# Patient Record
Sex: FEMALE | Race: WHITE | NOT HISPANIC OR LATINO | Employment: STUDENT | URBAN - METROPOLITAN AREA
[De-identification: names, ages, dates, MRNs, and addresses within clinical notes are randomized per-mention and may not be internally consistent; named-entity substitution may affect disease eponyms.]

---
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
## 2018-01-31 ENCOUNTER — HOSPITAL ENCOUNTER (EMERGENCY)
Facility: MEDICAL CENTER | Age: 18
End: 2018-01-31
Attending: EMERGENCY MEDICINE
Payer: MEDICAID

## 2018-01-31 VITALS
SYSTOLIC BLOOD PRESSURE: 113 MMHG | RESPIRATION RATE: 18 BRPM | TEMPERATURE: 98.2 F | HEART RATE: 102 BPM | OXYGEN SATURATION: 100 % | BODY MASS INDEX: 26.33 KG/M2 | WEIGHT: 148.59 LBS | HEIGHT: 63 IN | DIASTOLIC BLOOD PRESSURE: 67 MMHG

## 2018-01-31 DIAGNOSIS — J02.9 PHARYNGITIS, UNSPECIFIED ETIOLOGY: ICD-10-CM

## 2018-01-31 DIAGNOSIS — J04.0 LARYNGITIS: ICD-10-CM

## 2018-01-31 LAB
S PYO AG THROAT QL: NORMAL
SIGNIFICANT IND 70042: NORMAL
SITE SITE: NORMAL
SOURCE SOURCE: NORMAL

## 2018-01-31 PROCEDURE — 87880 STREP A ASSAY W/OPTIC: CPT

## 2018-01-31 PROCEDURE — 87081 CULTURE SCREEN ONLY: CPT

## 2018-01-31 PROCEDURE — 99283 EMERGENCY DEPT VISIT LOW MDM: CPT

## 2018-01-31 RX ORDER — AZITHROMYCIN 250 MG/1
TABLET, FILM COATED ORAL
Qty: 6 TAB | Refills: 0 | Status: SHIPPED | OUTPATIENT
Start: 2018-01-31 | End: 2019-11-11

## 2018-01-31 ASSESSMENT — PAIN SCALES - GENERAL: PAINLEVEL_OUTOF10: 5

## 2018-01-31 NOTE — ED NOTES
Released with all follow-up and Rx, advised OTC measures of Tylenol or Motrin for pain and to take antibiotic until all gone. Encouraged to see PCP is not better.

## 2018-01-31 NOTE — DISCHARGE INSTRUCTIONS
Laryngitis  Laryngitis is inflammation of your vocal cords. This causes hoarseness, coughing, loss of voice, sore throat, or a dry throat. Your vocal cords are two bands of muscles that are found in your throat. When you speak, these cords come together and vibrate. These vibrations come out through your mouth as sound. When your vocal cords are inflamed, your voice sounds different.  Laryngitis can be temporary (acute) or long-term (chronic). Most cases of acute laryngitis improve with time. Chronic laryngitis is laryngitis that lasts for more than three weeks.  CAUSES  Acute laryngitis may be caused by:  · A viral infection.  · Lots of talking, yelling, or singing. This is also called vocal strain.  · Bacterial infections.  Chronic laryngitis may be caused by:  · Vocal strain.  · Injury to your vocal cords.  · Acid reflux (gastroesophageal reflux disease or GERD).  · Allergies.  · Sinus infection.  · Smoking.  · Alcohol abuse.  · Breathing in chemicals or dust.  · Growths on the vocal cords.  RISK FACTORS  Risk factors for laryngitis include:  · Smoking.  · Alcohol abuse.  · Having allergies.  SIGNS AND SYMPTOMS  Symptoms of laryngitis may include:  · Low, hoarse voice.  · Loss of voice.  · Dry cough.  · Sore throat.  · Stuffy nose.  DIAGNOSIS  Laryngitis may be diagnosed by:  · Physical exam.  · Throat culture.  · Blood test.  · Laryngoscopy. This procedure allows your health care provider to look at your vocal cords with a mirror or viewing tube.  TREATMENT  Treatment for laryngitis depends on what is causing it. Usually, treatment involves resting your voice and using medicines to soothe your throat. However, if your laryngitis is caused by a bacterial infection, you may need to take antibiotic medicine. If your laryngitis is caused by a growth, you may need to have a procedure to remove it.  HOME CARE INSTRUCTIONS  · Drink enough fluid to keep your urine clear or pale yellow.  · Breathe in moist air. Use a  humidifier if you live in a dry climate.  · Take medicines only as directed by your health care provider.  · If you were prescribed an antibiotic medicine, finish it all even if you start to feel better.  · Do not smoke cigarettes or electronic cigarettes. If you need help quitting, ask your health care provider.  · Talk as little as possible. Also avoid whispering, which can cause vocal strain.  · Write instead of talking. Do this until your voice is back to normal.  SEEK MEDICAL CARE IF:  · You have a fever.  · You have increasing pain.  · You have difficulty swallowing.  SEEK IMMEDIATE MEDICAL CARE IF:  · You cough up blood.  · You have trouble breathing.     This information is not intended to replace advice given to you by your health care provider. Make sure you discuss any questions you have with your health care provider.     Document Released: 12/18/2006 Document Revised: 01/08/2016 Document Reviewed: 06/02/2015  [employer] Interactive Patient Education ©2016 [employer]  Salt Water Gargle  This solution will help make your mouth and throat feel better.  HOME CARE INSTRUCTIONS   · Mix 1 teaspoon of salt in 8 ounces of warm water.  · Gargle with this solution as much or often as you need or as directed. Swish and gargle gently if you have any sores or wounds in your mouth.  · Do not swallow this mixture.     This information is not intended to replace advice given to you by your health care provider. Make sure you discuss any questions you have with your health care provider.     Document Released: 09/21/2005 Document Revised: 03/11/2013 Document Reviewed: 02/12/2010  [employer] Patient Education ©2016 [employer]  Pharyngitis  Pharyngitis is redness, pain, and swelling (inflammation) of your pharynx.   CAUSES   Pharyngitis is usually caused by infection. Most of the time, these infections are from viruses (viral) and are part of a cold. However, sometimes pharyngitis is caused by bacteria  (bacterial). Pharyngitis can also be caused by allergies. Viral pharyngitis may be spread from person to person by coughing, sneezing, and personal items or utensils (cups, forks, spoons, toothbrushes). Bacterial pharyngitis may be spread from person to person by more intimate contact, such as kissing.   SIGNS AND SYMPTOMS   Symptoms of pharyngitis include:    · Sore throat.    · Tiredness (fatigue).    · Low-grade fever.    · Headache.  · Joint pain and muscle aches.  · Skin rashes.  · Swollen lymph nodes.  · Plaque-like film on throat or tonsils (often seen with bacterial pharyngitis).  DIAGNOSIS   Your health care provider will ask you questions about your illness and your symptoms. Your medical history, along with a physical exam, is often all that is needed to diagnose pharyngitis. Sometimes, a rapid strep test is done. Other lab tests may also be done, depending on the suspected cause.   TREATMENT   Viral pharyngitis will usually get better in 3-4 days without the use of medicine. Bacterial pharyngitis is treated with medicines that kill germs (antibiotics).   HOME CARE INSTRUCTIONS   · Drink enough water and fluids to keep your urine clear or pale yellow.    · Only take over-the-counter or prescription medicines as directed by your health care provider:    ¨ If you are prescribed antibiotics, make sure you finish them even if you start to feel better.    ¨ Do not take aspirin.    · Get lots of rest.    · Gargle with 8 oz of salt water (½ tsp of salt per 1 qt of water) as often as every 1-2 hours to soothe your throat.    · Throat lozenges (if you are not at risk for choking) or sprays may be used to soothe your throat.  SEEK MEDICAL CARE IF:   · You have large, tender lumps in your neck.  · You have a rash.  · You cough up green, yellow-brown, or bloody spit.  SEEK IMMEDIATE MEDICAL CARE IF:   · Your neck becomes stiff.  · You drool or are unable to swallow liquids.  · You vomit or are unable to keep  medicines or liquids down.  · You have severe pain that does not go away with the use of recommended medicines.  · You have trouble breathing (not caused by a stuffy nose).  MAKE SURE YOU:   · Understand these instructions.  · Will watch your condition.  · Will get help right away if you are not doing well or get worse.     This information is not intended to replace advice given to you by your health care provider. Make sure you discuss any questions you have with your health care provider.     Document Released: 12/18/2006 Document Revised: 10/08/2014 Document Reviewed: 08/25/2014  [employer] Interactive Patient Education ©2016 [employer]

## 2018-01-31 NOTE — ED PROVIDER NOTES
"ED Provider Note    CHIEF COMPLAINT  Chief Complaint   Patient presents with   • Sore Throat       HPI  Annika Means is a 17 y.o. female who presents with a 1 day history of progressive sore throat.  Patient presents complaining of a hoarse voice along with throat pain.  Patient does relate a past history of pharyngitis.  The patient denies: Fever, chills, nasal congestion, cough, sputum, difficulty swallowing, difficulty breathing, rashes.  No other acute symptomatology or complaints.    REVIEW OF SYSTEMS  See HPI for further details.  She denies a history of: Hypertension, diabetes, cardiopulmonary disorders, gastrointestinal disorders.    PAST MEDICAL HISTORY  None    FAMILY HISTORY  No family history on file.    SOCIAL HISTORY  Nonsmoker; positive alcohol use;    SURGICAL HISTORY  No past surgical history on file.    CURRENT MEDICATIONS  None    ALLERGIES  Allergies   Allergen Reactions   • Pcn [Penicillins] Hives     Rxn - as a child       PHYSICAL EXAM  VITAL SIGNS: /67   Pulse (!) 102   Temp 36.8 °C (98.2 °F)   Resp 18   Ht 1.6 m (5' 3\")   Wt 67.4 kg (148 lb 9.4 oz)   LMP 01/17/2018   SpO2 100%   BMI 26.32 kg/m²    Constitutional: 17-year-old female, Well developed, Well nourished, No acute distress, reading a book, sitting comfortably, Non-toxic appearance has a hoarse voice but not a hot potato type voice.   HENT: Normocephalic, Atraumatic, Nares:Clear, Oropharynx: moist, well hydrated, posterior pharynx: Mildly erythematous  Eyes: PERRL, EOMI, Conjunctiva normal, No discharge.   Neck: Normal range of motion, No tenderness, Supple, No stridor.   Lymphatic: No lymphadenopathy noted.   Cardiovascular: Regular rate and rhythm without mumurs, gallups, rubs   Thorax & Lungs: Normal Equal breath sounds, No respiratory distress, No wheezing, no stridor, no rales. No chest tenderness.   Abdomen: Soft, nontender, nondistended, no organomegaly, positive bowel sounds normal in quality. No " guarding or rebound.  Skin: Good skin turgor, pink, warm, dry. No rashes, petechiae, purpura. Normal capillary refill.   Extremities: Intact distal pulses, No edema, No tenderness, No cyanosis,  Vascular: Pulses are 2+, symmetric in the upper and lower extremities.  Neurologic: Alert & oriented x 3,  No gross focal deficits noted.   Psychiatric: Affect normal, Judgment normal, Mood normal.     COURSE & MEDICAL DECISION MAKING  Pertinent Labs & Imaging studies reviewed. (See chart for details)  Laboratory studies: Rapid strep is negative;    Discussion: At this time, the patient presents with findings consistent with laryngitis.  She did not have findings to suggest retropharyngeal abscess.  She is nontoxic appearing.  Her voice is hoarse as opposed to a hot potato type voice.  At this time, the patient is stable for discharge.  I see no indication for further laboratory studies or any emergent imaging studies.  I discussed the findings and treatment plan with the patient.  She indicates she is comfortable with this explanation and disposition.    FINAL IMPRESSION  1. Pharyngitis, unspecified etiology    2. Laryngitis        PLAN  1.  Appropriate discharge instructions given  2.  Azithromycin, Z-Elpidio  3.  Follow-up with primary care  4.  Recheck if any change or worsening symptoms, as discussed      Electronically signed by: Guy G Gansert, 1/31/2018 3:23 PM

## 2018-02-02 LAB
S PYO SPEC QL CULT: NORMAL
SIGNIFICANT IND 70042: NORMAL
SITE SITE: NORMAL
SOURCE SOURCE: NORMAL

## 2019-11-11 ENCOUNTER — HOSPITAL ENCOUNTER (EMERGENCY)
Facility: MEDICAL CENTER | Age: 19
End: 2019-11-11
Attending: EMERGENCY MEDICINE
Payer: MEDICAID

## 2019-11-11 ENCOUNTER — APPOINTMENT (OUTPATIENT)
Dept: RADIOLOGY | Facility: MEDICAL CENTER | Age: 19
End: 2019-11-11
Attending: EMERGENCY MEDICINE
Payer: MEDICAID

## 2019-11-11 VITALS
HEART RATE: 100 BPM | SYSTOLIC BLOOD PRESSURE: 134 MMHG | RESPIRATION RATE: 18 BRPM | WEIGHT: 142.86 LBS | TEMPERATURE: 97.8 F | OXYGEN SATURATION: 95 % | HEIGHT: 63 IN | DIASTOLIC BLOOD PRESSURE: 76 MMHG | BODY MASS INDEX: 25.31 KG/M2

## 2019-11-11 DIAGNOSIS — S90.111A CONTUSION OF RIGHT GREAT TOE WITHOUT DAMAGE TO NAIL, INITIAL ENCOUNTER: ICD-10-CM

## 2019-11-11 PROCEDURE — 99284 EMERGENCY DEPT VISIT MOD MDM: CPT

## 2019-11-11 PROCEDURE — 73660 X-RAY EXAM OF TOE(S): CPT | Mod: RT

## 2019-11-11 RX ORDER — NAPROXEN SODIUM 220 MG
440 TABLET ORAL 2 TIMES DAILY PRN
COMMUNITY

## 2019-11-11 NOTE — ED TRIAGE NOTES
"Pt ambulates with limp to triage  Chief Complaint   Patient presents with   • Toe Injury     \"dropped a car door on it 5 days ago\"   restoring a car and the door fell onto R big toe  Pt A & 0 x 4, speech clear, ambulates well  Pt asked to wait in lobby, pt updated on triage process and pt asked to inform RN of any changes.   "

## 2019-11-11 NOTE — ED NOTES
Pt given written and oral dc instructions. Pt verbalized understanding of all instructions given. All questions answered. VSS. Pt given fu instructions and educated on s/s of when to return to the ER. Pt dc'd in stable condition.

## 2019-11-11 NOTE — ED PROVIDER NOTES
"ED Provider Note    CHIEF COMPLAINT  Chief Complaint   Patient presents with   • Toe Injury     \"dropped a car door on it 5 days ago\"       HPI  Annika Means is a 18 y.o. female who presents to the emergency department with right great toe pain.  She dropped a car door on her great toe 5 days ago and continues with pain and swelling to she is here.  She is the MCP joint she is small little abscess follow-up her dose here is 1 she of her +1 think this is a few days see how she is doing okay she should come see any evidence of flexion to the synovitis might be concerned would be a little abscess abscess because she is it is a little abscess antibiotics might you can see her she    REVIEW OF SYSTEMS  Positive for toe pain  PAST MEDICAL HISTORY   Denies    SOCIAL HISTORY  Social History     Tobacco Use   • Smoking status: Light Tobacco Smoker     Packs/day: 0.00     Types: Cigarettes   • Smokeless tobacco: Never Used   Substance and Sexual Activity   • Alcohol use: Yes   • Drug use: No   • Sexual activity: Not on file       SURGICAL HISTORY  patient denies any surgical history    CURRENT MEDICATIONS  Reviewed.      ALLERGIES  Allergies   Allergen Reactions   • Pcn [Penicillins] Hives     Rxn - as a child       PHYSICAL EXAM  VITAL SIGNS: /83   Pulse (!) 102   Temp 36.2 °C (97.2 °F) (Temporal)   Resp 20   Ht 1.6 m (5' 3\")   Wt 64.8 kg (142 lb 13.7 oz)   LMP 10/16/2019   BMI 25.31 kg/m²   Constitutional:  Alert in no apparent distress.  HENT: Normocephalic, Bilateral external ears normal. Nose normal.   Eyes: Pupils are equal and reactive. Conjunctiva normal, non-icteric.   Thorax & Lungs: Easy unlabored respirations  Abdomen:  No gross signs of peritonitis, no pain with movement   Skin: Visualized skin is  Dry, No erythema, No rash.   Extremities: Swelling of her great toe  Neurologic: Alert, Grossly non-focal.   Psychiatric: Affect and Mood normal      COURSE & MEDICAL DECISION MAKING  Nursing " notes and vital signs were reviewed. (See chart for details)    The patient presents to the Emergency Department with toe pain after dropping a car door and the differential is fracture versus contusion    DX-TOE(S) 2+ RIGHT   Final Result      No acute fracture is identified.            We discussed julian taping, symptomatic relief with NSAIDs and elevation.  Pain lasts greater than 1 week she will follow-up with orthopedics      FINAL IMPRESSION  1.  Acute toe pain          Electronically signed by: Stefanie Ibrahim, 11/11/2019 9:55 AM

## 2019-11-11 NOTE — ED NOTES
Pt c/o increasing swelling and pain in Right Great Toe, able to move toe w/ effort.  Pt provided w/ warm blanket, aware of POC including pending Radiology.  Whiteboard updated.

## 2020-08-23 ENCOUNTER — APPOINTMENT (OUTPATIENT)
Dept: RADIOLOGY | Facility: MEDICAL CENTER | Age: 20
DRG: 004 | End: 2020-08-23
Attending: EMERGENCY MEDICINE
Payer: MEDICAID

## 2020-08-23 ENCOUNTER — HOSPITAL ENCOUNTER (INPATIENT)
Facility: MEDICAL CENTER | Age: 20
LOS: 35 days | DRG: 004 | End: 2020-09-28
Attending: EMERGENCY MEDICINE | Admitting: INTERNAL MEDICINE
Payer: MEDICAID

## 2020-08-23 DIAGNOSIS — G93.1 ANOXIC ENCEPHALOPATHY (HCC): ICD-10-CM

## 2020-08-23 DIAGNOSIS — J96.01 ACUTE RESPIRATORY FAILURE WITH HYPOXIA (HCC): ICD-10-CM

## 2020-08-23 DIAGNOSIS — R56.9 SEIZURES (HCC): ICD-10-CM

## 2020-08-23 LAB
ACTION RANGE TRIGGERED IACRT: YES
AMMONIA PLAS-SCNC: 47 UMOL/L (ref 11–45)
AMPHET UR QL SCN: POSITIVE
APAP SERPL-MCNC: <5 UG/ML (ref 10–30)
APPEARANCE UR: CLEAR
APTT PPP: 26.9 SEC (ref 24.7–36)
BACTERIA #/AREA URNS HPF: ABNORMAL /HPF
BARBITURATES UR QL SCN: NEGATIVE
BASE EXCESS BLDA CALC-SCNC: -18 MMOL/L (ref -4–3)
BENZODIAZ UR QL SCN: NEGATIVE
BILIRUB UR QL STRIP.AUTO: NEGATIVE
BODY TEMPERATURE: ABNORMAL DEGREES
BZE UR QL SCN: NEGATIVE
CANNABINOIDS UR QL SCN: NEGATIVE
CO2 BLDA-SCNC: 13 MMOL/L (ref 20–33)
COLOR UR: YELLOW
COVID ORDER STATUS COVID19: NORMAL
EKG IMPRESSION: NORMAL
EPI CELLS #/AREA URNS HPF: NEGATIVE /HPF
ETHANOL BLD-MCNC: 160.8 MG/DL (ref 0–10.1)
GLUCOSE BLD-MCNC: 154 MG/DL (ref 65–99)
GLUCOSE UR STRIP.AUTO-MCNC: NEGATIVE MG/DL
HCG SERPL QL: NEGATIVE
HCO3 BLDA-SCNC: 12 MMOL/L (ref 17–25)
HOROWITZ INDEX BLDA+IHG-RTO: 250 MM[HG]
HYALINE CASTS #/AREA URNS LPF: ABNORMAL /LPF
INR PPP: 0.87 (ref 0.87–1.13)
INST. QUALIFIED PATIENT IIQPT: YES
KETONES UR STRIP.AUTO-MCNC: NEGATIVE MG/DL
LACTATE BLD-SCNC: 7.6 MMOL/L (ref 0.5–2)
LEUKOCYTE ESTERASE UR QL STRIP.AUTO: NEGATIVE
LIPASE SERPL-CCNC: 44 U/L (ref 11–82)
METHADONE UR QL SCN: NEGATIVE
MICRO URNS: ABNORMAL
MORPHOLOGY BLD-IMP: NORMAL
NITRITE UR QL STRIP.AUTO: POSITIVE
O2/TOTAL GAS SETTING VFR VENT: 50 %
OPIATES UR QL SCN: NEGATIVE
OXYCODONE UR QL SCN: NEGATIVE
PCO2 BLDA: 41.5 MMHG (ref 26–37)
PCO2 TEMP ADJ BLDA: 39.2 MMHG (ref 26–37)
PCP UR QL SCN: NEGATIVE
PH BLDA: 7.07 [PH] (ref 7.4–7.5)
PH TEMP ADJ BLDA: 7.09 [PH] (ref 7.4–7.5)
PH UR STRIP.AUTO: 5 [PH] (ref 5–8)
PO2 BLDA: 125 MMHG (ref 64–87)
PO2 TEMP ADJ BLDA: 117 MMHG (ref 64–87)
PROPOXYPH UR QL SCN: NEGATIVE
PROT UR QL STRIP: NEGATIVE MG/DL
PROTHROMBIN TIME: 12.1 SEC (ref 12–14.6)
RBC # URNS HPF: ABNORMAL /HPF
RBC UR QL AUTO: NEGATIVE
SALICYLATES SERPL-MCNC: <1 MG/DL (ref 15–25)
SAO2 % BLDA: 97 % (ref 93–99)
SP GR UR STRIP.AUTO: 1.01
SPECIMEN DRAWN FROM PATIENT: ABNORMAL
TRIGL SERPL-MCNC: 312 MG/DL (ref 0–149)
UROBILINOGEN UR STRIP.AUTO-MCNC: 0.2 MG/DL
WBC #/AREA URNS HPF: ABNORMAL /HPF

## 2020-08-23 PROCEDURE — 70450 CT HEAD/BRAIN W/O DYE: CPT

## 2020-08-23 PROCEDURE — 700101 HCHG RX REV CODE 250: Performed by: EMERGENCY MEDICINE

## 2020-08-23 PROCEDURE — 92950 HEART/LUNG RESUSCITATION CPR: CPT

## 2020-08-23 PROCEDURE — C9803 HOPD COVID-19 SPEC COLLECT: HCPCS | Performed by: EMERGENCY MEDICINE

## 2020-08-23 PROCEDURE — 700111 HCHG RX REV CODE 636 W/ 250 OVERRIDE (IP): Performed by: EMERGENCY MEDICINE

## 2020-08-23 PROCEDURE — 96368 THER/DIAG CONCURRENT INF: CPT

## 2020-08-23 PROCEDURE — 0BH18EZ INSERTION OF ENDOTRACHEAL AIRWAY INTO TRACHEA, VIA NATURAL OR ARTIFICIAL OPENING ENDOSCOPIC: ICD-10-PCS | Performed by: EMERGENCY MEDICINE

## 2020-08-23 PROCEDURE — 82803 BLOOD GASES ANY COMBINATION: CPT

## 2020-08-23 PROCEDURE — 85027 COMPLETE CBC AUTOMATED: CPT

## 2020-08-23 PROCEDURE — U0003 INFECTIOUS AGENT DETECTION BY NUCLEIC ACID (DNA OR RNA); SEVERE ACUTE RESPIRATORY SYNDROME CORONAVIRUS 2 (SARS-COV-2) (CORONAVIRUS DISEASE [COVID-19]), AMPLIFIED PROBE TECHNIQUE, MAKING USE OF HIGH THROUGHPUT TECHNOLOGIES AS DESCRIBED BY CMS-2020-01-R: HCPCS

## 2020-08-23 PROCEDURE — 71045 X-RAY EXAM CHEST 1 VIEW: CPT

## 2020-08-23 PROCEDURE — 85730 THROMBOPLASTIN TIME PARTIAL: CPT

## 2020-08-23 PROCEDURE — 83605 ASSAY OF LACTIC ACID: CPT

## 2020-08-23 PROCEDURE — 85610 PROTHROMBIN TIME: CPT

## 2020-08-23 PROCEDURE — 96365 THER/PROPH/DIAG IV INF INIT: CPT

## 2020-08-23 PROCEDURE — 82962 GLUCOSE BLOOD TEST: CPT

## 2020-08-23 PROCEDURE — 36415 COLL VENOUS BLD VENIPUNCTURE: CPT

## 2020-08-23 PROCEDURE — 99291 CRITICAL CARE FIRST HOUR: CPT

## 2020-08-23 PROCEDURE — 80053 COMPREHEN METABOLIC PANEL: CPT

## 2020-08-23 PROCEDURE — 83690 ASSAY OF LIPASE: CPT

## 2020-08-23 PROCEDURE — 5A12012 PERFORMANCE OF CARDIAC OUTPUT, SINGLE, MANUAL: ICD-10-PCS | Performed by: EMERGENCY MEDICINE

## 2020-08-23 PROCEDURE — 5A1955Z RESPIRATORY VENTILATION, GREATER THAN 96 CONSECUTIVE HOURS: ICD-10-PCS | Performed by: EMERGENCY MEDICINE

## 2020-08-23 PROCEDURE — 31500 INSERT EMERGENCY AIRWAY: CPT

## 2020-08-23 PROCEDURE — 81001 URINALYSIS AUTO W/SCOPE: CPT

## 2020-08-23 PROCEDURE — 93005 ELECTROCARDIOGRAM TRACING: CPT

## 2020-08-23 PROCEDURE — 87040 BLOOD CULTURE FOR BACTERIA: CPT

## 2020-08-23 PROCEDURE — 84478 ASSAY OF TRIGLYCERIDES: CPT

## 2020-08-23 PROCEDURE — 96366 THER/PROPH/DIAG IV INF ADDON: CPT

## 2020-08-23 PROCEDURE — 80307 DRUG TEST PRSMV CHEM ANLYZR: CPT

## 2020-08-23 PROCEDURE — 82140 ASSAY OF AMMONIA: CPT

## 2020-08-23 PROCEDURE — 36600 WITHDRAWAL OF ARTERIAL BLOOD: CPT

## 2020-08-23 PROCEDURE — 94002 VENT MGMT INPAT INIT DAY: CPT

## 2020-08-23 PROCEDURE — 96375 TX/PRO/DX INJ NEW DRUG ADDON: CPT

## 2020-08-23 PROCEDURE — 84703 CHORIONIC GONADOTROPIN ASSAY: CPT

## 2020-08-23 PROCEDURE — 85007 BL SMEAR W/DIFF WBC COUNT: CPT

## 2020-08-23 RX ORDER — EPINEPHRINE IN SOD CHLOR,ISO 1 MG/10 ML
SYRINGE (ML) INTRAVENOUS
Status: COMPLETED | OUTPATIENT
Start: 2020-08-23 | End: 2020-08-23

## 2020-08-23 RX ADMIN — NOREPINEPHRINE BITARTRATE 10 MCG/MIN: 1 INJECTION INTRAVENOUS at 22:37

## 2020-08-23 RX ADMIN — PROPOFOL 15 MCG/KG/MIN: 10 INJECTION, EMULSION INTRAVENOUS at 23:37

## 2020-08-23 RX ADMIN — PROPOFOL 5 MCG/KG/MIN: 10 INJECTION, EMULSION INTRAVENOUS at 22:16

## 2020-08-23 RX ADMIN — EPINEPHRINE 1 MG: 0.1 INJECTION, SOLUTION ENDOTRACHEAL; INTRACARDIAC; INTRAVENOUS at 21:59

## 2020-08-23 RX ADMIN — PROPOFOL 15 MCG/KG/MIN: 10 INJECTION, EMULSION INTRAVENOUS at 22:45

## 2020-08-23 RX ADMIN — FENTANYL CITRATE 100 MCG: 50 INJECTION INTRAMUSCULAR; INTRAVENOUS at 23:15

## 2020-08-23 RX ADMIN — PROPOFOL 15 MCG/KG/MIN: 10 INJECTION, EMULSION INTRAVENOUS at 22:21

## 2020-08-23 RX ADMIN — PROPOFOL 17 MCG/KG/MIN: 10 INJECTION, EMULSION INTRAVENOUS at 23:17

## 2020-08-23 RX ADMIN — PROPOFOL 20 MCG/KG/MIN: 10 INJECTION, EMULSION INTRAVENOUS at 22:30

## 2020-08-23 RX ADMIN — PROPOFOL 25 MCG/KG/MIN: 10 INJECTION, EMULSION INTRAVENOUS at 23:51

## 2020-08-24 ENCOUNTER — APPOINTMENT (OUTPATIENT)
Dept: CARDIOLOGY | Facility: MEDICAL CENTER | Age: 20
DRG: 004 | End: 2020-08-24
Attending: INTERNAL MEDICINE
Payer: MEDICAID

## 2020-08-24 ENCOUNTER — APPOINTMENT (OUTPATIENT)
Dept: RADIOLOGY | Facility: MEDICAL CENTER | Age: 20
DRG: 004 | End: 2020-08-24
Attending: INTERNAL MEDICINE
Payer: MEDICAID

## 2020-08-24 PROBLEM — E87.20 METABOLIC ACIDOSIS: Status: ACTIVE | Noted: 2020-08-24

## 2020-08-24 PROBLEM — G93.40 ENCEPHALOPATHY ACUTE: Status: ACTIVE | Noted: 2020-08-24

## 2020-08-24 PROBLEM — I46.9 PEA (PULSELESS ELECTRICAL ACTIVITY) (HCC): Status: ACTIVE | Noted: 2020-08-24

## 2020-08-24 PROBLEM — J96.01 ACUTE RESPIRATORY FAILURE WITH HYPOXIA (HCC): Status: ACTIVE | Noted: 2020-08-24

## 2020-08-24 PROBLEM — R74.01 ELEVATED TRANSAMINASE LEVEL: Status: ACTIVE | Noted: 2020-08-24

## 2020-08-24 PROBLEM — T50.901A DRUG OVERDOSE: Status: ACTIVE | Noted: 2020-08-24

## 2020-08-24 PROBLEM — E16.2 HYPOGLYCEMIA: Status: ACTIVE | Noted: 2020-08-24

## 2020-08-24 PROBLEM — G93.6 CEREBRAL EDEMA (HCC): Status: ACTIVE | Noted: 2020-08-24

## 2020-08-24 PROBLEM — I46.9 CARDIAC ARREST (HCC): Status: ACTIVE | Noted: 2020-08-24

## 2020-08-24 LAB
ACTION RANGE TRIGGERED IACRT: NO
ACTION RANGE TRIGGERED IACRT: YES
ALBUMIN SERPL BCP-MCNC: 3.6 G/DL (ref 3.2–4.9)
ALBUMIN SERPL BCP-MCNC: 4.1 G/DL (ref 3.2–4.9)
ALBUMIN/GLOB SERPL: 1.7 G/DL
ALBUMIN/GLOB SERPL: 1.8 G/DL
ALP SERPL-CCNC: 109 U/L (ref 30–99)
ALP SERPL-CCNC: 81 U/L (ref 30–99)
ALT SERPL-CCNC: 124 U/L (ref 2–50)
ALT SERPL-CCNC: 152 U/L (ref 2–50)
ANION GAP SERPL CALC-SCNC: 12 MMOL/L (ref 7–16)
ANION GAP SERPL CALC-SCNC: 13 MMOL/L (ref 7–16)
ANION GAP SERPL CALC-SCNC: 14 MMOL/L (ref 7–16)
ANION GAP SERPL CALC-SCNC: 18 MMOL/L (ref 7–16)
ANION GAP SERPL CALC-SCNC: 25 MMOL/L (ref 7–16)
ANISOCYTOSIS BLD QL SMEAR: ABNORMAL
APTT PPP: 25.7 SEC (ref 24.7–36)
APTT PPP: 28.2 SEC (ref 24.7–36)
APTT PPP: 30.5 SEC (ref 24.7–36)
APTT PPP: 48.2 SEC (ref 24.7–36)
AST SERPL-CCNC: 188 U/L (ref 12–45)
AST SERPL-CCNC: 265 U/L (ref 12–45)
BASE EXCESS BLDA CALC-SCNC: -12 MMOL/L (ref -4–3)
BASE EXCESS BLDA CALC-SCNC: -9 MMOL/L (ref -4–3)
BASOPHILS # BLD AUTO: 0 % (ref 0–1.8)
BILIRUB SERPL-MCNC: 0.3 MG/DL (ref 0.1–1.5)
BILIRUB SERPL-MCNC: 0.3 MG/DL (ref 0.1–1.5)
BODY TEMPERATURE: ABNORMAL DEGREES
BODY TEMPERATURE: ABNORMAL DEGREES
BUN SERPL-MCNC: 10 MG/DL (ref 8–22)
BUN SERPL-MCNC: 13 MG/DL (ref 8–22)
BUN SERPL-MCNC: 15 MG/DL (ref 8–22)
BUN SERPL-MCNC: 17 MG/DL (ref 8–22)
BUN SERPL-MCNC: 19 MG/DL (ref 8–22)
CALCIUM SERPL-MCNC: 7.1 MG/DL (ref 8.5–10.5)
CALCIUM SERPL-MCNC: 7.3 MG/DL (ref 8.5–10.5)
CALCIUM SERPL-MCNC: 7.4 MG/DL (ref 8.5–10.5)
CALCIUM SERPL-MCNC: 7.9 MG/DL (ref 8.5–10.5)
CALCIUM SERPL-MCNC: 8.4 MG/DL (ref 8.5–10.5)
CHLORIDE SERPL-SCNC: 100 MMOL/L (ref 96–112)
CHLORIDE SERPL-SCNC: 103 MMOL/L (ref 96–112)
CHLORIDE SERPL-SCNC: 104 MMOL/L (ref 96–112)
CHLORIDE SERPL-SCNC: 105 MMOL/L (ref 96–112)
CHLORIDE SERPL-SCNC: 105 MMOL/L (ref 96–112)
CO2 BLDA-SCNC: 14 MMOL/L (ref 20–33)
CO2 BLDA-SCNC: 17 MMOL/L (ref 20–33)
CO2 SERPL-SCNC: 12 MMOL/L (ref 20–33)
CO2 SERPL-SCNC: 17 MMOL/L (ref 20–33)
CO2 SERPL-SCNC: 17 MMOL/L (ref 20–33)
CO2 SERPL-SCNC: 18 MMOL/L (ref 20–33)
CO2 SERPL-SCNC: 19 MMOL/L (ref 20–33)
CORTIS SERPL-MCNC: 13.6 UG/DL (ref 0–23)
CREAT SERPL-MCNC: 0.47 MG/DL (ref 0.5–1.4)
CREAT SERPL-MCNC: 0.49 MG/DL (ref 0.5–1.4)
CREAT SERPL-MCNC: 0.52 MG/DL (ref 0.5–1.4)
CREAT SERPL-MCNC: 0.73 MG/DL (ref 0.5–1.4)
CREAT SERPL-MCNC: 0.99 MG/DL (ref 0.5–1.4)
DACRYOCYTES BLD QL SMEAR: NORMAL
EOSINOPHIL # BLD AUTO: 0.18 K/UL (ref 0–0.51)
EOSINOPHIL NFR BLD: 1.6 % (ref 0–6.9)
ERYTHROCYTE [DISTWIDTH] IN BLOOD BY AUTOMATED COUNT: 42.6 FL (ref 35.9–50)
ERYTHROCYTE [DISTWIDTH] IN BLOOD BY AUTOMATED COUNT: 44.2 FL (ref 35.9–50)
ERYTHROCYTE [DISTWIDTH] IN BLOOD BY AUTOMATED COUNT: 48.9 FL (ref 35.9–50)
GLOBULIN SER CALC-MCNC: 2 G/DL (ref 1.9–3.5)
GLOBULIN SER CALC-MCNC: 2.4 G/DL (ref 1.9–3.5)
GLUCOSE BLD-MCNC: 100 MG/DL (ref 65–99)
GLUCOSE BLD-MCNC: 112 MG/DL (ref 65–99)
GLUCOSE BLD-MCNC: 119 MG/DL (ref 65–99)
GLUCOSE BLD-MCNC: 135 MG/DL (ref 65–99)
GLUCOSE BLD-MCNC: 138 MG/DL (ref 65–99)
GLUCOSE BLD-MCNC: 157 MG/DL (ref 65–99)
GLUCOSE BLD-MCNC: 29 MG/DL (ref 65–99)
GLUCOSE BLD-MCNC: 55 MG/DL (ref 65–99)
GLUCOSE BLD-MCNC: 59 MG/DL (ref 65–99)
GLUCOSE BLD-MCNC: 67 MG/DL (ref 65–99)
GLUCOSE BLD-MCNC: 67 MG/DL (ref 65–99)
GLUCOSE BLD-MCNC: 69 MG/DL (ref 65–99)
GLUCOSE BLD-MCNC: 70 MG/DL (ref 65–99)
GLUCOSE BLD-MCNC: 71 MG/DL (ref 65–99)
GLUCOSE BLD-MCNC: 76 MG/DL (ref 65–99)
GLUCOSE BLD-MCNC: 78 MG/DL (ref 65–99)
GLUCOSE BLD-MCNC: 86 MG/DL (ref 65–99)
GLUCOSE BLD-MCNC: 92 MG/DL (ref 65–99)
GLUCOSE BLD-MCNC: 92 MG/DL (ref 65–99)
GLUCOSE SERPL-MCNC: 127 MG/DL (ref 65–99)
GLUCOSE SERPL-MCNC: 138 MG/DL (ref 65–99)
GLUCOSE SERPL-MCNC: 141 MG/DL (ref 65–99)
GLUCOSE SERPL-MCNC: 82 MG/DL (ref 65–99)
GLUCOSE SERPL-MCNC: 86 MG/DL (ref 65–99)
HCO3 BLDA-SCNC: 13.1 MMOL/L (ref 17–25)
HCO3 BLDA-SCNC: 16 MMOL/L (ref 17–25)
HCT VFR BLD AUTO: 38 % (ref 37–47)
HCT VFR BLD AUTO: 43 % (ref 37–47)
HCT VFR BLD AUTO: 49 % (ref 37–47)
HGB BLD-MCNC: 13.1 G/DL (ref 12–16)
HGB BLD-MCNC: 14.4 G/DL (ref 12–16)
HGB BLD-MCNC: 15.3 G/DL (ref 12–16)
HOROWITZ INDEX BLDA+IHG-RTO: 195 MM[HG]
HOROWITZ INDEX BLDA+IHG-RTO: 276 MM[HG]
INR PPP: 0.92 (ref 0.87–1.13)
INR PPP: 1.03 (ref 0.87–1.13)
INR PPP: 1.03 (ref 0.87–1.13)
INR PPP: 1.09 (ref 0.87–1.13)
INST. QUALIFIED PATIENT IIQPT: YES
INST. QUALIFIED PATIENT IIQPT: YES
LACTATE BLD-SCNC: 1.4 MMOL/L (ref 0.5–2)
LACTATE BLD-SCNC: 1.8 MMOL/L (ref 0.5–2)
LACTATE BLD-SCNC: 2.1 MMOL/L (ref 0.5–2)
LACTATE BLD-SCNC: 3.1 MMOL/L (ref 0.5–2)
LV EJECT FRACT  99904: 60
LV EJECT FRACT MOD 2C 99903: 57.16
LV EJECT FRACT MOD 4C 99902: 55.97
LV EJECT FRACT MOD BP 99901: 58.68
LYMPHOCYTES # BLD AUTO: 5.79 K/UL (ref 1–4.8)
LYMPHOCYTES NFR BLD: 50.8 % (ref 22–41)
MACROCYTES BLD QL SMEAR: ABNORMAL
MAGNESIUM SERPL-MCNC: 2.1 MG/DL (ref 1.5–2.5)
MAGNESIUM SERPL-MCNC: 2.3 MG/DL (ref 1.5–2.5)
MAGNESIUM SERPL-MCNC: 2.9 MG/DL (ref 1.5–2.5)
MAGNESIUM SERPL-MCNC: >4.8 MG/DL (ref 1.5–2.5)
MANUAL DIFF BLD: NORMAL
MCH RBC QN AUTO: 31.6 PG (ref 27–33)
MCH RBC QN AUTO: 31.6 PG (ref 27–33)
MCH RBC QN AUTO: 31.9 PG (ref 27–33)
MCHC RBC AUTO-ENTMCNC: 31.2 G/DL (ref 33.6–35)
MCHC RBC AUTO-ENTMCNC: 33.5 G/DL (ref 33.6–35)
MCHC RBC AUTO-ENTMCNC: 34.5 G/DL (ref 33.6–35)
MCV RBC AUTO: 102.3 FL (ref 81.4–97.8)
MCV RBC AUTO: 91.6 FL (ref 81.4–97.8)
MCV RBC AUTO: 94.3 FL (ref 81.4–97.8)
MONOCYTES # BLD AUTO: 0.63 K/UL (ref 0–0.85)
MONOCYTES NFR BLD AUTO: 5.6 % (ref 0–13.4)
MYELOCYTES NFR BLD MANUAL: 1.6 %
NEUTROPHILS # BLD AUTO: 4.61 K/UL (ref 2–7.15)
NEUTROPHILS NFR BLD: 40.5 % (ref 44–72)
NRBC # BLD AUTO: 0.05 K/UL
NRBC BLD-RTO: 0.4 /100 WBC
O2/TOTAL GAS SETTING VFR VENT: 40 %
O2/TOTAL GAS SETTING VFR VENT: 50 %
PCO2 BLDA: 20.2 MMHG (ref 26–37)
PCO2 BLDA: 44.5 MMHG (ref 26–37)
PCO2 TEMP ADJ BLDA: 19.1 MMHG (ref 26–37)
PCO2 TEMP ADJ BLDA: 41.2 MMHG (ref 26–37)
PH BLDA: 7.16 [PH] (ref 7.4–7.5)
PH BLDA: 7.42 [PH] (ref 7.4–7.5)
PH TEMP ADJ BLDA: 7.19 [PH] (ref 7.4–7.5)
PH TEMP ADJ BLDA: 7.44 [PH] (ref 7.4–7.5)
PHOSPHATE SERPL-MCNC: 1.6 MG/DL (ref 2.5–4.5)
PLATELET # BLD AUTO: 193 K/UL (ref 164–446)
PLATELET # BLD AUTO: 256 K/UL (ref 164–446)
PLATELET # BLD AUTO: 334 K/UL (ref 164–446)
PLATELET BLD QL SMEAR: NORMAL
PMV BLD AUTO: 9.2 FL (ref 9–12.9)
PMV BLD AUTO: 9.5 FL (ref 9–12.9)
PMV BLD AUTO: 9.7 FL (ref 9–12.9)
PO2 BLDA: 138 MMHG (ref 64–87)
PO2 BLDA: 78 MMHG (ref 64–87)
PO2 TEMP ADJ BLDA: 131 MMHG (ref 64–87)
PO2 TEMP ADJ BLDA: 69 MMHG (ref 64–87)
POIKILOCYTOSIS BLD QL SMEAR: NORMAL
POTASSIUM SERPL-SCNC: 3.3 MMOL/L (ref 3.6–5.5)
POTASSIUM SERPL-SCNC: 3.4 MMOL/L (ref 3.6–5.5)
POTASSIUM SERPL-SCNC: 3.7 MMOL/L (ref 3.6–5.5)
POTASSIUM SERPL-SCNC: 4.3 MMOL/L (ref 3.6–5.5)
POTASSIUM SERPL-SCNC: 4.4 MMOL/L (ref 3.6–5.5)
POTASSIUM SERPL-SCNC: ABNORMAL MMOL/L (ref 3.6–5.5)
PROT SERPL-MCNC: 5.6 G/DL (ref 6–8.2)
PROT SERPL-MCNC: 6.5 G/DL (ref 6–8.2)
PROTHROMBIN TIME: 12.6 SEC (ref 12–14.6)
PROTHROMBIN TIME: 13.8 SEC (ref 12–14.6)
PROTHROMBIN TIME: 13.9 SEC (ref 12–14.6)
PROTHROMBIN TIME: 14.4 SEC (ref 12–14.6)
RBC # BLD AUTO: 4.15 M/UL (ref 4.2–5.4)
RBC # BLD AUTO: 4.56 M/UL (ref 4.2–5.4)
RBC # BLD AUTO: 4.79 M/UL (ref 4.2–5.4)
RBC BLD AUTO: PRESENT
SAO2 % BLDA: 91 % (ref 93–99)
SAO2 % BLDA: 99 % (ref 93–99)
SARS-COV-2 RNA RESP QL NAA+PROBE: NOTDETECTED
SODIUM SERPL-SCNC: 132 MMOL/L (ref 135–145)
SODIUM SERPL-SCNC: 134 MMOL/L (ref 135–145)
SODIUM SERPL-SCNC: 136 MMOL/L (ref 135–145)
SODIUM SERPL-SCNC: 140 MMOL/L (ref 135–145)
SODIUM SERPL-SCNC: 140 MMOL/L (ref 135–145)
SPECIMEN DRAWN FROM PATIENT: ABNORMAL
SPECIMEN DRAWN FROM PATIENT: ABNORMAL
SPECIMEN SOURCE: NORMAL
TARGETS BLD QL SMEAR: NORMAL
TROPONIN T SERPL-MCNC: 12 NG/L (ref 6–19)
TROPONIN T SERPL-MCNC: 22 NG/L (ref 6–19)
TROPONIN T SERPL-MCNC: 34 NG/L (ref 6–19)
TROPONIN T SERPL-MCNC: 37 NG/L (ref 6–19)
TSH SERPL DL<=0.005 MIU/L-ACNC: 0.79 UIU/ML (ref 0.38–5.33)
WBC # BLD AUTO: 10.7 K/UL (ref 4.8–10.8)
WBC # BLD AUTO: 11.4 K/UL (ref 4.8–10.8)
WBC # BLD AUTO: 8.5 K/UL (ref 4.8–10.8)

## 2020-08-24 PROCEDURE — 700101 HCHG RX REV CODE 250: Performed by: INTERNAL MEDICINE

## 2020-08-24 PROCEDURE — 36620 INSERTION CATHETER ARTERY: CPT | Performed by: INTERNAL MEDICINE

## 2020-08-24 PROCEDURE — 99292 CRITICAL CARE ADDL 30 MIN: CPT | Mod: 25 | Performed by: INTERNAL MEDICINE

## 2020-08-24 PROCEDURE — 93306 TTE W/DOPPLER COMPLETE: CPT | Mod: 26 | Performed by: INTERNAL MEDICINE

## 2020-08-24 PROCEDURE — 700102 HCHG RX REV CODE 250 W/ 637 OVERRIDE(OP): Performed by: INTERNAL MEDICINE

## 2020-08-24 PROCEDURE — 82533 TOTAL CORTISOL: CPT

## 2020-08-24 PROCEDURE — 84484 ASSAY OF TROPONIN QUANT: CPT | Mod: 91

## 2020-08-24 PROCEDURE — 99223 1ST HOSP IP/OBS HIGH 75: CPT | Performed by: PSYCHIATRY & NEUROLOGY

## 2020-08-24 PROCEDURE — 83605 ASSAY OF LACTIC ACID: CPT | Mod: 91

## 2020-08-24 PROCEDURE — 93306 TTE W/DOPPLER COMPLETE: CPT

## 2020-08-24 PROCEDURE — A9270 NON-COVERED ITEM OR SERVICE: HCPCS | Performed by: INTERNAL MEDICINE

## 2020-08-24 PROCEDURE — 700111 HCHG RX REV CODE 636 W/ 250 OVERRIDE (IP): Performed by: EMERGENCY MEDICINE

## 2020-08-24 PROCEDURE — 95822 EEG COMA OR SLEEP ONLY: CPT | Performed by: PSYCHIATRY & NEUROLOGY

## 2020-08-24 PROCEDURE — 99291 CRITICAL CARE FIRST HOUR: CPT | Mod: 25 | Performed by: INTERNAL MEDICINE

## 2020-08-24 PROCEDURE — 700111 HCHG RX REV CODE 636 W/ 250 OVERRIDE (IP): Performed by: INTERNAL MEDICINE

## 2020-08-24 PROCEDURE — 700105 HCHG RX REV CODE 258: Performed by: INTERNAL MEDICINE

## 2020-08-24 PROCEDURE — 05H633Z INSERTION OF INFUSION DEVICE INTO LEFT SUBCLAVIAN VEIN, PERCUTANEOUS APPROACH: ICD-10-PCS | Performed by: INTERNAL MEDICINE

## 2020-08-24 PROCEDURE — 94770 HCHG CO2 EXPIRED GAS DETERMINATION: CPT

## 2020-08-24 PROCEDURE — B547ZZA ULTRASONOGRAPHY OF LEFT SUBCLAVIAN VEIN, GUIDANCE: ICD-10-PCS | Performed by: INTERNAL MEDICINE

## 2020-08-24 PROCEDURE — 37799 UNLISTED PX VASCULAR SURGERY: CPT

## 2020-08-24 PROCEDURE — 84460 ALANINE AMINO (ALT) (SGPT): CPT

## 2020-08-24 PROCEDURE — 36556 INSERT NON-TUNNEL CV CATH: CPT | Mod: LT | Performed by: INTERNAL MEDICINE

## 2020-08-24 PROCEDURE — 80048 BASIC METABOLIC PNL TOTAL CA: CPT | Mod: 91

## 2020-08-24 PROCEDURE — 80053 COMPREHEN METABOLIC PANEL: CPT

## 2020-08-24 PROCEDURE — 82962 GLUCOSE BLOOD TEST: CPT | Mod: 91

## 2020-08-24 PROCEDURE — 84100 ASSAY OF PHOSPHORUS: CPT

## 2020-08-24 PROCEDURE — 82803 BLOOD GASES ANY COMBINATION: CPT

## 2020-08-24 PROCEDURE — 85730 THROMBOPLASTIN TIME PARTIAL: CPT | Mod: 91

## 2020-08-24 PROCEDURE — 84443 ASSAY THYROID STIM HORMONE: CPT

## 2020-08-24 PROCEDURE — 84450 TRANSFERASE (AST) (SGOT): CPT

## 2020-08-24 PROCEDURE — 71045 X-RAY EXAM CHEST 1 VIEW: CPT

## 2020-08-24 PROCEDURE — 700105 HCHG RX REV CODE 258: Performed by: EMERGENCY MEDICINE

## 2020-08-24 PROCEDURE — 95822 EEG COMA OR SLEEP ONLY: CPT | Mod: 26 | Performed by: PSYCHIATRY & NEUROLOGY

## 2020-08-24 PROCEDURE — 4A10X4Z MONITORING OF CENTRAL NERVOUS ELECTRICAL ACTIVITY, EXTERNAL APPROACH: ICD-10-PCS | Performed by: PSYCHIATRY & NEUROLOGY

## 2020-08-24 PROCEDURE — 84132 ASSAY OF SERUM POTASSIUM: CPT

## 2020-08-24 PROCEDURE — 96366 THER/PROPH/DIAG IV INF ADDON: CPT

## 2020-08-24 PROCEDURE — 85027 COMPLETE CBC AUTOMATED: CPT

## 2020-08-24 PROCEDURE — 36600 WITHDRAWAL OF ARTERIAL BLOOD: CPT

## 2020-08-24 PROCEDURE — 94003 VENT MGMT INPAT SUBQ DAY: CPT

## 2020-08-24 PROCEDURE — 85610 PROTHROMBIN TIME: CPT | Mod: 91

## 2020-08-24 PROCEDURE — 770022 HCHG ROOM/CARE - ICU (200)

## 2020-08-24 PROCEDURE — 83735 ASSAY OF MAGNESIUM: CPT | Mod: 91

## 2020-08-24 PROCEDURE — 03HY32Z INSERTION OF MONITORING DEVICE INTO UPPER ARTERY, PERCUTANEOUS APPROACH: ICD-10-PCS | Performed by: INTERNAL MEDICINE

## 2020-08-24 RX ORDER — FAMOTIDINE 20 MG/1
20 TABLET, FILM COATED ORAL EVERY 12 HOURS
Status: DISCONTINUED | OUTPATIENT
Start: 2020-08-24 | End: 2020-09-26

## 2020-08-24 RX ORDER — LORAZEPAM 2 MG/ML
4 INJECTION INTRAMUSCULAR ONCE
Status: DISCONTINUED | OUTPATIENT
Start: 2020-08-24 | End: 2020-08-24

## 2020-08-24 RX ORDER — LABETALOL HYDROCHLORIDE 5 MG/ML
10 INJECTION, SOLUTION INTRAVENOUS EVERY 4 HOURS PRN
Status: DISCONTINUED | OUTPATIENT
Start: 2020-08-24 | End: 2020-08-28

## 2020-08-24 RX ORDER — DEXTROSE MONOHYDRATE 50 MG/ML
INJECTION, SOLUTION INTRAVENOUS CONTINUOUS
Status: DISCONTINUED | OUTPATIENT
Start: 2020-08-24 | End: 2020-08-24

## 2020-08-24 RX ORDER — SODIUM CHLORIDE, SODIUM LACTATE, POTASSIUM CHLORIDE, CALCIUM CHLORIDE 600; 310; 30; 20 MG/100ML; MG/100ML; MG/100ML; MG/100ML
2000 INJECTION, SOLUTION INTRAVENOUS ONCE
Status: COMPLETED | OUTPATIENT
Start: 2020-08-24 | End: 2020-08-24

## 2020-08-24 RX ORDER — ACETAMINOPHEN 325 MG/1
650 TABLET ORAL EVERY 6 HOURS PRN
Status: DISCONTINUED | OUTPATIENT
Start: 2020-08-24 | End: 2020-08-28

## 2020-08-24 RX ORDER — BISACODYL 10 MG
10 SUPPOSITORY, RECTAL RECTAL
Status: DISCONTINUED | OUTPATIENT
Start: 2020-08-24 | End: 2020-09-23

## 2020-08-24 RX ORDER — POTASSIUM CHLORIDE 14.9 MG/ML
20 INJECTION INTRAVENOUS ONCE
Status: COMPLETED | OUTPATIENT
Start: 2020-08-24 | End: 2020-08-24

## 2020-08-24 RX ORDER — AMOXICILLIN 250 MG
2 CAPSULE ORAL 2 TIMES DAILY
Status: DISCONTINUED | OUTPATIENT
Start: 2020-08-24 | End: 2020-09-26

## 2020-08-24 RX ORDER — LORAZEPAM 2 MG/ML
4 INJECTION INTRAMUSCULAR ONCE
Status: COMPLETED | OUTPATIENT
Start: 2020-08-24 | End: 2020-08-24

## 2020-08-24 RX ORDER — NOREPINEPHRINE BITARTRATE 0.03 MG/ML
0-30 INJECTION, SOLUTION INTRAVENOUS CONTINUOUS
Status: DISCONTINUED | OUTPATIENT
Start: 2020-08-24 | End: 2020-08-24

## 2020-08-24 RX ORDER — SODIUM CHLORIDE 9 MG/ML
INJECTION, SOLUTION INTRAVENOUS CONTINUOUS
Status: DISCONTINUED | OUTPATIENT
Start: 2020-08-24 | End: 2020-08-24

## 2020-08-24 RX ORDER — DEXTROSE MONOHYDRATE 25 G/50ML
25 INJECTION, SOLUTION INTRAVENOUS ONCE
Status: COMPLETED | OUTPATIENT
Start: 2020-08-24 | End: 2020-08-24

## 2020-08-24 RX ORDER — DEXTROSE MONOHYDRATE 50 MG/ML
INJECTION, SOLUTION INTRAVENOUS
Status: ACTIVE
Start: 2020-08-24 | End: 2020-08-24

## 2020-08-24 RX ORDER — LORAZEPAM 2 MG/ML
INJECTION INTRAMUSCULAR
Status: ACTIVE
Start: 2020-08-24 | End: 2020-08-24

## 2020-08-24 RX ORDER — SODIUM CHLORIDE 9 MG/ML
INJECTION, SOLUTION INTRAVENOUS
Status: ACTIVE
Start: 2020-08-24 | End: 2020-08-24

## 2020-08-24 RX ORDER — MAGNESIUM SULFATE HEPTAHYDRATE 40 MG/ML
.5-2 INJECTION, SOLUTION INTRAVENOUS CONTINUOUS
Status: DISCONTINUED | OUTPATIENT
Start: 2020-08-24 | End: 2020-08-25

## 2020-08-24 RX ORDER — VECURONIUM BROMIDE 1 MG/ML
0.1 INJECTION, POWDER, LYOPHILIZED, FOR SOLUTION INTRAVENOUS ONCE
Status: DISCONTINUED | OUTPATIENT
Start: 2020-08-24 | End: 2020-08-25

## 2020-08-24 RX ORDER — SODIUM CHLORIDE, SODIUM LACTATE, POTASSIUM CHLORIDE, CALCIUM CHLORIDE 600; 310; 30; 20 MG/100ML; MG/100ML; MG/100ML; MG/100ML
1000 INJECTION, SOLUTION INTRAVENOUS ONCE
Status: DISCONTINUED | OUTPATIENT
Start: 2020-08-24 | End: 2020-08-24

## 2020-08-24 RX ORDER — ACETAMINOPHEN 650 MG/1
650 SUPPOSITORY RECTAL EVERY 6 HOURS PRN
Status: DISCONTINUED | OUTPATIENT
Start: 2020-08-24 | End: 2020-08-28

## 2020-08-24 RX ORDER — MEPERIDINE HYDROCHLORIDE 50 MG/ML
25 INJECTION INTRAMUSCULAR; INTRAVENOUS; SUBCUTANEOUS
Status: DISCONTINUED | OUTPATIENT
Start: 2020-08-24 | End: 2020-08-25

## 2020-08-24 RX ORDER — 0.9 % SODIUM CHLORIDE 0.9 %
10 INTRAVENOUS SOLUTION INTRAVENOUS
Status: DISCONTINUED | OUTPATIENT
Start: 2020-08-24 | End: 2020-08-25

## 2020-08-24 RX ORDER — HYDRALAZINE HYDROCHLORIDE 20 MG/ML
10-20 INJECTION INTRAMUSCULAR; INTRAVENOUS EVERY 6 HOURS PRN
Status: DISCONTINUED | OUTPATIENT
Start: 2020-08-24 | End: 2020-09-08

## 2020-08-24 RX ORDER — NOREPINEPHRINE BITARTRATE 0.03 MG/ML
0-30 INJECTION, SOLUTION INTRAVENOUS CONTINUOUS
Status: DISCONTINUED | OUTPATIENT
Start: 2020-08-24 | End: 2020-08-25

## 2020-08-24 RX ORDER — POLYETHYLENE GLYCOL 3350 17 G/17G
1 POWDER, FOR SOLUTION ORAL
Status: DISCONTINUED | OUTPATIENT
Start: 2020-08-24 | End: 2020-09-23

## 2020-08-24 RX ORDER — DEXTROSE MONOHYDRATE 100 MG/ML
INJECTION, SOLUTION INTRAVENOUS CONTINUOUS
Status: DISCONTINUED | OUTPATIENT
Start: 2020-08-24 | End: 2020-08-24

## 2020-08-24 RX ORDER — IPRATROPIUM BROMIDE AND ALBUTEROL SULFATE 2.5; .5 MG/3ML; MG/3ML
3 SOLUTION RESPIRATORY (INHALATION)
Status: DISCONTINUED | OUTPATIENT
Start: 2020-08-24 | End: 2020-09-23

## 2020-08-24 RX ORDER — DEXTROSE MONOHYDRATE 25 G/50ML
25-50 INJECTION, SOLUTION INTRAVENOUS PRN
Status: DISCONTINUED | OUTPATIENT
Start: 2020-08-24 | End: 2020-08-25

## 2020-08-24 RX ORDER — 0.9 % SODIUM CHLORIDE 0.9 %
20 INTRAVENOUS SOLUTION INTRAVENOUS ONCE
Status: DISCONTINUED | OUTPATIENT
Start: 2020-08-24 | End: 2020-08-24

## 2020-08-24 RX ORDER — MIDAZOLAM HYDROCHLORIDE 1 MG/ML
2 INJECTION INTRAMUSCULAR; INTRAVENOUS ONCE
Status: DISCONTINUED | OUTPATIENT
Start: 2020-08-24 | End: 2020-08-25

## 2020-08-24 RX ADMIN — DEXTROSE MONOHYDRATE 25 ML: 25 INJECTION, SOLUTION INTRAVENOUS at 13:28

## 2020-08-24 RX ADMIN — FAMOTIDINE 20 MG: 10 INJECTION INTRAVENOUS at 06:14

## 2020-08-24 RX ADMIN — POTASSIUM CHLORIDE 20 MEQ: 14.9 INJECTION, SOLUTION INTRAVENOUS at 08:06

## 2020-08-24 RX ADMIN — MINERAL OIL, PETROLATUM 1 APPLICATION: 425; 573 OINTMENT OPHTHALMIC at 14:43

## 2020-08-24 RX ADMIN — PROPOFOL 50 MCG/KG/MIN: 10 INJECTION, EMULSION INTRAVENOUS at 01:43

## 2020-08-24 RX ADMIN — ACETAMINOPHEN 650 MG: 650 SUPPOSITORY RECTAL at 12:47

## 2020-08-24 RX ADMIN — DEXTROSE MONOHYDRATE 50 ML: 25 INJECTION, SOLUTION INTRAVENOUS at 19:42

## 2020-08-24 RX ADMIN — SODIUM CHLORIDE, POTASSIUM CHLORIDE, SODIUM LACTATE AND CALCIUM CHLORIDE 2000 ML: 600; 310; 30; 20 INJECTION, SOLUTION INTRAVENOUS at 02:31

## 2020-08-24 RX ADMIN — SODIUM CHLORIDE: 9 INJECTION, SOLUTION INTRAVENOUS at 01:45

## 2020-08-24 RX ADMIN — POTASSIUM CHLORIDE 20 MEQ: 14.9 INJECTION, SOLUTION INTRAVENOUS at 20:41

## 2020-08-24 RX ADMIN — PROPOFOL 50 MCG/KG/MIN: 10 INJECTION, EMULSION INTRAVENOUS at 03:41

## 2020-08-24 RX ADMIN — MAGNESIUM SULFATE IN WATER 0.5 G/HR: 40 INJECTION, SOLUTION INTRAVENOUS at 01:45

## 2020-08-24 RX ADMIN — PROPOFOL 70 MCG/KG/MIN: 10 INJECTION, EMULSION INTRAVENOUS at 20:46

## 2020-08-24 RX ADMIN — DEXTROSE MONOHYDRATE 50 ML: 50 INJECTION, SOLUTION INTRAVENOUS at 04:43

## 2020-08-24 RX ADMIN — Medication 50 MCG/HR: at 03:34

## 2020-08-24 RX ADMIN — LORAZEPAM 4 MG: 2 INJECTION INTRAMUSCULAR; INTRAVENOUS at 01:55

## 2020-08-24 RX ADMIN — MEPERIDINE HYDROCHLORIDE 25 MG: 50 INJECTION INTRAMUSCULAR; INTRAVENOUS; SUBCUTANEOUS at 04:33

## 2020-08-24 RX ADMIN — METRONIDAZOLE 500 MG: 500 INJECTION, SOLUTION INTRAVENOUS at 16:49

## 2020-08-24 RX ADMIN — DEXTROSE MONOHYDRATE 25 ML: 25 INJECTION, SOLUTION INTRAVENOUS at 09:22

## 2020-08-24 RX ADMIN — PROPOFOL 70 MCG/KG/MIN: 10 INJECTION, EMULSION INTRAVENOUS at 17:54

## 2020-08-24 RX ADMIN — CEFTRIAXONE SODIUM 2 G: 2 INJECTION, POWDER, FOR SOLUTION INTRAMUSCULAR; INTRAVENOUS at 01:27

## 2020-08-24 RX ADMIN — DEXTROSE MONOHYDRATE 50 ML: 25 INJECTION, SOLUTION INTRAVENOUS at 04:10

## 2020-08-24 RX ADMIN — MINERAL OIL, PETROLATUM 1 APPLICATION: 425; 573 OINTMENT OPHTHALMIC at 06:14

## 2020-08-24 RX ADMIN — ENOXAPARIN SODIUM 40 MG: 40 INJECTION SUBCUTANEOUS at 12:47

## 2020-08-24 RX ADMIN — PROPOFOL 80 MCG/KG/MIN: 10 INJECTION, EMULSION INTRAVENOUS at 06:54

## 2020-08-24 RX ADMIN — PROPOFOL 50 MCG/KG/MIN: 10 INJECTION, EMULSION INTRAVENOUS at 11:18

## 2020-08-24 RX ADMIN — LABETALOL HYDROCHLORIDE 10 MG: 5 INJECTION INTRAVENOUS at 05:05

## 2020-08-24 RX ADMIN — MEPERIDINE HYDROCHLORIDE 25 MG: 50 INJECTION INTRAMUSCULAR; INTRAVENOUS; SUBCUTANEOUS at 03:36

## 2020-08-24 RX ADMIN — Medication 200 MCG/HR: at 12:46

## 2020-08-24 RX ADMIN — MINERAL OIL, PETROLATUM 1 APPLICATION: 425; 573 OINTMENT OPHTHALMIC at 22:00

## 2020-08-24 RX ADMIN — POTASSIUM CHLORIDE 20 MEQ: 14.9 INJECTION, SOLUTION INTRAVENOUS at 13:30

## 2020-08-24 RX ADMIN — FAMOTIDINE 20 MG: 10 INJECTION INTRAVENOUS at 18:31

## 2020-08-24 RX ADMIN — PROPOFOL 70 MCG/KG/MIN: 10 INJECTION, EMULSION INTRAVENOUS at 14:15

## 2020-08-24 RX ADMIN — SODIUM PHOSPHATE, MONOBASIC, MONOHYDRATE 15 MMOL: 276; 142 INJECTION, SOLUTION INTRAVENOUS at 06:14

## 2020-08-24 RX ADMIN — DEXTROSE MONOHYDRATE: 100 INJECTION, SOLUTION INTRAVENOUS at 13:29

## 2020-08-24 RX ADMIN — DEXTROSE MONOHYDRATE: 70 INJECTION, SOLUTION INTRAVENOUS at 18:00

## 2020-08-24 RX ADMIN — METRONIDAZOLE 500 MG: 500 INJECTION, SOLUTION INTRAVENOUS at 22:00

## 2020-08-24 ASSESSMENT — FIBROSIS 4 INDEX: FIB4 SCORE: 1.35

## 2020-08-24 NOTE — PROGRESS NOTES
Updated Dr. Murdock on Blood glucose of 67.  Orders received for D5 gtt.  Also updated on increase in shivering and hypertension.  Orders received for PRN medications for hypertension.  Physician in aware that the arterial line and BP cuff have a 40-50 point difference.

## 2020-08-24 NOTE — ED NOTES
Pt back from CT scan and in room. Pt transported on gurney with this RN bedside and on cardiac monitor. Respiratory bedside.

## 2020-08-24 NOTE — PROGRESS NOTES
Pt arrived at 0040 on cardiac monitor with RNx2 and RT.     2 RN skin assessment complete, skin intact.     Arctic Sun pads placed for hypothermia protocol at 0145. Dr. Murdock at bedside to place central line and arterial line.

## 2020-08-24 NOTE — CONSULTS
Critical Care Consultation    Date of consult: 8/24/2020    Referring Physician  Brandy Lazo M.D.    Reason for Consultation  Cardiac arrest and drug overdose    History of Presenting Illness  19 y.o. female who presented 8/23/2020 with drug overdose with methamphetamines, oxycodone and found by boyfriend.  Cardiac arrest enroute to hospital and received narcan, epinephrine and cpr and ROSC achieved.  Difficult airway at scene and LMA placed. ET tube placed in ER.  She was vomiting during airway attempt.  On propofol due to vent asynchrony.  Propofol turned off for neuro assessment and possible hypothermic protocol.  Family no longer at hospital and unable to reach per phone number given for mother.      Code Status  Full code    Review of Systems  Review of Systems   Unable to perform ROS: Intubated       Past Medical History   has no past medical history on file.    Surgical History   has no past surgical history on file.    Family History  family history is not on file.    Social History       Medications  Home Medications    **Home medications have not yet been reviewed for this encounter**       Current Facility-Administered Medications   Medication Dose Route Frequency Provider Last Rate Last Dose   • PROPOFOL 10 MG/ML IV EMUL            • norepinephrine (Levophed) infusion 8 mg/250 mL (premix)  0-30 mcg/min Intravenous Continuous Brandy Lazo M.D. 18.8 mL/hr at 08/23/20 2237 10 mcg/min at 08/23/20 2237   • propofol (DIPRIVAN) injection  0-80 mcg/kg/min Intravenous Continuous Brandy Lazo M.D. 10.8 mL/hr at 08/23/20 2351 25 mcg/kg/min at 08/23/20 2351   • cefTRIAXone (ROCEPHIN) 2 g in  mL IVPB  2 g Intravenous Once Brandy Lazo M.D.         No current outpatient medications on file.       Allergies  Not on File    Vital Signs last 24 hours  Pulse:  [116-150] 124  Resp:  [12-20] 20  BP: ()/(35-90) 119/74  SpO2:  [85 %-99 %] 93 %    Physical Exam  Physical Exam  Vitals signs and  nursing note reviewed.   Constitutional:       General: She is not in acute distress.     Appearance: She is ill-appearing. She is not toxic-appearing or diaphoretic.   HENT:      Head: Normocephalic and atraumatic.      Right Ear: External ear normal.      Left Ear: External ear normal.      Nose: No congestion or rhinorrhea.      Mouth/Throat:      Mouth: Mucous membranes are dry.      Pharynx: No oropharyngeal exudate or posterior oropharyngeal erythema.   Eyes:      General: No scleral icterus.     Conjunctiva/sclera: Conjunctivae normal.      Pupils: Pupils are equal, round, and reactive to light.   Neck:      Musculoskeletal: Neck supple. No neck rigidity or muscular tenderness.   Cardiovascular:      Rate and Rhythm: Regular rhythm. Tachycardia present.      Pulses: Normal pulses.      Heart sounds: Normal heart sounds. No murmur.   Pulmonary:      Effort: Respiratory distress present.      Breath sounds: No wheezing.   Abdominal:      General: There is no distension.      Palpations: There is no mass.      Tenderness: There is no abdominal tenderness. There is no guarding.   Musculoskeletal:         General: No swelling or tenderness.      Right lower leg: No edema.      Left lower leg: No edema.   Lymphadenopathy:      Cervical: No cervical adenopathy.   Skin:     Coloration: Skin is not jaundiced or pale.      Findings: No bruising, erythema, lesion or rash.   Neurological:      Mental Status: She is alert.      Cranial Nerves: Cranial nerve deficit present.      Sensory: No sensory deficit.      Motor: No weakness.      Coordination: Coordination normal.      Deep Tendon Reflexes: Reflexes normal.         Fluids    Intake/Output Summary (Last 24 hours) at 8/24/2020 0012  Last data filed at 8/23/2020 2208  Gross per 24 hour   Intake --   Output 250 ml   Net -250 ml       Laboratory  Recent Results (from the past 48 hour(s))   ACCU-CHEK GLUCOSE    Collection Time: 08/23/20 10:00 PM   Result Value Ref Range     Glucose - Accu-Ck 154 (H) 65 - 99 mg/dL   EKG (NOW)    Collection Time: 20 10:00 PM   Result Value Ref Range    Report       [facility] Emergency Dept.    Test Date:  2020  Pt Name:    LEE TWO                   Department: ER  MRN:        [id number]                      Room:        15  Gender:                                  Technician: [id number]  :                                     Requested By:ER TRIAGE PROTOCOL  Order #:    [id number]                    Reading MD:    Measurements  Intervals                                Axis  Rate:       119                          P:          75  WV:         156                          QRS:        97  QRSD:       86                           T:          -23  QT:         292  QTc:        411    Interpretive Statements  SINUS TACHYCARDIA  RIGHT AXIS DEVIATION  NONSPECIFIC T ABNORMALITIES, INFERIOR LEADS  No previous ECG available for comparison     LACTIC ACID    Collection Time: 20 10:05 PM   Result Value Ref Range    Lactic Acid 7.6 (HH) 0.5 - 2.0 mmol/L   CBC WITH DIFFERENTIAL    Collection Time: 20 10:05 PM   Result Value Ref Range    WBC 11.4 (H) 4.8 - 10.8 K/uL    RBC 4.79 4.20 - 5.40 M/uL    Hemoglobin 15.3 12.0 - 16.0 g/dL    Hematocrit 49.0 (H) 37.0 - 47.0 %    .3 (H) 81.4 - 97.8 fL    MCH 31.9 27.0 - 33.0 pg    MCHC 31.2 (L) 33.6 - 35.0 g/dL    RDW 48.9 35.9 - 50.0 fL    Platelet Count 334 164 - 446 K/uL    MPV 9.7 9.0 - 12.9 fL    Neutrophils-Polys 40.50 (L) 44.00 - 72.00 %    Lymphocytes 50.80 (H) 22.00 - 41.00 %    Monocytes 5.60 0.00 - 13.40 %    Eosinophils 1.60 0.00 - 6.90 %    Basophils 0.00 0.00 - 1.80 %    Nucleated RBC 0.40 /100 WBC    Neutrophils (Absolute) 4.61 2.00 - 7.15 K/uL    Lymphs (Absolute) 5.79 (H) 1.00 - 4.80 K/uL    Monos (Absolute) 0.63 0.00 - 0.85 K/uL    Eos (Absolute) 0.18 0.00 - 0.51 K/uL    NRBC (Absolute) 0.05 K/uL    Anisocytosis 1+     Macrocytosis 1+    HCG QUAL SERUM     Collection Time: 08/23/20 10:05 PM   Result Value Ref Range    Beta-Hcg Qualitative Serum Negative Negative   PROTHROMBIN TIME (INR)    Collection Time: 08/23/20 10:05 PM   Result Value Ref Range    PT 12.1 12.0 - 14.6 sec    INR 0.87 0.87 - 1.13   APTT    Collection Time: 08/23/20 10:05 PM   Result Value Ref Range    APTT 26.9 24.7 - 36.0 sec   ACETAMINOPHEN    Collection Time: 08/23/20 10:05 PM   Result Value Ref Range    Acetaminophen -Tylenol <5 (L) 10 - 30 ug/mL   Salicylate    Collection Time: 08/23/20 10:05 PM   Result Value Ref Range    Salicylates, Quant. <1 (L) 15 - 25 mg/dL   DIFFERENTIAL MANUAL    Collection Time: 08/23/20 10:05 PM   Result Value Ref Range    Myelocytes 1.60 %    Manual Diff Status PERFORMED    PERIPHERAL SMEAR REVIEW    Collection Time: 08/23/20 10:05 PM   Result Value Ref Range    Peripheral Smear Review see below    PLATELET ESTIMATE    Collection Time: 08/23/20 10:05 PM   Result Value Ref Range    Plt Estimation Normal    MORPHOLOGY    Collection Time: 08/23/20 10:05 PM   Result Value Ref Range    RBC Morphology Present     Poikilocytosis 1+     Target Cells 1+     Tear Drop Cells 1+    COVID/SARS CoV-2 PCR    Collection Time: 08/23/20 10:10 PM    Specimen: Nasopharyngeal; Respirate   Result Value Ref Range    COVID Order Status Received    SARS-CoV-2, PCR (In-House)    Collection Time: 08/23/20 10:10 PM   Result Value Ref Range    SARS-CoV-2 Source NP Swab    AMMONIA    Collection Time: 08/23/20 10:17 PM   Result Value Ref Range    Ammonia 47 (H) 11 - 45 umol/L   URINALYSIS (UA)    Collection Time: 08/23/20 10:17 PM    Specimen: Blood   Result Value Ref Range    Color Yellow     Character Clear     Specific Gravity 1.009 <1.035    Ph 5.0 5.0 - 8.0    Glucose Negative Negative mg/dL    Ketones Negative Negative mg/dL    Protein Negative Negative mg/dL    Bilirubin Negative Negative    Urobilinogen, Urine 0.2 Negative    Nitrite Positive (A) Negative    Leukocyte Esterase Negative  Negative    Occult Blood Negative Negative    Micro Urine Req Microscopic    URINE DRUG SCREEN    Collection Time: 08/23/20 10:17 PM   Result Value Ref Range    Amphetamines Urine Positive (A) Negative    Barbiturates Negative Negative    Benzodiazepines Negative Negative    Cocaine Metabolite Negative Negative    Methadone Negative Negative    Opiates Negative Negative    Oxycodone Negative Negative    Phencyclidine -Pcp Negative Negative    Propoxyphene Negative Negative    Cannabinoid Metab Negative Negative   URINE MICROSCOPIC (W/UA)    Collection Time: 08/23/20 10:17 PM   Result Value Ref Range    WBC 0-2 /hpf    RBC 0-2 /hpf    Bacteria Moderate (A) None /hpf    Epithelial Cells Negative /hpf    Hyaline Cast 0-2 /lpf   ISTAT ARTERIAL BLOOD GAS    Collection Time: 08/23/20 10:21 PM   Result Value Ref Range    Ph 7.071 (LL) 7.400 - 7.500    Pco2 41.5 (H) 26.0 - 37.0 mmHg    Po2 125 (H) 64 - 87 mmHg    Tco2 13 (L) 20 - 33 mmol/L    S02 97 93 - 99 %    Hco3 12.0 (L) 17.0 - 25.0 mmol/L    BE -18 (L) -4 - 3 mmol/L    Body Temp 35.7 C degrees    O2 Therapy 50 %    iPF Ratio 250     Ph Temp Barb 7.087 (LL) 7.400 - 7.500    Pco2 Temp Co 39.2 (H) 26.0 - 37.0 mmHg    Po2 Temp Cor 117 (H) 64 - 87 mmHg    Specimen Arterial     Action Range Triggered YES     Inst. Qualified Patient YES    Comp Metabolic Panel    Collection Time: 08/23/20 11:11 PM   Result Value Ref Range    Sodium 140 135 - 145 mmol/L    Potassium - 3.6 - 5.5 mmol/L    Chloride 103 96 - 112 mmol/L    Co2 12 (L) 20 - 33 mmol/L    Anion Gap 25.0 (H) 7.0 - 16.0    Glucose 127 (H) 65 - 99 mg/dL    Bun 17 8 - 22 mg/dL    Creatinine 0.99 0.50 - 1.40 mg/dL    Calcium 8.4 (L) 8.5 - 10.5 mg/dL    Alkaline Phosphatase 109 (H) 30 - 99 U/L    Total Bilirubin 0.3 0.1 - 1.5 mg/dL    Albumin 4.1 3.2 - 4.9 g/dL    Total Protein 6.5 6.0 - 8.2 g/dL    Globulin 2.4 1.9 - 3.5 g/dL    A-G Ratio 1.7 g/dL   LIPASE    Collection Time: 08/23/20 11:11 PM   Result Value Ref Range     Lipase 44 11 - 82 U/L   DIAGNOSTIC ALCOHOL    Collection Time: 08/23/20 11:11 PM   Result Value Ref Range    Diagnostic Alcohol 160.8 (H) 0.0 - 10.1 mg/dL   Triglyceride    Collection Time: 08/23/20 11:11 PM   Result Value Ref Range    Triglycerides 312 (H) 0 - 149 mg/dL   ESTIMATED GFR    Collection Time: 08/23/20 11:11 PM   Result Value Ref Range    GFR If African American >60 >60 mL/min/1.73 m 2    GFR If Non African American >60 >60 mL/min/1.73 m 2       Imaging  CT-HEAD W/O   Final Result         1.  Possible subtle sulcal effacement and slight loss of differentiation of gray-white matter, consider component of cerebral edema. Recommend radiographic follow-up   2.  Bilateral sphenoid and maxillary sinus air-fluid levels      DX-CHEST-PORTABLE (1 VIEW)   Final Result         1.  No acute cardiopulmonary disease.      DX-CHEST-PORTABLE (1 VIEW)    (Results Pending)       Assessment/Plan  Drug overdose  Assessment & Plan  Methamphetamines, oxycodone and etoh per boyfriend    Metabolic acidosis  Assessment & Plan  Secondary to cardiac arrest  Aggressive IVF and pressors    Acute respiratory failure with hypoxia (HCC)  Assessment & Plan  Secondary to drug overdose  Ventilator  Rt protocol  Propofol and fentanyl drips      Cardiac arrest (HCC)  Assessment & Plan  [place], enroute to hospital    PEA (Pulseless electrical activity) (HCC)  Assessment & Plan  Enroute to hospital  Hypoxia and aspiration contributory  If unable to arouse off sedation then hypothermic protocol  Ct with mild edema    Cerebral edema (HCC)  Assessment & Plan  Mild per radiologist on ct  Repeat mri 72 hours      Discussed patient condition and risk of morbidity and/or mortality with RN, RT, Pharmacy, Code status disscussed and Charge nurse / hot rounds.      The patient remains critically ill.  On propofol drip and mechanical ventilator.  Levophed for map > 65 and sbp > 90.  Critical care time = 120 minutes in directly providing and coordinating  critical care and extensive data review.  No time overlap and excludes procedures.

## 2020-08-24 NOTE — PROGRESS NOTES
Brief Neurology Note    Discussed w Dr. Murdock.     19F overdose on alcohol, meth, and oxy in w hypoxia and difficulty in intubation by [facility] in the field given vomiting.    Cardiac arrest in field requiring CPR & epinephrine w ROSC.     Witnessed in [facility] to have rhythmic movements of mouth during weaning of sedation     Prelim rec:  - continue / resume propofol  - EEG and full neurology consult in the AM  <B R>INA. MD Siobhan  Neurology

## 2020-08-24 NOTE — DISCHARGE PLANNING
Medical Social Work    MSW escorted pt's dad, Angel Luis ([phone]) to bedside.  Pt's father was updated by bedside RN.  Emotional support provided to pt's family.  Per pt's father pt's mother went to figure out pt's dog; unsure when she will return.

## 2020-08-24 NOTE — ASSESSMENT & PLAN NOTE
"MRI on 9/2 and 9/15 consistent with severe anoxic brain injury  EEG with frequent, blunted generalized sharps with frontal maximum and triphasic morphology with frequent runs of frontal intermittent rhythmic delta activity (FIRDA)  Continue AEDs    Exam consistent with vegetative state. Poor prognosis for return to independent function. Discuss further with neurology and family    More seizures, back on propofol. Suspect extremely poor brain function will persist. Meet with family Thursday after assessment again off propofol.    More seizures on Versed and propofol. We increased propofol dose from 30 to 60. Neurology considering pentobarb coma. Given duration of coma (30 days), poor and worsening MRI, refractory seizures despite 6 anti-seizure drugs, in my opinion, this patient's prognosis for return to functional independence and return to communication is poor. We will discuss with family tomorrow with neurology.    In addition to fixed dose propofol I added Versed at request of neurology as pt still having seizures per EEG. The refractory seizures reflect a \"bad\" irritable brain. Sadly, given the duration of illness and the progressive clinical decline and MRI deterioration the prognosis is grim for meaningful lneurology recovery    No change- still having some seizures per EEG/ neurology  Family meeting with pt's father today to provide medical update and discuss goals of care  "

## 2020-08-24 NOTE — PROCEDURES
Procedures  Procedure: left subclavian central line insertion, us guided    : Dr Murdock    Reason: acute respiratory failure, shock and cardiac arrest, on vasopressors    The patient's left shoulder region was prepped and draped in sterile fashion using chlorhexidine scrub. Anesthesia was achieved with 1% lidocaine. The left subclavian vein was accessed under ultrasound guidance using a finder needle Venous blood was withdrawn and the needle was withdrawn after a guidewire was advanced through the needle catheter. A small incision was made with a blade scalpel and the needle was exchanged for a dilator over the guidewire until appropriate dilation was obtained. The dilator was removed and a central venous triple-lumen catheter was advanced over the guidewire and secured into place with 2 sutures at 15 cm. At time of procedure completion, all ports aspirated and flushed properly. Post-procedure x-ray adequate placement, no pneumothorax.  Sterile procedure done throughout entire procedure by all participating.    Complications: none    Blood loss: < 3 cc

## 2020-08-24 NOTE — ASSESSMENT & PLAN NOTE
BA 0.16 on presentation  Positive urine drug screen for amphetamines on presentation  Boyfriend reported methamphetamine, oxycodone and alcohol use

## 2020-08-24 NOTE — ASSESSMENT & PLAN NOTE
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  Status post therapeutic hypothermia and rewarmed

## 2020-08-24 NOTE — RESPIRATORY CARE
Ventilator Daily Summary    Vent Day #2    Ventilator settings changed this shift:RR decreased from 30 to 28    APV 28/400/+8 40%    Weaning trials: No    Respiratory Procedures: None    Plan: Continue current ventilator settings and wean mechanical ventilation as tolerated per physician orders.

## 2020-08-24 NOTE — DISCHARGE PLANNING
"Medical Social Work    Pt's father arrived and was escorted to Family Support Room.  Pt's father is also aware that pt's mother, Juliette is on her way.  MSW updated ERP.  Pt's father was advised that only he and pt's mother would be allowed at bedside at this time due to visitor restrictions as pt's \"friend\" arrived with him.    "

## 2020-08-24 NOTE — PROCEDURES
ROUTINE ELECTROENCEPHALOGRAM REPORT      Referring provider: Dr. Mccann.     DOS: 8/24/2020 (total recording of 33 minutes)    INDICATION:  Annika He 19 y.o. female presenting with seizure.    CURRENT ANTIEPILEPTIC REGIMEN: Propofol, prior to the study.  Sedated with fentanyl.    TECHNIQUE: 30 channel routine electroencephalogram (EEG) was performed in accordance with the international 10-20 system. The study was reviewed in bipolar and referential montages. The recording examined a sedated patient.    DESCRIPTION OF THE RECORD:  There is waxing and waning of the cerebral electrical activity, likely secondary to the use of a sedative.  Episodes of mild eye twitching were captured, and were not epileptic in nature.  No seizures captured.      ACTIVATION PROCEDURES:   Not performed.    ICTAL AND/OR INTERICTAL FINDINGS:   No focal or generalized epileptiform activity noted. No regional slowing was seen during this routine study.  No seizures were reported or recorded during the study.     EKG: sampling of the EKG recording demonstrated sinus rhythm.       INTERPRETATION:  This is an normal routine EEG recording in a sedated patient.  Episodes of mild eye twitching were captured during the study, and were not epileptic in nature.  No seizures captured during the study.  Clinical correlation is recommended.    Note: A normal EEG does not rule out epilepsy.  If the clinical suspicion remains high for seizures, a prolonged recording to capture clinical or subclinical events may be helpful.        Garland Beatty MD   Epilepsy and Neurodiagnostics.   Clinical  of Neurology [employer].   Diplomate in Neurology, Epilepsy, and Electrodiagnostic Medicine.   Office: [phone]  Fax: [phone]

## 2020-08-24 NOTE — ED TRIAGE NOTES
Chief Complaint   Patient presents with   • Drug Overdose     unknown amount of type of drug ingestion per EMS, pt found down, aspiration, CPR in progress upon EMS arrival.     Pt BIB EMS. EMS found pt unresponsive w/ HR 60 w/ pulses present. Pt family report drug ingestion. Pt aspirated prior to EMS arrival. EMS applied Kike airway. Pt lost pulses while EMS was wheeling pt into ED on gurney. EMS began CPR which was continue by ED staff upon pt arrival to [facility]. EMS administered 4mg narcan PIV PTA and administered 1mg epi x 1 dose PTA to [facility] 15. Pt has 18G PIV R AC in place PTA.     /68   Pulse 100   Temp (!) 34.8 °C (94.6 °F) (Bladder)   Resp (!) 30   Wt 71.2 kg (156 lb 15.5 oz)   SpO2 96%

## 2020-08-24 NOTE — CODE DOCUMENTATION
ETT 7.5, 23 at the teeth placed by Dr. Lazo, Bilateral breathe sounds present, positive color change. Xray called for bedside CXR.

## 2020-08-24 NOTE — CARE PLAN
Problem: Respiratory:  Goal: Respiratory status will improve  Outcome: PROGRESSING SLOWER THAN EXPECTED  Intervention: Assess and monitor pulmonary status  Note: Ventilator in place.      Problem: Communication  Goal: The ability to communicate needs accurately and effectively will improve  Outcome: PROGRESSING SLOWER THAN EXPECTED  Intervention: [place] patient and significant other/support system to call light to alert staff of needs  Note: Family updated on pt condition.

## 2020-08-24 NOTE — PROCEDURES
Procedures  Arterial line, right radial artery us guided  Reason: cardiac arrest, acute respiratory failure with hypoxia, shock on vasopressors    Procedure:  Sterile procedure.  Draped appropriately.  Florin test normal.  US guided, right radial artery.  Needle inserted with flash obtained.  Guidewire inserted through needle, catheter progressed over wire, wire removed, catheter sutured in place with two sutures, chlorhexidine patch and bandage applied.  Connected appropriately.  No complications. Less than 3 cc blood loss.

## 2020-08-24 NOTE — CONSULTS
"Neurology Initial Consult H&P  Neurohospitalist Service, [facility]    Referring Physician: Boogie Augustine Jr., D.O.    Chief Complaint   Patient presents with   • Drug Overdose     unknown amount or type of drug ingestion per EMS, pt found down, aspiration, CPR in progress upon EMS arrival.       HPI: Annika He is a 19 y.o. woman presenting for whom neurology has been consulted for abnormal movements and prognostication.  Patient currently intubated and sedated with propofol, thus unable to obtain subjective history, with no family at bedside to provide corollary.  Bedside RN noted patient is being chilled (therapeutic hypothermia) with plans to begin rewarming at 3a 8/25/20. As documented by Dr. Brock Murdock 8/24/20, \"presented 8/23/2020 with drug overdose with methamphetamines, oxycodone and found by boyfriend.  Cardiac arrest enroute to hospital and received narcan, epinephrine and cpr and ROSC achieved.  Difficult airway at scene and LMA placed. ET tube placed in ER.  She was vomiting during airway attempt.  On propofol due to vent asynchrony.  Propofol turned off for neuro assessment and possible hypothermic protocol.  Family no longer at hospital and unable to reach per phone number given for mother.\"    Review of systems: In addition to what is detailed in the HPI above, all other systems reviewed and are negative.    Past Medical History:   Unable to obtain given intubation and sedation    FHx:  Unable to obtain given intubation and sedation    SHx:  Polysubstance abuse    Allergies:  Not on File    Medications:    Current Facility-Administered Medications:   •  Cold NS infusion 720 mL, 10 mL/kg, Intravenous, Once PRN, Brock Murdock M.D., Stopped at 08/24/20 0656  •  NS infusion, , Intravenous, Continuous, Brock Murdock M.D., Last Rate: 75 mL/hr at 08/24/20 0445  •  magnesium sulfate 20 g/500mL infusion, 0.5-2 g/hr, Intravenous, Continuous, Brock Murdock M.D., Last " Rate: 13 mL/hr at 08/24/20 0145, 0.5 g/hr at 08/24/20 0145  •  meperidine (DEMEROL) injection 25 mg, 25 mg, Intravenous, Q HOUR PRN, Brock Murdock M.D., 25 mg at 08/24/20 0433  •  fentaNYL (SUBLIMAZE) injection 100 mcg, 100 mcg, Intravenous, Q HOUR PRN, Brock Murdock M.D.  •  fentanyl 50 mcg/mL infusion,  mcg/hr, Intravenous, Continuous, Brock Murdock M.D., Last Rate: 4 mL/hr at 08/24/20 0857, 200 mcg/hr at 08/24/20 0857  •  midazolam (VERSED) 2 MG/2ML injection 2 mg, 2 mg, Intravenous, Once **FOLLOWED BY** midazolam (VERSED) premix 125 mg/125 mL NS, 0-10 mg/hr, Intravenous, Continuous, Brock Murdock M.D., Stopped at 08/24/20 0100  •  vecuronium (NORCURON) injection 7.2 mg, 0.1 mg/kg, Intravenous, Once **FOLLOWED BY** vecuronium (NORCURON) 60 mg in D5W 500 mL Infusion, 1-2 mcg/kg/min, Intravenous, Continuous, Brock Murdock M.D.  •  insulin regular (HumuLIN R,NovoLIN R) injection, 0-14 Units, Intravenous, Once PRN **AND** insulin regular human (HUMULIN/NOVOLIN R) 62.5 Units in  mL infusion per protocol, 0-29 Units/hr, Intravenous, PRN **AND** POC Blood Glucose, , , PRN **AND** dextrose 50% (D50W) injection 25-50 mL, 25-50 mL, Intravenous, PRN, Brock Murdock M.D., 50 mL at 08/24/20 0410  •  artificial tears (EYE LUBRICANT) ophth ointment 1 Application, 1 Application, Both Eyes, Q8HRS, Brock Murdock M.D., 1 Application at 08/24/20 0614  •  norepinephrine (Levophed) infusion 8 mg/250 mL (premix), 0-30 mcg/min, Intravenous, Continuous, Brock Murdock M.D., Stopped at 08/24/20 0100  •  K+ Scale: Goal of 4.5, 1 Each, Intravenous, Q6HRS, Brock Murdock M.D., 1 Each at 08/24/20 0600  •  sodium phosphate 15 mmol in D5W 250 mL ivpb, 15 mmol, Intravenous, Once PRN, Last Rate: 62.5 mL/hr at 08/24/20 0614, 15 mmol at 08/24/20 0614 **OR** [COMPLETED] sodium phosphate 30 mmol in D5W 500 mL ivpb, 30 mmol, Intravenous, Once PRN, Brock Murdock M.D.  •  Respiratory Therapy Consult, , Nebulization, Continuous RT,  Brock Murdock M.D.  •  ipratropium-albuterol (DUONEB) nebulizer solution, 3 mL, Nebulization, Q2HRS PRN (RT), Brock Murdock M.D.  •  famotidine (PEPCID) tablet 20 mg, 20 mg, Enteral Tube, Q12HRS **OR** famotidine (PEPCID) injection 20 mg, 20 mg, Intravenous, Q12HRS, Brock Murdock M.D., 20 mg at 08/24/20 0614  •  senna-docusate (PERICOLACE or SENOKOT S) 8.6-50 MG per tablet 2 Tab, 2 Tab, Enteral Tube, BID, Stopped at 08/24/20 0600 **AND** polyethylene glycol/lytes (MIRALAX) PACKET 1 Packet, 1 Packet, Enteral Tube, QDAY PRN **AND** magnesium hydroxide (MILK OF MAGNESIA) suspension 30 mL, 30 mL, Enteral Tube, QDAY PRN **AND** bisacodyl (DULCOLAX) suppository 10 mg, 10 mg, Rectal, QDAY PRN, Brock Murdock M.D.  •  MD Alert...ICU Electrolyte Replacement per Pharmacy, , Other, PHARMACY TO DOSE, Brock Murdock M.D.  •  lidocaine (XYLOCAINE) 1 % injection 1-2 mL, 1-2 mL, Tracheal Tube, Q30 MIN PRN, Brcok Murdock M.D.  •  LORAZEPAM 2 MG/ML INJ SOLN, , , ,   •  propofol (DIPRIVAN) injection, 50-80 mcg/kg/min, Intravenous, Continuous, Last Rate: 34.6 mL/hr at 08/24/20 0857, 80 mcg/kg/min at 08/24/20 0857 **AND** [START ON 8/26/2020] Triglycerides Starting now and then Every 3 Days, , , Every 3 Days (0300), Brock Murdock M.D.  •  SODIUM CHLORIDE 0.9 % IV SOLN, , , ,   •  DEXTROSE 5 % IV SOLN, , , ,   •  D5W infusion, , Intravenous, Continuous, Brock Murdock M.D., Last Rate: 75 mL/hr at 08/24/20 0910  •  labetalol (NORMODYNE/TRANDATE) injection 10 mg, 10 mg, Intravenous, Q4HRS PRN, Brock Murdock M.D., 10 mg at 08/24/20 0505  •  hydrALAZINE (APRESOLINE) injection 10-20 mg, 10-20 mg, Intravenous, Q6HRS PRN, Brock Murdock M.D.  •  potassium chloride in water (KCL) ivpb **Administer in ICU only** 20 mEq, 20 mEq, Intravenous, Once, Brock Murdock M.D., Last Rate: 50 mL/hr at 08/24/20 0806, 20 mEq at 08/24/20 0806  •  PROPOFOL 10 MG/ML IV EMUL, , , ,     Physical Examination:     Vitals:    08/24/20 0600 08/24/20 0632 08/24/20  0700 08/24/20 0800   BP: 102/68  (!) 96/65 (!) 93/63   Pulse: 95 90 86 78   Resp: (!) 28 (!) 28 (!) 28 (!) 28   Temp: 36.8 °C (98.2 °F)  35.9 °C (96.6 °F) (!) 35.1 °C (95.2 °F)   TempSrc: Bladder  Bladder Bladder   SpO2: 100% 100%  100%   Weight:           General: Patient is intubated and sedated, actively being chilled  Eyes: examination of optic disks not indicated at this time given acuity of consult  CV: RRR    NEUROLOGICAL EXAM:     Mental status: does not open eyes to noxious stimulus, does not follow commands  Speech and language: intubated  Cranial nerve exam: Pupils are equal, round and reactive to light bilaterally. Visual fields: does not blink to threat bilaterally. Gaze in neutral position. Corneal reflexes absent bilaterally with noted stimulus induced myokymia of eyelids.  Extraocular muscles are weakly intact to oculocephalic maneuver ie VOR intact. Face is symmetric. Unable to assess sensation in the face due to mental status. Cough and gag reflexes are absent.  Motor exam: Does not participate in formal strength testing. Tone is flaccid throughout. Stimulus induced myokymia of eyelids  Sensory exam: no response to noxious stimuli x4 extremities  Deep tendon reflexes:  1+ throughout. Toes mute bilaterally  Coordination: not participatory due to mental status  Gait: deferred due to ICU status      Objective Data:    Labs:  Lab Results   Component Value Date/Time    PROTHROMBTM 13.8 08/24/2020 06:14 AM    INR 1.03 08/24/2020 06:14 AM      Lab Results   Component Value Date/Time    WBC 10.7 08/24/2020 02:50 AM    RBC 4.56 08/24/2020 02:50 AM    HEMOGLOBIN 14.4 08/24/2020 02:50 AM    HEMATOCRIT 43.0 08/24/2020 02:50 AM    MCV 94.3 08/24/2020 02:50 AM    MCH 31.6 08/24/2020 02:50 AM    MCHC 33.5 (L) 08/24/2020 02:50 AM    MPV 9.5 08/24/2020 02:50 AM    NEUTSPOLYS 40.50 (L) 08/23/2020 10:05 PM    LYMPHOCYTES 50.80 (H) 08/23/2020 10:05 PM    MONOCYTES 5.60 08/23/2020 10:05 PM    EOSINOPHILS 1.60  08/23/2020 10:05 PM    BASOPHILS 0.00 08/23/2020 10:05 PM    ANISOCYTOSIS 1+ 08/23/2020 10:05 PM      Lab Results   Component Value Date/Time    SODIUM 136 08/24/2020 06:14 AM    POTASSIUM 3.7 08/24/2020 06:14 AM    CHLORIDE 105 08/24/2020 06:14 AM    CO2 17 (L) 08/24/2020 06:14 AM    GLUCOSE 141 (H) 08/24/2020 06:14 AM    BUN 15 08/24/2020 06:14 AM    CREATININE 0.47 (L) 08/24/2020 06:14 AM      Lab Results   Component Value Date/Time    TRIGLYCERIDE 312 (H) 08/23/2020 11:11 PM       Lab Results   Component Value Date/Time    ALKPHOSPHAT 81 08/24/2020 02:50 AM    ASTSGOT 188 (H) 08/24/2020 02:50 AM    ALTSGPT 124 (H) 08/24/2020 02:50 AM    TBILIRUBIN 0.3 08/24/2020 02:50 AM        Imaging/Testing:    I interpreted and/or reviewed the patient's neuroimaging    DX-CHEST-PORTABLE (1 VIEW)   Final Result         1.  Patchy bilateral pulmonary infiltrates, somewhat increased since prior.      CT-HEAD W/O   Final Result         1.  Possible subtle sulcal effacement and slight loss of differentiation of gray-white matter, consider component of cerebral edema. Recommend radiographic follow-up   2.  Bilateral sphenoid and maxillary sinus air-fluid levels      DX-CHEST-PORTABLE (1 VIEW)   Final Result         1.  No acute cardiopulmonary disease.      EC-ECHOCARDIOGRAM COMPLETE W/O CONT    (Results Pending)       Assessment and Plan:    Annika He is a 19 y.o. woman presenting for whom neurology has been consulted for abnormal movements in the setting of drug overdose and PEA arrest.  CT head is concerning for anoxic injury given sulcal effacement and loss of grey white differentiation.  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  Prognosis is guarded, pending rewarming s/p therapeutic hypothermia.    Plan:    - begin rewarming at 3AM 8/25/20  - EEG 8/24/20 with sedation held  - neuron specific enolase to be drawn Thursday  - MRI brain to be performed Friday to identify evidence of  cortical ribboning and bilateral T2 hypertensities of subcortical structures  - GOC, wean sedation and rewarm per ICU protocol  - no AED indicated at this juncture    The evaluation of the patient, and recommended management, was discussed with Dr. Augustine.     Ty Mccann MD  Medical Director, [facility], [facility]  Neurohospitalist, & Vascular Neurology, [facility]  Clinical  of Neurology, [employer]

## 2020-08-24 NOTE — ASSESSMENT & PLAN NOTE
Intubated 8/24  Trach 9/9  On T-piece 6 hours/day yesterday     Now on continuous T-piece, watching minute ventilation  ABG OK  Needs intermittent suctioning

## 2020-08-24 NOTE — PROGRESS NOTES
"Critical Care Progress Note    Date of admission  8/23/2020    Chief Complaint  19 y.o. female admitted 8/23/2020 with drug overdose, cardiac arrest    Hospital Course    \"19 y.o. female who presented 8/23/2020 with drug overdose with methamphetamines, oxycodone and found by boyfriend.  Cardiac arrest enroute to hospital and received narcan, epinephrine and cpr and ROSC achieved.  Difficult airway at scene and LMA placed. ET tube placed in ER.  She was vomiting during airway attempt.  On propofol due to vent asynchrony.  Propofol turned off for neuro assessment and possible hypothermic protocol.  Family no longer at hospital and unable to reach per phone number given for mother.\"      Interval Problem Update  Reviewed last 24 hour events:   - Admitted this AM, on TTM protocol   - Neuro: GCS 7T, on sedation. EEG pending   - HR: 90s-120s   - SBP: 100s-140s   - GI: NPO   - UOP: 815 mL since admission   - Pearce: yes   - Tm: 37.1   - Lines: SC CVC, Art   - PPx: GI pepcid, DVT lovenox   - ABG: compensated metabolic acidosis   - VD#2   - CXR (personally reviewed and compared to prior): patchy B/L infiltrates    Review of Systems  Review of Systems   Unable to perform ROS: Critical illness        Vital Signs for last 24 hours   Temp:  [34.7 °C (94.5 °F)-37 °C (98.6 °F)] 36.8 °C (98.2 °F)  Pulse:  [] 90  Resp:  [12-34] 28  BP: ()/() 102/68  SpO2:  [85 %-100 %] 100 %    Hemodynamic parameters for last 24 hours  CVP:  [12 MM HG-16 MM HG] 16 MM HG    Respiratory Information for the last 24 hours  Vent Mode: APVCMV  Rate (breaths/min): 28  Vt Target (mL): 400  PEEP/CPAP: 8  MAP: 15  Control VTE (exp VT): 408    Physical Exam   Physical Exam  Vitals signs and nursing note reviewed.   Constitutional:       General: She is in acute distress.      Appearance: She is toxic-appearing.   HENT:      Head: Normocephalic and atraumatic.      Right Ear: External ear normal.      Left Ear: External ear normal.      Nose: " No congestion or rhinorrhea.      Mouth/Throat:      Mouth: Mucous membranes are dry.      Pharynx: No oropharyngeal exudate or posterior oropharyngeal erythema.   Eyes:      General: No scleral icterus.     Conjunctiva/sclera: Conjunctivae normal.      Pupils: Pupils are equal, round, and reactive to light.      Comments: Slightly disconjugate gaze    Neck:      Musculoskeletal: Neck supple. No neck rigidity or muscular tenderness.   Cardiovascular:      Rate and Rhythm: Normal rate and regular rhythm.      Pulses: Normal pulses.      Heart sounds: Normal heart sounds. No murmur.   Pulmonary:      Effort: Respiratory distress present.      Breath sounds: No wheezing.   Chest:       Abdominal:      General: Abdomen is flat. There is no distension.      Palpations: There is no mass.      Tenderness: There is no abdominal tenderness.   Musculoskeletal:         General: No swelling or tenderness.      Right lower leg: No edema.      Left lower leg: No edema.   Skin:     General: Skin is warm and dry.      Capillary Refill: Capillary refill takes less than 2 seconds.      Findings: No erythema or rash.   Neurological:      Cranial Nerves: Cranial nerve deficit present.      Comments: Slightly disconjugate gaze, PERRL at 3 mm bilaterally,   + Corneal, -cough, -gag (on sedation at time of exam)  Does not withdraw from painful stimuli  Slight rhythmic movement of upper eyelids to noxious stimuli   Psychiatric:      Comments: Unable to assess         Medications  Current Facility-Administered Medications   Medication Dose Route Frequency Provider Last Rate Last Dose   • Cold NS infusion 720 mL  10 mL/kg Intravenous Once PRN Brock Murdock M.D.   Stopped at 08/24/20 0656   • NS infusion   Intravenous Continuous Brock Murdock M.D. 75 mL/hr at 08/24/20 0445     • magnesium sulfate 20 g/500mL infusion  0.5-2 g/hr Intravenous Continuous Brock Murdock M.D. 13 mL/hr at 08/24/20 0145 0.5 g/hr at 08/24/20 0145   • meperidine  (DEMEROL) injection 25 mg  25 mg Intravenous Q HOUR PRN Brock Murdock M.D.   25 mg at 08/24/20 0433   • fentaNYL (SUBLIMAZE) injection 100 mcg  100 mcg Intravenous Q HOUR PRN Brock Murdock M.D.       • fentanyl 50 mcg/mL infusion   mcg/hr Intravenous Continuous Brock Murdock M.D. 4 mL/hr at 08/24/20 0518 200 mcg/hr at 08/24/20 0518   • midazolam (VERSED) 2 MG/2ML injection 2 mg  2 mg Intravenous Once Brock Murdock M.D.        Followed by   • midazolam (VERSED) premix 125 mg/125 mL NS  0-10 mg/hr Intravenous Continuous Brock Murdock M.D.   Stopped at 08/24/20 0100   • vecuronium (NORCURON) injection 7.2 mg  0.1 mg/kg Intravenous Once Brock Murdock M.D.        Followed by   • vecuronium (NORCURON) 60 mg in D5W 500 mL Infusion  1-2 mcg/kg/min Intravenous Continuous Brock Murdock M.D.       • insulin regular (HumuLIN R,NovoLIN R) injection  0-14 Units Intravenous Once PRN Brock Murdock M.D.        And   • insulin regular human (HUMULIN/NOVOLIN R) 62.5 Units in  mL infusion per protocol  0-29 Units/hr Intravenous PRN Brock Murdock M.D.        And   • dextrose 50% (D50W) injection 25-50 mL  25-50 mL Intravenous PRN Brock Murdock M.D.   50 mL at 08/24/20 0410   • artificial tears (EYE LUBRICANT) ophth ointment 1 Application  1 Application Both Eyes Q8HRS Brock Murdock M.D.   1 Application at 08/24/20 0614   • norepinephrine (Levophed) infusion 8 mg/250 mL (premix)  0-30 mcg/min Intravenous Continuous Brock Murdock M.D.   Stopped at 08/24/20 0100   • K+ Scale: Goal of 4.5  1 Each Intravenous Q6HRS Brock Murdock M.D.   1 Each at 08/24/20 0600   • sodium phosphate 15 mmol in D5W 250 mL ivpb  15 mmol Intravenous Once PRN Brock Murdock M.D. 62.5 mL/hr at 08/24/20 0614 15 mmol at 08/24/20 0614   • Respiratory Therapy Consult   Nebulization Continuous RT Brock Murdock M.D.       • ipratropium-albuterol (DUONEB) nebulizer solution  3 mL Nebulization Q2HRS PRN (RT) Brock Murdock M.D.       • famotidine  (PEPCID) tablet 20 mg  20 mg Enteral Tube Q12HRS Brock Murdock M.D.        Or   • famotidine (PEPCID) injection 20 mg  20 mg Intravenous Q12HRS Brock Murdock M.D.   20 mg at 08/24/20 0614   • senna-docusate (PERICOLACE or SENOKOT S) 8.6-50 MG per tablet 2 Tab  2 Tab Enteral Tube BID Brock Murdock M.D.   Stopped at 08/24/20 0600    And   • polyethylene glycol/lytes (MIRALAX) PACKET 1 Packet  1 Packet Enteral Tube QDAY PRN Brock Murdock M.D.        And   • magnesium hydroxide (MILK OF MAGNESIA) suspension 30 mL  30 mL Enteral Tube QDAY PRN Brock Murdock M.D.        And   • bisacodyl (DULCOLAX) suppository 10 mg  10 mg Rectal QDAY PRN Brock Murdock M.D.       • MD Alert...ICU Electrolyte Replacement per Pharmacy   Other PHARMACY TO DOSE Brock Murdock M.D.       • lidocaine (XYLOCAINE) 1 % injection 1-2 mL  1-2 mL Tracheal Tube Q30 MIN PRN Brock Murdock M.D.       • LORAZEPAM 2 MG/ML INJ SOLN            • propofol (DIPRIVAN) injection  50-80 mcg/kg/min Intravenous Continuous Brock Murdock M.D. 34.6 mL/hr at 08/24/20 0654 80 mcg/kg/min at 08/24/20 0654   • SODIUM CHLORIDE 0.9 % IV SOLN            • DEXTROSE 5 % IV SOLN            • D5W infusion   Intravenous Continuous Brock Murdock M.D. 50 mL/hr at 08/24/20 0443 50 mL at 08/24/20 0443   • labetalol (NORMODYNE/TRANDATE) injection 10 mg  10 mg Intravenous Q4HRS PRN Brock Murdock M.D.   10 mg at 08/24/20 0505   • hydrALAZINE (APRESOLINE) injection 10-20 mg  10-20 mg Intravenous Q6HRS PRN Brock uMrdock M.D.       • potassium chloride in water (KCL) ivpb **Administer in ICU only** 20 mEq  20 mEq Intravenous Once Brock Murdock M.D.       • PROPOFOL 10 MG/ML IV EMUL                Fluids    Intake/Output Summary (Last 24 hours) at 8/24/2020 0702  Last data filed at 8/24/2020 0600  Gross per 24 hour   Intake 2241.67 ml   Output 865 ml   Net 1376.67 ml       Laboratory  Recent Labs     08/23/20  2221 08/24/20  0000 08/24/20  0301   ISTATAPH 7.071* 7.164* 7.420      ISTATAPCO2 41.5* 44.5* 20.2*   ISTATAPO2 125* 78 138*   ISTATATCO2 13* 17* 14*   DHNMDBM6EOT 97 91* 99   ISTATARTHCO3 12.0* 16.0* 13.1*   ISTATARTBE -18* -12* -9*   ISTATTEMP 35.7 C 35.2 C 35.8 C   ISTATFIO2 50 40 50   ISTATSPEC Arterial Arterial Arterial   ISTATAPHTC 7.087* 7.188* 7.438   EYIBQLCI8RB 117* 69 131*         Recent Labs     08/23/20 2311 08/24/20  0021 08/24/20  0250 08/24/20  0614   SODIUM 140  --  140 136   POTASSIUM - 4.4 4.3 3.7   CHLORIDE 103  --  105 105   CO2 12*  --  17* 17*   BUN 17  --  19 15   CREATININE 0.99  --  0.73 0.47*   MAGNESIUM  --   --  2.1 2.3   PHOSPHORUS  --   --  1.6*  --    CALCIUM 8.4*  --  7.9* 7.4*     Recent Labs     08/23/20 2311 08/24/20 0021 08/24/20  0250 08/24/20  0614   ALTSGPT  --  152* 124*  --    ASTSGOT  --  265* 188*  --    ALKPHOSPHAT 109*  --  81  --    TBILIRUBIN 0.3  --  0.3  --    LIPASE 44  --   --   --    GLUCOSE 127*  --  86 141*     Recent Labs     08/23/20 2205 08/23/20 2311 08/24/20  0021 08/24/20  0250   WBC 11.4*  --   --  10.7   NEUTSPOLYS 40.50*  --   --   --    LYMPHOCYTES 50.80*  --   --   --    MONOCYTES 5.60  --   --   --    EOSINOPHILS 1.60  --   --   --    BASOPHILS 0.00  --   --   --    ASTSGOT  --   --  265* 188*   ALTSGPT  --   --  152* 124*   ALKPHOSPHAT  --  109*  --  81   TBILIRUBIN  --  0.3  --  0.3     Recent Labs     08/23/20 2205 08/24/20  0250 08/24/20  0614   RBC 4.79 4.56  --    HEMOGLOBIN 15.3 14.4  --    HEMATOCRIT 49.0* 43.0  --    PLATELETCT 334 256  --    PROTHROMBTM 12.1 12.6 13.8   APTT 26.9 25.7 28.2   INR 0.87 0.92 1.03       Imaging  X-Ray:  I have personally reviewed the images and compared with prior images.  EKG:  I have personally reviewed the images and compared with prior images.  CT:    Reviewed  Echo:   Reviewed    Assessment/Plan  Drug overdose- (present on admission)  Assessment & Plan  Methamphetamines, oxycodone and EtOH per boyfriend  Counseling when able    Metabolic acidosis- (present on  admission)  Assessment & Plan  Secondary to cardiac arrest  Aggressive IVF and pressors  Monitor    Acute respiratory failure with hypoxia (HCC)- (present on admission)  Assessment & Plan  Secondary to drug overdose  RT/O2 protocols  Daily and PRN ABGs  Titration of ventilator therapy based on ABGs and patient's status  Sedation as tolerated/indicated  Daily CXR  HOB >30 degrees and peridex for VAP prevention  Pepcid for GI prophylaxis  SAT/SBT when able (ABCDEF Bundle)  Early mobility  Currently to remain heavily sedated for targeted temperature management protocol, propofol transiently held for EEG    Cardiac arrest (HCC)- (present on admission)  Assessment & Plan  Pulseless electrical activity in ambulance while en route to hospital    AHA guidelines for comatose patients following out-of-hospital cardiac arrest:  Induce hypothermia for unconscious adult patients with return of spontaneous circulation (ROSC) after out-of-hospital cardiac arrest when the initial rhythm was ventricular fibrillation (VF) or pulseless ventricular tachycardia (pVT) (class I, level of evidence: B-R)    Similar therapy may be beneficial for patients with non-VF/non-pVT (nonshockable) arrest out-of-hospital or with in-hospital arrest (class I, level of evidence: C-EO)  Rogelio et al. [id number]    Post cardiac arrest care   -Ventilation: Goal PCO2 40-45, PaO2 ~100, low tidal volume ventilation   -Hemodynamic: Goal systolic blood pressure >90 mmHg, goal MAP >65 mmHg; fluid boluses and pressors (epinephrine, norepinephrine) as guided by bedside ultrasound and hemodynamics   -Cardiac: Treat ACS if indicated, treat arrhythmia as required, Echo pending   -Neurologic: Serial neurologic exams, CT head: Possible subtle sulcal effacement and slight loss of differentiation of gray-white matter, consider component of cerebral edema. Recommend radiographic follow-up, EEG pending, targeted temperature management, sedation is required to control  shivering or ventilator dyssynchrony   -Metabolic: Serial lactate, goal blood glucose 120-180, maintain euvolemia, monitor urine output, maintain potassium greater than 3.5, maintain magnesium greater than 2  Neuro prognostication in 48-72 hours  Cardiology consult if indicated  Cardiac ischemia evaluation when indicated    PEA (Pulseless electrical activity) (HCC)- (present on admission)  Assessment & Plan  Enroute to hospital  Hypoxia and aspiration contributory  Tele monitoring   On TTM protocol  CT Head with mild edema    Hypoglycemia- (present on admission)  Assessment & Plan  ?2/2 ischemic hepatitis  Continue dextrose and serial accuchecks    Encephalopathy acute- (present on admission)  Assessment & Plan  Given history of cardiac arrest and mild edema on CT this is concerning for cerebral ischemia  Ongoing targeted temperature management protocol  Will need to readdress mental status when completed  EEG pending to rule out NCSE    Elevated transaminase level- (present on admission)  Assessment & Plan  Likely due to ischemic hepatitis  Monitor synthetic functions including INR and glucose  Avoid hepatotoxins  Currently infusing D5W to compensate for hypoglycemia    Cerebral edema (HCC)- (present on admission)  Assessment & Plan  Mild/possible per radiologist on CT  Repeat MRI in 72 hours       VTE:  Lovenox  Ulcer: H2 Antagonist  Lines: Central Line  Ongoing indication addressed, Arterial Line  Ongoing indication addressed and Pearce Catheter  Ongoing indication addressed    I have performed a physical exam and reviewed and updated ROS and Plan today (8/24/2020). In review of yesterday's note (8/23/2020), there are no changes except as documented above.     Discussed patient condition and risk of morbidity and/or mortality with Family, RN, RT, Pharmacy, Dietary, , Code status disscussed, Charge nurse / hot rounds and neurology     The patient remains critically ill.  Critical care time = 79 minutes  in directly providing and coordinating critical care and extensive data review.  No time overlap and excludes procedures.

## 2020-08-24 NOTE — ASSESSMENT & PLAN NOTE
Mild/possible per radiologist on CT  MRI on 8/27/2020 without edema or severe changes  Repeat MRI 9/2 - radiographic evidence of anoxic brain injury more apparent then prior MRI

## 2020-08-24 NOTE — ED PROVIDER NOTES
ED Provider Note    CHIEF COMPLAINT  AMS, ingestion, arrest    HPI  Phil Vargas is a 28ish adult who presents to the emergency department after possible ingestion and cardiac arrest.  According to EMS on scene the boyfriend was quite hysterical stating she took something not quite sure maybe alcohol may be opioids but she was unresponsive and had been vomiting.  They attempted to give airway she had an emesis episode and ended up putting in a LMA and unfortunately in route she became bradycardic and then had a cardiac arrest she received 4 mg of Narcan without improvement of her symptoms as well as 1 round of epinephrine.    History is otherwise limited secondary to poor historian and the patient is in cardiac arrest    REVIEW OF SYSTEMS  Unable to perform  All other review of systems are negative    PAST MEDICAL HISTORY       SOCIAL HISTORY  Social History     Tobacco Use   • Smoking status: Not on file   Substance and Sexual Activity   • Alcohol use: Not on file   • Drug use: Not on file   • Sexual activity: Not on file       SURGICAL HISTORY  patient denies any surgical history    CURRENT MEDICATIONS  Home Medications    **Home medications have not yet been reviewed for this encounter**         ALLERGIES  Not on File    PHYSICAL EXAM  VITAL SIGNS: Wt 72 kg (158 lb 11.7 oz)  CPR in progress, end tital 77 for EMS   Pulse ox interpretation: low 80's with LMA  Constitutional: unconscious CPR in progress  HENT: Normocephalic atraumatic, supraglottic airway in place  Eyes:  No stridor no ant neck swelling  Cardiovascular: pulses felt with CPR  Thorax & Lungs: coarse diminished breath sounds b/l   Abdomen: distended abdomen   Skin: mottled, purplish extremities .   Extremities/MSK:no edema or large deformity noted  Neurologic: unconscious       DIFFERENTIAL DIAGNOSIS AND WORK UP PLAN    Patient was 36-year-old female who presented in cardiac arrest after possible ingestion unknown if this was purposeful.  CPR was in  progress we gave her another round of epi here in the emergency department and on pulse check we had obtained return of spontaneous circulation.  Her supraglottic airway was replaced with a 7.5 ET tube there was evidence of aspiration with food products in her airway.    She did have a gag reflex during intubation and is doing some agonal respirations but otherwise no other purposeful movement noted    We will evaluate for intracranial injury severe acidosis severe electrolyte abnormality possible ingestions she be placed on some propofol as nursing staff told me she is starting to move her head a little bit more and I do not want her to dislodge the ET tube.    I spoke with Dr. Yan with the ICU service as I was discussing another patient with him and let him know when she rolled through the door he will come to evaluate her once her work-up has been completed    DIAGNOSTIC STUDIES / PROCEDURES    EKG  Results for orders placed or performed during the hospital encounter of 20   EKG (NOW)   Result Value Ref Range    Report       [facility]    Test Date:  2020  Pt Name:    LEE TWO                   Department: ER  MRN:        [id number]                      Room:       [facility]  Gender:                                  Technician: [id number]  :                                     Requested By:ER TRIAGE PROTOCOL  Order #:    [id number]                    Reading MD:    Measurements  Intervals                                Axis  Rate:       119                          P:          75  KS:         156                          QRS:        97  QRSD:       86                           T:          -23  QT:         292  QTc:        411    Interpretive Statements  SINUS TACHYCARDIA  RIGHT AXIS DEVIATION  NONSPECIFIC T ABNORMALITIES, INFERIOR LEADS  No previous ECG available for comparison         LABS  Pertinent Lab Findings  CBC with white blood count of 11 she is a neutrophil  predominance lactate is elevated 7.6 ammonia is mildly elevated urinalysis consistent with infection negative salicylate Tylenol urine drug screen positive for amphetamines and alcohol is elevated CMP consistent with anion gap acidosis without evidence of RUSSELL  Blood cultures were sent the patient is not pregnant troponin was mildly elevated  COVID negative      RADIOLOGY  CT-HEAD W/O   Final Result         1.  Possible subtle sulcal effacement and slight loss of differentiation of gray-white matter, consider component of cerebral edema. Recommend radiographic follow-up   2.  Bilateral sphenoid and maxillary sinus air-fluid levels      DX-CHEST-PORTABLE (1 VIEW)   Final Result         1.  No acute cardiopulmonary disease.        The radiologist's interpretation of all radiological studies have been reviewed by me.    Intubation Procedure    Indication: Respiratory failure and airway compromise    Consent: Unable to be obtained due to the emergent nature of this procedure.    Medications Used: None    Procedure: The patient was placed in the appropriate position.  Cricoid pressure was not required.  Intubation was performed by direct laryngoscopy using a laryngoscope and a 7.5 cuffed endotracheal tube.  The cuff was then inflated and the tube was secured appropriately at a distance of 23 cm to the dental ridge.  Initial confirmation of placement included bilateral breath sounds, an end tidal CO2 detector, absence of sounds over the stomach, tube fogging and adequate chest rise.  A chest x-ray to verify correct placement of the tube showed appropriate tube position.    The patient tolerated the procedure well - Evidence of aspirated food in the airway and ett after placement     Complications: None        COURSE & MEDICAL DECISION MAKING  Pertinent Labs & Imaging studies reviewed. (See chart for details)    11:10 PM  Reassessed patient at the bedside, she is moving a little bit more on the vent and kind of  overbreathing which is a good sign, she will be given 100 mcg of fentanyl for apparent discomfort she is tachycardic and diaphoretic which is in line with possible amphetamine use    11:19 PM  Spoke karlos family and the boyfriend was with her he states that she did take an Gina 30 as well as alcohol and possibly amphetamines.  He states he was giving her mouth-to-mouth at home and she was vomiting so unknown how long she was down with hypoxia concerning signs of her CT scan showing global swelling and edema which is concerning in the setting of hypoxia.      12:18 AM  Spoke karlos Murdock with the PMA service and he will hospitalize the patient.  He agrees with me that edema on the brain is a porcine at this time but she is showing a little bit more life she is biting on the tube and a little bit more agitated potentially she may have a positive outcome.  She will be admitted possible hypothermia in the ICU overnight    CRITICAL CARE  The very real possibilty of a deterioration of this patient's condition required the highest level of my preparedness for sudden, emergent intervention.  I provided critical care services, which included medication orders, frequent reevaluations of the patient's condition and response to treatment, ordering and reviewing test results, and discussing the case with various consultants.  The critical care time associated with the care of the patient was 45 minutes. Review chart for interventions. This time is exclusive of any other billable procedures.     I did place patient on a legal hold for ingestion leading to hypoxia, and in cardiac arrest.  I am not sure if this was intentional or not but did have deleterious effects.    I verified that the patient was wearing a mask and I was wearing appropriate PPE every time I entered the room. The patient's mask was on the patient at all times during my encounter except for a brief view of the oropharynx.    DISPOSITION:  Patient will be evaluated  for hospitalization by Dr Murdock in critical condition.        FINAL IMPRESSION  1. Ingestion  2. Cardiac arrest  3. Lactic acidosis  4. Global cerebral edema  5. Amphetamine use  6. Alcohol use  7. Aspiration    Critical care time 45 min         Electronically signed by: Brandy Lazo M.D., 8/23/2020 10:09 PM    This dictation has been created using voice recognition software and/or scribes. The accuracy of the dictation is limited by the abilities of the software and the expertise of the scribes. I expect there may be some errors of grammar and possibly content. I made every attempt to manually correct the errors within my dictation. However, errors related to voice recognition software and/or scribes may still exist and should be interpreted within the appropriate context.

## 2020-08-24 NOTE — ED NOTES
Blood cultures x 2 drawn according to hospital policy and drawn prior to administering antibiotics.

## 2020-08-24 NOTE — ED NOTES
Pt transported by Elif KING w/ respiratory, Pt transported on ventilator and cardiac monitor, pt transported on gurney, pt left ED GSC 3 w/ no sedation medication.

## 2020-08-24 NOTE — PROGRESS NOTES
Group Topic: BH Coping Skills Education    Date: 4/30/2020  Start Time:  1:00 PM  End Time:  2:00 PM  Facilitators: Guido Mars LPC    Focus: Cognitive distortions/Challenging negative thoughts   Number in attendance: 4    This visit was performed via live interactive two-way video.    During their visit, the patient was informed that their consent to treat includes permission to submit claims to their insurance on their behalf for the services received.  Clinician Location: Home  Patient Location: Home    A presentation was given on some of the cognitive distortions that are prevalent in daily life. Pt's engaged in self-reflection to determine which one they struggle with most and how to challenge them.    Guido Mars LPC        Attendance: Present  Patient Response: Appropriate feedback, Attentive, Good eye contact, Interactive and Interested in topic    Pt was attentive during the presentation and stated that he was working on challenging the negative thought that his divorce made him a failure.   Monitor Summary    SR ST  .14/.08/.36     12 hour chart check

## 2020-08-24 NOTE — DISCHARGE PLANNING
Medical Social Work    MSW escorted pt's mom, Juliette to the Family Room where she was updated by ERP.  ERP also spoke with pt's father earlier.  MSW will escort pt's parents to bedside as soon as nursing staff is ready.

## 2020-08-24 NOTE — DISCHARGE PLANNING
Medical Social Work    Referral: Acute Medical Patient    Intervention: MSW responded to [id number].  Pt is a 19 year old female brought in by MAREK from [facility] at [address], [place].  Pt is Annika Means (: 2000).  MSW attempted to contact pt's mother, Juliette listed as emergency contact; however, number was not in service.  MSW received a transferred call and spoke with pt's mother, Juliette ([phone]) via phone.  Pt's mom was briefly updated on pt's status.  Pt's mom is driving here from [facility].  MSW updated [facility] Lobby that pt's mom is on her way.  The Lobby will contact this worker when pt's mom arrives.      Plan: SW will follow.

## 2020-08-25 ENCOUNTER — APPOINTMENT (OUTPATIENT)
Dept: RADIOLOGY | Facility: MEDICAL CENTER | Age: 20
DRG: 004 | End: 2020-08-25
Attending: INTERNAL MEDICINE
Payer: MEDICAID

## 2020-08-25 LAB
ACTION RANGE TRIGGERED IACRT: NO
ALBUMIN SERPL BCP-MCNC: 2.5 G/DL (ref 3.2–4.9)
ALBUMIN/GLOB SERPL: 1.3 G/DL
ALP SERPL-CCNC: 65 U/L (ref 30–99)
ALT SERPL-CCNC: 63 U/L (ref 2–50)
ANION GAP SERPL CALC-SCNC: 10 MMOL/L (ref 7–16)
ANION GAP SERPL CALC-SCNC: 12 MMOL/L (ref 7–16)
ANION GAP SERPL CALC-SCNC: 12 MMOL/L (ref 7–16)
ANION GAP SERPL CALC-SCNC: 9 MMOL/L (ref 7–16)
APTT PPP: 41.6 SEC (ref 24.7–36)
APTT PPP: 45 SEC (ref 24.7–36)
APTT PPP: 46.5 SEC (ref 24.7–36)
APTT PPP: 47.2 SEC (ref 24.7–36)
AST SERPL-CCNC: 56 U/L (ref 12–45)
BASE EXCESS BLDA CALC-SCNC: -7 MMOL/L (ref -4–3)
BASOPHILS # BLD AUTO: 0.2 % (ref 0–1.8)
BASOPHILS # BLD: 0.02 K/UL (ref 0–0.12)
BILIRUB SERPL-MCNC: 0.7 MG/DL (ref 0.1–1.5)
BODY TEMPERATURE: ABNORMAL DEGREES
BUN SERPL-MCNC: 6 MG/DL (ref 8–22)
BUN SERPL-MCNC: 7 MG/DL (ref 8–22)
BUN SERPL-MCNC: 8 MG/DL (ref 8–22)
BUN SERPL-MCNC: 8 MG/DL (ref 8–22)
CALCIUM SERPL-MCNC: 7 MG/DL (ref 8.5–10.5)
CALCIUM SERPL-MCNC: 7.5 MG/DL (ref 8.5–10.5)
CHLORIDE SERPL-SCNC: 105 MMOL/L (ref 96–112)
CHLORIDE SERPL-SCNC: 106 MMOL/L (ref 96–112)
CHLORIDE SERPL-SCNC: 106 MMOL/L (ref 96–112)
CHLORIDE SERPL-SCNC: 107 MMOL/L (ref 96–112)
CO2 BLDA-SCNC: 15 MMOL/L (ref 20–33)
CO2 SERPL-SCNC: 16 MMOL/L (ref 20–33)
CO2 SERPL-SCNC: 18 MMOL/L (ref 20–33)
CREAT SERPL-MCNC: 0.42 MG/DL (ref 0.5–1.4)
CREAT SERPL-MCNC: 0.49 MG/DL (ref 0.5–1.4)
CREAT SERPL-MCNC: 0.52 MG/DL (ref 0.5–1.4)
CREAT SERPL-MCNC: 0.64 MG/DL (ref 0.5–1.4)
EOSINOPHIL # BLD AUTO: 0.13 K/UL (ref 0–0.51)
EOSINOPHIL NFR BLD: 1.4 % (ref 0–6.9)
ERYTHROCYTE [DISTWIDTH] IN BLOOD BY AUTOMATED COUNT: 42.7 FL (ref 35.9–50)
GLOBULIN SER CALC-MCNC: 2 G/DL (ref 1.9–3.5)
GLUCOSE BLD-MCNC: 109 MG/DL (ref 65–99)
GLUCOSE BLD-MCNC: 112 MG/DL (ref 65–99)
GLUCOSE BLD-MCNC: 124 MG/DL (ref 65–99)
GLUCOSE BLD-MCNC: 125 MG/DL (ref 65–99)
GLUCOSE BLD-MCNC: 131 MG/DL (ref 65–99)
GLUCOSE BLD-MCNC: 131 MG/DL (ref 65–99)
GLUCOSE BLD-MCNC: 132 MG/DL (ref 65–99)
GLUCOSE BLD-MCNC: 136 MG/DL (ref 65–99)
GLUCOSE BLD-MCNC: 137 MG/DL (ref 65–99)
GLUCOSE BLD-MCNC: 139 MG/DL (ref 65–99)
GLUCOSE BLD-MCNC: 140 MG/DL (ref 65–99)
GLUCOSE BLD-MCNC: 145 MG/DL (ref 65–99)
GLUCOSE BLD-MCNC: 147 MG/DL (ref 65–99)
GLUCOSE BLD-MCNC: 149 MG/DL (ref 65–99)
GLUCOSE BLD-MCNC: 152 MG/DL (ref 65–99)
GLUCOSE BLD-MCNC: 158 MG/DL (ref 65–99)
GLUCOSE BLD-MCNC: 71 MG/DL (ref 65–99)
GLUCOSE BLD-MCNC: 90 MG/DL (ref 65–99)
GLUCOSE BLD-MCNC: 95 MG/DL (ref 65–99)
GLUCOSE BLD-MCNC: 99 MG/DL (ref 65–99)
GLUCOSE SERPL-MCNC: 113 MG/DL (ref 65–99)
GLUCOSE SERPL-MCNC: 121 MG/DL (ref 65–99)
GLUCOSE SERPL-MCNC: 139 MG/DL (ref 65–99)
GLUCOSE SERPL-MCNC: 149 MG/DL (ref 65–99)
HCO3 BLDA-SCNC: 14.6 MMOL/L (ref 17–25)
HCT VFR BLD AUTO: 35.2 % (ref 37–47)
HGB BLD-MCNC: 12.4 G/DL (ref 12–16)
HOROWITZ INDEX BLDA+IHG-RTO: 377 MM[HG]
IMM GRANULOCYTES # BLD AUTO: 0.02 K/UL (ref 0–0.11)
IMM GRANULOCYTES NFR BLD AUTO: 0.2 % (ref 0–0.9)
INR PPP: 1.05 (ref 0.87–1.13)
INR PPP: 1.06 (ref 0.87–1.13)
INR PPP: 1.1 (ref 0.87–1.13)
INR PPP: 1.17 (ref 0.87–1.13)
INST. QUALIFIED PATIENT IIQPT: YES
LYMPHOCYTES # BLD AUTO: 1.44 K/UL (ref 1–4.8)
LYMPHOCYTES NFR BLD: 15 % (ref 22–41)
MAGNESIUM SERPL-MCNC: 2.3 MG/DL (ref 1.5–2.5)
MAGNESIUM SERPL-MCNC: 2.3 MG/DL (ref 1.5–2.5)
MAGNESIUM SERPL-MCNC: 3.3 MG/DL (ref 1.5–2.5)
MAGNESIUM SERPL-MCNC: 4.4 MG/DL (ref 1.5–2.5)
MCH RBC QN AUTO: 31.6 PG (ref 27–33)
MCHC RBC AUTO-ENTMCNC: 35.2 G/DL (ref 33.6–35)
MCV RBC AUTO: 89.6 FL (ref 81.4–97.8)
MONOCYTES # BLD AUTO: 0.44 K/UL (ref 0–0.85)
MONOCYTES NFR BLD AUTO: 4.6 % (ref 0–13.4)
NEUTROPHILS # BLD AUTO: 7.57 K/UL (ref 2–7.15)
NEUTROPHILS NFR BLD: 78.6 % (ref 44–72)
NRBC # BLD AUTO: 0 K/UL
NRBC BLD-RTO: 0 /100 WBC
O2/TOTAL GAS SETTING VFR VENT: 30 %
PCO2 BLDA: 21 MMHG (ref 26–37)
PCO2 TEMP ADJ BLDA: 20 MMHG (ref 26–37)
PH BLDA: 7.45 [PH] (ref 7.4–7.5)
PH TEMP ADJ BLDA: 7.47 [PH] (ref 7.4–7.5)
PHOSPHATE SERPL-MCNC: 2 MG/DL (ref 2.5–4.5)
PLATELET # BLD AUTO: 188 K/UL (ref 164–446)
PMV BLD AUTO: 9.7 FL (ref 9–12.9)
PO2 BLDA: 113 MMHG (ref 64–87)
PO2 TEMP ADJ BLDA: 107 MMHG (ref 64–87)
POTASSIUM SERPL-SCNC: 3.5 MMOL/L (ref 3.6–5.5)
POTASSIUM SERPL-SCNC: 3.6 MMOL/L (ref 3.6–5.5)
POTASSIUM SERPL-SCNC: 4 MMOL/L (ref 3.6–5.5)
POTASSIUM SERPL-SCNC: 4.5 MMOL/L (ref 3.6–5.5)
PROT SERPL-MCNC: 4.5 G/DL (ref 6–8.2)
PROTHROMBIN TIME: 14 SEC (ref 12–14.6)
PROTHROMBIN TIME: 14.1 SEC (ref 12–14.6)
PROTHROMBIN TIME: 14.6 SEC (ref 12–14.6)
PROTHROMBIN TIME: 15.2 SEC (ref 12–14.6)
RBC # BLD AUTO: 3.93 M/UL (ref 4.2–5.4)
SAO2 % BLDA: 99 % (ref 93–99)
SODIUM SERPL-SCNC: 132 MMOL/L (ref 135–145)
SODIUM SERPL-SCNC: 133 MMOL/L (ref 135–145)
SODIUM SERPL-SCNC: 133 MMOL/L (ref 135–145)
SODIUM SERPL-SCNC: 135 MMOL/L (ref 135–145)
SPECIMEN DRAWN FROM PATIENT: ABNORMAL
WBC # BLD AUTO: 9.6 K/UL (ref 4.8–10.8)

## 2020-08-25 PROCEDURE — 99233 SBSQ HOSP IP/OBS HIGH 50: CPT | Performed by: PSYCHIATRY & NEUROLOGY

## 2020-08-25 PROCEDURE — 700102 HCHG RX REV CODE 250 W/ 637 OVERRIDE(OP): Performed by: INTERNAL MEDICINE

## 2020-08-25 PROCEDURE — 700105 HCHG RX REV CODE 258: Performed by: INTERNAL MEDICINE

## 2020-08-25 PROCEDURE — 94003 VENT MGMT INPAT SUBQ DAY: CPT

## 2020-08-25 PROCEDURE — 71045 X-RAY EXAM CHEST 1 VIEW: CPT

## 2020-08-25 PROCEDURE — 82803 BLOOD GASES ANY COMBINATION: CPT

## 2020-08-25 PROCEDURE — 700111 HCHG RX REV CODE 636 W/ 250 OVERRIDE (IP): Performed by: INTERNAL MEDICINE

## 2020-08-25 PROCEDURE — 85025 COMPLETE CBC W/AUTO DIFF WBC: CPT

## 2020-08-25 PROCEDURE — 99292 CRITICAL CARE ADDL 30 MIN: CPT | Performed by: INTERNAL MEDICINE

## 2020-08-25 PROCEDURE — 80053 COMPREHEN METABOLIC PANEL: CPT

## 2020-08-25 PROCEDURE — 700105 HCHG RX REV CODE 258

## 2020-08-25 PROCEDURE — 82962 GLUCOSE BLOOD TEST: CPT | Mod: 91

## 2020-08-25 PROCEDURE — 700101 HCHG RX REV CODE 250: Performed by: INTERNAL MEDICINE

## 2020-08-25 PROCEDURE — 37799 UNLISTED PX VASCULAR SURGERY: CPT

## 2020-08-25 PROCEDURE — 85730 THROMBOPLASTIN TIME PARTIAL: CPT | Mod: 91

## 2020-08-25 PROCEDURE — 85610 PROTHROMBIN TIME: CPT

## 2020-08-25 PROCEDURE — 84100 ASSAY OF PHOSPHORUS: CPT

## 2020-08-25 PROCEDURE — 99291 CRITICAL CARE FIRST HOUR: CPT | Performed by: INTERNAL MEDICINE

## 2020-08-25 PROCEDURE — A9270 NON-COVERED ITEM OR SERVICE: HCPCS | Performed by: INTERNAL MEDICINE

## 2020-08-25 PROCEDURE — 770022 HCHG ROOM/CARE - ICU (200)

## 2020-08-25 PROCEDURE — 83735 ASSAY OF MAGNESIUM: CPT | Mod: 91

## 2020-08-25 PROCEDURE — 94770 HCHG CO2 EXPIRED GAS DETERMINATION: CPT

## 2020-08-25 PROCEDURE — 80048 BASIC METABOLIC PNL TOTAL CA: CPT | Mod: 91

## 2020-08-25 RX ORDER — SODIUM CHLORIDE, SODIUM LACTATE, POTASSIUM CHLORIDE, CALCIUM CHLORIDE 600; 310; 30; 20 MG/100ML; MG/100ML; MG/100ML; MG/100ML
INJECTION, SOLUTION INTRAVENOUS
Status: COMPLETED
Start: 2020-08-25 | End: 2020-08-25

## 2020-08-25 RX ORDER — SODIUM CHLORIDE 9 MG/ML
INJECTION, SOLUTION INTRAVENOUS
Status: COMPLETED
Start: 2020-08-25 | End: 2020-08-25

## 2020-08-25 RX ORDER — SODIUM CHLORIDE 9 MG/ML
INJECTION, SOLUTION INTRAVENOUS
Status: ACTIVE
Start: 2020-08-25 | End: 2020-08-26

## 2020-08-25 RX ORDER — SODIUM CHLORIDE, SODIUM LACTATE, POTASSIUM CHLORIDE, AND CALCIUM CHLORIDE .6; .31; .03; .02 G/100ML; G/100ML; G/100ML; G/100ML
500 INJECTION, SOLUTION INTRAVENOUS ONCE
Status: COMPLETED | OUTPATIENT
Start: 2020-08-25 | End: 2020-08-25

## 2020-08-25 RX ORDER — SODIUM CHLORIDE 9 MG/ML
1000 INJECTION, SOLUTION INTRAVENOUS ONCE
Status: COMPLETED | OUTPATIENT
Start: 2020-08-25 | End: 2020-08-25

## 2020-08-25 RX ADMIN — METRONIDAZOLE 500 MG: 500 INJECTION, SOLUTION INTRAVENOUS at 21:09

## 2020-08-25 RX ADMIN — METRONIDAZOLE 500 MG: 500 INJECTION, SOLUTION INTRAVENOUS at 05:09

## 2020-08-25 RX ADMIN — ENOXAPARIN SODIUM 40 MG: 40 INJECTION SUBCUTANEOUS at 05:08

## 2020-08-25 RX ADMIN — DEXTROSE MONOHYDRATE: 70 INJECTION, SOLUTION INTRAVENOUS at 11:50

## 2020-08-25 RX ADMIN — PROPOFOL 50 MCG/KG/MIN: 10 INJECTION, EMULSION INTRAVENOUS at 20:41

## 2020-08-25 RX ADMIN — PROPOFOL 70 MCG/KG/MIN: 10 INJECTION, EMULSION INTRAVENOUS at 00:25

## 2020-08-25 RX ADMIN — FAMOTIDINE 20 MG: 20 TABLET, FILM COATED ORAL at 18:10

## 2020-08-25 RX ADMIN — CEFTRIAXONE SODIUM 1 G: 1 INJECTION, POWDER, FOR SOLUTION INTRAMUSCULAR; INTRAVENOUS at 06:10

## 2020-08-25 RX ADMIN — SODIUM CHLORIDE, SODIUM LACTATE, POTASSIUM CHLORIDE, AND CALCIUM CHLORIDE 500 ML: .6; .31; .03; .02 INJECTION, SOLUTION INTRAVENOUS at 11:08

## 2020-08-25 RX ADMIN — SODIUM CHLORIDE 1000 ML: 9 INJECTION, SOLUTION INTRAVENOUS at 06:11

## 2020-08-25 RX ADMIN — PROPOFOL 30 MCG/KG/MIN: 10 INJECTION, EMULSION INTRAVENOUS at 04:57

## 2020-08-25 RX ADMIN — Medication 200 MCG/HR: at 01:01

## 2020-08-25 RX ADMIN — FAMOTIDINE 20 MG: 10 INJECTION INTRAVENOUS at 05:09

## 2020-08-25 RX ADMIN — PROPOFOL 20 MCG/KG/MIN: 10 INJECTION, EMULSION INTRAVENOUS at 14:38

## 2020-08-25 RX ADMIN — METRONIDAZOLE 500 MG: 500 INJECTION, SOLUTION INTRAVENOUS at 14:38

## 2020-08-25 RX ADMIN — SODIUM CHLORIDE, POTASSIUM CHLORIDE, SODIUM LACTATE AND CALCIUM CHLORIDE 500 ML: 600; 310; 30; 20 INJECTION, SOLUTION INTRAVENOUS at 11:08

## 2020-08-25 RX ADMIN — DOCUSATE SODIUM 50 MG AND SENNOSIDES 8.6 MG 2 TABLET: 8.6; 5 TABLET, FILM COATED ORAL at 18:10

## 2020-08-25 RX ADMIN — Medication 200 MCG/HR: at 10:04

## 2020-08-25 RX ADMIN — POTASSIUM PHOSPHATE, MONOBASIC AND POTASSIUM PHOSPHATE, DIBASIC 30 MMOL: 224; 236 INJECTION, SOLUTION, CONCENTRATE INTRAVENOUS at 11:51

## 2020-08-25 ASSESSMENT — FIBROSIS 4 INDEX
FIB4 SCORE: 1.84
FIB4 SCORE: 0.79

## 2020-08-25 NOTE — PROGRESS NOTES
SBP 90's, MAPs low 50's, CVP 7-9, UOP zero for last hour. Dr. Tomlin updated. Orders to administer 1 L NS bolus, if no improvement start norepinephrine infusion. Order entered into Epic.

## 2020-08-25 NOTE — PROGRESS NOTES
BS 55. D50 infusing at 20 mL/hr. Dr. Gonda updated. Orders to increase D50 infusion to 30 mL/hr and administer 1 amp D50.

## 2020-08-25 NOTE — DIETARY
"Nutrition Support Assessment   Day 1 of admit.  Annika He is a 19 y.o. female with admitting DX of Cardiac arrest and drug overdose.     19 y.o. female who presented 2020 with drug overdose with methamphetamines, oxycodone and found by boyfriend.  Cardiac arrest enroute to hospital and received narcan, epinephrine and cpr and ROSC achieved.  Difficult airway at scene and LMA placed. ET tube placed in ER.  She was vomiting during airway attempt.  On propofol due to vent asynchrony.  Propofol turned off for neuro assessment and possible hypothermic protocol. Pt was cooled then rewarmed to reach 36.5C on .     Current problem list:  1. Acute respiratory failure with hypoxia   2. Metabolic acidosis  3. Cerebral edema  4. Acute Encephalopathy  5. Hypoglycemia      Assessment:  Estimated Nutritional Needs: based on:   Height: 160 cm (5' 3\")  Weight: 76.7 kg (169 lb 1.5 oz)  Weight to use in calculation: 71.2 kg (157 lb)  Body mass index is 29.95 kg/m²., BMI classification: Overweight. Pt appears to be retaining fluid. Per I/O's, pt is positive 4.3 L since admit.    Calculation/Equation: MSJ x 1.0-1.1: 1447 - 1592 kcal/ day   PSU using VE=10.4, Tmax over past 24 hrs = 36.7: 1627 kcal/day    Total Calories / day: 1450 - 1700  (Calories/k - 24 )   Total Grams Protein / day: 85 - 107 (Grams Protein/k.2 - 1.5)       Evaluation:   1. Intubated , unable to eat.   2. Vent day # 3  3.  Cortrak placed and verified (pylorus or first duodenal segment)  4. Labs: Lg=005, K=3.5, Glucose=139, Glucose-Accu-Cq=844,Phos=2.0 and Mg=3.3  5. Medications: rocephin, lovenox, pepcid, electrolyte replacement therapy, flagyl, potassium phosphate, and pericolace.   6. Fluids: propofol @ 9.2 mL/hr= 243 kcal   7. Edema: BUE/BLE generalized edema.  8. Last BM: PTA   9. Based on estimation of nutritional needs, will recommend Impact Peptide 1.5 to meet increased protein/calories requirements for this ventilated pt. " Formula has lower volume, which is appropriate as pt is  positive for fluid accumulation per I/O's + generalized BUE/BLE edema.      Malnutrition Risk: At risk due to generalized edema in BUE/BLE noted. No other criteria noted at this time.      Recommendations/Plan:  1. Start Impact Peptide 1.5 @ 25 ml/hr and advance per protocol to 40 ml/hr (goal rate with propofol) to provide 1440 kcal + 243 kcal from propofol= 1683 kcal/day (24 kcal/kg), 90 grams protein (1.3 gm/kg), and 739 ml free water per day.   2. When propofol d/c'd increase TF to final goal rate of 45 ml/hr to provide 1620 kcal/day (23 kcal/kg), 102 grams protein (1.4 gm/kg), and 832 ml free water per day.   3. Fluids per MD.   4. Continue to monitor weight.     RD will be following.

## 2020-08-25 NOTE — CARE PLAN
Ventilator Daily Summary    Vent Day #3   7.5 at 23    Ventilator settings changed this shift:  FiO2 to 30%     Weaning trials:no    Respiratory Procedures:no    Plan: Continue current ventilator settings and wean mechanical ventilation as tolerated per physician orders.   28 400 8 40%

## 2020-08-25 NOTE — CONSULTS
Referral # [id number]    Please call [phone] select 1, then select 2, and use the last [phone] of the referral number to contact the on-call coordinator with any updates.     Date: 08/24/20      Thank you for the referral of this patient. A chart review has been completed to determine suitability for organ and tissue donation.    *Donation is an option:    -Upon meeting the criteria for brain death this patient will be a potential candidate for organ donation, upon further evaluation.    -This patient may meet the criteria for DCD (donation after circulatory death). Further evaluation will be needed should the family decide to transition to comfort care.      *[facility] will continue to follow this case.      -Please contact the clinical coordinator with any changes in the patient's neurological or hemodynamic status, family questions/concerns/decisions, or changes in plan of care.    -Organ donation should not be mentioned to the family until the physician has discussed the patient's diagnosis and grave prognosis. Discussion of organ donation should then be a planned, one-time coordinated event in collaboration with [facility].        Thank you for your continued support of organ and tissue donation.        Zeferino Wilson  Clinical

## 2020-08-25 NOTE — CONSULTS
"Reason for PC Consult: Advance Care Planning    Consulted by: Dr. Murdock    Assessment:  General: 19  Year old female BIB EMS 8/24/2020 with drug OD (meth & oxycodone).  Found by boyfriend.  Cardiac arrest on route to hospital.  Intubated in the ER.  Encephalopathy, cerebral edema, CT concerning for anoxic brain injury.  Pt moving head and opening eyes not following commands. Weak cough and gag, corneal reflexes absent, pupils equal, round, reactive, posturing upper extremities to noxious stimuli, no response lower extremities.  Pt intubated on the ventilator and sedated, hypoglycemia, and increased transaminase levels.   Social: Per father Angel Luis pt was supposed to start [employer] this week.  Father reports pt has an\"unstable living situation\"  and not \"the best friends or people around her\".    Dyspnea: Yes-  Vented and sedated  Last BM: (prior to arrival )-    Pain: Unable to determine-  Vented and sedated   Depression: Unable to determine-    Dementia: No    Spiritual:  Is Islam or spirituality important for coping with this illness? Unable to determine-  Yes per pt's father.  Angel Luis hopeful that the pt's Uncle can visit to provide a blessing. Offered Zoom as an alternative if visiting is not possible.  Has a  or spiritual provider visit been requested? Unable to determine.  Yes requested by pt's father    Palliative Performance Scale: 10%    Advance Directive: None-    DPOA:  - NOK  Parents Juliette and Angel Luis  BRISEIDA: No-      Code Status: Full-      Outcome:  Pt sedated and vented.  Introduced self and role of Palliative Care to Angel Luis the pt's father.    Angel Luis so surprised over what has happened. The pt was about to start University.  Pt's parents hopeful for full recovery. Angel Luis reports that he was critically ill and \"they thought I wouldn't make it but I'm here and I'm strong\".  Prayer is a source of strength will request a  visit.   Angel Luis waiting on update this afternoon from Neurology.  Support and " encouragement provided.   Palliative Care contact information given.   Updated: Dr. Augustine, Marcy RN Cm, Chrissie RN.    Plan: Will follow and provide additional support as needed.     Thank you for allowing Palliative Care to participate in this patient's care. Please feel free to call [phone] with any questions or concerns.

## 2020-08-25 NOTE — PROGRESS NOTES
Neurology Progress Note  Neurohospitalist Service, [facility]    Referring Physician: Boogie Augustine Jr., D.O.    Chief Complaint   Patient presents with   • Drug Overdose     unknown amount or type of drug ingestion per EMS, pt found down, aspiration, CPR in progress upon EMS arrival.       HPI: Refer to initial documented Neurology H&P, as detailed in the patient's chart.    Interval History: No acute events overnight.  Remains intubated and sedated in the ICU.  Rewarmed to goal as of 5AM 8/25/20.    Review of systems: unable to obtain given intubation and sedation    Past Medical History:   Unchanged from prior    FHx:  Unchanged from prior    SHx:   polysubstance abuse    Medications:    Current Facility-Administered Medications:   •  lactated ringer BOLUS infusion, 500 mL, Intravenous, Once, Boogie Augustine Jr., D.O.  •  potassium phosphate IVPB 30 mmol in 500 mL D5W (premix), 30 mmol, Intravenous, Once, Boogie Augustine Jr., D.O.  •  fentaNYL (SUBLIMAZE) injection 50 mcg, 50 mcg, Intravenous, Q15 MIN PRN **AND** fentaNYL (SUBLIMAZE) injection 100 mcg, 100 mcg, Intravenous, Q15 MIN PRN **AND** fentaNYL (SUBLIMAZE) 50 mcg/mL in 50mL (Continuous Infusion), , Intravenous, Continuous, Last Rate: 3 mL/hr at 08/25/20 1100, 150 mcg/hr at 08/25/20 1100 **AND** propofol (DIPRIVAN) injection, 0-40 mcg/kg/min, Intravenous, Continuous **AND** [START ON 8/26/2020] Triglyceride, , , Every 3 Days (0300), Boogie Augustine Jr., D.O.  •  Respiratory Therapy Consult, , Nebulization, Continuous RT, Brock Murdock M.D.  •  ipratropium-albuterol (DUONEB) nebulizer solution, 3 mL, Nebulization, Q2HRS PRN (RT), Brock Murdock M.D.  •  famotidine (PEPCID) tablet 20 mg, 20 mg, Enteral Tube, Q12HRS **OR** famotidine (PEPCID) injection 20 mg, 20 mg, Intravenous, Q12HRS, Brock Murdock M.D., 20 mg at 08/25/20 0509  •  senna-docusate (PERICOLACE or SENOKOT S) 8.6-50 MG per tablet 2 Tab, 2 Tab, Enteral Tube, BID,  Stopped at 08/24/20 0600 **AND** polyethylene glycol/lytes (MIRALAX) PACKET 1 Packet, 1 Packet, Enteral Tube, QDAY PRN **AND** magnesium hydroxide (MILK OF MAGNESIA) suspension 30 mL, 30 mL, Enteral Tube, QDAY PRN **AND** bisacodyl (DULCOLAX) suppository 10 mg, 10 mg, Rectal, QDAY PRN, Brock Murdock M.D.  •  MD Alert...ICU Electrolyte Replacement per Pharmacy, , Other, PHARMACY TO DOSE, Brock Murdock M.D.  •  lidocaine (XYLOCAINE) 1 % injection 1-2 mL, 1-2 mL, Tracheal Tube, Q30 MIN PRN, Brock Murdock M.D.  •  labetalol (NORMODYNE/TRANDATE) injection 10 mg, 10 mg, Intravenous, Q4HRS PRN, Brock Murdock M.D., 10 mg at 08/24/20 0505  •  hydrALAZINE (APRESOLINE) injection 10-20 mg, 10-20 mg, Intravenous, Q6HRS PRN, Brock Murdock M.D.  •  enoxaparin (LOVENOX) inj 40 mg, 40 mg, Subcutaneous, DAILY, Boogie Augustine Jr., D.O., 40 mg at 08/25/20 0508  •  acetaminophen (TYLENOL) suppository 650 mg, 650 mg, Rectal, Q6HRS PRN, Boogie Augustine Jr., D.O., 650 mg at 08/24/20 1247  •  acetaminophen (TYLENOL) tablet 650 mg, 650 mg, Enteral Tube, Q6HRS PRN, Boogie Augustine Jr., D.O.  •  cefTRIAXone (ROCEPHIN) 1 g in  mL IVPB, 1 g, Intravenous, DAILY, Boogie Augustine Jr. D.O., Stopped at 08/25/20 0640  •  metroNIDAZOLE (FLAGYL) IVPB 500 mg, 500 mg, Intravenous, Q8HRS, Boogie Augustine Jr., D.O., Stopped at 08/25/20 0609  •  Dextrose 50% infusion (D50W) 500 mL, , Intravenous, Continuous, Boogie Augustine Jr. D.O., Last Rate: 25 mL/hr at 08/25/20 0319    Physical Examination:     Vitals:    08/25/20 0700 08/25/20 0800 08/25/20 0900 08/25/20 0923   BP: (!) 83/40 (!) 85/41 (!) 84/43    Pulse: 83 81 80 81   Resp: (!) 28 (!) 28 (!) 28 (!) 28   Temp: 36.6 °C (97.9 °F) 36.5 °C (97.7 °F) 36.6 °C (97.9 °F)    TempSrc: Bladder Bladder Bladder    SpO2: 99% 99% 100% 100%   Weight:       Height:           General: Patient is intubated in ICU  Eyes: examination of optic disks not indicated at this time  CV: RRR    NEUROLOGICAL EXAM:  With  propofol held for 5 minutes    Mental status: opens eyes to tactile stimulus, does not follow simple commands  Speech and language: intubated  Cranial nerve exam: Pupils are round and reactive to light bilaterally. Visual fields: does not blink to threat bilaterally. Gaze in neutral position. Corneal reflexes absent bilaterally.  Extraocular muscles are intact to oculocephalic maneuver ie VOR intact. Face is symmetric. Unable to assess sensation in the face due to mental status. Cough and gag reflexes are weakly intact.  Motor exam: Does not participate in formal strength testing. Tone is severely increased, rigidity x4 extremities. No abnormal movements were seen on exam.  Sensory exam: extensor posturing to noxious stimuli b./l UE and no response to noxious b/l LE  Deep tendon reflexes:  1+ throughout. Toes upgoing bilaterally  Coordination: not participatory due to mental status  Gait: deferred due to ICU status    Objective Data:    Labs:  Lab Results   Component Value Date/Time    PROTHROMBTM 14.6 08/25/2020 05:21 AM    INR 1.10 08/25/2020 05:21 AM      Lab Results   Component Value Date/Time    WBC 9.6 08/25/2020 05:21 AM    RBC 3.93 (L) 08/25/2020 05:21 AM    HEMOGLOBIN 12.4 08/25/2020 05:21 AM    HEMATOCRIT 35.2 (L) 08/25/2020 05:21 AM    MCV 89.6 08/25/2020 05:21 AM    MCH 31.6 08/25/2020 05:21 AM    MCHC 35.2 (H) 08/25/2020 05:21 AM    MPV 9.7 08/25/2020 05:21 AM    NEUTSPOLYS 78.60 (H) 08/25/2020 05:21 AM    LYMPHOCYTES 15.00 (L) 08/25/2020 05:21 AM    MONOCYTES 4.60 08/25/2020 05:21 AM    EOSINOPHILS 1.40 08/25/2020 05:21 AM    BASOPHILS 0.20 08/25/2020 05:21 AM    ANISOCYTOSIS 1+ 08/23/2020 10:05 PM      Lab Results   Component Value Date/Time    SODIUM 132 (L) 08/25/2020 05:21 AM    POTASSIUM 3.5 (L) 08/25/2020 05:21 AM    CHLORIDE 106 08/25/2020 05:21 AM    CO2 16 (L) 08/25/2020 05:21 AM    GLUCOSE 139 (H) 08/25/2020 05:21 AM    BUN 8 08/25/2020 05:21 AM    CREATININE 0.52 08/25/2020 05:21 AM      Lab  "Results   Component Value Date/Time    TRIGLYCERIDE 312 (H) 08/23/2020 11:11 PM       Lab Results   Component Value Date/Time    ALKPHOSPHAT 65 08/25/2020 05:21 AM    ASTSGOT 56 (H) 08/25/2020 05:21 AM    ALTSGPT 63 (H) 08/25/2020 05:21 AM    TBILIRUBIN 0.7 08/25/2020 05:21 AM        Imaging/Testing:    I interpreted and/or reviewed the patient's neuroimaging    DX-ABDOMEN FOR TUBE PLACEMENT   Final Result         1.  Nonspecific bowel gas pattern.   2.  Dobbhoff tube tip overlying the expected location of the pylorus or first duodenal segment.      DX-CHEST-PORTABLE (1 VIEW)   Final Result         1.  Patchy bilateral pulmonary infiltrates, somewhat increased since prior.      EC-ECHOCARDIOGRAM COMPLETE W/O CONT   Final Result      DX-CHEST-PORTABLE (1 VIEW)   Final Result         1.  Patchy bilateral pulmonary infiltrates, somewhat increased since prior.      CT-HEAD W/O   Final Result         1.  Possible subtle sulcal effacement and slight loss of differentiation of gray-white matter, consider component of cerebral edema. Recommend radiographic follow-up   2.  Bilateral sphenoid and maxillary sinus air-fluid levels      DX-CHEST-PORTABLE (1 VIEW)   Final Result         1.  No acute cardiopulmonary disease.        EEG 8/24: \"This is an normal routine EEG recording in a sedated patient.  Episodes of mild eye twitching were captured during the study, and were not epileptic in nature.  No seizures captured during the study.\"    Assessment and Plan:    Annika He is a 19 y.o. woman presenting for whom neurology has been consulted for abnormal movements in the setting of drug overdose and PEA arrest.  CT head is concerning for anoxic injury given sulcal effacement and loss of grey white differentiation.  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  Prognosis is guarded, pending dedicated neuroimaging s/p therapeutic hypothermia and rewarming (scheduled for Thurs " 8/27/20).     Plan:     - rewarmed to goal as of 5AM 8/25/20  - neuron specific enolase to be drawn Thursday  - MRI brain to be performed Thursday to identify evidence of cortical ribboning and bilateral T2 hypertensities of subcortical structures  - GOC, wean sedation as able  - no AED indicated at this juncture    1700 addendum - met father, Angel Luis, at bedside to review hospital course and discuss preliminary impression and plan.  Will be available for family meeting once studies are completed as detailed above.    The evaluation of the patient, and recommended management, was discussed with Dr. Augustine at bedside. I have performed a physical exam and reviewed and updated ROS and Plan today (8/25/2020). In review of yesterday's note (8/24/2020), there are no changes except as documented above.    Ty Mccann MD  Medical Director, [facility], [facility]  Neurohospitalist, & Vascular Neurology, [facility]  Clinical  of Neurology, [employer]

## 2020-08-25 NOTE — PROGRESS NOTES
"Critical Care Progress Note    Date of admission  8/23/2020    Chief Complaint  19 y.o. female admitted 8/23/2020 with drug overdose, cardiac arrest    Hospital Course    \"19 y.o. female who presented 8/23/2020 with drug overdose with methamphetamines, oxycodone and found by boyfriend.  Cardiac arrest enroute to hospital and received narcan, epinephrine and cpr and ROSC achieved.  Difficult airway at scene and LMA placed. ET tube placed in ER.  She was vomiting during airway attempt.  On propofol due to vent asynchrony.  Propofol turned off for neuro assessment and possible hypothermic protocol.  Family no longer at hospital and unable to reach per phone number given for mother.\"    8/24 - TTM protocol initiated, EEG without NCSE      Interval Problem Update  Reviewed last 24 hour events:   - warmed overnight   - Neuro: moving all 4 spontaneously, eyes open to voice   - HR: 70s to 90s   - SBP: 80s-700s   - GI: start TF today   - UOP: 2L/24 hrs   - Pearce: yes   - Tm: 37.4   - Lines: CVC, Art   - PPx: GI pepcid, DVT lovenox   - ABG: Slightly over compensated metabolic acidosis -> drop respiratory rate to 26    - VD #3   - CXR (personally reviewed and compared to prior): Stable bilateral infiltrates    Yesterday   - Admitted this AM, on TTM protocol   - Neuro: GCS 7T, on sedation. EEG pending   - HR: 90s-120s   - SBP: 100s-140s   - GI: NPO   - UOP: 815 mL since admission   - Pearce: yes   - Tm: 37.1   - Lines: SC CVC, Art   - PPx: GI pepcid, DVT lovenox   - ABG: compensated metabolic acidosis   - VD#2   - CXR (personally reviewed and compared to prior): patchy B/L infiltrates    Review of Systems  Review of Systems   Unable to perform ROS: Acuity of condition        Vital Signs for last 24 hours   Temp:  [34.6 °C (94.3 °F)-37.4 °C (99.3 °F)] 36.6 °C (97.9 °F)  Pulse:  [52-92] 84  Resp:  [2-30] 28  BP: ()/(38-67) 88/44  SpO2:  [92 %-100 %] 98 %    Hemodynamic parameters for last 24 hours  CVP:  [7 MM HG-20 MM HG] " 10 MM HG    Respiratory Information for the last 24 hours  Vent Mode: ASV  Rate (breaths/min): 28  Vt Target (mL): 400  PEEP/CPAP: 8  MAP: 13  Control VTE (exp VT): 400    Physical Exam   Physical Exam  Vitals signs and nursing note reviewed.   Constitutional:       General: She is in acute distress.      Appearance: She is toxic-appearing.   HENT:      Head: Normocephalic and atraumatic.      Right Ear: External ear normal.      Left Ear: External ear normal.      Nose: No congestion or rhinorrhea.      Mouth/Throat:      Mouth: Mucous membranes are dry.      Pharynx: No oropharyngeal exudate or posterior oropharyngeal erythema.   Eyes:      General: No scleral icterus.     Conjunctiva/sclera: Conjunctivae normal.      Pupils: Pupils are equal, round, and reactive to light.      Comments: Slightly disconjugate gaze    Neck:      Musculoskeletal: Neck supple. No neck rigidity or muscular tenderness.   Cardiovascular:      Rate and Rhythm: Normal rate and regular rhythm.      Pulses: Normal pulses.      Heart sounds: Normal heart sounds. No murmur.   Pulmonary:      Effort: Respiratory distress present.      Breath sounds: No wheezing.   Chest:       Abdominal:      General: Abdomen is flat. There is no distension.      Palpations: There is no mass.      Tenderness: There is no abdominal tenderness.   Musculoskeletal:         General: No swelling or tenderness.      Right lower leg: No edema.      Left lower leg: No edema.   Skin:     General: Skin is warm and dry.      Capillary Refill: Capillary refill takes less than 2 seconds.      Findings: No erythema or rash.   Neurological:      GCS: GCS eye subscore is 3. GCS verbal subscore is 1. GCS motor subscore is 5.      Cranial Nerves: Cranial nerve deficit present. No facial asymmetry.      Motor: Abnormal muscle tone present.      Comments: PERRLA, does not blink to confrontation, extraocular muscles appear intact.  Extensor posturing in all 4 extremities to painful  stimuli.  Overall increased tone and rigid musculature.   Psychiatric:      Comments: Unable to assess         Medications  Current Facility-Administered Medications   Medication Dose Route Frequency Provider Last Rate Last Dose   • propofol (DIPRIVAN) injection  0-80 mcg/kg/min Intravenous Continuous Yosef Tomlin M.D. 4.3 mL/hr at 08/25/20 0525 10 mcg/kg/min at 08/25/20 0525   • Cold NS infusion 720 mL  10 mL/kg Intravenous Once PRN Brock Murdock M.D.   Stopped at 08/24/20 0656   • meperidine (DEMEROL) injection 25 mg  25 mg Intravenous Q HOUR PRN Brock Murdock M.D.   25 mg at 08/24/20 0433   • fentaNYL (SUBLIMAZE) injection 100 mcg  100 mcg Intravenous Q HOUR PRN Brock Murdock M.D.       • fentanyl 50 mcg/mL infusion   mcg/hr Intravenous Continuous Brock Murdock M.D. 4 mL/hr at 08/25/20 0101 200 mcg/hr at 08/25/20 0101   • midazolam (VERSED) premix 125 mg/125 mL NS  0-10 mg/hr Intravenous Continuous Brock Murdock M.D.   Stopped at 08/24/20 0100   • vecuronium (NORCURON) 60 mg in D5W 500 mL Infusion  1-2 mcg/kg/min Intravenous Continuous Brock Murdock M.D.       • insulin regular (HumuLIN R,NovoLIN R) injection  0-14 Units Intravenous Once PRN Brock Murdock M.D.        And   • insulin regular human (HUMULIN/NOVOLIN R) 62.5 Units in  mL infusion per protocol  0-29 Units/hr Intravenous PRN Brock Murdock M.D.        And   • dextrose 50% (D50W) injection 25-50 mL  25-50 mL Intravenous PRN Brock Murdock M.D.   50 mL at 08/24/20 1942   • norepinephrine (Levophed) infusion 8 mg/250 mL (premix)  0-30 mcg/min Intravenous Continuous Brock Murdock M.D.   Stopped at 08/24/20 0100   • Respiratory Therapy Consult   Nebulization Continuous RT Brock Murdock M.D.       • ipratropium-albuterol (DUONEB) nebulizer solution  3 mL Nebulization Q2HRS PRN (RT) Brock Murdock M.D.       • famotidine (PEPCID) tablet 20 mg  20 mg Enteral Tube Q12HRS Brock Murdock M.D.        Or   • famotidine (PEPCID) injection 20  mg  20 mg Intravenous Q12HRS Brock Murdock M.D.   20 mg at 08/25/20 0509   • senna-docusate (PERICOLACE or SENOKOT S) 8.6-50 MG per tablet 2 Tab  2 Tab Enteral Tube BID Brock Murdock M.D.   Stopped at 08/24/20 0600    And   • polyethylene glycol/lytes (MIRALAX) PACKET 1 Packet  1 Packet Enteral Tube QDAY PRN Brock Murdock M.D.        And   • magnesium hydroxide (MILK OF MAGNESIA) suspension 30 mL  30 mL Enteral Tube QDAY PRN Brock Murdock M.D.        And   • bisacodyl (DULCOLAX) suppository 10 mg  10 mg Rectal QDAY PRN Brock Murdock M.D.       • MD Alert...ICU Electrolyte Replacement per Pharmacy   Other PHARMACY TO DOSE Brock Murdock M.D.       • lidocaine (XYLOCAINE) 1 % injection 1-2 mL  1-2 mL Tracheal Tube Q30 MIN PRN Brock Murdock M.D.       • labetalol (NORMODYNE/TRANDATE) injection 10 mg  10 mg Intravenous Q4HRS PRN Brock Murdock M.D.   10 mg at 08/24/20 0505   • hydrALAZINE (APRESOLINE) injection 10-20 mg  10-20 mg Intravenous Q6HRS PRN Brock Murdock M.D.       • enoxaparin (LOVENOX) inj 40 mg  40 mg Subcutaneous DAILY Boogie Augustine Jr., D.O.   40 mg at 08/25/20 0508   • acetaminophen (TYLENOL) suppository 650 mg  650 mg Rectal Q6HRS PRN Boogie Augustine Jr., D.O.   650 mg at 08/24/20 1247   • acetaminophen (TYLENOL) tablet 650 mg  650 mg Enteral Tube Q6HRS PRN Boogie Augustine Jr., D.O.       • cefTRIAXone (ROCEPHIN) 1 g in  mL IVPB  1 g Intravenous DAILY Boogie Augustine Jr., D.O.   Stopped at 08/25/20 0640   • metroNIDAZOLE (FLAGYL) IVPB 500 mg  500 mg Intravenous Q8HRS Boogie Augustine Jr., D.O.   Stopped at 08/25/20 0609   • Dextrose 50% infusion (D50W) 500 mL   Intravenous Continuous Boogie Augustine Jr., D.O. 25 mL/hr at 08/25/20 0319         Fluids    Intake/Output Summary (Last 24 hours) at 8/25/2020 0725  Last data filed at 8/25/2020 0600  Gross per 24 hour   Intake 3194.27 ml   Output 1976 ml   Net 1218.27 ml       Laboratory  Recent Labs     08/24/20  0000 08/24/20  0301  08/25/20  0417   ISTATAPH 7.164* 7.420 7.451   ISTATAPCO2 44.5* 20.2* 21.0*   ISTATAPO2 78 138* 113*   ISTATATCO2 17* 14* 15*   PYDCRSG3MPD 91* 99 99   ISTATARTHCO3 16.0* 13.1* 14.6*   ISTATARTBE -12* -9* -7*   ISTATTEMP 35.2 C 35.8 C 96.6 F   ISTATFIO2 40 50 30   ISTATSPEC Arterial Arterial Arterial   ISTATAPHTC 7.188* 7.438 7.468   PGLFRIES3HJ 69 131* 107*         Recent Labs     08/24/20 0250 08/24/20 1826 08/25/20 0032 08/25/20  0521   SODIUM 140   < > 134* 133* 132*   POTASSIUM 4.3   < > 3.4* 3.6 3.5*   CHLORIDE 105   < > 104 105 106   CO2 17*   < > 18* 16* 16*   BUN 19   < > 10 8 8   CREATININE 0.73   < > 0.52 0.49* 0.52   MAGNESIUM 2.1   < > >4.8* 4.4* 3.3*   PHOSPHORUS 1.6*  --   --   --  2.0*   CALCIUM 7.9*   < > 7.1* 7.0* 7.0*    < > = values in this interval not displayed.     Recent Labs     08/23/20 2311 08/24/20 0021 08/24/20 0250 08/24/20 1826 08/25/20 0032 08/25/20  0521   ALTSGPT  --  152* 124*  --   --   --  63*   ASTSGOT  --  265* 188*  --   --   --  56*   ALKPHOSPHAT 109*  --  81  --   --   --  65   TBILIRUBIN 0.3  --  0.3  --   --   --  0.7   LIPASE 44  --   --   --   --   --   --    GLUCOSE 127*  --  86   < > 138* 149* 139*    < > = values in this interval not displayed.     Recent Labs     08/23/20 2205 08/23/20 2311 08/24/20 0021 08/24/20 0250 08/24/20 1135 08/25/20  0521   WBC 11.4*  --   --  10.7 8.5 9.6   NEUTSPOLYS 40.50*  --   --   --   --  78.60*   LYMPHOCYTES 50.80*  --   --   --   --  15.00*   MONOCYTES 5.60  --   --   --   --  4.60   EOSINOPHILS 1.60  --   --   --   --  1.40   BASOPHILS 0.00  --   --   --   --  0.20   ASTSGOT  --   --  265* 188*  --  56*   ALTSGPT  --   --  152* 124*  --  63*   ALKPHOSPHAT  --  109*  --  81  --  65   TBILIRUBIN  --  0.3  --  0.3  --  0.7     Recent Labs     08/24/20  0250  08/24/20  1135 08/24/20  1826 08/25/20  0032 08/25/20  0521   RBC 4.56  --  4.15*  --   --  3.93*   HEMOGLOBIN 14.4  --  13.1  --   --  12.4   HEMATOCRIT 43.0  --   38.0  --   --  35.2*   PLATELETCT 256  --  193  --   --  188   PROTHROMBTM 12.6   < > 13.9 14.4 15.2* 14.6   APTT 25.7   < > 30.5 48.2* 46.5* 41.6*   INR 0.92   < > 1.03 1.09 1.17* 1.10    < > = values in this interval not displayed.       Imaging  X-Ray:  I have personally reviewed the images and compared with prior images.  EKG:  I have personally reviewed the images and compared with prior images.  CT:    Reviewed  Echo:   Reviewed    Assessment/Plan  Drug overdose- (present on admission)  Assessment & Plan  Methamphetamines, oxycodone and EtOH per boyfriend  Counseling when able    Metabolic acidosis- (present on admission)  Assessment & Plan  Secondary to cardiac arrest  Aggressive IVF and pressors  Monitor, improving    Acute respiratory failure with hypoxia (HCC)- (present on admission)  Assessment & Plan  Secondary to drug overdose  RT/O2 protocols  Daily and PRN ABGs  Titration of ventilator therapy based on ABGs and patient's status  Sedation as tolerated/indicated  Daily CXR  HOB >30 degrees and peridex for VAP prevention  Pepcid for GI prophylaxis  SAT/SBT when able (ABCDEF Bundle)  Early mobility  Monitor for possibility to perform SBT, sedation to remain off as tolerated    Cardiac arrest (HCC)- (present on admission)  Assessment & Plan  Pulseless electrical activity in ambulance while en route to hospital    AHA guidelines for comatose patients following out-of-hospital cardiac arrest:  Induce hypothermia for unconscious adult patients with return of spontaneous circulation (ROSC) after out-of-hospital cardiac arrest when the initial rhythm was ventricular fibrillation (VF) or pulseless ventricular tachycardia (pVT) (class I, level of evidence: B-R)    Similar therapy may be beneficial for patients with non-VF/non-pVT (nonshockable) arrest out-of-hospital or with in-hospital arrest (class I, level of evidence: C-EO)  Rogelio et al. [id number]    Post cardiac arrest care   -Ventilation: Goal PCO2  40-45, PaO2 ~100, low tidal volume ventilation   -Hemodynamic: Goal systolic blood pressure >90 mmHg, goal MAP >65 mmHg; fluid boluses and pressors (epinephrine, norepinephrine) as guided by bedside ultrasound and hemodynamics   -Cardiac: Treat ACS if indicated, treat arrhythmia as required, Echo pending   -Neurologic: Serial neurologic exams, CT head: Possible subtle sulcal effacement and slight loss of differentiation of gray-white matter, consider component of cerebral edema. Recommend radiographic follow-up, EEG with sedated appearing waveforms, targeted temperature management complete, sedation only as needed   -Metabolic: Serial lactate, goal blood glucose 120-180, maintain euvolemia, monitor urine output, maintain potassium greater than 3.5, maintain magnesium greater than 2  Neuro prognostication in 48-72 hours  MRI on Thursday, 8/27/2020.  NSE at that time as well.  Consider SSEP  Cardiology consult if indicated  Cardiac ischemia evaluation when indicated    PEA (Pulseless electrical activity) (HCC)- (present on admission)  Assessment & Plan  Enroute to hospital  Hypoxia and aspiration contributory  Tele monitoring   On TTM protocol  CT Head with mild edema    Hypoglycemia- (present on admission)  Assessment & Plan  ?2/2 ischemic hepatitis  Continue to titrate dextrose and serial accuchecks    Encephalopathy acute- (present on admission)  Assessment & Plan  Given history of cardiac arrest and mild edema on CT this is concerning for cerebral ischemia  Completing targeted temperature management protocol  EEG without an NCSE  MRI ordered for Thursday, 8/27/2020  Consider NSE and SSEP    Elevated transaminase level- (present on admission)  Assessment & Plan  Likely due to ischemic hepatitis, improving today  Monitor synthetic functions including INR and glucose  Avoid hepatotoxins  Titrating D5W to compensate for hypoglycemia    Cerebral edema (HCC)- (present on admission)  Assessment & Plan  Mild/possible per  radiologist on CT  MRI on 8/27/2020       VTE:  Lovenox  Ulcer: H2 Antagonist  Lines: Central Line  Ongoing indication addressed, Arterial Line  Ongoing indication addressed and Pearce Catheter  Ongoing indication addressed    I have performed a physical exam and reviewed and updated ROS and Plan today (8/25/2020). In review of yesterday's note (8/24/2020), there are no changes except as documented above.     Ongoing treatment of multiple critical organ failures post cardiac arrest. This patient is critically ill, at high risk for decompensation leading to worsening vital organ dysfunction and death without critical care interventions.    Discussed patient condition and risk of morbidity and/or mortality with RN, RT, Pharmacy, Dietary, , Code status disscussed, Charge nurse / hot rounds and neurology     The patient remains critically ill.  Critical care time = 81 minutes in directly providing and coordinating critical care and extensive data review.  No time overlap and excludes procedures.

## 2020-08-25 NOTE — CARE PLAN
Adult Ventilation Update    Total Vent Days: 2  Vent:  x 28, 40% +8    Patient Lines/Drains/Airways Status      Active Airway       Name: Placement date: Placement time: Site: Days:    Airway ETT 7.5  08/23/20 2210   --  2                  Mobility  Activity Performed: Unable to mobilize   Reason Not Mobilized: Unstable condition (code chill)       Events/Summary/Plan: Patient stable on vent. No changes.

## 2020-08-26 ENCOUNTER — APPOINTMENT (OUTPATIENT)
Dept: RADIOLOGY | Facility: MEDICAL CENTER | Age: 20
DRG: 004 | End: 2020-08-26
Attending: INTERNAL MEDICINE
Payer: MEDICAID

## 2020-08-26 LAB
ACTION RANGE TRIGGERED IACRT: NO
ALBUMIN SERPL BCP-MCNC: 2.5 G/DL (ref 3.2–4.9)
ALBUMIN/GLOB SERPL: 1.2 G/DL
ALP SERPL-CCNC: 71 U/L (ref 30–99)
ALT SERPL-CCNC: 50 U/L (ref 2–50)
ANION GAP SERPL CALC-SCNC: 11 MMOL/L (ref 7–16)
AST SERPL-CCNC: 44 U/L (ref 12–45)
BASE EXCESS BLDA CALC-SCNC: -7 MMOL/L (ref -4–3)
BASOPHILS # BLD AUTO: 0.3 % (ref 0–1.8)
BASOPHILS # BLD: 0.02 K/UL (ref 0–0.12)
BILIRUB SERPL-MCNC: 0.4 MG/DL (ref 0.1–1.5)
BODY TEMPERATURE: ABNORMAL DEGREES
BUN SERPL-MCNC: 7 MG/DL (ref 8–22)
CALCIUM SERPL-MCNC: 7.9 MG/DL (ref 8.5–10.5)
CHLORIDE SERPL-SCNC: 111 MMOL/L (ref 96–112)
CO2 BLDA-SCNC: 16 MMOL/L (ref 20–33)
CO2 SERPL-SCNC: 16 MMOL/L (ref 20–33)
CREAT SERPL-MCNC: 0.48 MG/DL (ref 0.5–1.4)
CRP SERPL HS-MCNC: 11.32 MG/DL (ref 0–0.75)
EOSINOPHIL # BLD AUTO: 0.14 K/UL (ref 0–0.51)
EOSINOPHIL NFR BLD: 1.8 % (ref 0–6.9)
ERYTHROCYTE [DISTWIDTH] IN BLOOD BY AUTOMATED COUNT: 45.6 FL (ref 35.9–50)
GLOBULIN SER CALC-MCNC: 2.1 G/DL (ref 1.9–3.5)
GLUCOSE BLD-MCNC: 101 MG/DL (ref 65–99)
GLUCOSE BLD-MCNC: 104 MG/DL (ref 65–99)
GLUCOSE BLD-MCNC: 104 MG/DL (ref 65–99)
GLUCOSE BLD-MCNC: 106 MG/DL (ref 65–99)
GLUCOSE BLD-MCNC: 108 MG/DL (ref 65–99)
GLUCOSE BLD-MCNC: 109 MG/DL (ref 65–99)
GLUCOSE BLD-MCNC: 110 MG/DL (ref 65–99)
GLUCOSE BLD-MCNC: 113 MG/DL (ref 65–99)
GLUCOSE BLD-MCNC: 117 MG/DL (ref 65–99)
GLUCOSE BLD-MCNC: 118 MG/DL (ref 65–99)
GLUCOSE BLD-MCNC: 122 MG/DL (ref 65–99)
GLUCOSE BLD-MCNC: 132 MG/DL (ref 65–99)
GLUCOSE BLD-MCNC: 82 MG/DL (ref 65–99)
GLUCOSE BLD-MCNC: 84 MG/DL (ref 65–99)
GLUCOSE BLD-MCNC: 86 MG/DL (ref 65–99)
GLUCOSE BLD-MCNC: 87 MG/DL (ref 65–99)
GLUCOSE BLD-MCNC: 97 MG/DL (ref 65–99)
GLUCOSE BLD-MCNC: 97 MG/DL (ref 65–99)
GLUCOSE SERPL-MCNC: 113 MG/DL (ref 65–99)
HCO3 BLDA-SCNC: 15.7 MMOL/L (ref 17–25)
HCT VFR BLD AUTO: 35.6 % (ref 37–47)
HGB BLD-MCNC: 12.1 G/DL (ref 12–16)
HOROWITZ INDEX BLDA+IHG-RTO: 280 MM[HG]
IMM GRANULOCYTES # BLD AUTO: 0.02 K/UL (ref 0–0.11)
IMM GRANULOCYTES NFR BLD AUTO: 0.3 % (ref 0–0.9)
INST. QUALIFIED PATIENT IIQPT: YES
LYMPHOCYTES # BLD AUTO: 1.15 K/UL (ref 1–4.8)
LYMPHOCYTES NFR BLD: 14.6 % (ref 22–41)
MAGNESIUM SERPL-MCNC: 2 MG/DL (ref 1.5–2.5)
MCH RBC QN AUTO: 31.6 PG (ref 27–33)
MCHC RBC AUTO-ENTMCNC: 34 G/DL (ref 33.6–35)
MCV RBC AUTO: 93 FL (ref 81.4–97.8)
MONOCYTES # BLD AUTO: 0.4 K/UL (ref 0–0.85)
MONOCYTES NFR BLD AUTO: 5.1 % (ref 0–13.4)
NEUTROPHILS # BLD AUTO: 6.13 K/UL (ref 2–7.15)
NEUTROPHILS NFR BLD: 77.9 % (ref 44–72)
NRBC # BLD AUTO: 0 K/UL
NRBC BLD-RTO: 0 /100 WBC
O2/TOTAL GAS SETTING VFR VENT: 30 %
PCO2 BLDA: 24.3 MMHG (ref 26–37)
PCO2 TEMP ADJ BLDA: 23.8 MMHG (ref 26–37)
PH BLDA: 7.42 [PH] (ref 7.4–7.5)
PH TEMP ADJ BLDA: 7.43 [PH] (ref 7.4–7.5)
PHOSPHATE SERPL-MCNC: 2.4 MG/DL (ref 2.5–4.5)
PLATELET # BLD AUTO: 193 K/UL (ref 164–446)
PMV BLD AUTO: 9.6 FL (ref 9–12.9)
PO2 BLDA: 84 MMHG (ref 64–87)
PO2 TEMP ADJ BLDA: 82 MMHG (ref 64–87)
POTASSIUM SERPL-SCNC: 3.7 MMOL/L (ref 3.6–5.5)
PREALB SERPL-MCNC: 14.7 MG/DL (ref 18–38)
PROT SERPL-MCNC: 4.6 G/DL (ref 6–8.2)
RBC # BLD AUTO: 3.83 M/UL (ref 4.2–5.4)
SAO2 % BLDA: 97 % (ref 93–99)
SODIUM SERPL-SCNC: 138 MMOL/L (ref 135–145)
SPECIMEN DRAWN FROM PATIENT: ABNORMAL
WBC # BLD AUTO: 7.9 K/UL (ref 4.8–10.8)

## 2020-08-26 PROCEDURE — 700102 HCHG RX REV CODE 250 W/ 637 OVERRIDE(OP): Performed by: INTERNAL MEDICINE

## 2020-08-26 PROCEDURE — 770022 HCHG ROOM/CARE - ICU (200)

## 2020-08-26 PROCEDURE — 85025 COMPLETE CBC W/AUTO DIFF WBC: CPT

## 2020-08-26 PROCEDURE — 700111 HCHG RX REV CODE 636 W/ 250 OVERRIDE (IP): Performed by: INTERNAL MEDICINE

## 2020-08-26 PROCEDURE — A9270 NON-COVERED ITEM OR SERVICE: HCPCS | Performed by: INTERNAL MEDICINE

## 2020-08-26 PROCEDURE — 700101 HCHG RX REV CODE 250: Performed by: INTERNAL MEDICINE

## 2020-08-26 PROCEDURE — 99292 CRITICAL CARE ADDL 30 MIN: CPT | Performed by: INTERNAL MEDICINE

## 2020-08-26 PROCEDURE — 94003 VENT MGMT INPAT SUBQ DAY: CPT

## 2020-08-26 PROCEDURE — 700105 HCHG RX REV CODE 258: Performed by: INTERNAL MEDICINE

## 2020-08-26 PROCEDURE — 700105 HCHG RX REV CODE 258

## 2020-08-26 PROCEDURE — 99291 CRITICAL CARE FIRST HOUR: CPT | Performed by: INTERNAL MEDICINE

## 2020-08-26 PROCEDURE — 84100 ASSAY OF PHOSPHORUS: CPT

## 2020-08-26 PROCEDURE — 94760 N-INVAS EAR/PLS OXIMETRY 1: CPT

## 2020-08-26 PROCEDURE — 80053 COMPREHEN METABOLIC PANEL: CPT

## 2020-08-26 PROCEDURE — 94002 VENT MGMT INPAT INIT DAY: CPT

## 2020-08-26 PROCEDURE — 94770 HCHG CO2 EXPIRED GAS DETERMINATION: CPT

## 2020-08-26 PROCEDURE — 83735 ASSAY OF MAGNESIUM: CPT

## 2020-08-26 PROCEDURE — 71045 X-RAY EXAM CHEST 1 VIEW: CPT

## 2020-08-26 PROCEDURE — 82803 BLOOD GASES ANY COMBINATION: CPT

## 2020-08-26 PROCEDURE — 99233 SBSQ HOSP IP/OBS HIGH 50: CPT | Performed by: PSYCHIATRY & NEUROLOGY

## 2020-08-26 PROCEDURE — 86140 C-REACTIVE PROTEIN: CPT

## 2020-08-26 PROCEDURE — 84134 ASSAY OF PREALBUMIN: CPT

## 2020-08-26 PROCEDURE — 37799 UNLISTED PX VASCULAR SURGERY: CPT

## 2020-08-26 PROCEDURE — 82962 GLUCOSE BLOOD TEST: CPT | Mod: 91

## 2020-08-26 RX ORDER — SODIUM CHLORIDE 9 MG/ML
INJECTION, SOLUTION INTRAVENOUS
Status: COMPLETED
Start: 2020-08-26 | End: 2020-08-26

## 2020-08-26 RX ORDER — DEXMEDETOMIDINE HYDROCHLORIDE 4 UG/ML
0-.7 INJECTION, SOLUTION INTRAVENOUS CONTINUOUS
Status: DISCONTINUED | OUTPATIENT
Start: 2020-08-26 | End: 2020-08-26

## 2020-08-26 RX ORDER — METRONIDAZOLE 500 MG/1
500 TABLET ORAL EVERY 8 HOURS
Status: COMPLETED | OUTPATIENT
Start: 2020-08-26 | End: 2020-08-29

## 2020-08-26 RX ADMIN — POTASSIUM PHOSPHATE, MONOBASIC AND POTASSIUM PHOSPHATE, DIBASIC 15 MMOL: 224; 236 INJECTION, SOLUTION, CONCENTRATE INTRAVENOUS at 08:41

## 2020-08-26 RX ADMIN — FAMOTIDINE 20 MG: 20 TABLET, FILM COATED ORAL at 05:54

## 2020-08-26 RX ADMIN — FAMOTIDINE 20 MG: 10 INJECTION INTRAVENOUS at 17:47

## 2020-08-26 RX ADMIN — METRONIDAZOLE 500 MG: 500 INJECTION, SOLUTION INTRAVENOUS at 15:15

## 2020-08-26 RX ADMIN — PROPOFOL 50 MCG/KG/MIN: 10 INJECTION, EMULSION INTRAVENOUS at 08:40

## 2020-08-26 RX ADMIN — CEFTRIAXONE SODIUM 1 G: 1 INJECTION, POWDER, FOR SOLUTION INTRAMUSCULAR; INTRAVENOUS at 06:33

## 2020-08-26 RX ADMIN — ENOXAPARIN SODIUM 40 MG: 40 INJECTION SUBCUTANEOUS at 05:56

## 2020-08-26 RX ADMIN — FENTANYL CITRATE 100 MCG: 50 INJECTION INTRAMUSCULAR; INTRAVENOUS at 20:32

## 2020-08-26 RX ADMIN — PROPOFOL 40 MCG/KG/MIN: 10 INJECTION, EMULSION INTRAVENOUS at 13:15

## 2020-08-26 RX ADMIN — PROPOFOL 40 MCG/KG/MIN: 10 INJECTION, EMULSION INTRAVENOUS at 14:45

## 2020-08-26 RX ADMIN — PROPOFOL 70 MCG/KG/MIN: 10 INJECTION, EMULSION INTRAVENOUS at 02:29

## 2020-08-26 RX ADMIN — Medication 150 MCG/HR: at 14:45

## 2020-08-26 RX ADMIN — FENTANYL CITRATE 200 MCG/HR: 50 INJECTION, SOLUTION INTRAMUSCULAR; INTRAVENOUS at 13:25

## 2020-08-26 RX ADMIN — PROPOFOL 60 MCG/KG/MIN: 10 INJECTION, EMULSION INTRAVENOUS at 05:17

## 2020-08-26 RX ADMIN — Medication 150 MCG/HR: at 01:39

## 2020-08-26 RX ADMIN — PROPOFOL 40 MCG/KG/MIN: 10 INJECTION, EMULSION INTRAVENOUS at 15:02

## 2020-08-26 RX ADMIN — Medication 150 MCG/HR: at 15:31

## 2020-08-26 RX ADMIN — Medication 150 MCG/HR: at 11:06

## 2020-08-26 RX ADMIN — SODIUM CHLORIDE 500 ML: 9 INJECTION, SOLUTION INTRAVENOUS at 05:56

## 2020-08-26 RX ADMIN — DOCUSATE SODIUM 50 MG AND SENNOSIDES 8.6 MG 2 TABLET: 8.6; 5 TABLET, FILM COATED ORAL at 05:55

## 2020-08-26 RX ADMIN — METRONIDAZOLE 500 MG: 500 TABLET ORAL at 22:00

## 2020-08-26 RX ADMIN — DEXMEDETOMIDINE HYDROCHLORIDE 0.3 MCG/KG/HR: 100 INJECTION, SOLUTION INTRAVENOUS at 10:58

## 2020-08-26 RX ADMIN — METRONIDAZOLE 500 MG: 500 INJECTION, SOLUTION INTRAVENOUS at 05:54

## 2020-08-26 RX ADMIN — DOCUSATE SODIUM 50 MG AND SENNOSIDES 8.6 MG 2 TABLET: 8.6; 5 TABLET, FILM COATED ORAL at 17:47

## 2020-08-26 RX ADMIN — PROPOFOL 40 MCG/KG/MIN: 10 INJECTION, EMULSION INTRAVENOUS at 18:44

## 2020-08-26 ASSESSMENT — FIBROSIS 4 INDEX: FIB4 SCORE: 0.68

## 2020-08-26 NOTE — CARE PLAN
Problem: Ventilation Defect:  Goal: Ability to achieve and maintain unassisted ventilation or tolerate decreased levels of ventilator support  Outcome: NOT MET      Ventilator Daily Summary    Vent Day #2    Ventilator settings changed this shift:dropped rr from 28 to 26    Weaning trials:none    Respiratory Procedures:none    Plan: Continue current ventilator settings and wean mechanical ventilation as tolerated per physician orders.

## 2020-08-26 NOTE — PROGRESS NOTES
Neurology Progress Note  Neurohospitalist Service, [facility]    Referring Physician: Boogie Augustine Jr., D.O.    Chief Complaint   Patient presents with   • Drug Overdose     unknown amount or type of drug ingestion per EMS, pt found down, aspiration, CPR in progress upon EMS arrival.       HPI: Refer to initial documented Neurology H&P, as detailed in the patient's chart.    Interval History: No acute events overnight.  Remains in ICU level of care, intubated and sedated with propofol.    Review of systems: In addition to what is detailed in the HPI and/or updated in the interval history, all other systems reviewed and are negative.    Past Medical History:   Unchanged from prior    FHx:  Unchanged from prior    SHx:  Unchanged from prior    Medications:    Current Facility-Administered Medications:   •  potassium phosphate 15 mmol in D5W 250 mL ivpb, 15 mmol, Intravenous, Once, Boogie Augustine Jr., JESSY.O., Last Rate: 62.5 mL/hr at 08/26/20 0841, 15 mmol at 08/26/20 0841  •  Pharmacy Consult: Enteral tube insertion - review meds/change route/product selection, 1 Each, Other, PHARMACY TO DOSE, Boogie Augustine Jr., JESSY.O.  •  fentaNYL (SUBLIMAZE) injection 50 mcg, 50 mcg, Intravenous, Q15 MIN PRN **AND** fentaNYL (SUBLIMAZE) injection 100 mcg, 100 mcg, Intravenous, Q15 MIN PRN **AND** fentaNYL (SUBLIMAZE) 50 mcg/mL in 50mL (Continuous Infusion), , Intravenous, Continuous, Last Rate: 3 mL/hr at 08/26/20 0719, 150 mcg/hr at 08/26/20 0719 **AND** propofol (DIPRIVAN) injection, 0-80 mcg/kg/min, Intravenous, Continuous, Last Rate: 23 mL/hr at 08/26/20 0840, 50 mcg/kg/min at 08/26/20 0840 **AND** [START ON 8/28/2020] Triglyceride, , , Every 3 Days (0300), Jeremy M Gonda, M.D.  •  Respiratory Therapy Consult, , Nebulization, Continuous RT, Brock Murdock M.D.  •  ipratropium-albuterol (DUONEB) nebulizer solution, 3 mL, Nebulization, Q2HRS PRN (RT), Brock Murdock M.D.  •  famotidine (PEPCID) tablet 20  mg, 20 mg, Enteral Tube, Q12HRS, 20 mg at 08/26/20 0554 **OR** famotidine (PEPCID) injection 20 mg, 20 mg, Intravenous, Q12HRS, Brock Murdock M.D., 20 mg at 08/25/20 0509  •  senna-docusate (PERICOLACE or SENOKOT S) 8.6-50 MG per tablet 2 Tab, 2 Tab, Enteral Tube, BID, 2 Tab at 08/26/20 0555 **AND** polyethylene glycol/lytes (MIRALAX) PACKET 1 Packet, 1 Packet, Enteral Tube, QDAY PRN **AND** magnesium hydroxide (MILK OF MAGNESIA) suspension 30 mL, 30 mL, Enteral Tube, QDAY PRN **AND** bisacodyl (DULCOLAX) suppository 10 mg, 10 mg, Rectal, QDAY PRN, Brock Murdock M.D.  •  MD Alert...ICU Electrolyte Replacement per Pharmacy, , Other, PHARMACY TO DOSE, Brock Murdock M.D.  •  lidocaine (XYLOCAINE) 1 % injection 1-2 mL, 1-2 mL, Tracheal Tube, Q30 MIN PRN, Brock Murdock M.D.  •  labetalol (NORMODYNE/TRANDATE) injection 10 mg, 10 mg, Intravenous, Q4HRS PRN, Brock Murdock M.D., 10 mg at 08/24/20 0505  •  hydrALAZINE (APRESOLINE) injection 10-20 mg, 10-20 mg, Intravenous, Q6HRS PRN, Brock Murdock M.D.  •  enoxaparin (LOVENOX) inj 40 mg, 40 mg, Subcutaneous, DAILY, Boogie Augustine Jr. D.O., 40 mg at 08/26/20 0556  •  acetaminophen (TYLENOL) suppository 650 mg, 650 mg, Rectal, Q6HRS PRN, Boogie Augustine Jr. D.O., 650 mg at 08/24/20 1247  •  acetaminophen (TYLENOL) tablet 650 mg, 650 mg, Enteral Tube, Q6HRS PRN, Boogie Augustine Jr., D.O.  •  cefTRIAXone (ROCEPHIN) 1 g in  mL IVPB, 1 g, Intravenous, DAILY, JESSY Piper Jr..O., Stopped at 08/26/20 0703  •  metroNIDAZOLE (FLAGYL) IVPB 500 mg, 500 mg, Intravenous, Q8HRS, JESSY Piper Jr..O., Stopped at 08/26/20 0654  •  Dextrose 50% infusion (D50W) 500 mL, , Intravenous, Continuous, JESSY Piper Jr..OYue, Last Rate: 20 mL/hr at 08/25/20 1400    Physical Examination:     Vitals:    08/26/20 0600 08/26/20 0647 08/26/20 0700 08/26/20 0800   BP: (!) 94/52  (!) 94/65 (!) 93/51   Pulse: 74 77 78 77   Resp: (!) 26 (!) 26 (!) 26 (!) 26   Temp: 36.2 °C (97.2  °F)      TempSrc: Bladder      SpO2: 100% 100% 100% 100%   Weight:   76.7 kg (169 lb 1.5 oz)    Height:           General: Patient is intubated in the ICU  Eyes: examination of optic disks not indicated at this time  CV: RRR    NEUROLOGICAL EXAM:     Mental status: opens eyes to tactile stimulus, follows 1/3 simple commands: wiggle toes  Speech and language: intubated  Cranial nerve exam: Pupils are round and reactive to light bilaterally. Visual fields: does not blink to threat bilaterally. Gaze in neutral position. Corneal reflexes present bilaterally.  Extraocular muscles are intact to oculocephalic maneuver ie VOR intact. Face is symmetric. Unable to assess sensation in the face due to mental status. Cough and gag reflexes are weakly intact.  Motor exam: Does not participate in formal strength testing. Tone is increased, rigidity x4 extremities. No abnormal movements were seen on exam.  Sensory exam: extensor posturing to noxious stimuli b/l UE and no response to noxious b/l LE  Deep tendon reflexes:  1+ throughout. Toes mute bilaterally  Coordination: not participatory due to mental status  Gait: deferred due to ICU status    Objective Data:    Labs:  Lab Results   Component Value Date/Time    PROTHROMBTM 14.0 08/25/2020 09:20 PM    INR 1.05 08/25/2020 09:20 PM      Lab Results   Component Value Date/Time    WBC 7.9 08/26/2020 03:58 AM    RBC 3.83 (L) 08/26/2020 03:58 AM    HEMOGLOBIN 12.1 08/26/2020 03:58 AM    HEMATOCRIT 35.6 (L) 08/26/2020 03:58 AM    MCV 93.0 08/26/2020 03:58 AM    MCH 31.6 08/26/2020 03:58 AM    MCHC 34.0 08/26/2020 03:58 AM    MPV 9.6 08/26/2020 03:58 AM    NEUTSPOLYS 77.90 (H) 08/26/2020 03:58 AM    LYMPHOCYTES 14.60 (L) 08/26/2020 03:58 AM    MONOCYTES 5.10 08/26/2020 03:58 AM    EOSINOPHILS 1.80 08/26/2020 03:58 AM    BASOPHILS 0.30 08/26/2020 03:58 AM    ANISOCYTOSIS 1+ 08/23/2020 10:05 PM      Lab Results   Component Value Date/Time    SODIUM 138 08/26/2020 03:58 AM    POTASSIUM 3.7  08/26/2020 03:58 AM    CHLORIDE 111 08/26/2020 03:58 AM    CO2 16 (L) 08/26/2020 03:58 AM    GLUCOSE 113 (H) 08/26/2020 03:58 AM    BUN 7 (L) 08/26/2020 03:58 AM    CREATININE 0.48 (L) 08/26/2020 03:58 AM      Lab Results   Component Value Date/Time    TRIGLYCERIDE 312 (H) 08/23/2020 11:11 PM       Lab Results   Component Value Date/Time    ALKPHOSPHAT 71 08/26/2020 03:58 AM    ASTSGOT 44 08/26/2020 03:58 AM    ALTSGPT 50 08/26/2020 03:58 AM    TBILIRUBIN 0.4 08/26/2020 03:58 AM        Imaging/Testing:    I interpreted and/or reviewed the patient's neuroimaging    DX-CHEST-PORTABLE (1 VIEW)   Final Result         1.  Pulmonary edema and/or infiltrates are identified, which are stable since the prior exam.         DX-ABDOMEN FOR TUBE PLACEMENT   Final Result         1.  Nonspecific bowel gas pattern.   2.  Dobbhoff tube tip overlying the expected location of the pylorus or first duodenal segment.      DX-CHEST-PORTABLE (1 VIEW)   Final Result         1.  Patchy bilateral pulmonary infiltrates, somewhat increased since prior.      EC-ECHOCARDIOGRAM COMPLETE W/O CONT   Final Result      DX-CHEST-PORTABLE (1 VIEW)   Final Result         1.  Patchy bilateral pulmonary infiltrates, somewhat increased since prior.      CT-HEAD W/O   Final Result         1.  Possible subtle sulcal effacement and slight loss of differentiation of gray-white matter, consider component of cerebral edema. Recommend radiographic follow-up   2.  Bilateral sphenoid and maxillary sinus air-fluid levels      DX-CHEST-PORTABLE (1 VIEW)   Final Result         1.  No acute cardiopulmonary disease.          Assessment and Plan:    Annika He is a 19 y.o. woman presenting for whom neurology has been consulted for prognostication in the setting of drug overdose and PEA arrest.  CT head is concerning for anoxic injury given sulcal effacement and loss of grey white differentiation.  Prognosis is guarded, pending dedicated neuroimaging s/p  therapeutic hypothermia and rewarming (scheduled for Thurs 8/27/20).     Plan:     - rewarmed to goal as of 5AM 8/25/20  - neuron specific enolase to be drawn Thursday  - MRI brain to be performed Thursday AM to identify evidence of cortical ribboning and bilateral T2 hypertensities of subcortical structures  - GOC, wean sedation as able  - no AED indicated at this juncture  - family meeting tomorrow, 8/27/20 at 3pm    The evaluation of the patient, and recommended management, was discussed with the resident staff. I have performed a physical exam and reviewed and updated ROS and Plan today (8/26/2020). In review of yesterday's note (8/25/2020), there are no changes except as documented above.    Ty Mccann MD  Medical Director, [facility]  Neurohospitalist, & Vascular Neurology, [facility] [place] [facility]  Clinical  of Neurology, [facility] [employer]

## 2020-08-26 NOTE — PROGRESS NOTES
"Critical Care Progress Note    Date of admission  8/23/2020    Chief Complaint  19 y.o. female admitted 8/23/2020 with drug overdose, cardiac arrest    Hospital Course    \"19 y.o. female who presented 8/23/2020 with drug overdose with methamphetamines, oxycodone and found by boyfriend.  Cardiac arrest enroute to hospital and received narcan, epinephrine and cpr and ROSC achieved.  Difficult airway at scene and LMA placed. ET tube placed in ER.  She was vomiting during airway attempt.  On propofol due to vent asynchrony.  Propofol turned off for neuro assessment and possible hypothermic protocol.  Family no longer at hospital and unable to reach per phone number given for mother.\"    8/24 - TTM protocol initiated, EEG without NCSE  8/25 - rewarmed, awake, not following      Interval Problem Update  Reviewed last 24 hour events:   - following in LEs   - Neuro: + brainstems, moves feet to command   - HR: 70s-110s   - SBP: 90s-120s   - GI: TF at goal   - UOP: 3.5L/24 hrs   - Pearce: yes   - Tm: 37.1   - Lines: CVC, Art, PIV   - PPx: GI pepcid, DVT lovenox   - ABG: Well compensated metabolic acidosis   - CXR (personally reviewed and compared to prior): No change   - weaning D50    Yesterday   - warmed overnight   - Neuro: moving all 4 spontaneously, eyes open to voice   - HR: 70s to 90s   - SBP: 80s-700s   - GI: start TF today   - UOP: 2L/24 hrs   - Pearce: yes   - Tm: 37.4   - Lines: CVC, Art   - PPx: GI pepcid, DVT lovenox   - ABG: Slightly over compensated metabolic acidosis -> drop respiratory rate to 26    - VD #3   - CXR (personally reviewed and compared to prior): Stable bilateral infiltrates    Review of Systems  Review of Systems   Unable to perform ROS: Acuity of condition        Vital Signs for last 24 hours   Temp:  [35.7 °C (96.3 °F)-37 °C (98.6 °F)] 36.2 °C (97.2 °F)  Pulse:  [] 78  Resp:  [12-29] 26  BP: ()/(41-87) 94/65  SpO2:  [96 %-100 %] 100 %    Hemodynamic parameters for last 24 hours  CVP: "  [5 MM HG-247 MM HG] 247 MM HG    Respiratory Information for the last 24 hours  Vent Mode: APVCMV  Rate (breaths/min): 26  Vt Target (mL): 400  PEEP/CPAP: 8  MAP: 13  Control VTE (exp VT): 398    Physical Exam   Physical Exam  Vitals signs and nursing note reviewed.   Constitutional:       General: She is in acute distress.      Appearance: She is toxic-appearing.   HENT:      Head: Normocephalic and atraumatic.      Right Ear: External ear normal.      Left Ear: External ear normal.      Nose: No congestion or rhinorrhea.      Mouth/Throat:      Mouth: Mucous membranes are dry.      Pharynx: No oropharyngeal exudate or posterior oropharyngeal erythema.   Eyes:      General: No scleral icterus.     Conjunctiva/sclera: Conjunctivae normal.      Pupils: Pupils are equal, round, and reactive to light.      Comments: Gaze normalized   Neck:      Musculoskeletal: Neck supple. No neck rigidity or muscular tenderness.   Cardiovascular:      Rate and Rhythm: Normal rate and regular rhythm.      Pulses: Normal pulses.      Heart sounds: Normal heart sounds. No murmur.   Pulmonary:      Effort: Respiratory distress present.      Breath sounds: No wheezing.   Chest:       Abdominal:      General: Abdomen is flat. There is no distension.      Palpations: There is no mass.      Tenderness: There is no abdominal tenderness.   Musculoskeletal:         General: No swelling or tenderness.      Right lower leg: No edema.      Left lower leg: No edema.   Skin:     General: Skin is warm and dry.      Capillary Refill: Capillary refill takes less than 2 seconds.      Findings: No erythema or rash.   Neurological:      GCS: GCS eye subscore is 3. GCS verbal subscore is 1. GCS motor subscore is 5.      Cranial Nerves: Cranial nerve deficit present. No facial asymmetry.      Motor: Abnormal muscle tone present.      Comments: PERRLA, tracking with eyes.  Will wiggle toes on command. Overall increased tone and rigid musculature.      Psychiatric:      Comments: Unable to assess         Medications  Current Facility-Administered Medications   Medication Dose Route Frequency Provider Last Rate Last Dose   • potassium phosphate 15 mmol in D5W 250 mL ivpb  15 mmol Intravenous Once JESSY Piper Jr..O.       • Pharmacy Consult: Enteral tube insertion - review meds/change route/product selection  1 Each Other PHARMACY TO DOSE JESSY Piper Jr..O.       • fentaNYL (SUBLIMAZE) injection 50 mcg  50 mcg Intravenous Q15 MIN PRN Jeremy M Gonda, M.D.        And   • fentaNYL (SUBLIMAZE) injection 100 mcg  100 mcg Intravenous Q15 MIN PRN Jeremy M Gonda, M.D.        And   • fentaNYL (SUBLIMAZE) 50 mcg/mL in 50mL (Continuous Infusion)   Intravenous Continuous Jeremy M Gonda, M.D. 3 mL/hr at 08/26/20 0719 150 mcg/hr at 08/26/20 0719    And   • propofol (DIPRIVAN) injection  0-80 mcg/kg/min Intravenous Continuous Jeremy M Gonda, M.D. 23 mL/hr at 08/26/20 0616 50 mcg/kg/min at 08/26/20 0616   • Respiratory Therapy Consult   Nebulization Continuous RT Brock Murdock M.D.       • ipratropium-albuterol (DUONEB) nebulizer solution  3 mL Nebulization Q2HRS PRN (RT) Brock Murdock M.D.       • famotidine (PEPCID) tablet 20 mg  20 mg Enteral Tube Q12HRS Brock Murdock M.D.   20 mg at 08/26/20 0554    Or   • famotidine (PEPCID) injection 20 mg  20 mg Intravenous Q12HRS Brock Murdock M.D.   20 mg at 08/25/20 0509   • senna-docusate (PERICOLACE or SENOKOT S) 8.6-50 MG per tablet 2 Tab  2 Tab Enteral Tube BID Brock Murdock M.D.   2 Tab at 08/26/20 0555    And   • polyethylene glycol/lytes (MIRALAX) PACKET 1 Packet  1 Packet Enteral Tube QDAY PRN Brock Murdock M.D.        And   • magnesium hydroxide (MILK OF MAGNESIA) suspension 30 mL  30 mL Enteral Tube QDAY PRN Brock Murdock M.D.        And   • bisacodyl (DULCOLAX) suppository 10 mg  10 mg Rectal QDAY PRN Brock Murdock M.D.       • MD Alert...ICU Electrolyte Replacement per Pharmacy   Other PHARMACY TO DOSE  Brock Murdock M.D.       • lidocaine (XYLOCAINE) 1 % injection 1-2 mL  1-2 mL Tracheal Tube Q30 MIN PRN Brock Murdock M.D.       • labetalol (NORMODYNE/TRANDATE) injection 10 mg  10 mg Intravenous Q4HRS PRN Brock Murdock M.D.   10 mg at 08/24/20 0505   • hydrALAZINE (APRESOLINE) injection 10-20 mg  10-20 mg Intravenous Q6HRS PRN Brock Murdock M.D.       • enoxaparin (LOVENOX) inj 40 mg  40 mg Subcutaneous DAILY Boogie Augustine Jr., D.O.   40 mg at 08/26/20 0556   • acetaminophen (TYLENOL) suppository 650 mg  650 mg Rectal Q6HRS PRN Boogie Augustine Jr., D.O.   650 mg at 08/24/20 1247   • acetaminophen (TYLENOL) tablet 650 mg  650 mg Enteral Tube Q6HRS PRN Boogie Augustine Jr., D.O.       • cefTRIAXone (ROCEPHIN) 1 g in  mL IVPB  1 g Intravenous DAILY Boogie Augustine Jr., D.O.   Stopped at 08/26/20 0703   • metroNIDAZOLE (FLAGYL) IVPB 500 mg  500 mg Intravenous Q8HRS Boogie Augustine Jr. D.O.   Stopped at 08/26/20 0654   • Dextrose 50% infusion (D50W) 500 mL   Intravenous Continuous Boogie Augustine Jr. D.O. 20 mL/hr at 08/25/20 1400         Fluids    Intake/Output Summary (Last 24 hours) at 8/26/2020 0751  Last data filed at 8/26/2020 0600  Gross per 24 hour   Intake 3684.7 ml   Output 3405 ml   Net 279.7 ml       Laboratory  Recent Labs     08/24/20  0301 08/25/20  0417 08/26/20  0311   ISTATAPH 7.420 7.451 7.419   ISTATAPCO2 20.2* 21.0* 24.3*   ISTATAPO2 138* 113* 84   ISTATATCO2 14* 15* 16*   AHREJDM3NPY 99 99 97   ISTATARTHCO3 13.1* 14.6* 15.7*   ISTATARTBE -9* -7* -7*   ISTATTEMP 35.8 C 96.6 F 97.8 F   ISTATFIO2 50 30 30   ISTATSPEC Arterial Arterial Arterial   ISTATAPHTC 7.438 7.468 7.426   KZCRKHKK1CG 131* 107* 82         Recent Labs     08/24/20  0250  08/25/20  0521 08/25/20  1600 08/25/20  2120 08/26/20  0358   SODIUM 140   < > 132* 133* 135 138   POTASSIUM 4.3   < > 3.5* 4.5 4.0 3.7   CHLORIDE 105   < > 106 106 107 111   CO2 17*   < > 16* 18* 16* 16*   BUN 19   < > 8 7* 6* 7*   CREATININE 0.73   <  > 0.52 0.64 0.42* 0.48*   MAGNESIUM 2.1   < > 3.3* 2.3 2.3 2.0   PHOSPHORUS 1.6*  --  2.0*  --   --  2.4*   CALCIUM 7.9*   < > 7.0* 7.0* 7.5* 7.9*    < > = values in this interval not displayed.     Recent Labs     08/23/20 2311 08/24/20 0250 08/25/20 0521 08/25/20 1600 08/25/20 2120 08/26/20 0358   ALTSGPT  --    < > 124*  --  63*  --   --  50   ASTSGOT  --    < > 188*  --  56*  --   --  44   ALKPHOSPHAT 109*  --  81  --  65  --   --  71   TBILIRUBIN 0.3  --  0.3  --  0.7  --   --  0.4   LIPASE 44  --   --   --   --   --   --   --    PREALBUMIN  --   --   --   --   --   --   --  14.7*   GLUCOSE 127*  --  86   < > 139* 113* 121* 113*    < > = values in this interval not displayed.     Recent Labs     08/23/20 2205 08/24/20 0250 08/24/20 1135 08/25/20 0521 08/26/20 0358   WBC 11.4*  --  10.7 8.5 9.6 7.9   NEUTSPOLYS 40.50*  --   --   --  78.60* 77.90*   LYMPHOCYTES 50.80*  --   --   --  15.00* 14.60*   MONOCYTES 5.60  --   --   --  4.60 5.10   EOSINOPHILS 1.60  --   --   --  1.40 1.80   BASOPHILS 0.00  --   --   --  0.20 0.30   ASTSGOT  --    < > 188*  --  56* 44   ALTSGPT  --    < > 124*  --  63* 50   ALKPHOSPHAT  --    < > 81  --  65 71   TBILIRUBIN  --    < > 0.3  --  0.7 0.4    < > = values in this interval not displayed.     Recent Labs     08/24/20 1135 08/25/20 0521 08/25/20 1600 08/25/20 2120 08/26/20  0358   RBC 4.15*  --  3.93*  --   --  3.83*   HEMOGLOBIN 13.1  --  12.4  --   --  12.1   HEMATOCRIT 38.0  --  35.2*  --   --  35.6*   PLATELETCT 193  --  188  --   --  193   PROTHROMBTM 13.9   < > 14.6 14.1 14.0  --    APTT 30.5   < > 41.6* 47.2* 45.0*  --    INR 1.03   < > 1.10 1.06 1.05  --     < > = values in this interval not displayed.       Imaging  X-Ray:  I have personally reviewed the images and compared with prior images.  EKG:  I have personally reviewed the images and compared with prior images.  CT:    Reviewed  Echo:   Reviewed    Assessment/Plan  Drug overdose- (present on  admission)  Assessment & Plan  Methamphetamines, oxycodone and EtOH per boyfriend  Counseling when able    Metabolic acidosis- (present on admission)  Assessment & Plan  Secondary to cardiac arrest  Aggressive IVF and pressors  Monitor, improving    Acute respiratory failure with hypoxia (HCC)- (present on admission)  Assessment & Plan  Secondary to drug overdose  RT/O2 protocols  Daily and PRN ABGs  Titration of ventilator therapy based on ABGs and patient's status  Sedation as tolerated/indicated  Daily CXR  HOB >30 degrees and peridex for VAP prevention  Pepcid for GI prophylaxis  SAT/SBT when able (ABCDEF Bundle)  Early mobility  Monitor for possibility to perform SBT, sedation to remain off as tolerated  Trial SBT in the morning of 8/27/2020    Cardiac arrest (HCC)- (present on admission)  Assessment & Plan  Pulseless electrical activity in ambulance while en route to hospital    AHA guidelines for comatose patients following out-of-hospital cardiac arrest:  Induce hypothermia for unconscious adult patients with return of spontaneous circulation (ROSC) after out-of-hospital cardiac arrest when the initial rhythm was ventricular fibrillation (VF) or pulseless ventricular tachycardia (pVT) (class I, level of evidence: B-R)    Similar therapy may be beneficial for patients with non-VF/non-pVT (nonshockable) arrest out-of-hospital or with in-hospital arrest (class I, level of evidence: C-EO)  Rogelio et al. [id number]    Post cardiac arrest care   -Ventilation: Goal PCO2 40-45, PaO2 ~100, low tidal volume ventilation   -Hemodynamic: Goal systolic blood pressure >90 mmHg, goal MAP >65 mmHg; fluid boluses and pressors (epinephrine, norepinephrine) as guided by bedside ultrasound and hemodynamics   -Cardiac: Treat ACS if indicated, treat arrhythmia as required, Echo pending   -Neurologic: Serial neurologic exams, CT head: Possible subtle sulcal effacement and slight loss of differentiation of gray-white matter,  consider component of cerebral edema. Recommend radiographic follow-up, EEG with sedated appearing waveforms, targeted temperature management complete, sedation only as needed   -Metabolic: Serial lactate, goal blood glucose 120-180, maintain euvolemia, monitor urine output, maintain potassium greater than 3.5, maintain magnesium greater than 2  Neuro prognostication in 48-72 hours  MRI early in the morning on Thursday, 8/27/2020.  Consider NSE at that time as well.  Cardiology consult if indicated  Cardiac ischemia evaluation if indicated    PEA (Pulseless electrical activity) (HCC)- (present on admission)  Assessment & Plan  Enroute to hospital  Hypoxia and aspiration contributory  Tele monitoring   On TTM protocol  CT Head with mild edema    Hypoglycemia- (present on admission)  Assessment & Plan  ?2/2 ischemic hepatitis  Able to be titrated off of dextrose containing infusion, continue serial accuchecks    Encephalopathy acute- (present on admission)  Assessment & Plan  Given history of cardiac arrest and mild edema on CT this is concerning for cerebral ischemia  Completing targeted temperature management protocol  EEG without an NCSE  MRI ordered for Thursday, 8/27/2020  Consider NSE if needed  Improving    Elevated transaminase level- (present on admission)  Assessment & Plan  Likely due to ischemic hepatitis, improving today  Monitor synthetic functions including INR and glucose  Avoid hepatotoxins  Finally able to be weaned off of dextrose    Cerebral edema (HCC)- (present on admission)  Assessment & Plan  Mild/possible per radiologist on CT  Follow-up MRI on 8/27/2020       VTE:  Lovenox  Ulcer: H2 Antagonist  Lines: Central Line  Ongoing indication addressed, Arterial Line  Ongoing indication addressed and Pearce Catheter  Ongoing indication addressed    I have performed a physical exam and reviewed and updated ROS and Plan today (8/26/2020). In review of yesterday's note (8/25/2020), there are no changes  except as documented above.     Titrating ventilator, sedation, closely monitoring and correcting metabolic and acid-base derangements in the post cardiac arrest phase to optimize neurologic outcome. This patient is critically ill, at high risk for decompensation leading to worsening vital organ dysfunction and death without critical care interventions.    Discussed patient condition and risk of morbidity and/or mortality with RN, RT, Pharmacy, Dietary, , Code status disscussed, Charge nurse / hot rounds and neurology     The patient remains critically ill.  Critical care time = 78 minutes in directly providing and coordinating critical care and extensive data review.  No time overlap and excludes procedures.

## 2020-08-26 NOTE — PROGRESS NOTES
Monitor Summary: .18/.08/.40. Rhythm: SR-ST. Rate: 70's-90's, 100's-110's with stimulation and sedation off.

## 2020-08-26 NOTE — PROGRESS NOTES
Dr Gonda updated on patient's increasing agitation, still not following commands. Patient repositioned in bed every 10 minutes. Patient moves self down in bed until ETT is tight then turns head and pulls it apart. Sedation increased. See MAR for orders.

## 2020-08-26 NOTE — CARE PLAN
Ventilator Daily Summary    Vent Day #3    Ventilator settings changed this shift:  7.5 at 23    Weaning trials: no     Respiratory Procedures: no    Plan: Continue current ventilator settings and wean mechanical ventilation as tolerated per physician orders.   26 400 8 30%

## 2020-08-26 NOTE — CARE PLAN
Problem: Ventilation Defect:  Goal: Ability to achieve and maintain unassisted ventilation or tolerate decreased levels of ventilator support  Outcome: PROGRESSING SLOWER THAN EXPECTED      Ventilator Daily Summary    Vent Day # 4    Ventilator settings changed this shift: N/A    Weaning trials: No agitation    Plan: Continue current ventilator settings and wean mechanical ventilation as tolerated per physician orders.

## 2020-08-26 NOTE — CARE PLAN
Problem: Nutritional:  Goal: Nutrition support tolerated and meeting greater than 85% of estimated needs  Outcome: MET   TF is @ goal 40 mL/hr with prop. Will advance to 45 mL/hr without prop.

## 2020-08-27 ENCOUNTER — APPOINTMENT (OUTPATIENT)
Dept: RADIOLOGY | Facility: MEDICAL CENTER | Age: 20
DRG: 004 | End: 2020-08-27
Attending: INTERNAL MEDICINE
Payer: MEDICAID

## 2020-08-27 ENCOUNTER — APPOINTMENT (OUTPATIENT)
Dept: RADIOLOGY | Facility: MEDICAL CENTER | Age: 20
DRG: 004 | End: 2020-08-27
Attending: PSYCHIATRY & NEUROLOGY
Payer: MEDICAID

## 2020-08-27 LAB
ACTION RANGE TRIGGERED IACRT: NO
ALBUMIN SERPL BCP-MCNC: 3.1 G/DL (ref 3.2–4.9)
ALBUMIN/GLOB SERPL: 1.3 G/DL
ALP SERPL-CCNC: 98 U/L (ref 30–99)
ALT SERPL-CCNC: 63 U/L (ref 2–50)
ANION GAP SERPL CALC-SCNC: 12 MMOL/L (ref 7–16)
AST SERPL-CCNC: 67 U/L (ref 12–45)
BASE EXCESS BLDA CALC-SCNC: -6 MMOL/L (ref -4–3)
BASOPHILS # BLD AUTO: 0.4 % (ref 0–1.8)
BASOPHILS # BLD: 0.03 K/UL (ref 0–0.12)
BILIRUB SERPL-MCNC: 0.3 MG/DL (ref 0.1–1.5)
BODY TEMPERATURE: ABNORMAL DEGREES
BUN SERPL-MCNC: 13 MG/DL (ref 8–22)
CALCIUM SERPL-MCNC: 8.5 MG/DL (ref 8.5–10.5)
CHLORIDE SERPL-SCNC: 105 MMOL/L (ref 96–112)
CO2 BLDA-SCNC: 21 MMOL/L (ref 20–33)
CO2 SERPL-SCNC: 20 MMOL/L (ref 20–33)
CREAT SERPL-MCNC: 0.56 MG/DL (ref 0.5–1.4)
EOSINOPHIL # BLD AUTO: 0.16 K/UL (ref 0–0.51)
EOSINOPHIL NFR BLD: 1.9 % (ref 0–6.9)
ERYTHROCYTE [DISTWIDTH] IN BLOOD BY AUTOMATED COUNT: 47.5 FL (ref 35.9–50)
GLOBULIN SER CALC-MCNC: 2.3 G/DL (ref 1.9–3.5)
GLUCOSE BLD-MCNC: 112 MG/DL (ref 65–99)
GLUCOSE BLD-MCNC: 95 MG/DL (ref 65–99)
GLUCOSE BLD-MCNC: 97 MG/DL (ref 65–99)
GLUCOSE SERPL-MCNC: 101 MG/DL (ref 65–99)
HCO3 BLDA-SCNC: 19.6 MMOL/L (ref 17–25)
HCT VFR BLD AUTO: 40.6 % (ref 37–47)
HGB BLD-MCNC: 13.3 G/DL (ref 12–16)
HOROWITZ INDEX BLDA+IHG-RTO: 297 MM[HG]
IMM GRANULOCYTES # BLD AUTO: 0.12 K/UL (ref 0–0.11)
IMM GRANULOCYTES NFR BLD AUTO: 1.4 % (ref 0–0.9)
INST. QUALIFIED PATIENT IIQPT: YES
LYMPHOCYTES # BLD AUTO: 1.45 K/UL (ref 1–4.8)
LYMPHOCYTES NFR BLD: 17.3 % (ref 22–41)
MAGNESIUM SERPL-MCNC: 1.6 MG/DL (ref 1.5–2.5)
MCH RBC QN AUTO: 31.4 PG (ref 27–33)
MCHC RBC AUTO-ENTMCNC: 32.8 G/DL (ref 33.6–35)
MCV RBC AUTO: 95.8 FL (ref 81.4–97.8)
MONOCYTES # BLD AUTO: 0.67 K/UL (ref 0–0.85)
MONOCYTES NFR BLD AUTO: 8 % (ref 0–13.4)
NEUTROPHILS # BLD AUTO: 5.96 K/UL (ref 2–7.15)
NEUTROPHILS NFR BLD: 71 % (ref 44–72)
NRBC # BLD AUTO: 0 K/UL
NRBC BLD-RTO: 0 /100 WBC
O2/TOTAL GAS SETTING VFR VENT: 30 %
PCO2 BLDA: 38.1 MMHG (ref 26–37)
PCO2 TEMP ADJ BLDA: 37.1 MMHG (ref 26–37)
PH BLDA: 7.32 [PH] (ref 7.4–7.5)
PH TEMP ADJ BLDA: 7.33 [PH] (ref 7.4–7.5)
PHOSPHATE SERPL-MCNC: 4.5 MG/DL (ref 2.5–4.5)
PLATELET # BLD AUTO: 247 K/UL (ref 164–446)
PMV BLD AUTO: 10 FL (ref 9–12.9)
PO2 BLDA: 89 MMHG (ref 64–87)
PO2 TEMP ADJ BLDA: 86 MMHG (ref 64–87)
POTASSIUM SERPL-SCNC: 4.2 MMOL/L (ref 3.6–5.5)
PROT SERPL-MCNC: 5.4 G/DL (ref 6–8.2)
RBC # BLD AUTO: 4.24 M/UL (ref 4.2–5.4)
SAO2 % BLDA: 96 % (ref 93–99)
SODIUM SERPL-SCNC: 137 MMOL/L (ref 135–145)
SPECIMEN DRAWN FROM PATIENT: ABNORMAL
WBC # BLD AUTO: 8.4 K/UL (ref 4.8–10.8)

## 2020-08-27 PROCEDURE — 700102 HCHG RX REV CODE 250 W/ 637 OVERRIDE(OP): Performed by: INTERNAL MEDICINE

## 2020-08-27 PROCEDURE — 80053 COMPREHEN METABOLIC PANEL: CPT

## 2020-08-27 PROCEDURE — 85025 COMPLETE CBC W/AUTO DIFF WBC: CPT

## 2020-08-27 PROCEDURE — 700111 HCHG RX REV CODE 636 W/ 250 OVERRIDE (IP): Performed by: INTERNAL MEDICINE

## 2020-08-27 PROCEDURE — 700101 HCHG RX REV CODE 250: Performed by: INTERNAL MEDICINE

## 2020-08-27 PROCEDURE — 700101 HCHG RX REV CODE 250

## 2020-08-27 PROCEDURE — 86316 IMMUNOASSAY TUMOR OTHER: CPT

## 2020-08-27 PROCEDURE — 31500 INSERT EMERGENCY AIRWAY: CPT | Performed by: INTERNAL MEDICINE

## 2020-08-27 PROCEDURE — 770022 HCHG ROOM/CARE - ICU (200)

## 2020-08-27 PROCEDURE — 99233 SBSQ HOSP IP/OBS HIGH 50: CPT | Performed by: PSYCHIATRY & NEUROLOGY

## 2020-08-27 PROCEDURE — 0B21XEZ CHANGE ENDOTRACHEAL AIRWAY IN TRACHEA, EXTERNAL APPROACH: ICD-10-PCS | Performed by: INTERNAL MEDICINE

## 2020-08-27 PROCEDURE — A9270 NON-COVERED ITEM OR SERVICE: HCPCS | Performed by: INTERNAL MEDICINE

## 2020-08-27 PROCEDURE — 71045 X-RAY EXAM CHEST 1 VIEW: CPT

## 2020-08-27 PROCEDURE — 84100 ASSAY OF PHOSPHORUS: CPT

## 2020-08-27 PROCEDURE — 37799 UNLISTED PX VASCULAR SURGERY: CPT

## 2020-08-27 PROCEDURE — 99291 CRITICAL CARE FIRST HOUR: CPT | Mod: 25 | Performed by: INTERNAL MEDICINE

## 2020-08-27 PROCEDURE — 94770 HCHG CO2 EXPIRED GAS DETERMINATION: CPT

## 2020-08-27 PROCEDURE — 82803 BLOOD GASES ANY COMBINATION: CPT

## 2020-08-27 PROCEDURE — 94003 VENT MGMT INPAT SUBQ DAY: CPT

## 2020-08-27 PROCEDURE — 83735 ASSAY OF MAGNESIUM: CPT

## 2020-08-27 PROCEDURE — 94760 N-INVAS EAR/PLS OXIMETRY 1: CPT

## 2020-08-27 PROCEDURE — 700105 HCHG RX REV CODE 258: Performed by: INTERNAL MEDICINE

## 2020-08-27 PROCEDURE — 70551 MRI BRAIN STEM W/O DYE: CPT

## 2020-08-27 PROCEDURE — 82962 GLUCOSE BLOOD TEST: CPT

## 2020-08-27 RX ORDER — PHENYLEPHRINE HCL IN 0.9% NACL 0.5 MG/5ML
SYRINGE (ML) INTRAVENOUS
Status: DISCONTINUED
Start: 2020-08-27 | End: 2020-08-27

## 2020-08-27 RX ORDER — LEVETIRACETAM 5 MG/ML
500 INJECTION INTRAVASCULAR EVERY 12 HOURS
Status: DISCONTINUED | OUTPATIENT
Start: 2020-08-27 | End: 2020-08-30

## 2020-08-27 RX ORDER — MIDAZOLAM HYDROCHLORIDE 1 MG/ML
INJECTION INTRAMUSCULAR; INTRAVENOUS
Status: COMPLETED
Start: 2020-08-27 | End: 2020-08-27

## 2020-08-27 RX ORDER — ROCURONIUM BROMIDE 10 MG/ML
50 INJECTION, SOLUTION INTRAVENOUS ONCE
Status: COMPLETED | OUTPATIENT
Start: 2020-08-27 | End: 2020-08-27

## 2020-08-27 RX ORDER — ROCURONIUM BROMIDE 10 MG/ML
INJECTION, SOLUTION INTRAVENOUS
Status: COMPLETED
Start: 2020-08-27 | End: 2020-08-27

## 2020-08-27 RX ORDER — MAGNESIUM SULFATE HEPTAHYDRATE 40 MG/ML
2 INJECTION, SOLUTION INTRAVENOUS ONCE
Status: COMPLETED | OUTPATIENT
Start: 2020-08-27 | End: 2020-08-27

## 2020-08-27 RX ORDER — MIDAZOLAM HYDROCHLORIDE 1 MG/ML
2 INJECTION INTRAMUSCULAR; INTRAVENOUS ONCE
Status: COMPLETED | OUTPATIENT
Start: 2020-08-28 | End: 2020-08-28

## 2020-08-27 RX ORDER — MIDAZOLAM HYDROCHLORIDE 1 MG/ML
2-4 INJECTION INTRAMUSCULAR; INTRAVENOUS
Status: DISCONTINUED | OUTPATIENT
Start: 2020-08-27 | End: 2020-08-29

## 2020-08-27 RX ORDER — DEXMEDETOMIDINE HYDROCHLORIDE 4 UG/ML
.1-.8 INJECTION, SOLUTION INTRAVENOUS CONTINUOUS
Status: DISCONTINUED | OUTPATIENT
Start: 2020-08-27 | End: 2020-08-27

## 2020-08-27 RX ADMIN — FAMOTIDINE 20 MG: 20 TABLET, FILM COATED ORAL at 05:31

## 2020-08-27 RX ADMIN — FAMOTIDINE 20 MG: 10 INJECTION INTRAVENOUS at 17:58

## 2020-08-27 RX ADMIN — PROPOFOL 70 MCG/KG/MIN: 10 INJECTION, EMULSION INTRAVENOUS at 10:40

## 2020-08-27 RX ADMIN — METRONIDAZOLE 500 MG: 500 TABLET ORAL at 05:30

## 2020-08-27 RX ADMIN — ROCURONIUM BROMIDE 50 MG: 10 INJECTION, SOLUTION INTRAVENOUS at 11:43

## 2020-08-27 RX ADMIN — PROPOFOL 80 MCG/KG/MIN: 10 INJECTION, EMULSION INTRAVENOUS at 00:05

## 2020-08-27 RX ADMIN — FENTANYL CITRATE 100 MCG: 50 INJECTION INTRAMUSCULAR; INTRAVENOUS at 11:21

## 2020-08-27 RX ADMIN — ENOXAPARIN SODIUM 40 MG: 40 INJECTION SUBCUTANEOUS at 05:30

## 2020-08-27 RX ADMIN — PROPOFOL 30 MCG/KG/MIN: 10 INJECTION, EMULSION INTRAVENOUS at 22:24

## 2020-08-27 RX ADMIN — DOCUSATE SODIUM 50 MG AND SENNOSIDES 8.6 MG 2 TABLET: 8.6; 5 TABLET, FILM COATED ORAL at 17:58

## 2020-08-27 RX ADMIN — CEFTRIAXONE SODIUM 1 G: 1 INJECTION, POWDER, FOR SOLUTION INTRAMUSCULAR; INTRAVENOUS at 05:31

## 2020-08-27 RX ADMIN — METRONIDAZOLE 500 MG: 500 TABLET ORAL at 13:14

## 2020-08-27 RX ADMIN — LEVETIRACETAM INJECTION 500 MG: 5 INJECTION INTRAVENOUS at 23:33

## 2020-08-27 RX ADMIN — METRONIDAZOLE 500 MG: 500 TABLET ORAL at 22:42

## 2020-08-27 RX ADMIN — Medication 200 MCG/HR: at 08:41

## 2020-08-27 RX ADMIN — DEXMEDETOMIDINE HYDROCHLORIDE 0.5 MCG/KG/HR: 100 INJECTION, SOLUTION INTRAVENOUS at 13:20

## 2020-08-27 RX ADMIN — PROPOFOL 40 MCG/KG/MIN: 10 INJECTION, EMULSION INTRAVENOUS at 04:16

## 2020-08-27 RX ADMIN — PROPOFOL 40 MCG/KG/MIN: 10 INJECTION, EMULSION INTRAVENOUS at 07:09

## 2020-08-27 RX ADMIN — MAGNESIUM SULFATE IN WATER 2 G: 40 INJECTION, SOLUTION INTRAVENOUS at 13:14

## 2020-08-27 RX ADMIN — DOCUSATE SODIUM 50 MG AND SENNOSIDES 8.6 MG 2 TABLET: 8.6; 5 TABLET, FILM COATED ORAL at 05:31

## 2020-08-27 ASSESSMENT — FIBROSIS 4 INDEX
FIB4 SCORE: 0.68
FIB4 SCORE: 0.72

## 2020-08-27 NOTE — PROGRESS NOTES
Neurology Progress Note  Neurohospitalist Service, [facility]    Referring Physician: Boogie Augustine Jr., D.O.    Chief Complaint   Patient presents with   • Drug Overdose     unknown amount or type of drug ingestion per EMS, pt found down, aspiration, CPR in progress upon EMS arrival.       HPI: Refer to initial documented Neurology H&P, as detailed in the patient's chart.    Interval History: No acute events overnight.  Remains intubated in the ICU thus unable to provide novel subjective history.    Review of systems: In addition to what is detailed in the HPI and/or updated in the interval history, all other systems reviewed and are negative.    Past Medical History:   Unchanged from prior    FHx:  Unchanged from prior    SHx:  Unchanged from prior    Medications:    Current Facility-Administered Medications:   •  dexmedetomidine (PRECEDEX) 400 mcg/100mL NS premix infusion, 0.1-0.8 mcg/kg/hr, Intravenous, Continuous, JESSY Piper Jr..O.  •  fentaNYL (SUBLIMAZE) injection 50 mcg, 50 mcg, Intravenous, Q15 MIN PRN **AND** fentaNYL (SUBLIMAZE) injection 100 mcg, 100 mcg, Intravenous, Q15 MIN PRN, 100 mcg at 08/26/20 2032 **AND** fentaNYL (SUBLIMAZE) 50 mcg/mL in 50mL (Continuous Infusion), , Intravenous, Continuous, Last Rate: 4 mL/hr at 08/27/20 0559, 200 mcg/hr at 08/27/20 0559 **AND** propofol (DIPRIVAN) injection, 0-80 mcg/kg/min, Intravenous, Continuous, Last Rate: 18.4 mL/hr at 08/27/20 0709, 40 mcg/kg/min at 08/27/20 0709 **AND** [START ON 8/29/2020] Triglyceride, , , Every 3 Days (0300), JESSY Piper Jr..O.  •  metroNIDAZOLE (FLAGYL) tablet 500 mg, 500 mg, Enteral Tube, Q8HRS, JESSY Piper Jr..O., 500 mg at 08/27/20 0530  •  Pharmacy Consult: Enteral tube insertion - review meds/change route/product selection, 1 Each, Other, PHARMACY TO DOSE, JESSY Piper Jr..O.  •  Respiratory Therapy Consult, , Nebulization, Continuous RT, Brock Murdock M.D.  •   ipratropium-albuterol (DUONEB) nebulizer solution, 3 mL, Nebulization, Q2HRS PRN (RT), Brock Murdock M.D.  •  famotidine (PEPCID) tablet 20 mg, 20 mg, Enteral Tube, Q12HRS, 20 mg at 08/27/20 0531 **OR** famotidine (PEPCID) injection 20 mg, 20 mg, Intravenous, Q12HRS, Brock Murdock M.D., 20 mg at 08/26/20 1747  •  senna-docusate (PERICOLACE or SENOKOT S) 8.6-50 MG per tablet 2 Tab, 2 Tab, Enteral Tube, BID, 2 Tab at 08/27/20 0531 **AND** polyethylene glycol/lytes (MIRALAX) PACKET 1 Packet, 1 Packet, Enteral Tube, QDAY PRN **AND** magnesium hydroxide (MILK OF MAGNESIA) suspension 30 mL, 30 mL, Enteral Tube, QDAY PRN **AND** bisacodyl (DULCOLAX) suppository 10 mg, 10 mg, Rectal, QDAY PRN, Brock Murdock M.D.  •  MD Alert...ICU Electrolyte Replacement per Pharmacy, , Other, PHARMACY TO DOSE, Brock Murdock M.D.  •  lidocaine (XYLOCAINE) 1 % injection 1-2 mL, 1-2 mL, Tracheal Tube, Q30 MIN PRN, Brock Murdock M.D.  •  labetalol (NORMODYNE/TRANDATE) injection 10 mg, 10 mg, Intravenous, Q4HRS PRN, Brock Murdock M.D., 10 mg at 08/24/20 0505  •  hydrALAZINE (APRESOLINE) injection 10-20 mg, 10-20 mg, Intravenous, Q6HRS PRN, Brock Murdock M.D.  •  enoxaparin (LOVENOX) inj 40 mg, 40 mg, Subcutaneous, DAILY, Boogie Augustine Jr., D.O., 40 mg at 08/27/20 0530  •  acetaminophen (TYLENOL) suppository 650 mg, 650 mg, Rectal, Q6HRS PRN, Boogie Augustine Jr., D.O., 650 mg at 08/24/20 1247  •  acetaminophen (TYLENOL) tablet 650 mg, 650 mg, Enteral Tube, Q6HRS PRN, Boogie Augustine Jr. D.O.  •  cefTRIAXone (ROCEPHIN) 1 g in  mL IVPB, 1 g, Intravenous, DAILY, Boogie Augustine Jr. D.O., Stopped at 08/27/20 0601  •  Dextrose 50% infusion (D50W) 500 mL, , Intravenous, Continuous, Boogie Augustine Jr. D.O., Stopped at 08/26/20 1159    Physical Examination:     Vitals:    08/27/20 0615 08/27/20 0630 08/27/20 0634 08/27/20 0645   BP: (!) 93/48 (!) 93/48  (!) 87/47   Pulse: 93 95 93 88   Resp: (!) 26 (!) 26 (!) 26 (!) 26   Temp:        TempSrc:       SpO2: 98% 98% 99% 99%   Weight:       Height:           General: Patient is intubated in the ICU  Eyes: examination of optic disks not indicated at this time  CV: RRR     NEUROLOGICAL EXAM:      Mental status: opens eyes to noxious stimulus, does not follow commands this AM 8/27/20  Speech and language: intubated  Cranial nerve exam: Pupils are round and reactive to light bilaterally. Visual fields: does not blink to threat bilaterally. Gaze in neutral position. Corneal reflexes present bilaterally.  Extraocular muscles are intact to oculocephalic maneuver ie VOR intact. Face is symmetric. Unable to assess sensation in the face due to mental status. Cough and gag reflexes are absent.  Motor exam: Does not participate in formal strength testing. Tone is increased. No abnormal movements were seen on exam.  Sensory exam: extensor posturing to noxious stimuli b/l UE and no response to noxious stimulus b/l LE  Deep tendon reflexes:  1+ throughout. Toes mute bilaterally  Coordination: not participatory due to mental status  Gait: deferred due to ICU status    Objective Data:    Labs:  Lab Results   Component Value Date/Time    PROTHROMBTM 14.0 08/25/2020 09:20 PM    INR 1.05 08/25/2020 09:20 PM      Lab Results   Component Value Date/Time    WBC 8.4 08/27/2020 05:27 AM    RBC 4.24 08/27/2020 05:27 AM    HEMOGLOBIN 13.3 08/27/2020 05:27 AM    HEMATOCRIT 40.6 08/27/2020 05:27 AM    MCV 95.8 08/27/2020 05:27 AM    MCH 31.4 08/27/2020 05:27 AM    MCHC 32.8 (L) 08/27/2020 05:27 AM    MPV 10.0 08/27/2020 05:27 AM    NEUTSPOLYS 71.00 08/27/2020 05:27 AM    LYMPHOCYTES 17.30 (L) 08/27/2020 05:27 AM    MONOCYTES 8.00 08/27/2020 05:27 AM    EOSINOPHILS 1.90 08/27/2020 05:27 AM    BASOPHILS 0.40 08/27/2020 05:27 AM    ANISOCYTOSIS 1+ 08/23/2020 10:05 PM      Lab Results   Component Value Date/Time    SODIUM 137 08/27/2020 05:47 AM    POTASSIUM 4.2 08/27/2020 05:47 AM    CHLORIDE 105 08/27/2020 05:47 AM    CO2 20  08/27/2020 05:47 AM    GLUCOSE 101 (H) 08/27/2020 05:47 AM    BUN 13 08/27/2020 05:47 AM    CREATININE 0.56 08/27/2020 05:47 AM      Lab Results   Component Value Date/Time    TRIGLYCERIDE 312 (H) 08/23/2020 11:11 PM       Lab Results   Component Value Date/Time    ALKPHOSPHAT 98 08/27/2020 05:47 AM    ASTSGOT 67 (H) 08/27/2020 05:47 AM    ALTSGPT 63 (H) 08/27/2020 05:47 AM    TBILIRUBIN 0.3 08/27/2020 05:47 AM        Imaging/Testing:    I interpreted and/or reviewed the patient's neuroimaging    DX-CHEST-PORTABLE (1 VIEW)   Final Result         1.  Pulmonary edema and/or infiltrates are identified, which are somewhat decreased since the prior exam.            DX-CHEST-PORTABLE (1 VIEW)   Final Result         1.  Pulmonary edema and/or infiltrates are identified, which are stable since the prior exam.         DX-ABDOMEN FOR TUBE PLACEMENT   Final Result         1.  Nonspecific bowel gas pattern.   2.  Dobbhoff tube tip overlying the expected location of the pylorus or first duodenal segment.      DX-CHEST-PORTABLE (1 VIEW)   Final Result         1.  Patchy bilateral pulmonary infiltrates, somewhat increased since prior.      EC-ECHOCARDIOGRAM COMPLETE W/O CONT   Final Result      DX-CHEST-PORTABLE (1 VIEW)   Final Result         1.  Patchy bilateral pulmonary infiltrates, somewhat increased since prior.      CT-HEAD W/O   Final Result         1.  Possible subtle sulcal effacement and slight loss of differentiation of gray-white matter, consider component of cerebral edema. Recommend radiographic follow-up   2.  Bilateral sphenoid and maxillary sinus air-fluid levels      DX-CHEST-PORTABLE (1 VIEW)   Final Result         1.  No acute cardiopulmonary disease.      MR-BRAIN-W/O    (Results Pending)       Assessment and Plan:    Annika He is a 19 y.o. woman presenting for whom neurology has been consulted for prognostication in the setting of drug overdose and PEA arrest.  CT head is concerning for anoxic  injury given sulcal effacement and loss of grey white differentiation.  Prognosis is guarded, pending dedicated neuroimaging s/p therapeutic hypothermia and rewarming (scheduled for today 8/27/20).  Examination has improved over the past 48 hours, now with patient following midline and appendicular commands; remains intubated as of 8/27/20 AM.     Plan:     - rewarmed to goal as of 5AM 8/25/20  - neuron specific enolase drawn today 8/27/20  - MRI brain to be performed today AM to identify evidence of cortical ribboning and bilateral T2 hypertensities of subcortical structures  - GOC, wean sedation and extubate if and when able per ICU protocol(s)  - no AED indicated at this juncture  - family meeting today, 8/27/20 at 3pm    The evaluation of the patient, and recommended management, was discussed with the resident staff. I have performed a physical exam and reviewed and updated ROS and Plan today (8/27/2020). In review of yesterday's note (8/26/2020), there are no changes except as documented above.    Ty Mccann MD  Medical Director, [facility], [facility]  Neurohospitalist, & Vascular Neurology, [facility]  Clinical  of Neurology, [employer] [facility] [employer]

## 2020-08-27 NOTE — CARE PLAN
Problem: Communication  Goal: The ability to communicate needs accurately and effectively will improve  Intervention: Educate patient and significant other/support system about the plan of care, procedures, treatments, medications and allow for questions  Note: During initial SAT this RN educated patient on why she was here, what has been done for her here, and what the POC currently is. Patient was able to nod 'yes' or 'no' to simple questions. Will continue to orient patient on POC.      Problem: Infection  Goal: Will remain free from infection  Intervention: Implement standard precautions and perform hand washing before and after patient contact  Note: Standard precautions implemented and hand hygiene performed before and after patient contact.

## 2020-08-27 NOTE — CARE PLAN
Ventilator Daily Summary    Vent Day # 5    Ventilator settings changed this shift: None    Weaning trials: No pt HR/BP increased    Respiratory Procedures: none    Plan: Continue current ventilator settings and wean mechanical ventilation as tolerated per physician orders.

## 2020-08-27 NOTE — CARE PLAN
Problem: Ventilation Defect:  Goal: Ability to achieve and maintain unassisted ventilation or tolerate decreased levels of ventilator support  Outcome: PROGRESSING SLOWER THAN EXPECTED      Ventilator Daily Summary    Vent Day # 5    Ventilator settings changed this shift: N/A    Weaning trials: SBT attempt x 1    Plan: Continue current ventilator settings and wean mechanical ventilation as tolerated per physician orders.

## 2020-08-27 NOTE — PROGRESS NOTES
"Critical Care Progress Note    Date of admission  8/23/2020    Chief Complaint  19 y.o. female admitted 8/23/2020 with drug overdose, cardiac arrest    Hospital Course    \"19 y.o. female who presented 8/23/2020 with drug overdose with methamphetamines, oxycodone and found by boyfriend.  Cardiac arrest enroute to hospital and received narcan, epinephrine and cpr and ROSC achieved.  Difficult airway at scene and LMA placed. ET tube placed in ER.  She was vomiting during airway attempt.  On propofol due to vent asynchrony.  Propofol turned off for neuro assessment and possible hypothermic protocol.  Family no longer at hospital and unable to reach per phone number given for mother.\"    8/24 - TTM protocol initiated, EEG without NCSE  8/25 - rewarmed, awake, not following  8/26 - following occasional commands      Interval Problem Update  Reviewed last 24 hour events:   - MRI this AM   - Neuro: follows   - HR: 60s-80s   - SBP: 90s-110s   - GI: TF at goal, BM PTA   - UOP: 3.7L/24 hrs   - Pearce: yes   - Tm: 37.5   - Lines: CVC, Art, PIV   - PPx: GI pepcid, DVT lovenox   - ABG: compensated metabolic acidosis   -SAT completed this morning with significant tachycardia and hypertension therefore SBT was not completed.  SAT and SBT will be repeated following MRI as patient appears to be extubated will in the next 1 to 2 days.   - CXR (personally reviewed and compared to prior): improving edema    Yesterday   - following in LEs   - Neuro: + brainstems, moves feet to command   - HR: 70s-110s   - SBP: 90s-120s   - GI: TF at goal   - UOP: 3.5L/24 hrs   - Pearce: yes   - Tm: 37.1   - Lines: CVC, Art, PIV   - PPx: GI pepcid, DVT lovenox   - ABG: Well compensated metabolic acidosis   - CXR (personally reviewed and compared to prior): No change   - weaning D50    Review of Systems  Review of Systems   Unable to perform ROS: Acuity of condition        Vital Signs for last 24 hours   Temp:  [36 °C (96.8 °F)-37.3 °C (99.1 °F)] 36.9 °C " (98.4 °F)  Pulse:  [] 88  Resp:  [21-33] 26  BP: ()/(40-91) 87/47  SpO2:  [95 %-100 %] 99 %    Hemodynamic parameters for last 24 hours  CVP:  [6 MM HG-246 MM HG] 6 MM HG    Respiratory Information for the last 24 hours  Vent Mode: APVCMV  Rate (breaths/min): 26  Vt Target (mL): 310  PEEP/CPAP: 8  MAP: 12  Control VTE (exp VT): 308    Physical Exam   Physical Exam  Vitals signs and nursing note reviewed.   Constitutional:       General: She is in acute distress.      Appearance: She is ill-appearing and toxic-appearing.      Interventions: She is intubated.   HENT:      Head: Normocephalic and atraumatic.      Right Ear: External ear normal.      Left Ear: External ear normal.      Nose: No congestion or rhinorrhea.      Comments: Feeding tube in place     Mouth/Throat:      Mouth: Mucous membranes are dry.      Pharynx: No oropharyngeal exudate or posterior oropharyngeal erythema.      Comments: ET tube in place  Eyes:      General: No scleral icterus.     Conjunctiva/sclera: Conjunctivae normal.      Pupils: Pupils are equal, round, and reactive to light.      Comments: Gaze normalized   Neck:      Musculoskeletal: Neck supple. No neck rigidity or muscular tenderness.   Cardiovascular:      Rate and Rhythm: Normal rate and regular rhythm.      Pulses: Normal pulses.      Heart sounds: Normal heart sounds. No murmur.   Pulmonary:      Effort: She is intubated.      Breath sounds: Rales present. No wheezing.   Chest:       Abdominal:      General: Abdomen is flat. There is no distension.      Palpations: There is no mass.      Tenderness: There is no abdominal tenderness.   Musculoskeletal:         General: No swelling or tenderness.      Right lower leg: No edema.      Left lower leg: No edema.   Skin:     General: Skin is warm and dry.      Capillary Refill: Capillary refill takes less than 2 seconds.      Findings: No erythema or rash.   Neurological:      GCS: GCS eye subscore is 3. GCS verbal  subscore is 1. GCS motor subscore is 5.      Cranial Nerves: Cranial nerve deficit present. No facial asymmetry.      Motor: Abnormal muscle tone present.      Comments: PERRLA, tracking with eyes.  Will wiggle toes on command. Overall increased tone and rigid musculature.   Psychiatric:      Comments: Unable to assess         Medications  Current Facility-Administered Medications   Medication Dose Route Frequency Provider Last Rate Last Dose   • dexmedetomidine (PRECEDEX) 400 mcg/100mL NS premix infusion  0.1-0.8 mcg/kg/hr Intravenous Continuous Boogie Augustine Jr. D.O.       • fentaNYL (SUBLIMAZE) injection 50 mcg  50 mcg Intravenous Q15 MIN PRN Boogie Augustine Jr., D.O.        And   • fentaNYL (SUBLIMAZE) injection 100 mcg  100 mcg Intravenous Q15 MIN PRN JESSY Piper Jr..O.   100 mcg at 08/26/20 2032    And   • fentaNYL (SUBLIMAZE) 50 mcg/mL in 50mL (Continuous Infusion)   Intravenous Continuous JESSY Piper Jr..O. 4 mL/hr at 08/27/20 0559 200 mcg/hr at 08/27/20 0559    And   • propofol (DIPRIVAN) injection  0-80 mcg/kg/min Intravenous Continuous Boogie Augustine Jr. D.O. 32.2 mL/hr at 08/27/20 0654 70 mcg/kg/min at 08/27/20 0654   • metroNIDAZOLE (FLAGYL) tablet 500 mg  500 mg Enteral Tube Q8HRS Boogie Augustine Jr. D.O.   500 mg at 08/27/20 0530   • Pharmacy Consult: Enteral tube insertion - review meds/change route/product selection  1 Each Other PHARMACY TO DOSE Boogie Augustine Jr., D.O.       • Respiratory Therapy Consult   Nebulization Continuous RT Brock Murdock M.D.       • ipratropium-albuterol (DUONEB) nebulizer solution  3 mL Nebulization Q2HRS PRN (RT) Brock Murdock M.D.       • famotidine (PEPCID) tablet 20 mg  20 mg Enteral Tube Q12HRS Brock Murdock M.D.   20 mg at 08/27/20 0531    Or   • famotidine (PEPCID) injection 20 mg  20 mg Intravenous Q12HRS Brock Murdock M.D.   20 mg at 08/26/20 1747   • senna-docusate (PERICOLACE or SENOKOT S) 8.6-50 MG per tablet 2 Tab  2 Tab Enteral  Tube BID Brock Murdock M.D.   2 Tab at 08/27/20 0531    And   • polyethylene glycol/lytes (MIRALAX) PACKET 1 Packet  1 Packet Enteral Tube QDAY PRN Brock Murdock M.D.        And   • magnesium hydroxide (MILK OF MAGNESIA) suspension 30 mL  30 mL Enteral Tube QDAY PRN Brock Murdock M.D.        And   • bisacodyl (DULCOLAX) suppository 10 mg  10 mg Rectal QDAY PRN Brock Murdock M.D.       • MD Alert...ICU Electrolyte Replacement per Pharmacy   Other PHARMACY TO DOSE Brock Murdock M.D.       • lidocaine (XYLOCAINE) 1 % injection 1-2 mL  1-2 mL Tracheal Tube Q30 MIN PRN Brock Murdock M.D.       • labetalol (NORMODYNE/TRANDATE) injection 10 mg  10 mg Intravenous Q4HRS PRN Brock Murdock M.D.   10 mg at 08/24/20 0505   • hydrALAZINE (APRESOLINE) injection 10-20 mg  10-20 mg Intravenous Q6HRS PRN Brock Murdock M.D.       • enoxaparin (LOVENOX) inj 40 mg  40 mg Subcutaneous DAILY Boogie Augustine Jr., D.O.   40 mg at 08/27/20 0530   • acetaminophen (TYLENOL) suppository 650 mg  650 mg Rectal Q6HRS PRN Boogie Augustine Jr., D.O.   650 mg at 08/24/20 1247   • acetaminophen (TYLENOL) tablet 650 mg  650 mg Enteral Tube Q6HRS PRN Boogie Augustine Jr., D.O.       • cefTRIAXone (ROCEPHIN) 1 g in  mL IVPB  1 g Intravenous DAILY Boogie Augustine Jr. D.O.   Stopped at 08/27/20 0601   • Dextrose 50% infusion (D50W) 500 mL   Intravenous Continuous Boogie Augustine Jr. D.O.   Stopped at 08/26/20 1159       Fluids    Intake/Output Summary (Last 24 hours) at 8/27/2020 0709  Last data filed at 8/27/2020 0600  Gross per 24 hour   Intake 2011.5 ml   Output 3680 ml   Net -1668.5 ml       Laboratory  Recent Labs     08/25/20  0417 08/26/20  0311 08/27/20  0444   ISTATAPH 7.451 7.419 7.320*   ISTATAPCO2 21.0* 24.3* 38.1*   ISTATAPO2 113* 84 89*   ISTATATCO2 15* 16* 21   DRIRDHM6MKU 99 97 96   ISTATARTHCO3 14.6* 15.7* 19.6   ISTATARTBE -7* -7* -6*   ISTATTEMP 96.6 F 97.8 F 36.4 C   ISTATFIO2 30 30 30   ISTATSPEC Arterial Arterial  Arterial   ISTATAPHTC 7.468 7.426 7.329*   BAMRYZTZ2BG 107* 82 86         Recent Labs     08/25/20 0521 08/25/20 2120 08/26/20 0358 08/27/20  0547   SODIUM 132*   < > 135 138 137   POTASSIUM 3.5*   < > 4.0 3.7 4.2   CHLORIDE 106   < > 107 111 105   CO2 16*   < > 16* 16* 20   BUN 8   < > 6* 7* 13   CREATININE 0.52   < > 0.42* 0.48* 0.56   MAGNESIUM 3.3*   < > 2.3 2.0 1.6   PHOSPHORUS 2.0*  --   --  2.4* 4.5   CALCIUM 7.0*   < > 7.5* 7.9* 8.5    < > = values in this interval not displayed.     Recent Labs     08/25/20 0521 08/25/20 2120 08/26/20 0358 08/27/20  0547   ALTSGPT 63*  --   --  50 63*   ASTSGOT 56*  --   --  44 67*   ALKPHOSPHAT 65  --   --  71 98   TBILIRUBIN 0.7  --   --  0.4 0.3   PREALBUMIN  --   --   --  14.7*  --    GLUCOSE 139*   < > 121* 113* 101*    < > = values in this interval not displayed.     Recent Labs     08/25/20 0521 08/26/20 0358 08/27/20 0527 08/27/20  0547   WBC 9.6 7.9 8.4  --    NEUTSPOLYS 78.60* 77.90* 71.00  --    LYMPHOCYTES 15.00* 14.60* 17.30*  --    MONOCYTES 4.60 5.10 8.00  --    EOSINOPHILS 1.40 1.80 1.90  --    BASOPHILS 0.20 0.30 0.40  --    ASTSGOT 56* 44  --  67*   ALTSGPT 63* 50  --  63*   ALKPHOSPHAT 65 71  --  98   TBILIRUBIN 0.7 0.4  --  0.3     Recent Labs     08/25/20 0521 08/25/20  1600 08/25/20 2120 08/26/20 0358 08/27/20  0527   RBC 3.93*  --   --  3.83* 4.24   HEMOGLOBIN 12.4  --   --  12.1 13.3   HEMATOCRIT 35.2*  --   --  35.6* 40.6   PLATELETCT 188  --   --  193 247   PROTHROMBTM 14.6 14.1 14.0  --   --    APTT 41.6* 47.2* 45.0*  --   --    INR 1.10 1.06 1.05  --   --        Imaging  X-Ray:  I have personally reviewed the images and compared with prior images.  EKG:  I have personally reviewed the images and compared with prior images.  CT:    Reviewed  Echo:   Reviewed    Assessment/Plan  Drug overdose- (present on admission)  Assessment & Plan  Methamphetamines, oxycodone and EtOH per boyfriend  Counseling when extubated    Metabolic acidosis-  (present on admission)  Assessment & Plan  Secondary to cardiac arrest  Improved with aggressive IVF and pressors  Monitor    Acute respiratory failure with hypoxia (HCC)- (present on admission)  Assessment & Plan  Secondary to drug overdose  RT/O2 protocols  Daily and PRN ABGs  Titration of ventilator therapy based on ABGs and patient's status  Sedation as tolerated/indicated  Daily CXR  HOB >30 degrees and peridex for VAP prevention  Pepcid for GI prophylaxis  SAT/SBT when able (ABCDEF Bundle)  Early mobility  Likely extubated on the next 1 to 2 days given improvement in mental status and minimal ventilator settings    Cardiac arrest (HCC)- (present on admission)  Assessment & Plan  Pulseless electrical activity in ambulance while en route to hospital    AHA guidelines for comatose patients following out-of-hospital cardiac arrest:  Induce hypothermia for unconscious adult patients with return of spontaneous circulation (ROSC) after out-of-hospital cardiac arrest when the initial rhythm was ventricular fibrillation (VF) or pulseless ventricular tachycardia (pVT) (class I, level of evidence: B-R)    Similar therapy may be beneficial for patients with non-VF/non-pVT (nonshockable) arrest out-of-hospital or with in-hospital arrest (class I, level of evidence: C-EO)  Rogelio et al. [id number]    Post cardiac arrest care   -Ventilation: Goal PCO2 40-45, PaO2 ~100, low tidal volume ventilation   -Hemodynamic: Goal systolic blood pressure >90 mmHg, goal MAP >65 mmHg; fluid boluses and pressors (epinephrine, norepinephrine) as guided by bedside ultrasound and hemodynamics   -Cardiac: Treat ACS if indicated, treat arrhythmia as required, Echo reviewed   -Neurologic: Serial neurologic exams, CT head: Possible subtle sulcal effacement and slight loss of differentiation of gray-white matter, consider component of cerebral edema. Recommend radiographic follow-up, EEG with sedated appearing waveforms, targeted temperature  management complete, sedation only as needed   -Metabolic: Serial lactate, goal blood glucose 120-180, maintain euvolemia, monitor urine output, maintain potassium greater than 3.5, maintain magnesium greater than 2  Neuro prognostication in 48-72 hours  NSC and MRI on 8/27/2020.  MRI reviewed with bilateral Basal ganglia and frontal lobe changes however not severe.  Cardiology consult if indicated  Cardiac ischemia evaluation not currently needed    PEA (Pulseless electrical activity) (HCC)- (present on admission)  Assessment & Plan  Enroute to hospital  Hypoxia and aspiration contributory  Continue tele monitoring   On TTM protocol  CT Head with mild edema, MRI with mild hypoxic changes    Hypoglycemia- (present on admission)  Assessment & Plan  ?2/2 ischemic hepatitis  Able to be titrated off of dextrose containing infusion  No recurrence on serial accuchecks    Encephalopathy acute- (present on admission)  Assessment & Plan  Given history of cardiac arrest and mild edema on CT this is concerning for cerebral ischemia  Completing targeted temperature management protocol  EEG without an NCSE  MRI 8/27/2020 reviewed  NSC sent  Improves every day, now following commands, likely to progress to extubation in the next 1 to 2 days    Elevated transaminase level- (present on admission)  Assessment & Plan  Likely due to ischemic hepatitis, improving today  Monitor synthetic functions including INR and glucose  Avoid hepatotoxins  Initially hypoglycemic, now titrated off of dextrose    Cerebral edema (HCC)- (present on admission)  Assessment & Plan  Mild/possible per radiologist on CT  MRI on 8/27/2020 without edema or severe changes       VTE:  Lovenox  Ulcer: H2 Antagonist  Lines: Central Line  Ongoing indication addressed, Arterial Line  Ongoing indication addressed and Pearce Catheter  Ongoing indication addressed    I have performed a physical exam and reviewed and updated ROS and Plan today (8/27/2020). In review of  yesterday's note (8/26/2020), there are no changes except as documented above.     Titrating ventilator, sedatives and evaluating post-arrest encephalopathy. This patient is critically ill, at high risk for decompensation leading to worsening vital organ dysfunction and death without critical care interventions.    Discussed patient condition and risk of morbidity and/or mortality with RN, RT, Pharmacy, Dietary, , Code status disscussed, Charge nurse / hot rounds and neurology     The patient remains critically ill.  Critical care time = 54 minutes in directly providing and coordinating critical care and extensive data review.  No time overlap and excludes procedures.

## 2020-08-27 NOTE — PROGRESS NOTES
Upon transfer to [facility], RT noted patient had a  balloon in ETT with metal, notified MD for tube exchange, 50 mg of Nestor pushed IV per MD order for procedure, time out called at 1140, tube exchange at 1144.

## 2020-08-27 NOTE — PROGRESS NOTES
Monitor Summary: .16/.08/.36. Rhythm: SR-ST. Rate: 70's-180's.    ST with sedation off or when stimulated.

## 2020-08-27 NOTE — PROGRESS NOTES
Palliative Care follow-up  Care conference held with Dr. Mccann and the pt's parents Angel Luis and Juliette.  Introductions made and roles of team members described. Dr. Mccnan assessed the parent's understanding of the pt's condition.  Dr. Mccann then provided a clinical update focusing on his physical exam and the MRI results from earlier today.  Parents relieved that the damage to Lindsey's brain is mild. Dr. Mccann also had sedation held and asked to pt to follow commands with the parents present. Pt was able to close her eyes to command and wiggle her toes.  Pt appeared to be crying.  Dr. Mccann also discussed possible progression of care over the next month to a year if pt's condition continues to improve. All questions answered and support provided.  Palliative Care contact information provided.     Plan: Will continue to follow for additional support.     Thank you for allowing Palliative Care to support this patient and family. Contact [phone] for additional assistance, change in patient status, or with any questions/concerns.

## 2020-08-27 NOTE — DISCHARGE PLANNING
Care Transition Team Discharge Planning    Anticipated Discharge Disposition: TBD    Action: Per AM rounds, bedside RN indicates pt, Lindsey, has been rewarmed. Pt is not following this AM. Pt will have an MRI today. Pt is on vent day 5, not ambulating, no BM, and at goal w/ tube feeding. Pt's family has a meeting today at 3PM w/ neurologist.    Barriers to Discharge: TBD    Plan: f/u w/ medical team

## 2020-08-27 NOTE — PROGRESS NOTES
Fentanyl and Propofol infusions stopped for 2000 assessment. Patient moving all 4 extremities independently, 3/5 strength. Following commands; wiggling toes on command and blinking twice when asked. Will not wiggle fingers, squeeze RN's hands, or show RN a thumbs up when asked. Nodding head yes/no when asked questions. Purposefully attempting to reach up for ETT. Sedation restarted (see MAR) due to 's, 's, and Rass +3.

## 2020-08-27 NOTE — PROCEDURES
Intubation    Date/Time: 8/27/2020 12:28 PM  Performed by: Boogie Augustine Jr., D.O.  Authorized by: Boogie Augustine Jr., D.O.     Consent:     Consent obtained:  Emergent situation  Pre-procedure details:     Patient status:  Unresponsive    Paralytics:  Rocuronium  Procedure details:     Preoxygenation:  None    CPR in progress: no      Intubation method:  Oral    Oral intubation technique: ETT exchanged over a bougie.    Tube size (mm):  7.5    Tube type:  Cuffed    Number of attempts:  1  Placement assessment:     ETT to teeth:  23    Tube secured with:  ETT burleson    Breath sounds:  Equal    Placement verification: chest rise, condensation and ETCO2 detector    Post-procedure details:     Patient tolerance of procedure:  Tolerated well, no immediate complications  Comments:      ETT exchanged over a bougie for an MRI compatible ETT (prior ETT with a  balloon repair kit in place)

## 2020-08-28 ENCOUNTER — APPOINTMENT (OUTPATIENT)
Dept: RADIOLOGY | Facility: MEDICAL CENTER | Age: 20
DRG: 004 | End: 2020-08-28
Attending: INTERNAL MEDICINE
Payer: MEDICAID

## 2020-08-28 LAB
ACTION RANGE TRIGGERED IACRT: NO
ALBUMIN SERPL BCP-MCNC: 2.9 G/DL (ref 3.2–4.9)
ALBUMIN/GLOB SERPL: 1 G/DL
ALP SERPL-CCNC: 94 U/L (ref 30–99)
ALT SERPL-CCNC: 82 U/L (ref 2–50)
ANION GAP SERPL CALC-SCNC: 9 MMOL/L (ref 7–16)
AST SERPL-CCNC: 75 U/L (ref 12–45)
BASE EXCESS BLDA CALC-SCNC: -3 MMOL/L (ref -4–3)
BASOPHILS # BLD AUTO: 0.4 % (ref 0–1.8)
BASOPHILS # BLD: 0.04 K/UL (ref 0–0.12)
BILIRUB SERPL-MCNC: 0.2 MG/DL (ref 0.1–1.5)
BODY TEMPERATURE: ABNORMAL DEGREES
BUN SERPL-MCNC: 12 MG/DL (ref 8–22)
CALCIUM SERPL-MCNC: 8.7 MG/DL (ref 8.5–10.5)
CHLORIDE SERPL-SCNC: 108 MMOL/L (ref 96–112)
CO2 BLDA-SCNC: 23 MMOL/L (ref 20–33)
CO2 SERPL-SCNC: 21 MMOL/L (ref 20–33)
CREAT SERPL-MCNC: 0.51 MG/DL (ref 0.5–1.4)
EOSINOPHIL # BLD AUTO: 0.15 K/UL (ref 0–0.51)
EOSINOPHIL NFR BLD: 1.6 % (ref 0–6.9)
ERYTHROCYTE [DISTWIDTH] IN BLOOD BY AUTOMATED COUNT: 46.8 FL (ref 35.9–50)
GLOBULIN SER CALC-MCNC: 2.9 G/DL (ref 1.9–3.5)
GLUCOSE BLD-MCNC: 89 MG/DL (ref 65–99)
GLUCOSE SERPL-MCNC: 104 MG/DL (ref 65–99)
HCO3 BLDA-SCNC: 22.3 MMOL/L (ref 17–25)
HCT VFR BLD AUTO: 40.5 % (ref 37–47)
HGB BLD-MCNC: 13.2 G/DL (ref 12–16)
HOROWITZ INDEX BLDA+IHG-RTO: 280 MM[HG]
IMM GRANULOCYTES # BLD AUTO: 0.08 K/UL (ref 0–0.11)
IMM GRANULOCYTES NFR BLD AUTO: 0.9 % (ref 0–0.9)
INST. QUALIFIED PATIENT IIQPT: YES
LYMPHOCYTES # BLD AUTO: 1.44 K/UL (ref 1–4.8)
LYMPHOCYTES NFR BLD: 15.4 % (ref 22–41)
MAGNESIUM SERPL-MCNC: 1.8 MG/DL (ref 1.5–2.5)
MCH RBC QN AUTO: 31.3 PG (ref 27–33)
MCHC RBC AUTO-ENTMCNC: 32.6 G/DL (ref 33.6–35)
MCV RBC AUTO: 96 FL (ref 81.4–97.8)
MONOCYTES # BLD AUTO: 1.05 K/UL (ref 0–0.85)
MONOCYTES NFR BLD AUTO: 11.2 % (ref 0–13.4)
NEUTROPHILS # BLD AUTO: 6.61 K/UL (ref 2–7.15)
NEUTROPHILS NFR BLD: 70.5 % (ref 44–72)
NRBC # BLD AUTO: 0 K/UL
NRBC BLD-RTO: 0 /100 WBC
O2/TOTAL GAS SETTING VFR VENT: 30 %
PCO2 BLDA: 40.6 MMHG (ref 26–37)
PCO2 TEMP ADJ BLDA: 42.2 MMHG (ref 26–37)
PH BLDA: 7.35 [PH] (ref 7.4–7.5)
PH TEMP ADJ BLDA: 7.33 [PH] (ref 7.4–7.5)
PLATELET # BLD AUTO: 257 K/UL (ref 164–446)
PMV BLD AUTO: 9.9 FL (ref 9–12.9)
PO2 BLDA: 84 MMHG (ref 64–87)
PO2 TEMP ADJ BLDA: 89 MMHG (ref 64–87)
POTASSIUM SERPL-SCNC: 4.4 MMOL/L (ref 3.6–5.5)
PROT SERPL-MCNC: 5.8 G/DL (ref 6–8.2)
RBC # BLD AUTO: 4.22 M/UL (ref 4.2–5.4)
SAO2 % BLDA: 96 % (ref 93–99)
SODIUM SERPL-SCNC: 138 MMOL/L (ref 135–145)
SPECIMEN DRAWN FROM PATIENT: ABNORMAL
WBC # BLD AUTO: 9.4 K/UL (ref 4.8–10.8)

## 2020-08-28 PROCEDURE — 99233 SBSQ HOSP IP/OBS HIGH 50: CPT | Performed by: PSYCHIATRY & NEUROLOGY

## 2020-08-28 PROCEDURE — 700111 HCHG RX REV CODE 636 W/ 250 OVERRIDE (IP): Performed by: INTERNAL MEDICINE

## 2020-08-28 PROCEDURE — 99291 CRITICAL CARE FIRST HOUR: CPT | Performed by: INTERNAL MEDICINE

## 2020-08-28 PROCEDURE — 770022 HCHG ROOM/CARE - ICU (200)

## 2020-08-28 PROCEDURE — 700102 HCHG RX REV CODE 250 W/ 637 OVERRIDE(OP): Performed by: INTERNAL MEDICINE

## 2020-08-28 PROCEDURE — 4A10X4Z MONITORING OF CENTRAL NERVOUS ELECTRICAL ACTIVITY, EXTERNAL APPROACH: ICD-10-PCS | Performed by: PSYCHIATRY & NEUROLOGY

## 2020-08-28 PROCEDURE — 37799 UNLISTED PX VASCULAR SURGERY: CPT

## 2020-08-28 PROCEDURE — 83735 ASSAY OF MAGNESIUM: CPT

## 2020-08-28 PROCEDURE — 82962 GLUCOSE BLOOD TEST: CPT

## 2020-08-28 PROCEDURE — A9270 NON-COVERED ITEM OR SERVICE: HCPCS | Performed by: INTERNAL MEDICINE

## 2020-08-28 PROCEDURE — 95819 EEG AWAKE AND ASLEEP: CPT | Performed by: PSYCHIATRY & NEUROLOGY

## 2020-08-28 PROCEDURE — 80053 COMPREHEN METABOLIC PANEL: CPT

## 2020-08-28 PROCEDURE — 700105 HCHG RX REV CODE 258: Performed by: INTERNAL MEDICINE

## 2020-08-28 PROCEDURE — 700101 HCHG RX REV CODE 250: Performed by: INTERNAL MEDICINE

## 2020-08-28 PROCEDURE — 94770 HCHG CO2 EXPIRED GAS DETERMINATION: CPT

## 2020-08-28 PROCEDURE — 302151 K-PAD 14X20: Performed by: INTERNAL MEDICINE

## 2020-08-28 PROCEDURE — 95819 EEG AWAKE AND ASLEEP: CPT | Mod: 26 | Performed by: PSYCHIATRY & NEUROLOGY

## 2020-08-28 PROCEDURE — 71045 X-RAY EXAM CHEST 1 VIEW: CPT

## 2020-08-28 PROCEDURE — 700105 HCHG RX REV CODE 258

## 2020-08-28 PROCEDURE — 94760 N-INVAS EAR/PLS OXIMETRY 1: CPT

## 2020-08-28 PROCEDURE — 82803 BLOOD GASES ANY COMBINATION: CPT

## 2020-08-28 PROCEDURE — 94003 VENT MGMT INPAT SUBQ DAY: CPT

## 2020-08-28 PROCEDURE — 85025 COMPLETE CBC W/AUTO DIFF WBC: CPT

## 2020-08-28 RX ORDER — MIDAZOLAM HYDROCHLORIDE 1 MG/ML
2-4 INJECTION INTRAMUSCULAR; INTRAVENOUS
Status: DISCONTINUED | OUTPATIENT
Start: 2020-08-28 | End: 2020-08-28

## 2020-08-28 RX ORDER — ACETAMINOPHEN 500 MG
1000 TABLET ORAL EVERY 6 HOURS
Status: DISCONTINUED | OUTPATIENT
Start: 2020-08-28 | End: 2020-08-29

## 2020-08-28 RX ORDER — MEPERIDINE HYDROCHLORIDE 50 MG/ML
25 INJECTION INTRAMUSCULAR; INTRAVENOUS; SUBCUTANEOUS
Status: DISCONTINUED | OUTPATIENT
Start: 2020-08-28 | End: 2020-08-31

## 2020-08-28 RX ORDER — LABETALOL HYDROCHLORIDE 5 MG/ML
10 INJECTION, SOLUTION INTRAVENOUS EVERY 4 HOURS PRN
Status: DISCONTINUED | OUTPATIENT
Start: 2020-08-28 | End: 2020-09-23

## 2020-08-28 RX ORDER — SODIUM CHLORIDE 9 MG/ML
INJECTION, SOLUTION INTRAVENOUS
Status: COMPLETED
Start: 2020-08-28 | End: 2020-08-28

## 2020-08-28 RX ORDER — MIDAZOLAM HYDROCHLORIDE 1 MG/ML
2-4 INJECTION INTRAMUSCULAR; INTRAVENOUS
Status: DISCONTINUED | OUTPATIENT
Start: 2020-08-28 | End: 2020-09-02

## 2020-08-28 RX ORDER — MAGNESIUM SULFATE HEPTAHYDRATE 40 MG/ML
2 INJECTION, SOLUTION INTRAVENOUS ONCE
Status: COMPLETED | OUTPATIENT
Start: 2020-08-28 | End: 2020-08-28

## 2020-08-28 RX ORDER — BUSPIRONE HYDROCHLORIDE 10 MG/1
15 TABLET ORAL 2 TIMES DAILY
Status: DISCONTINUED | OUTPATIENT
Start: 2020-08-28 | End: 2020-08-29

## 2020-08-28 RX ADMIN — ACETAMINOPHEN 650 MG: 325 TABLET, FILM COATED ORAL at 00:49

## 2020-08-28 RX ADMIN — METRONIDAZOLE 500 MG: 500 TABLET ORAL at 04:52

## 2020-08-28 RX ADMIN — DOCUSATE SODIUM 50 MG AND SENNOSIDES 8.6 MG 2 TABLET: 8.6; 5 TABLET, FILM COATED ORAL at 04:51

## 2020-08-28 RX ADMIN — FAMOTIDINE 20 MG: 10 INJECTION INTRAVENOUS at 17:41

## 2020-08-28 RX ADMIN — MEPERIDINE HYDROCHLORIDE 25 MG: 50 INJECTION INTRAMUSCULAR; INTRAVENOUS; SUBCUTANEOUS at 16:30

## 2020-08-28 RX ADMIN — MAGNESIUM SULFATE IN WATER 2 G: 40 INJECTION, SOLUTION INTRAVENOUS at 09:03

## 2020-08-28 RX ADMIN — PROPOFOL 80 MCG/KG/MIN: 10 INJECTION, EMULSION INTRAVENOUS at 12:20

## 2020-08-28 RX ADMIN — MEPERIDINE HYDROCHLORIDE 25 MG: 50 INJECTION INTRAMUSCULAR; INTRAVENOUS; SUBCUTANEOUS at 12:32

## 2020-08-28 RX ADMIN — MEPERIDINE HYDROCHLORIDE 25 MG: 50 INJECTION INTRAMUSCULAR; INTRAVENOUS; SUBCUTANEOUS at 11:35

## 2020-08-28 RX ADMIN — DOCUSATE SODIUM 50 MG AND SENNOSIDES 8.6 MG 2 TABLET: 8.6; 5 TABLET, FILM COATED ORAL at 17:42

## 2020-08-28 RX ADMIN — MEPERIDINE HYDROCHLORIDE 25 MG: 50 INJECTION INTRAMUSCULAR; INTRAVENOUS; SUBCUTANEOUS at 21:48

## 2020-08-28 RX ADMIN — MIDAZOLAM HYDROCHLORIDE 2 MG: 1 INJECTION, SOLUTION INTRAMUSCULAR; INTRAVENOUS at 01:57

## 2020-08-28 RX ADMIN — MEPERIDINE HYDROCHLORIDE 25 MG: 50 INJECTION INTRAMUSCULAR; INTRAVENOUS; SUBCUTANEOUS at 10:33

## 2020-08-28 RX ADMIN — MIDAZOLAM HYDROCHLORIDE 2 MG: 1 INJECTION, SOLUTION INTRAMUSCULAR; INTRAVENOUS at 08:54

## 2020-08-28 RX ADMIN — MIDAZOLAM HYDROCHLORIDE 4 MG: 1 INJECTION, SOLUTION INTRAMUSCULAR; INTRAVENOUS at 03:07

## 2020-08-28 RX ADMIN — PROPOFOL 80 MCG/KG/MIN: 10 INJECTION, EMULSION INTRAVENOUS at 20:29

## 2020-08-28 RX ADMIN — ENOXAPARIN SODIUM 40 MG: 40 INJECTION SUBCUTANEOUS at 04:50

## 2020-08-28 RX ADMIN — MIDAZOLAM HYDROCHLORIDE 4 MG: 1 INJECTION, SOLUTION INTRAMUSCULAR; INTRAVENOUS at 03:43

## 2020-08-28 RX ADMIN — METRONIDAZOLE 500 MG: 500 TABLET ORAL at 14:12

## 2020-08-28 RX ADMIN — LABETALOL HYDROCHLORIDE 10 MG: 5 INJECTION INTRAVENOUS at 01:31

## 2020-08-28 RX ADMIN — BUSPIRONE HYDROCHLORIDE 15 MG: 10 TABLET ORAL at 17:41

## 2020-08-28 RX ADMIN — PROPOFOL 80 MCG/KG/MIN: 10 INJECTION, EMULSION INTRAVENOUS at 00:52

## 2020-08-28 RX ADMIN — FAMOTIDINE 20 MG: 20 TABLET, FILM COATED ORAL at 04:51

## 2020-08-28 RX ADMIN — LEVETIRACETAM INJECTION 500 MG: 5 INJECTION INTRAVENOUS at 11:05

## 2020-08-28 RX ADMIN — PROPOFOL 80 MCG/KG/MIN: 10 INJECTION, EMULSION INTRAVENOUS at 08:10

## 2020-08-28 RX ADMIN — MIDAZOLAM HYDROCHLORIDE 4 MG: 1 INJECTION, SOLUTION INTRAMUSCULAR; INTRAVENOUS at 05:43

## 2020-08-28 RX ADMIN — ACETAMINOPHEN 1000 MG: 500 TABLET ORAL at 17:42

## 2020-08-28 RX ADMIN — METRONIDAZOLE 500 MG: 500 TABLET ORAL at 21:53

## 2020-08-28 RX ADMIN — LEVETIRACETAM INJECTION 500 MG: 5 INJECTION INTRAVENOUS at 17:42

## 2020-08-28 RX ADMIN — BUSPIRONE HYDROCHLORIDE 15 MG: 10 TABLET ORAL at 10:40

## 2020-08-28 RX ADMIN — MIDAZOLAM HYDROCHLORIDE 2 MG: 1 INJECTION, SOLUTION INTRAMUSCULAR; INTRAVENOUS at 00:54

## 2020-08-28 RX ADMIN — PROPOFOL 80 MCG/KG/MIN: 10 INJECTION, EMULSION INTRAVENOUS at 23:31

## 2020-08-28 RX ADMIN — FENTANYL CITRATE 100 MCG: 50 INJECTION INTRAMUSCULAR; INTRAVENOUS at 04:56

## 2020-08-28 RX ADMIN — PROPOFOL 80 MCG/KG/MIN: 10 INJECTION, EMULSION INTRAVENOUS at 17:39

## 2020-08-28 RX ADMIN — PROPOFOL 80 MCG/KG/MIN: 10 INJECTION, EMULSION INTRAVENOUS at 03:20

## 2020-08-28 RX ADMIN — MEPERIDINE HYDROCHLORIDE 25 MG: 50 INJECTION INTRAMUSCULAR; INTRAVENOUS; SUBCUTANEOUS at 23:12

## 2020-08-28 RX ADMIN — MIDAZOLAM HYDROCHLORIDE 4 MG: 1 INJECTION, SOLUTION INTRAMUSCULAR; INTRAVENOUS at 04:31

## 2020-08-28 RX ADMIN — MIDAZOLAM HYDROCHLORIDE 4 MG: 1 INJECTION, SOLUTION INTRAMUSCULAR; INTRAVENOUS at 02:40

## 2020-08-28 RX ADMIN — PROPOFOL 80 MCG/KG/MIN: 10 INJECTION, EMULSION INTRAVENOUS at 15:00

## 2020-08-28 RX ADMIN — FENTANYL CITRATE 100 MCG/HR: 50 INJECTION, SOLUTION INTRAMUSCULAR; INTRAVENOUS at 10:32

## 2020-08-28 RX ADMIN — SODIUM CHLORIDE 500 ML: 9 INJECTION, SOLUTION INTRAVENOUS at 04:36

## 2020-08-28 RX ADMIN — PROPOFOL 80 MCG/KG/MIN: 10 INJECTION, EMULSION INTRAVENOUS at 05:19

## 2020-08-28 RX ADMIN — MEPERIDINE HYDROCHLORIDE 25 MG: 50 INJECTION INTRAMUSCULAR; INTRAVENOUS; SUBCUTANEOUS at 19:23

## 2020-08-28 RX ADMIN — ACETAMINOPHEN 1000 MG: 500 TABLET ORAL at 11:39

## 2020-08-28 RX ADMIN — CEFTRIAXONE SODIUM 1 G: 1 INJECTION, POWDER, FOR SOLUTION INTRAMUSCULAR; INTRAVENOUS at 04:55

## 2020-08-28 ASSESSMENT — FIBROSIS 4 INDEX: FIB4 SCORE: 0.68

## 2020-08-28 NOTE — PALLIATIVE CARE
Palliative Care follow-up  Father Angel Luis at bedside with ChapImelda.  Angel Luis received an update from Dr. Stokes.  Angel Luis concerned that there is sometimes a delay in calls being returned advised Angel Luis that staff can be busy performing pt care.  Slava Hairston organizaing for pt's Uncle to visit to provide a blessing.  All questions answerd and support provided.    Updated/Report from: Dr. Stokes & Chaplain Hairston    Plan: Continue to provide additional support as needed.     Thank you for allowing Palliative Care to support this patient and family. Contact [phone] for additional assistance, change in patient status, or with any questions/concerns.

## 2020-08-28 NOTE — PROGRESS NOTES
Called by nursing for updates.     Patient's temperature increased in a short amount of time. She's also developed episodes of shivering for which she is given versed and has also received keppra tonight. She also has some elevations in blood pressure and heart rate with these events. Given that an EEG was obtained ~4 days ago and was normal w/ episodes of twitching captured which were non epileptic I suspect that these events may be non epileptic in nature as well. That being said, if they are controlled with Versed then this may be a viable alternative until the morning where a repeat EEG may be performed to investigate these further. Areas of ischemia in MRI brain were mostly subcortical in nature, and it's possible this may represent some paroxysmal autonomic instability in a patient with anoxic brain injury.

## 2020-08-28 NOTE — PROGRESS NOTES
Temp increased from 37.8C to 38.6C in 40 minutes. Continued seizure like activity despite 500 mg of keppra and maxed on propofol infusion. Continued hypertension, 's-160's/60's-70's with 's without seizure like activity. With seizure like activity 's and 's-140's. One time dose of 2 mg versed IVP administered. Seizure like activity stopped shortly after administration. Tylenol administered and ice packs applied for temperature. Dr. Tomlin updated. Orders for 2-4 mg versed IVP Q15min for seizures, labetalol 10 mg IVP Q4H PRN SBP >160, and cooling blanket.     Dr. Nunez with neurology updated on the above events by FERNANDO Orellana. Agrees with current POC. Awaiting AM EEG.

## 2020-08-28 NOTE — PROGRESS NOTES
Palliative Care follow-up  Two phone call received from pt's father Angel Luis.  Angel Luis concerned about event overnight.  Angel Luis wanting to know results of EEG.  Dr. Stokes plans to follow up with Angel Luis.  Angel Luis will be at the hospital at 3:00pm.    Updated: Cathleen IKNG    Plan: Will continue to provide additional support as needed.     Thank you for allowing Palliative Care to support this patient and family. Contact [phone] for additional assistance, change in patient status, or with any questions/concerns.

## 2020-08-28 NOTE — PROGRESS NOTES
Neurology Progress Note  Neurohospitalist Service, [facility]    Referring Physician: Boogie Augustine Jr., D.O.    Chief Complaint   Patient presents with   • Drug Overdose     unknown amount or type of drug ingestion per EMS, pt found down, aspiration, CPR in progress upon EMS arrival.       HPI: Refer to initial documented Neurology H&P, as detailed in the patient's chart.    Interval History: No acute events overnight.  Remains intubated and sedated in ICU thus unable to provide novel subjective history.  Family meeting held yesterday 8/27/20 (see palliative care note).    Review of systems: In addition to what is detailed in the HPI and/or updated in the interval history, all other systems reviewed and are negative.    Past Medical History:   Unchanged from prior    FHx:  Unchanged from prior    SHx:  Unchanged from prior    Medications:    Current Facility-Administered Medications:   •  labetalol (NORMODYNE/TRANDATE) injection 10 mg, 10 mg, Intravenous, Q4HRS PRN, Yosef Tomlin M.D., 10 mg at 08/28/20 0131  •  midazolam (VERSED) 2 MG/2ML injection 2-4 mg, 2-4 mg, Intravenous, Q15 MIN PRN, Yosef Tomlin M.D., 2 mg at 08/28/20 0854  •  magnesium sulfate IVPB premix 2 g, 2 g, Intravenous, Once, Boogie Augustine Jr., D.O., Last Rate: 25 mL/hr at 08/28/20 0903, 2 g at 08/28/20 0903  •  Pharmacy Consult: pharmacy to discontinue all other orders for acetaminophen, , Other, PHARMACY TO DOSE, Boogie Stokes M.D.  •  acetaminophen (TYLENOL) tablet 1,000 mg, 1,000 mg, Oral, Q6HRS, Boogie Stokes M.D.  •  busPIRone (BUSPAR) tablet 15 mg, 15 mg, Oral, BID, Boogie Stokes M.D.  •  meperidine (DEMEROL) injection 25 mg, 25 mg, Intravenous, Q HOUR PRN, Boogie Stokes M.D.  •  fentaNYL (SUBLIMAZE) injection 100 mcg, 100 mcg, Intravenous, Q HOUR PRN, Boogie Stokes M.D.  •  fentaNYL (SUBLIMAZE) 50 mcg/mL in 50mL (Continuous Infusion), , Intravenous, Continuous, Boogie Stokes M.D.  •  midazolam  (VERSED) 2 MG/2ML injection 2-4 mg, 2-4 mg, Intravenous, Once PRN, Boogie Augustine Jr., D.O.  •  levETIRAcetam (Keppra) 500 mg in 100 mL NaCl IV premix, 500 mg, Intravenous, Q12HRS, Jeremy M Gonda, M.D., Stopped at 08/27/20 2348  •  fentaNYL (SUBLIMAZE) injection 50 mcg, 50 mcg, Intravenous, Q15 MIN PRN **AND** fentaNYL (SUBLIMAZE) injection 100 mcg, 100 mcg, Intravenous, Q15 MIN PRN, 100 mcg at 08/28/20 0456 **AND** fentaNYL (SUBLIMAZE) 50 mcg/mL in 50mL (Continuous Infusion), , Intravenous, Continuous, Stopped at 08/27/20 2040 **AND** propofol (DIPRIVAN) injection, 0-80 mcg/kg/min, Intravenous, Continuous, Last Rate: 36.8 mL/hr at 08/28/20 0810, 80 mcg/kg/min at 08/28/20 0810 **AND** [START ON 8/29/2020] Triglyceride, , , Every 3 Days (0300), Boogie Augustine Jr., D.O.  •  metroNIDAZOLE (FLAGYL) tablet 500 mg, 500 mg, Enteral Tube, Q8HRS, Boogie Augustine Jr., D.O., 500 mg at 08/28/20 0452  •  Pharmacy Consult: Enteral tube insertion - review meds/change route/product selection, 1 Each, Other, PHARMACY TO DOSE, Boogie Augustine Jr., D.O.  •  Respiratory Therapy Consult, , Nebulization, Continuous RT, Brock Murdock M.D.  •  ipratropium-albuterol (DUONEB) nebulizer solution, 3 mL, Nebulization, Q2HRS PRN (RT), Brock Murdock M.D.  •  famotidine (PEPCID) tablet 20 mg, 20 mg, Enteral Tube, Q12HRS, 20 mg at 08/28/20 0451 **OR** famotidine (PEPCID) injection 20 mg, 20 mg, Intravenous, Q12HRS, Brock Murdock M.D., 20 mg at 08/27/20 1758  •  senna-docusate (PERICOLACE or SENOKOT S) 8.6-50 MG per tablet 2 Tab, 2 Tab, Enteral Tube, BID, 2 Tab at 08/28/20 0451 **AND** polyethylene glycol/lytes (MIRALAX) PACKET 1 Packet, 1 Packet, Enteral Tube, QDAY PRN **AND** magnesium hydroxide (MILK OF MAGNESIA) suspension 30 mL, 30 mL, Enteral Tube, QDAY PRN **AND** bisacodyl (DULCOLAX) suppository 10 mg, 10 mg, Rectal, QDAY PRN, Brock Murdock M.D.  •  MD Alert...ICU Electrolyte Replacement per Pharmacy, , Other, PHARMACY TO DOSE, Brock WARNER  BAILEE Murdock  •  lidocaine (XYLOCAINE) 1 % injection 1-2 mL, 1-2 mL, Tracheal Tube, Q30 MIN PRN, Brock Murdock M.D.  •  hydrALAZINE (APRESOLINE) injection 10-20 mg, 10-20 mg, Intravenous, Q6HRS PRN, Brock Murdock M.D.  •  enoxaparin (LOVENOX) inj 40 mg, 40 mg, Subcutaneous, DAILY, Boogie Augustine Jr., D.O., 40 mg at 08/28/20 0450  •  acetaminophen (TYLENOL) suppository 650 mg, 650 mg, Rectal, Q6HRS PRN, Boogie Augustine Jr., D.O., 650 mg at 08/24/20 1247  •  acetaminophen (TYLENOL) tablet 650 mg, 650 mg, Enteral Tube, Q6HRS PRN, Boogie Augustine Jr., D.O., 650 mg at 08/28/20 0049    Physical Examination:     Vitals:    08/28/20 0530 08/28/20 0600 08/28/20 0630 08/28/20 0637   BP: 121/86 124/76 119/74    Pulse: 100 (!) 101 (!) 109 (!) 112   Resp: (!) 22 (!) 26 18 17   Temp:       TempSrc:       SpO2: 100% 100% 99% 99%   Weight:       Height:           General: Patient is intubated and sedated in ICU  Eyes: examination of optic disks not indicated at this time  CV: RRR    NEUROLOGICAL EXAM:     Mental status: does not open eyes to noxious stimulus, does not follow commands (but will do so with sedation held)  Speech and language: intubated  Cranial nerve exam: Pupils are equal, round and reactive to light bilaterally. Visual fields: does not blink to threat bilaterally. Gaze in neutral position. Corneal reflexes intact bilaterally.  Extraocular muscles are intact to oculocephalic maneuver ie VOR intact. Face is symmetric. Unable to assess sensation in the face due to mental status. Cough and gag reflexes are intact.  Motor exam: Does not participate in formal strength testing. Tone is increased. + high freq. Low amplitude full body movements consistent with shivering  Sensory exam: no response to noxious stimuli x4 extremities  Deep tendon reflexes:  1+ throughout. Toes mute bilaterally  Coordination: not participatory due to mental status  Gait: deferred due to ICU status      Objective Data:    Labs:  Lab Results    Component Value Date/Time    PROTHROMBTM 14.0 08/25/2020 09:20 PM    INR 1.05 08/25/2020 09:20 PM      Lab Results   Component Value Date/Time    WBC 9.4 08/28/2020 04:35 AM    RBC 4.22 08/28/2020 04:35 AM    HEMOGLOBIN 13.2 08/28/2020 04:35 AM    HEMATOCRIT 40.5 08/28/2020 04:35 AM    MCV 96.0 08/28/2020 04:35 AM    MCH 31.3 08/28/2020 04:35 AM    MCHC 32.6 (L) 08/28/2020 04:35 AM    MPV 9.9 08/28/2020 04:35 AM    NEUTSPOLYS 70.50 08/28/2020 04:35 AM    LYMPHOCYTES 15.40 (L) 08/28/2020 04:35 AM    MONOCYTES 11.20 08/28/2020 04:35 AM    EOSINOPHILS 1.60 08/28/2020 04:35 AM    BASOPHILS 0.40 08/28/2020 04:35 AM    ANISOCYTOSIS 1+ 08/23/2020 10:05 PM      Lab Results   Component Value Date/Time    SODIUM 138 08/28/2020 04:35 AM    POTASSIUM 4.4 08/28/2020 04:35 AM    CHLORIDE 108 08/28/2020 04:35 AM    CO2 21 08/28/2020 04:35 AM    GLUCOSE 104 (H) 08/28/2020 04:35 AM    BUN 12 08/28/2020 04:35 AM    CREATININE 0.51 08/28/2020 04:35 AM      Lab Results   Component Value Date/Time    TRIGLYCERIDE 312 (H) 08/23/2020 11:11 PM       Lab Results   Component Value Date/Time    ALKPHOSPHAT 94 08/28/2020 04:35 AM    ASTSGOT 75 (H) 08/28/2020 04:35 AM    ALTSGPT 82 (H) 08/28/2020 04:35 AM    TBILIRUBIN 0.2 08/28/2020 04:35 AM        Imaging/Testing:    I interpreted and/or reviewed the patient's neuroimaging    DX-CHEST-PORTABLE (1 VIEW)   Final Result         1.  Pulmonary edema and/or infiltrates are identified, which are stable since the prior exam.               MR-BRAIN-W/O   Final Result      The diffusion-weighted sequences demonstrates areas of restricted diffusion in the bilateral basal ganglia and some of the frontal gray matter. The predominant portion of the brain parenchyma does not demonstrate any restricted diffusion. Therefore this    findings likely represent mild diffuse anoxic brain injury.      DX-CHEST-PORTABLE (1 VIEW)   Final Result         1.  Pulmonary edema and/or infiltrates are identified, which  are somewhat decreased since the prior exam.            DX-CHEST-PORTABLE (1 VIEW)   Final Result         1.  Pulmonary edema and/or infiltrates are identified, which are stable since the prior exam.         DX-ABDOMEN FOR TUBE PLACEMENT   Final Result         1.  Nonspecific bowel gas pattern.   2.  Dobbhoff tube tip overlying the expected location of the pylorus or first duodenal segment.      DX-CHEST-PORTABLE (1 VIEW)   Final Result         1.  Patchy bilateral pulmonary infiltrates, somewhat increased since prior.      EC-ECHOCARDIOGRAM COMPLETE W/O CONT   Final Result      DX-CHEST-PORTABLE (1 VIEW)   Final Result         1.  Patchy bilateral pulmonary infiltrates, somewhat increased since prior.      CT-HEAD W/O   Final Result         1.  Possible subtle sulcal effacement and slight loss of differentiation of gray-white matter, consider component of cerebral edema. Recommend radiographic follow-up   2.  Bilateral sphenoid and maxillary sinus air-fluid levels      DX-CHEST-PORTABLE (1 VIEW)   Final Result         1.  No acute cardiopulmonary disease.          Assessment and Plan:    Annika He is a 19 y.o. woman presenting for whom neurology has been consulted for prognostication in the setting of drug overdose and PEA arrest.  CT head is concerning for anoxic injury given sulcal effacement and loss of grey white differentiation with MRI brain demonstrating evidence of mild anoxic injury (b/l T2 hyperintensities in the basal ganglia structures).       Plan:     - neuron specific enolase drawn 8/27/20, results pending  - GOC, wean sedation and extubate if and when able per ICU protocol(s)  - no AED indicated at this juncture    The evaluation of the patient, and recommended management, was discussed with the resident staff. I have performed a physical exam and reviewed and updated ROS and Plan today (8/28/2020). In review of yesterday's note (8/27/2020), there are no changes except as documented  above.    Ty Mccann MD  Medical Director, [facility], [facility]  Neurohospitalist, & Vascular Neurology, [facility]  Clinical  of Neurology, [employer]

## 2020-08-28 NOTE — PROGRESS NOTES
Spiritual Care Note        Spiritual Care Provider Information:  Name of Spiritual Care Provider: Marika Thornton  Title of Spiritual Care Provider: Associate   Phone Number: [phone]  E-mail: [email or web address]  Total time : 10 minutes    Spiritual Screen Results:    Palliative Care  PC Islam/Spiritual Screening  Is your spiritual health or inner well-being important to you as you cope with your medical condition?: Unable to determine  Would you like to receive a visit from our Spiritual Care team or your own Taoist or spiritual leader?: Unable to determine      Encounter/Request Information  Encounter/Request Type   Visited With: Patient  Nature of the Visit: Initial, On shift  Continue Visiting: Yes  Next Follow-up Date: 08/28/20  Crisis Visit: Critical care  Referral From/ Origin of Request: [facility] nursing      Spiritual Assessment   Spiritual Care Encounters    Observations/Symptoms: (Pt unrespons - intubated, eyes opened, then closed)    Interacton/Conversation:  introduced self to pt. Pt did not purposefully respond, Pt's eyes were open when  arrived, pt slowly closed eyes as  offered words of encouragement to pt.  Pt is listed as non-Taoist so  did not want to assume Taoist overtones to pt.  will continue to follow pt with visit tomorrow 8/28.    Assessment: (unable to assess)    Interventions: Companionship    Plan: (visit tomorrow)

## 2020-08-28 NOTE — CARE PLAN
Problem: Safety  Goal: Will remain free from injury  Outcome: PROGRESSING AS EXPECTED  Interventions: multiple lines in place, assessment for need complete, hourly rounds in place, bed alarm on.      Problem: Skin Integrity  Goal: Risk for impaired skin integrity will decrease  Outcome: PROGRESSING AS EXPECTED  Interventions: patient turned q2 hrs, patient lying on waffle mattress, pillows in use for support.

## 2020-08-28 NOTE — PROGRESS NOTES
Pt's temperature at 38.6C. Artic sun pads placed on and normothermia therapy started per Dr. Stokes's orders. EEG in progress.

## 2020-08-28 NOTE — DIETARY
Nutrition Services: TF adjustment for max-dose propofol    Admit day 4.  Current TF regimen with propofol: Impact Peptide 1.5 @ 40 ml/hr, providing 1440 kcal (+Kcal from propofol), 90 grams protein, and 739 ml free water per day.   Propofol now maxed @ 80 mcg/kg/min: 36.8 ml/hr = 972 kcal/day. TF + propofol = 2412 kcal/day (34 kcal/kg of dry weight).  Pt is @ risk for overfeeding. Glu OK. ALT/AST trending up slightly. Requested order for triglyceride level in Rounds.    Will adjust TF regimen to closer meet caloric needs per predictive equations.   Can also get metabolic cart study next week per RT.    Estimated needs per RD assessment ():  MSJ x1.-1.1 = 5001-9390 kcal/day   PSU (vent L/min 10.4, T max/24 hrs 36.7) = 1627 kcal/day    25-30 kcal/kg = 5566-2893 kcal/day  Total Grams Protein / day: 85 - 107 (Grams Protein/k.2 - 1.5)    Recommendations:  · With max-/high-dose propofol: Peptamen Intense VHP @ 45 ml/hr to provide 1080 kcal (+Kcal from propofol), 99 grams protein, and 907 ml free water per day.  · Off propofol: Impact Peptide 1.5 @ goal rate 45 ml/hr to provide 1620 kcal, 102 grams protein, and 832 ml free water per day.    Fluids per MD.  Metabolic cart study per RT.  Will monitor triglyceride levels.    RD following.

## 2020-08-28 NOTE — PROGRESS NOTES
Dr. Tomlin updated on increased frequency of rigid shiver, occurring Q30min. Order to continue with current POC and neurologist recommendation.

## 2020-08-28 NOTE — PROGRESS NOTES
1430 BSAS score is 2. Demerol not available in any med select. Call placed to pharmacy tech for refill of demerol STAT.    Update:  Shivering episode lasted about 15min. No demerol administered at this time.

## 2020-08-28 NOTE — CARE PLAN
Problem: Ventilation Defect:  Goal: Ability to achieve and maintain unassisted ventilation or tolerate decreased levels of ventilator support  Outcome: PROGRESSING AS EXPECTED  Note:    Ventilator Daily Summary    Vent Day #6    Ventilator settings changed this shift:No    Weaning trials:No    Respiratory Procedures: No procedures performed overnight.    Plan: Continue current ventilator settings and wean mechanical ventilation as tolerated per physician orders.

## 2020-08-28 NOTE — PROCEDURES
ROUTINE ELECTROENCEPHALOGRAM REPORT      Referring provider: Dr. Gonda.     DOS: 8/28/2020 (total recording of 30 minutes)    INDICATION:  Annika He 19 y.o. female presenting with abnormal involuntary movements, altered mental status, s/p cardiac arrest.     CURRENT ANTIEPILEPTIC REGIMEN: Midazolam, Levetiracetam, Propofol.     TECHNIQUE: 30 channel routine electroencephalogram (EEG) was performed in accordance with the international 10-20 system. The study was reviewed in bipolar and referential montages. The recording examined the patient during wakeful and drowsy/sleep state(s).     DESCRIPTION OF THE RECORD:  During the wakefulness, the background showed a symmetrical 12 Hz alpha activity posteriorly with amplitude of 70 mV.  There was reactivity to eye closure/opening.  A normal anterior-posterior gradient was noted with faster beta frequencies seen anteriorly.  During drowsiness, theta/delta frequencies were seen.    During the sleep state, background shows diffuse high-amplitude 4-5 Hz delta activity.  Symmetrical high-amplitude sleep spindles and vertex sharps were seen in the leads over the central regions.     ACTIVATION PROCEDURES:   Not performed.     ICTAL AND/OR INTERICTAL FINDINGS:   No focal or generalized epileptiform activity noted. No regional slowing was seen during this routine study.  No seizures were reported or recorded during the study. Mild events of shivering and posturing like movements of the upper extremities captured, without eeg correlated. Patient woke up but did not follow any commands.     EKG: sampling of the EKG recording demonstrated sinus rhythm.       INTERPRETATION:  This is a normal routine EEG recording in the awake, drowsy/sleep state(s). Mild events of shivering and posturing like movements of the upper extremities captured, without eeg correlated. Patient woke up but did not follow any commands. No seizures captured. Clinical correlation is  recommended.    Note: A normal EEG does not rule out epilepsy.  If the clinical suspicion remains high for seizures, a prolonged recording to capture clinical or subclinical events may be helpful.        Garland Beatty MD   Epilepsy and Neurodiagnostics.   Clinical  of Neurology [facility].   Diplomate in Neurology, Epilepsy, and Electrodiagnostic Medicine.   Office: [phone]  Fax: [phone]

## 2020-08-28 NOTE — CARE PLAN
Problem: Ventilation Defect:  Goal: Ability to achieve and maintain unassisted ventilation or tolerate decreased levels of ventilator support  Outcome: PROGRESSING SLOWER THAN EXPECTED      Ventilator Daily Summary    Vent Day # 6    Weaning trials: SBT attempt x 1    Plan: Continue current ventilator settings and wean mechanical ventilation as tolerated per physician orders.

## 2020-08-28 NOTE — PROGRESS NOTES
Paged Dr. Stokes. Pt continuous to have episodes of shivering lasting about 10-20 minutes. Pt is currently on Fentanyl at 50mcg/hr, Propofol at 80mcg/kg/min, and prn demerol. Dr. Stokes will see patient shortly to reassess.

## 2020-08-28 NOTE — PROGRESS NOTES
Patient was having fine tremors throughout her body while off Precedex and fentanyl for 2 hours.  I noted her earlier findings on MRI.  Given these new structural findings and possible seizure-like activity, I will start her back on propofol drip with as needed benzodiazepines and load her with IV Keppra.  I will reorder an EEG and continue seizure precautions.

## 2020-08-28 NOTE — PROGRESS NOTES
Dr. Stokes at bedside to assess rigid shivering. Orders to keep propofol infusing at current rate until EEG can be performed. Administer PRN versed only if rigid shivering is more prominent than what was witnessed by this RN and physician.

## 2020-08-28 NOTE — PROGRESS NOTES
Sedation paused at 2040 for SAT. VSS, no evidence of distress. At 2220 RN noticed patient began to have a rigid shiver like tremor throughout. BUE appeared to be posturing. Dr. Gonda immediately notified. Order to start continuous infusion of propofol at 30 mcg/kg/min. Patient continued to have rigid shiver despite propofol infusion. Propofol infusion rate was increased to 80 mcg/kg/min by 2259. Rigid shivering continued while on propofol at 80 mcg/kg/min. Dr. Gonda was immediately notified. Orders for keppra 500 mg IV BID, EEG, and a one time dose of versed 2 mg IVP. Versed held at that time as patient was no longer have s/s of the rigid shiver.

## 2020-08-29 ENCOUNTER — APPOINTMENT (OUTPATIENT)
Dept: RADIOLOGY | Facility: MEDICAL CENTER | Age: 20
DRG: 004 | End: 2020-08-29
Attending: INTERNAL MEDICINE
Payer: MEDICAID

## 2020-08-29 LAB
ACTION RANGE TRIGGERED IACRT: NO
ALBUMIN SERPL BCP-MCNC: 2.9 G/DL (ref 3.2–4.9)
ALBUMIN/GLOB SERPL: 1 G/DL
ALP SERPL-CCNC: 86 U/L (ref 30–99)
ALT SERPL-CCNC: 65 U/L (ref 2–50)
ANION GAP SERPL CALC-SCNC: 12 MMOL/L (ref 7–16)
AST SERPL-CCNC: 63 U/L (ref 12–45)
BACTERIA BLD CULT: NORMAL
BACTERIA BLD CULT: NORMAL
BASE EXCESS BLDA CALC-SCNC: 1 MMOL/L (ref -4–3)
BASOPHILS # BLD AUTO: 0.4 % (ref 0–1.8)
BASOPHILS # BLD: 0.04 K/UL (ref 0–0.12)
BILIRUB SERPL-MCNC: 0.2 MG/DL (ref 0.1–1.5)
BODY TEMPERATURE: ABNORMAL DEGREES
BUN SERPL-MCNC: 16 MG/DL (ref 8–22)
CALCIUM SERPL-MCNC: 8.7 MG/DL (ref 8.5–10.5)
CHLORIDE SERPL-SCNC: 104 MMOL/L (ref 96–112)
CO2 BLDA-SCNC: 27 MMOL/L (ref 20–33)
CO2 SERPL-SCNC: 22 MMOL/L (ref 20–33)
CREAT SERPL-MCNC: 0.43 MG/DL (ref 0.5–1.4)
EKG IMPRESSION: NORMAL
EOSINOPHIL # BLD AUTO: 0.19 K/UL (ref 0–0.51)
EOSINOPHIL NFR BLD: 1.9 % (ref 0–6.9)
ERYTHROCYTE [DISTWIDTH] IN BLOOD BY AUTOMATED COUNT: 46.1 FL (ref 35.9–50)
GLOBULIN SER CALC-MCNC: 2.8 G/DL (ref 1.9–3.5)
GLUCOSE BLD-MCNC: 76 MG/DL (ref 65–99)
GLUCOSE BLD-MCNC: 77 MG/DL (ref 65–99)
GLUCOSE BLD-MCNC: 89 MG/DL (ref 65–99)
GLUCOSE BLD-MCNC: 92 MG/DL (ref 65–99)
GLUCOSE BLD-MCNC: 93 MG/DL (ref 65–99)
GLUCOSE BLD-MCNC: 95 MG/DL (ref 65–99)
GLUCOSE SERPL-MCNC: 112 MG/DL (ref 65–99)
HCO3 BLDA-SCNC: 25.4 MMOL/L (ref 17–25)
HCT VFR BLD AUTO: 37.8 % (ref 37–47)
HGB BLD-MCNC: 12.4 G/DL (ref 12–16)
HOROWITZ INDEX BLDA+IHG-RTO: 297 MM[HG]
IMM GRANULOCYTES # BLD AUTO: 0.19 K/UL (ref 0–0.11)
IMM GRANULOCYTES NFR BLD AUTO: 1.9 % (ref 0–0.9)
INST. QUALIFIED PATIENT IIQPT: YES
LACTATE BLD-SCNC: 0.8 MMOL/L (ref 0.5–2)
LYMPHOCYTES # BLD AUTO: 1.64 K/UL (ref 1–4.8)
LYMPHOCYTES NFR BLD: 16.3 % (ref 22–41)
MCH RBC QN AUTO: 31.3 PG (ref 27–33)
MCHC RBC AUTO-ENTMCNC: 32.8 G/DL (ref 33.6–35)
MCV RBC AUTO: 95.5 FL (ref 81.4–97.8)
MONOCYTES # BLD AUTO: 1.5 K/UL (ref 0–0.85)
MONOCYTES NFR BLD AUTO: 14.9 % (ref 0–13.4)
NEUTROPHILS # BLD AUTO: 6.5 K/UL (ref 2–7.15)
NEUTROPHILS NFR BLD: 64.6 % (ref 44–72)
NRBC # BLD AUTO: 0 K/UL
NRBC BLD-RTO: 0 /100 WBC
O2/TOTAL GAS SETTING VFR VENT: 30 %
PCO2 BLDA: 40.5 MMHG (ref 26–37)
PCO2 TEMP ADJ BLDA: 40.7 MMHG (ref 26–37)
PH BLDA: 7.41 [PH] (ref 7.4–7.5)
PH TEMP ADJ BLDA: 7.4 [PH] (ref 7.4–7.5)
PLATELET # BLD AUTO: 290 K/UL (ref 164–446)
PMV BLD AUTO: 9.8 FL (ref 9–12.9)
PO2 BLDA: 89 MMHG (ref 64–87)
PO2 TEMP ADJ BLDA: 90 MMHG (ref 64–87)
POTASSIUM SERPL-SCNC: 4.5 MMOL/L (ref 3.6–5.5)
PROT SERPL-MCNC: 5.7 G/DL (ref 6–8.2)
RBC # BLD AUTO: 3.96 M/UL (ref 4.2–5.4)
SAO2 % BLDA: 97 % (ref 93–99)
SIGNIFICANT IND 70042: NORMAL
SIGNIFICANT IND 70042: NORMAL
SITE SITE: NORMAL
SITE SITE: NORMAL
SODIUM SERPL-SCNC: 138 MMOL/L (ref 135–145)
SOURCE SOURCE: NORMAL
SOURCE SOURCE: NORMAL
SPECIMEN DRAWN FROM PATIENT: ABNORMAL
TRIGL SERPL-MCNC: 70 MG/DL (ref 0–149)
WBC # BLD AUTO: 10.1 K/UL (ref 4.8–10.8)

## 2020-08-29 PROCEDURE — 80053 COMPREHEN METABOLIC PANEL: CPT

## 2020-08-29 PROCEDURE — A9270 NON-COVERED ITEM OR SERVICE: HCPCS | Performed by: INTERNAL MEDICINE

## 2020-08-29 PROCEDURE — 700111 HCHG RX REV CODE 636 W/ 250 OVERRIDE (IP): Performed by: INTERNAL MEDICINE

## 2020-08-29 PROCEDURE — 93005 ELECTROCARDIOGRAM TRACING: CPT | Performed by: INTERNAL MEDICINE

## 2020-08-29 PROCEDURE — 37799 UNLISTED PX VASCULAR SURGERY: CPT

## 2020-08-29 PROCEDURE — 85025 COMPLETE CBC W/AUTO DIFF WBC: CPT

## 2020-08-29 PROCEDURE — 94003 VENT MGMT INPAT SUBQ DAY: CPT

## 2020-08-29 PROCEDURE — 71045 X-RAY EXAM CHEST 1 VIEW: CPT

## 2020-08-29 PROCEDURE — 82962 GLUCOSE BLOOD TEST: CPT | Mod: 91

## 2020-08-29 PROCEDURE — 770022 HCHG ROOM/CARE - ICU (200)

## 2020-08-29 PROCEDURE — 700102 HCHG RX REV CODE 250 W/ 637 OVERRIDE(OP): Performed by: INTERNAL MEDICINE

## 2020-08-29 PROCEDURE — 94770 HCHG CO2 EXPIRED GAS DETERMINATION: CPT

## 2020-08-29 PROCEDURE — 84478 ASSAY OF TRIGLYCERIDES: CPT

## 2020-08-29 PROCEDURE — 99291 CRITICAL CARE FIRST HOUR: CPT | Performed by: INTERNAL MEDICINE

## 2020-08-29 PROCEDURE — 83605 ASSAY OF LACTIC ACID: CPT

## 2020-08-29 PROCEDURE — 93010 ELECTROCARDIOGRAM REPORT: CPT | Performed by: INTERNAL MEDICINE

## 2020-08-29 PROCEDURE — 82803 BLOOD GASES ANY COMBINATION: CPT

## 2020-08-29 PROCEDURE — 99232 SBSQ HOSP IP/OBS MODERATE 35: CPT | Performed by: PSYCHIATRY & NEUROLOGY

## 2020-08-29 RX ORDER — ACETAMINOPHEN 500 MG
1000 TABLET ORAL ONCE
Status: DISPENSED | OUTPATIENT
Start: 2020-08-29 | End: 2020-08-30

## 2020-08-29 RX ORDER — SODIUM CHLORIDE 9 MG/ML
INJECTION, SOLUTION INTRAVENOUS
Status: ACTIVE
Start: 2020-08-29 | End: 2020-08-30

## 2020-08-29 RX ORDER — ACETAMINOPHEN 500 MG
1000 TABLET ORAL EVERY 6 HOURS
Status: DISCONTINUED | OUTPATIENT
Start: 2020-08-29 | End: 2020-08-31

## 2020-08-29 RX ORDER — BUSPIRONE HYDROCHLORIDE 10 MG/1
15 TABLET ORAL 2 TIMES DAILY
Status: DISCONTINUED | OUTPATIENT
Start: 2020-08-29 | End: 2020-09-02

## 2020-08-29 RX ADMIN — MEPERIDINE HYDROCHLORIDE 25 MG: 50 INJECTION INTRAMUSCULAR; INTRAVENOUS; SUBCUTANEOUS at 19:00

## 2020-08-29 RX ADMIN — MEPERIDINE HYDROCHLORIDE 25 MG: 50 INJECTION INTRAMUSCULAR; INTRAVENOUS; SUBCUTANEOUS at 04:10

## 2020-08-29 RX ADMIN — ACETAMINOPHEN 1000 MG: 500 TABLET ORAL at 16:56

## 2020-08-29 RX ADMIN — FENTANYL CITRATE 100 MCG: 50 INJECTION INTRAMUSCULAR; INTRAVENOUS at 15:00

## 2020-08-29 RX ADMIN — ENOXAPARIN SODIUM 40 MG: 40 INJECTION SUBCUTANEOUS at 04:56

## 2020-08-29 RX ADMIN — PROPOFOL 80 MCG/KG/MIN: 10 INJECTION, EMULSION INTRAVENOUS at 02:18

## 2020-08-29 RX ADMIN — MEPERIDINE HYDROCHLORIDE 25 MG: 50 INJECTION INTRAMUSCULAR; INTRAVENOUS; SUBCUTANEOUS at 07:32

## 2020-08-29 RX ADMIN — MEPERIDINE HYDROCHLORIDE 25 MG: 50 INJECTION INTRAMUSCULAR; INTRAVENOUS; SUBCUTANEOUS at 18:00

## 2020-08-29 RX ADMIN — PROPOFOL 40 MCG/KG/MIN: 10 INJECTION, EMULSION INTRAVENOUS at 06:00

## 2020-08-29 RX ADMIN — FENTANYL CITRATE 100 MCG: 50 INJECTION INTRAMUSCULAR; INTRAVENOUS at 13:12

## 2020-08-29 RX ADMIN — BUSPIRONE HYDROCHLORIDE 15 MG: 10 TABLET ORAL at 04:55

## 2020-08-29 RX ADMIN — FENTANYL CITRATE 100 MCG: 50 INJECTION INTRAMUSCULAR; INTRAVENOUS at 11:20

## 2020-08-29 RX ADMIN — MEPERIDINE HYDROCHLORIDE 25 MG: 50 INJECTION INTRAMUSCULAR; INTRAVENOUS; SUBCUTANEOUS at 20:43

## 2020-08-29 RX ADMIN — MEPERIDINE HYDROCHLORIDE 25 MG: 50 INJECTION INTRAMUSCULAR; INTRAVENOUS; SUBCUTANEOUS at 05:05

## 2020-08-29 RX ADMIN — PROPOFOL 50 MCG/KG/MIN: 10 INJECTION, EMULSION INTRAVENOUS at 19:18

## 2020-08-29 RX ADMIN — FENTANYL CITRATE 100 MCG: 50 INJECTION INTRAMUSCULAR; INTRAVENOUS at 17:00

## 2020-08-29 RX ADMIN — FAMOTIDINE 20 MG: 20 TABLET, FILM COATED ORAL at 04:55

## 2020-08-29 RX ADMIN — MEPERIDINE HYDROCHLORIDE 25 MG: 50 INJECTION INTRAMUSCULAR; INTRAVENOUS; SUBCUTANEOUS at 14:06

## 2020-08-29 RX ADMIN — MEPERIDINE HYDROCHLORIDE 25 MG: 50 INJECTION INTRAMUSCULAR; INTRAVENOUS; SUBCUTANEOUS at 12:00

## 2020-08-29 RX ADMIN — METRONIDAZOLE 500 MG: 500 TABLET ORAL at 04:55

## 2020-08-29 RX ADMIN — DOCUSATE SODIUM 50 MG AND SENNOSIDES 8.6 MG 2 TABLET: 8.6; 5 TABLET, FILM COATED ORAL at 04:55

## 2020-08-29 RX ADMIN — MEPERIDINE HYDROCHLORIDE 25 MG: 50 INJECTION INTRAMUSCULAR; INTRAVENOUS; SUBCUTANEOUS at 02:18

## 2020-08-29 RX ADMIN — LEVETIRACETAM INJECTION 500 MG: 5 INJECTION INTRAVENOUS at 16:57

## 2020-08-29 RX ADMIN — DOCUSATE SODIUM 50 MG AND SENNOSIDES 8.6 MG 2 TABLET: 8.6; 5 TABLET, FILM COATED ORAL at 16:57

## 2020-08-29 RX ADMIN — ACETAMINOPHEN 1000 MG: 500 TABLET ORAL at 00:28

## 2020-08-29 RX ADMIN — PROPOFOL 50 MCG/KG/MIN: 10 INJECTION, EMULSION INTRAVENOUS at 14:42

## 2020-08-29 RX ADMIN — BUSPIRONE HYDROCHLORIDE 15 MG: 10 TABLET ORAL at 16:56

## 2020-08-29 RX ADMIN — FAMOTIDINE 20 MG: 10 INJECTION INTRAVENOUS at 16:56

## 2020-08-29 RX ADMIN — PROPOFOL 60 MCG/KG/MIN: 10 INJECTION, EMULSION INTRAVENOUS at 23:35

## 2020-08-29 RX ADMIN — ACETAMINOPHEN 1000 MG: 500 TABLET ORAL at 04:55

## 2020-08-29 RX ADMIN — ACETAMINOPHEN 1000 MG: 500 TABLET ORAL at 11:51

## 2020-08-29 RX ADMIN — MEPERIDINE HYDROCHLORIDE 25 MG: 50 INJECTION INTRAMUSCULAR; INTRAVENOUS; SUBCUTANEOUS at 16:00

## 2020-08-29 RX ADMIN — MEPERIDINE HYDROCHLORIDE 25 MG: 50 INJECTION INTRAMUSCULAR; INTRAVENOUS; SUBCUTANEOUS at 00:23

## 2020-08-29 RX ADMIN — MEPERIDINE HYDROCHLORIDE 25 MG: 50 INJECTION INTRAMUSCULAR; INTRAVENOUS; SUBCUTANEOUS at 06:10

## 2020-08-29 RX ADMIN — PROPOFOL 80 MCG/KG/MIN: 10 INJECTION, EMULSION INTRAVENOUS at 09:10

## 2020-08-29 RX ADMIN — LEVETIRACETAM INJECTION 500 MG: 5 INJECTION INTRAVENOUS at 04:56

## 2020-08-29 ASSESSMENT — FIBROSIS 4 INDEX: FIB4 SCORE: 0.57

## 2020-08-29 NOTE — PROGRESS NOTES
"Critical Care Progress Note    Date of admission  2020    Chief Complaint  19 y.o. female admitted 2020 with drug overdose, cardiac arrest    Hospital Course    \"19 y.o. female who presented 2020 with drug overdose with methamphetamines, oxycodone and found by boyfriend.  Cardiac arrest enroute to hospital and received narcan, epinephrine and cpr and ROSC achieved.  Difficult airway at scene and LMA placed. ET tube placed in ER.  She was vomiting during airway attempt.  On propofol due to vent asynchrony.  Propofol turned off for neuro assessment and possible hypothermic protocol.  Family no longer at hospital and unable to reach per phone number given for mother.\"     - TTM protocol initiated, EEG without NCSE   - rewarmed, awake, not following   - following occasional commands  - seizure like activity vs shivering--> Keppra, versed, Propofol      Interval Problem Update  Reviewed last 24 hour events:   - Fluctuating fevers and shivering   - Tm: 37.6 with Arctic sun   - Neuro: Opens eyes, squeezes with both hands to command   - HR: 80-90   - SBP: 110s   - GI: TF at goal, BM smear   - UOP: 2.1 L   - Pearce: yes   - Lines: CVC, Art, PIV   - PPx: GI pepcid, DVT lovenox   - AB.39/35/108/22   - ABX 5 days prophylaxis completed     Infusions:  Propofol        Review of Systems  Review of Systems   Unable to perform ROS: Acuity of condition        Vital Signs for last 24 hours   Temp:  [36.8 °C (98.2 °F)-37.5 °C (99.5 °F)] 37.4 °C (99.3 °F)  Pulse:  [] 88  Resp:  [11-29] 17  BP: (101-134)/(65-83) 118/73  SpO2:  [98 %-100 %] 100 %    Hemodynamic parameters for last 24 hours  CVP:  [1 MM HG-269 MM HG] 4 MM HG    Respiratory Information for the last 24 hours  Vent Mode: APVCMV  Rate (breaths/min): 26  Vt Target (mL): 320  PEEP/CPAP: 8  P Support: 5  MAP: 14  Length of Weaning Trial (Hours): 2 hours  Control VTE (exp VT): 418    Physical Exam   Physical Exam  Vitals signs and nursing " note reviewed.   Constitutional:       General: She is not in acute distress.     Appearance: She is not ill-appearing or toxic-appearing.      Interventions: She is intubated.   HENT:      Head: Normocephalic and atraumatic.      Right Ear: External ear normal.      Left Ear: External ear normal.      Nose: No congestion or rhinorrhea.      Comments: Feeding tube in place     Mouth/Throat:      Mouth: Mucous membranes are dry.      Pharynx: No oropharyngeal exudate or posterior oropharyngeal erythema.      Comments: ET tube in place  Eyes:      General: No scleral icterus.     Conjunctiva/sclera: Conjunctivae normal.      Pupils: Pupils are equal, round, and reactive to light.      Comments: Gaze normalized   Neck:      Musculoskeletal: Neck supple. No neck rigidity or muscular tenderness.   Cardiovascular:      Rate and Rhythm: Normal rate and regular rhythm.      Pulses: Normal pulses.      Heart sounds: Normal heart sounds. No murmur.   Pulmonary:      Effort: She is intubated.      Breath sounds: No wheezing, rhonchi or rales.   Chest:       Abdominal:      General: Abdomen is flat. There is no distension.      Palpations: There is no mass.      Tenderness: There is no abdominal tenderness.   Musculoskeletal:         General: No swelling or tenderness.      Right lower leg: No edema.      Left lower leg: No edema.   Skin:     General: Skin is warm and dry.      Capillary Refill: Capillary refill takes less than 2 seconds.      Findings: No erythema or rash.   Neurological:      GCS: GCS eye subscore is 3. GCS verbal subscore is 1. GCS motor subscore is 5.      Cranial Nerves: No cranial nerve deficit or facial asymmetry.      Motor: Abnormal muscle tone present.      Comments: RASS-1, opens eyes to voice.  Now following commands with 2-3-second delay.  Squeezes equally with both upper extremities when asked to do so.   Psychiatric:      Comments: Unable to assess         Medications  Current  Facility-Administered Medications   Medication Dose Route Frequency Provider Last Rate Last Dose   • fentaNYL (SUBLIMAZE) 50 mcg/mL in 50mL (Continuous Infusion)   Intravenous Continuous Boogie Stokes M.D. 2 mL/hr at 08/30/20 1041 100 mcg/hr at 08/30/20 1041   • busPIRone (BUSPAR) tablet 15 mg  15 mg Enteral Tube BID Boogie Stokes M.D.   15 mg at 08/30/20 0449   • acetaminophen (TYLENOL) tablet 1,000 mg  1,000 mg Enteral Tube Q6HRS Boogie Stokes M.D.   1,000 mg at 08/30/20 1308   • labetalol (NORMODYNE/TRANDATE) injection 10 mg  10 mg Intravenous Q4HRS PRN oYsef Tomlin M.D.   10 mg at 08/28/20 0131   • midazolam (VERSED) 2 MG/2ML injection 2-4 mg  2-4 mg Intravenous Q15 MIN PRN Yosef Tomlin M.D.   2 mg at 08/28/20 0854   • Pharmacy Consult: pharmacy to discontinue all other orders for acetaminophen   Other PHARMACY TO DOSE Boogie Stokes M.D.       • meperidine (DEMEROL) injection 25 mg  25 mg Intravenous Q HOUR PRN Boogie Stokes M.D.   25 mg at 08/30/20 0148   • propofol (DIPRIVAN) injection  0-80 mcg/kg/min Intravenous Continuous JESSY Piper Jr..O.   Stopped at 08/30/20 0428   • Pharmacy Consult: Enteral tube insertion - review meds/change route/product selection  1 Each Other PHARMACY TO DOSE Boogie Augustine Jr., D.O.       • Respiratory Therapy Consult   Nebulization Continuous RT Brock Murdock M.D.       • ipratropium-albuterol (DUONEB) nebulizer solution  3 mL Nebulization Q2HRS PRN (RT) Brock Murdock M.D.       • famotidine (PEPCID) tablet 20 mg  20 mg Enteral Tube Q12HRS Brock Murdock M.D.   20 mg at 08/30/20 0450    Or   • famotidine (PEPCID) injection 20 mg  20 mg Intravenous Q12HRS Brock Murdock M.D.   20 mg at 08/29/20 1656   • senna-docusate (PERICOLACE or SENOKOT S) 8.6-50 MG per tablet 2 Tab  2 Tab Enteral Tube BID Brock Murdock M.D.   2 Tab at 08/30/20 0450    And   • polyethylene glycol/lytes (MIRALAX) PACKET 1 Packet  1 Packet Enteral Tube QDAY PRN Brock Murdock M.D.         And   • magnesium hydroxide (MILK OF MAGNESIA) suspension 30 mL  30 mL Enteral Tube QDAY PRN Brock Murdock M.D.        And   • bisacodyl (DULCOLAX) suppository 10 mg  10 mg Rectal QDAY PRN Brock Murdock M.D.       • MD Alert...ICU Electrolyte Replacement per Pharmacy   Other PHARMACY TO DOSE Brock Murdock M.D.       • lidocaine (XYLOCAINE) 1 % injection 1-2 mL  1-2 mL Tracheal Tube Q30 MIN PRN Brock Murdock M.D.       • hydrALAZINE (APRESOLINE) injection 10-20 mg  10-20 mg Intravenous Q6HRS PRN Brock Murdock M.D.       • enoxaparin (LOVENOX) inj 40 mg  40 mg Subcutaneous DAILY Boogie Augustine Jr., D.O.   40 mg at 08/30/20 0449       Fluids    Intake/Output Summary (Last 24 hours) at 8/30/2020 1352  Last data filed at 8/30/2020 1200  Gross per 24 hour   Intake 1592.93 ml   Output 2115 ml   Net -522.07 ml       Laboratory  Recent Labs     08/29/20  0401 08/30/20  0305 08/30/20  0500   ISTATAPH 7.405 7.355* 7.395*   ISTATAPCO2 40.5* 42.9* 35.6   ISTATAPO2 89* 39* 108*   ISTATATCO2 27 25 23   [id number] 97 71* 98   ISTATARTHCO3 25.4* 24.0 21.8   ISTATARTBE 1 -2 -3   ISTATTEMP 37.1 C 96.4 F 37.2 C   ISTATFIO2 30 30 30   ISTATSPEC Arterial Arterial Arterial   ISTATAPHTC 7.404 7.373* 7.392*   [id number] 90* 36* 109*         Recent Labs     08/28/20  0435 08/29/20  0516 08/30/20  0257   SODIUM 138 138 135   POTASSIUM 4.4 4.5 4.3   CHLORIDE 108 104 100   CO2 21 22 23   BUN 12 16 15   CREATININE 0.51 0.43* 0.39*   MAGNESIUM 1.8  --   --    CALCIUM 8.7 8.7 9.0     Recent Labs     08/28/20  0435 08/29/20  0516 08/30/20  0257   ALTSGPT 82* 65* 58*   ASTSGOT 75* 63* 52*   ALKPHOSPHAT 94 86 87   TBILIRUBIN 0.2 0.2 <0.2   GLUCOSE 104* 112* 102*     Recent Labs     08/28/20  0435 08/29/20  0516 08/30/20  0257   WBC 9.4 10.1 12.0*   NEUTSPOLYS 70.50 64.60 78.00*   LYMPHOCYTES 15.40* 16.30* 8.70*   MONOCYTES 11.20 14.90* 10.40   EOSINOPHILS 1.60 1.90 0.80   BASOPHILS 0.40 0.40 0.30   ASTSGOT 75* 63* 52*   ALTSGPT 82* 65*  58*   ALKPHOSPHAT 94 86 87   TBILIRUBIN 0.2 0.2 <0.2     Recent Labs     08/28/20  0435 08/29/20  0516 08/30/20  0257   RBC 4.22 3.96* 3.81*   HEMOGLOBIN 13.2 12.4 12.3   HEMATOCRIT 40.5 37.8 36.2*   PLATELETCT 257 290 298       Imaging  X-Ray:  I have personally reviewed the images and compared with prior images. and My impression is: Endotracheal tube is 2 cm above the may.  There is a left subclavian central venous catheter with the tip near the cavoatrial junction.  Mild pulmonary edema is unchanged.  Cannot exclude underlying infiltrate.  No pneumothorax.  MRI:   Reviewed   IMPRESSION:     The diffusion-weighted sequences demonstrates areas of restricted diffusion in the bilateral basal ganglia and some of the frontal gray matter. The predominant portion of the brain parenchyma does not demonstrate any restricted diffusion. Therefore this   findings likely represent mild diffuse anoxic brain injury.    Assessment/Plan  Drug overdose- (present on admission)  Assessment & Plan  Methamphetamines, oxycodone and EtOH per boyfriend  Counseling when extubated    Metabolic acidosis- (present on admission)  Assessment & Plan  Resolving, secondary to cardiac arrest      Acute respiratory failure with hypoxia (HCC)- (present on admission)  Assessment & Plan  Secondary to drug overdose  Lung protective ventilation strategies  Titrate ventilator prescription to optimize oxygenation, ventilation, and acid base balance.  Daily ABC trials  She remains too weak for trial of extubation today.        Cardiac arrest (HCC)- (present on admission)  Assessment & Plan  Pulseless electrical activity in ambulance while en route to hospital            PEA (Pulseless electrical activity) (HCC)- (present on admission)  Assessment & Plan  PEA enroute to hospital  Hypoxia and aspiration contributory  MRI reveals bilateral basal ganglial anoxic injury.  Neuron-specific enolase sent for analysis.  Maintain normothermia  Maintain  euglycemia  PCO2 35-40    Hypoglycemia- (present on admission)  Assessment & Plan  Possibly due to mild shock liver  Continue to monitor serum blood glucose    Encephalopathy acute- (present on admission)  Assessment & Plan  Anoxic encephalopathy, improving  Initial EEG without an NCSE  Serial neurologic exams  NSE sent      Elevated transaminase level- (present on admission)  Assessment & Plan  Likely due to ischemic hepatitis, improving today  Monitor synthetic functions including INR and glucose  Avoid hepatotoxins  Initially hypoglycemic, now titrated off of dextrose    Cerebral edema (HCC)- (present on admission)  Assessment & Plan  Mild/possible per radiologist on CT  MRI on 8/27/2020 without edema or severe changes       VTE:  Lovenox  Ulcer: H2 Antagonist  Lines: Central Line  Ongoing indication addressed, Arterial Line  Ongoing indication addressed and Pearce Catheter  Ongoing indication addressed    I have performed a physical exam and reviewed and updated ROS and Plan today (8/30/2020). In review of yesterday's note (8/29/2020), there are no changes except as documented above.     Titrating ventilator, sedatives and evaluating post-arrest encephalopathy. This patient is critically ill, at high risk for decompensation leading to worsening vital organ dysfunction and death without critical care interventions.    Discussed patient condition and risk of morbidity and/or mortality with RN, RT, Pharmacy, Dietary, , Code status disscussed and Charge nurse / hot rounds       The patient remains critically ill.  I have assessed and reassessed the respiratory status and made ventilator adjustments based upon arterial blood gas analysis, ventilator waveforms and airway mechanics.  I have assessed and reassessed the blood pressure, hemodynamics, cardiovascular status. This patient remains at high risk for worsening cardiopulmonary dysfunction and death without the above critical care  interventions.      Critical care time 45 minutes in directly providing and coordinating critical care and extensive data review.  No time overlap and excludes procedures.

## 2020-08-29 NOTE — PROGRESS NOTES
Shivering noted to be more persistent. Fentanyl and demerol use alternately. [place] water temperature is decreasing. Dr. Stokes updated. No new orders at this time.

## 2020-08-29 NOTE — CARE PLAN
Problem: Respiratory:  Goal: Respiratory status will improve  Outcome: PROGRESSING AS EXPECTED  Note: Frequent suction provided. Oral care done. Aspiration precautions in place. HOB elevated at 30 degree.      Problem: Fluid Volume:  Goal: Will maintain balanced intake and output  Outcome: PROGRESSING AS EXPECTED  Note: I&O recorded q2h. Adequate urine output noted.

## 2020-08-29 NOTE — CARE PLAN
Problem: Ventilation Defect:  Goal: Ability to achieve and maintain unassisted ventilation or tolerate decreased levels of ventilator support  Outcome: PROGRESSING AS EXPECTED  Note:    Ventilator Daily Summary    Vent Day #10    Ventilator settings changed this shift:No    Weaning trials:Yes    Respiratory Procedures:No    Plan: Continue current ventilator settings and wean mechanical ventilation as tolerated per physician orders.

## 2020-08-29 NOTE — DISCHARGE PLANNING
[facility] Rehabilitation Transitional Care Coordination  Referral from:  Ty Mccann MD   Facesheet indicates provider as: None at this time.   Potential Rehab Diagnosis: Anoxic brain injury from drug overdose    Chart review indicates patient has on going medical management and anticipate she will have therapy needs to possibly meet inpatient rehab facility criteria with the goal of returning to community. She is currently vented.     D/C support:  PT was living in [place]-planning on attending [employer] this fall.   Parents reside locally and are supportive.      Physiatry consultation forwarded per protocol. Anticipate consult to be done on 8/31       Thank you for the referral.

## 2020-08-29 NOTE — CARE PLAN
Problem: Skin Integrity  Goal: Risk for impaired skin integrity will decrease  Outcome: PROGRESSING AS EXPECTED  Note: Skin assessment completed. Mepilex in place for prevention. Pt repositioned q2h.      Problem: Pain Management  Goal: Pain level will decrease to patient's comfort goal  Outcome: PROGRESSING SLOWER THAN EXPECTED  Note: Fentanyl and propofol infusing.

## 2020-08-29 NOTE — PROGRESS NOTES
Brief Neurology Note - Prognostication    The patient's chart has been reviewed. Case discussed with Dr. Stokes.    I met with Angel Luis, father, at bedside to discuss prognosis.  Given patient's young age and mild degree of anoxic brain injury, this is a survivable event.  There are medical entities keeping her in the ICU inclusive of respiratory failure requiring intubation and severe shivering with fevers necessitating sedation.    Neurologically, only time will tell the degree of potential motor and cognitive recovery.  Discussed indication for trach and PEG in coordinated multidisciplinary care with ICU, nursing, and palliative care teams.  Physiatry to consult next week to aid in comprehensive approach to rehabilitation.    I, and the neurology service, will remain available to answer any questions, and to provide support, should Angel Luis request it.  Given clear underlying etiology and prognosis as delineated, there are no further recommendations or further studies from an acute neurological standpoint at this time. Please re-consult if you have further questions or there is a change in status.    Ty Mccann MD  Medical Director, [facility], [facility]  Neurohospitalist, & Vascular Neurology, [facility]  Clinical  of Neurology, [employer] [facility] [employer]

## 2020-08-29 NOTE — PROGRESS NOTES
Neurology Progress Note  Neurohospitalist Service, [facility] Neurosciences    Referring Physician: Boogie Augustine Jr., D.O.    Chief Complaint   Patient presents with   • Drug Overdose     unknown amount or type of drug ingestion per EMS, pt found down, aspiration, CPR in progress upon EMS arrival.       HPI: Refer to initial documented Neurology H&P, as detailed in the patient's chart.    Interval History: No acute events overnight.  Patient remains intubated in the ICU thus unable to provide interval subjective history.  On cooling blanket.    Review of systems: In addition to what is detailed in the HPI and/or updated in the interval history, all other systems reviewed and are negative.    Past Medical History:    has no past medical history on file.    FHx:  family history is not on file.    SHx:   polysubstance abuse and suicide attempt    Medications:    Current Facility-Administered Medications:   •  labetalol (NORMODYNE/TRANDATE) injection 10 mg, 10 mg, Intravenous, Q4HRS PRN, Yosef Tomlin M.D., 10 mg at 08/28/20 0131  •  midazolam (VERSED) 2 MG/2ML injection 2-4 mg, 2-4 mg, Intravenous, Q15 MIN PRN, Yosef Tomlin M.D., 2 mg at 08/28/20 0854  •  Pharmacy Consult: pharmacy to discontinue all other orders for acetaminophen, , Other, PHARMACY TO DOSE, Boogie Stokes M.D.  •  acetaminophen (TYLENOL) tablet 1,000 mg, 1,000 mg, Oral, Q6HRS, Boogie Stokes M.D., 1,000 mg at 08/29/20 0455  •  busPIRone (BUSPAR) tablet 15 mg, 15 mg, Oral, BID, Boogie Stokes M.D., 15 mg at 08/29/20 0455  •  meperidine (DEMEROL) injection 25 mg, 25 mg, Intravenous, Q HOUR PRN, Boogie Stokes M.D., 25 mg at 08/29/20 0732  •  fentaNYL (SUBLIMAZE) injection 100 mcg, 100 mcg, Intravenous, Q HOUR PRN, Boogie Stokes M.D.  •  fentaNYL (SUBLIMAZE) 50 mcg/mL in 50mL (Continuous Infusion), , Intravenous, Continuous, Boogie Stokes M.D., Last Rate: 1 mL/hr at 08/28/20 1857, 50 mcg/hr at 08/28/20 1857  •  midazolam  (VERSED) 2 MG/2ML injection 2-4 mg, 2-4 mg, Intravenous, Once PRN, Boogie Augustine Jr., D.O.  •  levETIRAcetam (Keppra) 500 mg in 100 mL NaCl IV premix, 500 mg, Intravenous, Q12HRS, Jeremy M Gonda, M.D., Stopped at 08/29/20 0511  •  fentaNYL (SUBLIMAZE) injection 50 mcg, 50 mcg, Intravenous, Q15 MIN PRN **AND** fentaNYL (SUBLIMAZE) injection 100 mcg, 100 mcg, Intravenous, Q15 MIN PRN, 100 mcg at 08/28/20 0456 **AND** fentaNYL (SUBLIMAZE) 50 mcg/mL in 50mL (Continuous Infusion), , Intravenous, Continuous, Last Rate: 1 mL/hr at 08/29/20 0601, 50 mcg/hr at 08/29/20 0601 **AND** propofol (DIPRIVAN) injection, 0-80 mcg/kg/min, Intravenous, Continuous, Last Rate: 36.8 mL/hr at 08/29/20 0620, 80 mcg/kg/min at 08/29/20 0620 **AND** Triglyceride, , , Every 3 Days (0300), Boogie Augustine Jr., D.O.  •  Pharmacy Consult: Enteral tube insertion - review meds/change route/product selection, 1 Each, Other, PHARMACY TO DOSE, Boogie Augustine Jr., D.O.  •  Respiratory Therapy Consult, , Nebulization, Continuous RT, Brock Murdock M.D.  •  ipratropium-albuterol (DUONEB) nebulizer solution, 3 mL, Nebulization, Q2HRS PRN (RT), Brock Murdock M.D.  •  famotidine (PEPCID) tablet 20 mg, 20 mg, Enteral Tube, Q12HRS, 20 mg at 08/29/20 0455 **OR** famotidine (PEPCID) injection 20 mg, 20 mg, Intravenous, Q12HRS, Brock Murdock M.D., 20 mg at 08/28/20 1741  •  senna-docusate (PERICOLACE or SENOKOT S) 8.6-50 MG per tablet 2 Tab, 2 Tab, Enteral Tube, BID, 2 Tab at 08/29/20 0455 **AND** polyethylene glycol/lytes (MIRALAX) PACKET 1 Packet, 1 Packet, Enteral Tube, QDAY PRN **AND** magnesium hydroxide (MILK OF MAGNESIA) suspension 30 mL, 30 mL, Enteral Tube, QDAY PRN **AND** bisacodyl (DULCOLAX) suppository 10 mg, 10 mg, Rectal, QDAY PRN, Brock Murdock M.D.  •  MD Alert...ICU Electrolyte Replacement per Pharmacy, , Other, PHARMACY TO DOSE, Brock Murdock M.D.  •  lidocaine (XYLOCAINE) 1 % injection 1-2 mL, 1-2 mL, Tracheal Tube, Q30 MIN PRN, Brock WARNER  BAILEE Murdock  •  hydrALAZINE (APRESOLINE) injection 10-20 mg, 10-20 mg, Intravenous, Q6HRS PRN, Brock Murdock M.D.  •  enoxaparin (LOVENOX) inj 40 mg, 40 mg, Subcutaneous, DAILY, Boogie Augustine Jr., D.O., 40 mg at 08/29/20 0456    Physical Examination:     Vitals:    08/29/20 0630 08/29/20 0648 08/29/20 0700 08/29/20 0730   BP: 122/75  112/68 117/68   Pulse: 98 96 94 (!) 117   Resp: (!) 26 (!) 26 (!) 26 (!) 28   Temp:       TempSrc:       SpO2:  100%     Weight:       Height:           General: Patient is intubated and sedated in ICU on propofol  Eyes: examination of optic disks not indicated at this time  CV: RRR     NEUROLOGICAL EXAM:      Mental status: opens eyes to noxious stimulus, does not follow commands reliably  Speech and language: intubated  Cranial nerve exam: Pupils are equal, round and reactive to light bilaterally. Visual fields: does not blink to threat bilaterally. Gaze in neutral position. Corneal reflexes intact bilaterally.  Extraocular muscles are intact to oculocephalic maneuver ie VOR intact. Face is symmetric. Unable to assess sensation in the face due to mental status. Cough and gag reflexes are intact.  Motor exam: Does not participate in formal strength testing. Tone is increased. + high freq. Low amplitude full body movements consistent with shivering with sedation held  Sensory exam: no response to noxious stimuli x4 extremities  Deep tendon reflexes:  1+ throughout. Toes mute bilaterally  Coordination: not participatory due to mental status  Gait: deferred due to ICU status    Objective Data:    Labs:  Lab Results   Component Value Date/Time    PROTHROMBTM 14.0 08/25/2020 09:20 PM    INR 1.05 08/25/2020 09:20 PM      Lab Results   Component Value Date/Time    WBC 10.1 08/29/2020 05:16 AM    RBC 3.96 (L) 08/29/2020 05:16 AM    HEMOGLOBIN 12.4 08/29/2020 05:16 AM    HEMATOCRIT 37.8 08/29/2020 05:16 AM    MCV 95.5 08/29/2020 05:16 AM    MCH 31.3 08/29/2020 05:16 AM    MCHC 32.8 (L)  08/29/2020 05:16 AM    MPV 9.8 08/29/2020 05:16 AM    NEUTSPOLYS 64.60 08/29/2020 05:16 AM    LYMPHOCYTES 16.30 (L) 08/29/2020 05:16 AM    MONOCYTES 14.90 (H) 08/29/2020 05:16 AM    EOSINOPHILS 1.90 08/29/2020 05:16 AM    BASOPHILS 0.40 08/29/2020 05:16 AM    ANISOCYTOSIS 1+ 08/23/2020 10:05 PM      Lab Results   Component Value Date/Time    SODIUM 138 08/29/2020 05:16 AM    POTASSIUM 4.5 08/29/2020 05:16 AM    CHLORIDE 104 08/29/2020 05:16 AM    CO2 22 08/29/2020 05:16 AM    GLUCOSE 112 (H) 08/29/2020 05:16 AM    BUN 16 08/29/2020 05:16 AM    CREATININE 0.43 (L) 08/29/2020 05:16 AM      Lab Results   Component Value Date/Time    TRIGLYCERIDE 70 08/29/2020 05:16 AM       Lab Results   Component Value Date/Time    ALKPHOSPHAT 86 08/29/2020 05:16 AM    ASTSGOT 63 (H) 08/29/2020 05:16 AM    ALTSGPT 65 (H) 08/29/2020 05:16 AM    TBILIRUBIN 0.2 08/29/2020 05:16 AM        Imaging/Testing:    I interpreted and/or reviewed the patient's neuroimaging    DX-CHEST-PORTABLE (1 VIEW)   Final Result         1.  Pulmonary edema and/or infiltrates are identified, which are stable since the prior exam.                  DX-CHEST-PORTABLE (1 VIEW)   Final Result         1.  Pulmonary edema and/or infiltrates are identified, which are stable since the prior exam.               MR-BRAIN-W/O   Final Result      The diffusion-weighted sequences demonstrates areas of restricted diffusion in the bilateral basal ganglia and some of the frontal gray matter. The predominant portion of the brain parenchyma does not demonstrate any restricted diffusion. Therefore this    findings likely represent mild diffuse anoxic brain injury.      DX-CHEST-PORTABLE (1 VIEW)   Final Result         1.  Pulmonary edema and/or infiltrates are identified, which are somewhat decreased since the prior exam.            DX-CHEST-PORTABLE (1 VIEW)   Final Result         1.  Pulmonary edema and/or infiltrates are identified, which are stable since the prior exam.          DX-ABDOMEN FOR TUBE PLACEMENT   Final Result         1.  Nonspecific bowel gas pattern.   2.  Dobbhoff tube tip overlying the expected location of the pylorus or first duodenal segment.      DX-CHEST-PORTABLE (1 VIEW)   Final Result         1.  Patchy bilateral pulmonary infiltrates, somewhat increased since prior.      EC-ECHOCARDIOGRAM COMPLETE W/O CONT   Final Result      DX-CHEST-PORTABLE (1 VIEW)   Final Result         1.  Patchy bilateral pulmonary infiltrates, somewhat increased since prior.      CT-HEAD W/O   Final Result         1.  Possible subtle sulcal effacement and slight loss of differentiation of gray-white matter, consider component of cerebral edema. Recommend radiographic follow-up   2.  Bilateral sphenoid and maxillary sinus air-fluid levels      DX-CHEST-PORTABLE (1 VIEW)   Final Result         1.  No acute cardiopulmonary disease.        EEG 8/28/20: normal    Assessment and Plan:    Annika He is a 19 y.o. woman presenting for whom neurology has been consulted for prognostication in the setting of drug overdose and PEA arrest.  CT head is concerning for anoxic injury given sulcal effacement and loss of grey white differentiation with MRI brain demonstrating evidence of mild anoxic injury (b/l T2 hyperintensities in the basal ganglia structures).  Stable in the ICU.  Given young age, and degree of injury, this is a survivable event with ultimate level of function 3 months out difficult to ascertain; only time will tell.  Thus, anticipate trach and PEG pending meeting with parents at bedside this afternoon 8/29/20.       Plan:     - neuron specific enolase drawn 8/27/20, results pending  - GOC, wean sedation and extubate if and when able per ICU protocol(s)  - no AED indicated at this juncture  - I will meet with family this afternoon 8/29 to discuss indication for trach and PEG  - physiatry consultation    The evaluation of the patient, and recommended management, was discussed  with Dr. Everardo Stokes at bedside in coordinated multidisciplinary care.     I have performed a physical exam and reviewed and updated ROS and Plan today (8/29/2020). In review of yesterday's note (8/28/2020), there are no changes except as documented above.    Ty Mccann MD  Medical Director, [facility], [facility]  Neurohospitalist, & Vascular Neurology, [facility]  Clinical  of Neurology, [facility] [employer]

## 2020-08-29 NOTE — PROGRESS NOTES
Spiritual Care Note     Patient Information     Patient's Name: Annika He   MRN: [id number]    YOB: 2000   Age and Gender: 19 y.o. female   Service Area: CARDIAC ICU [facility]   Room (and Bed): T6/   Ethnicity or Nationality:     Primary Language: English   Jewish/Spiritual preference: [employer] [place] [employer]   Place of Residence: [place]   Family/Friends/Others Present: Father   Clinical Team Present: Nurse   Medical Diagnosis(-es)/Procedure(s): Cardiac Arrest, Drug Overdose   Code Status: Full Code    Date of Admission: 8/23/2020   Length of Stay: 4 days        Spiritual Care Provider Information:  Name of Spiritual Care Provider: Marika Thornton  Title of Spiritual Care Provider: Associate Kahn  Phone Number: [phone]  E-mail: [email or web address]  Total time : 35 minutes    Spiritual Screen Results:    Gen Nursing        Palliative Care  PC Jewish/Spiritual Screening  Is your spiritual health or inner well-being important to you as you cope with your medical condition?: Unable to determine  Would you like to receive a visit from our Spiritual Care team or your own Yazidi or spiritual leader?: Unable to determine      Encounter/Request Information  Encounter/Request Type   Visited With: Family, Patient not available(Pt intubated, sedated)  Nature of the Visit: Follow-up, On shift  Continue Visiting: Yes  Next Follow-up Date: 08/29/20(1500)  Crisis Visit: Critical care  Referral From/ Origin of Request: Epic nursing    Religous Needs/Values       Spiritual Assessment   Spiritual Care Encounters    Observations/Symptoms: Anxious, Frustration, Sadness, Thankfulness(Dad)    Interacton/Conversation:  introduced self to father of pt what was sitting BS. Father had great concern for pt to receive Spiritual Care visit and [employer] tomorrow. Pt's Brother and his nephew will be coming at 1500 to offer blessing. In this tradition it  takes two people to offer an official spiritual blessing. Management gave approval for this experience for pt.     Palliat. Director offered conversation with Dad as well during  visit. Dad is very appreciative of [facility]iat. Care presence and support.  Dad admits he is very anxious waiting in pt's room and feels helpless. The many machines and alarms that go off are very distressing for him.     Dad was very happy about confirmation of pt's blessing. Dad desired this  to be present to meet his brother.  will meet with family at bedside when uncle of pt offers blessing. Dad desired  to continue visiting pt and checking on family when they are present.  confirmed f/u visits.    Assessment: Need, Distress(Father)    Need: Seeking Spiritual Assistance and Support    Distress: Anxiety about the Future, Feeling  from Culture/Tradition    Interventions: Compassionate Presence, Reflective Listening    Outcomes: Ability to Communicate with Truth and Honesty, Coping, Value/Dignity/Respect    Plan: Continue with Family

## 2020-08-30 ENCOUNTER — APPOINTMENT (OUTPATIENT)
Dept: RADIOLOGY | Facility: MEDICAL CENTER | Age: 20
DRG: 004 | End: 2020-08-30
Attending: INTERNAL MEDICINE
Payer: MEDICAID

## 2020-08-30 LAB
ACTION RANGE TRIGGERED IACRT: NO
ACTION RANGE TRIGGERED IACRT: YES
ALBUMIN SERPL BCP-MCNC: 3.1 G/DL (ref 3.2–4.9)
ALBUMIN/GLOB SERPL: 1 G/DL
ALP SERPL-CCNC: 87 U/L (ref 30–99)
ALT SERPL-CCNC: 58 U/L (ref 2–50)
ANION GAP SERPL CALC-SCNC: 12 MMOL/L (ref 7–16)
AST SERPL-CCNC: 52 U/L (ref 12–45)
BASE EXCESS BLDA CALC-SCNC: -2 MMOL/L (ref -4–3)
BASE EXCESS BLDA CALC-SCNC: -3 MMOL/L (ref -4–3)
BASOPHILS # BLD AUTO: 0.3 % (ref 0–1.8)
BASOPHILS # BLD: 0.03 K/UL (ref 0–0.12)
BILIRUB SERPL-MCNC: <0.2 MG/DL (ref 0.1–1.5)
BODY TEMPERATURE: ABNORMAL DEGREES
BODY TEMPERATURE: ABNORMAL DEGREES
BUN SERPL-MCNC: 15 MG/DL (ref 8–22)
CALCIUM SERPL-MCNC: 9 MG/DL (ref 8.5–10.5)
CHLORIDE SERPL-SCNC: 100 MMOL/L (ref 96–112)
CO2 BLDA-SCNC: 23 MMOL/L (ref 20–33)
CO2 BLDA-SCNC: 25 MMOL/L (ref 20–33)
CO2 SERPL-SCNC: 23 MMOL/L (ref 20–33)
CREAT SERPL-MCNC: 0.39 MG/DL (ref 0.5–1.4)
EOSINOPHIL # BLD AUTO: 0.09 K/UL (ref 0–0.51)
EOSINOPHIL NFR BLD: 0.8 % (ref 0–6.9)
ERYTHROCYTE [DISTWIDTH] IN BLOOD BY AUTOMATED COUNT: 44.6 FL (ref 35.9–50)
GLOBULIN SER CALC-MCNC: 3 G/DL (ref 1.9–3.5)
GLUCOSE BLD-MCNC: 101 MG/DL (ref 65–99)
GLUCOSE BLD-MCNC: 118 MG/DL (ref 65–99)
GLUCOSE BLD-MCNC: 95 MG/DL (ref 65–99)
GLUCOSE SERPL-MCNC: 102 MG/DL (ref 65–99)
HCO3 BLDA-SCNC: 21.8 MMOL/L (ref 17–25)
HCO3 BLDA-SCNC: 24 MMOL/L (ref 17–25)
HCT VFR BLD AUTO: 36.2 % (ref 37–47)
HGB BLD-MCNC: 12.3 G/DL (ref 12–16)
HOROWITZ INDEX BLDA+IHG-RTO: 130 MM[HG]
HOROWITZ INDEX BLDA+IHG-RTO: 360 MM[HG]
IMM GRANULOCYTES # BLD AUTO: 0.21 K/UL (ref 0–0.11)
IMM GRANULOCYTES NFR BLD AUTO: 1.8 % (ref 0–0.9)
INST. QUALIFIED PATIENT IIQPT: YES
INST. QUALIFIED PATIENT IIQPT: YES
LYMPHOCYTES # BLD AUTO: 1.04 K/UL (ref 1–4.8)
LYMPHOCYTES NFR BLD: 8.7 % (ref 22–41)
MCH RBC QN AUTO: 32.3 PG (ref 27–33)
MCHC RBC AUTO-ENTMCNC: 34 G/DL (ref 33.6–35)
MCV RBC AUTO: 95 FL (ref 81.4–97.8)
MONOCYTES # BLD AUTO: 1.25 K/UL (ref 0–0.85)
MONOCYTES NFR BLD AUTO: 10.4 % (ref 0–13.4)
NEUTROPHILS # BLD AUTO: 9.35 K/UL (ref 2–7.15)
NEUTROPHILS NFR BLD: 78 % (ref 44–72)
NRBC # BLD AUTO: 0 K/UL
NRBC BLD-RTO: 0 /100 WBC
O2/TOTAL GAS SETTING VFR VENT: 30 %
O2/TOTAL GAS SETTING VFR VENT: 30 %
PCO2 BLDA: 35.6 MMHG (ref 26–37)
PCO2 BLDA: 42.9 MMHG (ref 26–37)
PCO2 TEMP ADJ BLDA: 35.9 MMHG (ref 26–37)
PCO2 TEMP ADJ BLDA: 40.7 MMHG (ref 26–37)
PH BLDA: 7.36 [PH] (ref 7.4–7.5)
PH BLDA: 7.39 [PH] (ref 7.4–7.5)
PH TEMP ADJ BLDA: 7.37 [PH] (ref 7.4–7.5)
PH TEMP ADJ BLDA: 7.39 [PH] (ref 7.4–7.5)
PLATELET # BLD AUTO: 298 K/UL (ref 164–446)
PMV BLD AUTO: 9.6 FL (ref 9–12.9)
PO2 BLDA: 108 MMHG (ref 64–87)
PO2 BLDA: 39 MMHG (ref 64–87)
PO2 TEMP ADJ BLDA: 109 MMHG (ref 64–87)
PO2 TEMP ADJ BLDA: 36 MMHG (ref 64–87)
POTASSIUM SERPL-SCNC: 4.3 MMOL/L (ref 3.6–5.5)
PROT SERPL-MCNC: 6.1 G/DL (ref 6–8.2)
RBC # BLD AUTO: 3.81 M/UL (ref 4.2–5.4)
SAO2 % BLDA: 71 % (ref 93–99)
SAO2 % BLDA: 98 % (ref 93–99)
SODIUM SERPL-SCNC: 135 MMOL/L (ref 135–145)
SPECIMEN DRAWN FROM PATIENT: ABNORMAL
SPECIMEN DRAWN FROM PATIENT: ABNORMAL
WBC # BLD AUTO: 12 K/UL (ref 4.8–10.8)

## 2020-08-30 PROCEDURE — 71045 X-RAY EXAM CHEST 1 VIEW: CPT

## 2020-08-30 PROCEDURE — 700111 HCHG RX REV CODE 636 W/ 250 OVERRIDE (IP): Performed by: INTERNAL MEDICINE

## 2020-08-30 PROCEDURE — 82803 BLOOD GASES ANY COMBINATION: CPT

## 2020-08-30 PROCEDURE — A9270 NON-COVERED ITEM OR SERVICE: HCPCS | Performed by: INTERNAL MEDICINE

## 2020-08-30 PROCEDURE — 85025 COMPLETE CBC W/AUTO DIFF WBC: CPT

## 2020-08-30 PROCEDURE — 37799 UNLISTED PX VASCULAR SURGERY: CPT

## 2020-08-30 PROCEDURE — 94003 VENT MGMT INPAT SUBQ DAY: CPT

## 2020-08-30 PROCEDURE — 99291 CRITICAL CARE FIRST HOUR: CPT | Performed by: INTERNAL MEDICINE

## 2020-08-30 PROCEDURE — 700102 HCHG RX REV CODE 250 W/ 637 OVERRIDE(OP): Performed by: INTERNAL MEDICINE

## 2020-08-30 PROCEDURE — 770022 HCHG ROOM/CARE - ICU (200)

## 2020-08-30 PROCEDURE — 82962 GLUCOSE BLOOD TEST: CPT | Mod: 91

## 2020-08-30 PROCEDURE — 94150 VITAL CAPACITY TEST: CPT

## 2020-08-30 PROCEDURE — 80053 COMPREHEN METABOLIC PANEL: CPT

## 2020-08-30 PROCEDURE — 94770 HCHG CO2 EXPIRED GAS DETERMINATION: CPT

## 2020-08-30 RX ORDER — LORAZEPAM 2 MG/ML
2 INJECTION INTRAMUSCULAR ONCE
Status: COMPLETED | OUTPATIENT
Start: 2020-08-30 | End: 2020-08-30

## 2020-08-30 RX ORDER — LEVETIRACETAM 5 MG/ML
500 INJECTION INTRAVASCULAR EVERY 12 HOURS
Status: DISCONTINUED | OUTPATIENT
Start: 2020-08-30 | End: 2020-09-01

## 2020-08-30 RX ADMIN — LABETALOL HYDROCHLORIDE 10 MG: 5 INJECTION INTRAVENOUS at 15:35

## 2020-08-30 RX ADMIN — ACETAMINOPHEN 1000 MG: 500 TABLET ORAL at 13:08

## 2020-08-30 RX ADMIN — MEPERIDINE HYDROCHLORIDE 25 MG: 50 INJECTION INTRAMUSCULAR; INTRAVENOUS; SUBCUTANEOUS at 19:53

## 2020-08-30 RX ADMIN — ENOXAPARIN SODIUM 40 MG: 40 INJECTION SUBCUTANEOUS at 04:49

## 2020-08-30 RX ADMIN — PROPOFOL 60 MCG/KG/MIN: 10 INJECTION, EMULSION INTRAVENOUS at 02:45

## 2020-08-30 RX ADMIN — LEVETIRACETAM INJECTION 500 MG: 5 INJECTION INTRAVENOUS at 04:51

## 2020-08-30 RX ADMIN — BUSPIRONE HYDROCHLORIDE 15 MG: 10 TABLET ORAL at 17:09

## 2020-08-30 RX ADMIN — DOCUSATE SODIUM 50 MG AND SENNOSIDES 8.6 MG 2 TABLET: 8.6; 5 TABLET, FILM COATED ORAL at 17:10

## 2020-08-30 RX ADMIN — MEPERIDINE HYDROCHLORIDE 25 MG: 50 INJECTION INTRAMUSCULAR; INTRAVENOUS; SUBCUTANEOUS at 00:46

## 2020-08-30 RX ADMIN — BUSPIRONE HYDROCHLORIDE 15 MG: 10 TABLET ORAL at 04:49

## 2020-08-30 RX ADMIN — ACETAMINOPHEN 1000 MG: 500 TABLET ORAL at 00:11

## 2020-08-30 RX ADMIN — FAMOTIDINE 20 MG: 20 TABLET, FILM COATED ORAL at 04:50

## 2020-08-30 RX ADMIN — MEPERIDINE HYDROCHLORIDE 25 MG: 50 INJECTION INTRAMUSCULAR; INTRAVENOUS; SUBCUTANEOUS at 01:48

## 2020-08-30 RX ADMIN — ACETAMINOPHEN 1000 MG: 500 TABLET ORAL at 17:09

## 2020-08-30 RX ADMIN — FAMOTIDINE 20 MG: 10 INJECTION INTRAVENOUS at 17:10

## 2020-08-30 RX ADMIN — LEVETIRACETAM INJECTION 500 MG: 5 INJECTION INTRAVENOUS at 18:56

## 2020-08-30 RX ADMIN — Medication 100 MCG/HR: at 00:56

## 2020-08-30 RX ADMIN — ACETAMINOPHEN 1000 MG: 500 TABLET ORAL at 04:50

## 2020-08-30 RX ADMIN — LORAZEPAM 2 MG: 2 INJECTION INTRAMUSCULAR; INTRAVENOUS at 18:41

## 2020-08-30 RX ADMIN — LABETALOL HYDROCHLORIDE 10 MG: 5 INJECTION INTRAVENOUS at 22:56

## 2020-08-30 RX ADMIN — DOCUSATE SODIUM 50 MG AND SENNOSIDES 8.6 MG 2 TABLET: 8.6; 5 TABLET, FILM COATED ORAL at 04:50

## 2020-08-30 ASSESSMENT — PULMONARY FUNCTION TESTS: FVC: .59

## 2020-08-30 NOTE — PROGRESS NOTES
"Critical Care Progress Note    Date of admission  2020    Chief Complaint  19 y.o. female admitted 2020 with drug overdose, cardiac arrest    Hospital Course    \"19 y.o. female who presented 2020 with drug overdose with methamphetamines, oxycodone and found by boyfriend.  Cardiac arrest enroute to hospital and received narcan, epinephrine and cpr and ROSC achieved.  Difficult airway at scene and LMA placed. ET tube placed in ER.  She was vomiting during airway attempt.  On propofol due to vent asynchrony.  Propofol turned off for neuro assessment and possible hypothermic protocol.  Family no longer at hospital and unable to reach per phone number given for mother.\"     - TTM protocol initiated, EEG without NCSE   - rewarmed, awake, not following   - following occasional commands  - seizure like activity vs shivering--> Keppra, versed, Propofol  - no seizures on EEG  - remains unresponsive.  -mental status improved, now opening eyes and following commands.  Keppra discontinued      Interval Problem Update  Reviewed last 24 hour events:   - MRI -mild anoxic brain injury affecting bilateral basal ganglia.   - Neuro: RASS-2, persistent shivering opens eyes but not following.   - HR: 60s-80s   - SBP: 120-170s   - GI: TF at goal, BM PTA   - UOP: 1.5 L   - Pearce: yes   - Tm: 38.2, Normothermia protocol initiated.   - Lines: CVC, Art, PIV   - PPx: GI pepcid, DVT lovenox   - AB.34   - ABX 5 days prophylaxis completed     Infusions:  Propofol    7.    Review of Systems  Review of Systems   Unable to perform ROS: Acuity of condition        Vital Signs for last 24 hours   Temp:  [36.8 °C (98.2 °F)-37.5 °C (99.5 °F)] 37.4 °C (99.3 °F)  Pulse:  [] 88  Resp:  [11-29] 17  BP: (101-134)/(65-83) 118/73  SpO2:  [98 %-100 %] 100 %    Hemodynamic parameters for last 24 hours  CVP:  [1 MM HG-269 MM HG] 4 MM HG    Respiratory Information for the last 24 hours  Vent " Mode: APVCMV  Rate (breaths/min): 26  Vt Target (mL): 320  PEEP/CPAP: 8  P Support: 5  MAP: 14  Length of Weaning Trial (Hours): 2 hours  Control VTE (exp VT): 418    Physical Exam   Physical Exam  Vitals signs and nursing note reviewed.   Constitutional:       General: She is not in acute distress.     Appearance: She is not ill-appearing or toxic-appearing.      Interventions: She is intubated.   HENT:      Head: Normocephalic and atraumatic.      Right Ear: External ear normal.      Left Ear: External ear normal.      Nose: No congestion or rhinorrhea.      Comments: Feeding tube in place     Mouth/Throat:      Mouth: Mucous membranes are dry.      Pharynx: No oropharyngeal exudate or posterior oropharyngeal erythema.      Comments: ET tube in place  Eyes:      General: No scleral icterus.     Conjunctiva/sclera: Conjunctivae normal.      Pupils: Pupils are equal, round, and reactive to light.      Comments: Gaze normalized   Neck:      Musculoskeletal: Neck supple. No neck rigidity or muscular tenderness.   Cardiovascular:      Rate and Rhythm: Normal rate and regular rhythm.      Pulses: Normal pulses.      Heart sounds: Normal heart sounds. No murmur.   Pulmonary:      Effort: She is intubated.      Breath sounds: Rhonchi present. No wheezing or rales.   Chest:       Abdominal:      General: Abdomen is flat. There is no distension.      Palpations: There is no mass.      Tenderness: There is no abdominal tenderness.   Musculoskeletal:         General: No swelling or tenderness.      Right lower leg: No edema.      Left lower leg: No edema.   Skin:     General: Skin is warm and dry.      Capillary Refill: Capillary refill takes less than 2 seconds.      Findings: No erythema or rash.   Neurological:      GCS: GCS eye subscore is 3. GCS verbal subscore is 1. GCS motor subscore is 5.      Cranial Nerves: No cranial nerve deficit or facial asymmetry.      Motor: Abnormal muscle tone present.      Comments: Deeply  sedated. RASS-4 for seizure like activity vs seizures.  Pupils are constricted consistent with propofol infusion.  No response to verbal or painful stimuli.  Intermittent fine tremor noted at the hands and feet.   Psychiatric:      Comments: Unable to assess         Medications  Current Facility-Administered Medications   Medication Dose Route Frequency Provider Last Rate Last Dose   • fentaNYL (SUBLIMAZE) 50 mcg/mL in 50mL (Continuous Infusion)   Intravenous Continuous Boogie Stokes M.D. 2 mL/hr at 08/30/20 1041 100 mcg/hr at 08/30/20 1041   • busPIRone (BUSPAR) tablet 15 mg  15 mg Enteral Tube BID Boogie Stokes M.D.   15 mg at 08/30/20 0449   • acetaminophen (TYLENOL) tablet 1,000 mg  1,000 mg Enteral Tube Q6HRS Boogie Stoeks M.D.   1,000 mg at 08/30/20 1308   • labetalol (NORMODYNE/TRANDATE) injection 10 mg  10 mg Intravenous Q4HRS PRN Yosef Tomlin M.D.   10 mg at 08/28/20 0131   • midazolam (VERSED) 2 MG/2ML injection 2-4 mg  2-4 mg Intravenous Q15 MIN PRN Yosef Tomlin M.D.   2 mg at 08/28/20 0854   • Pharmacy Consult: pharmacy to discontinue all other orders for acetaminophen   Other PHARMACY TO DOSE Boogie Stokes M.D.       • meperidine (DEMEROL) injection 25 mg  25 mg Intravenous Q HOUR PRN Boogie Stokes M.D.   25 mg at 08/30/20 0148   • propofol (DIPRIVAN) injection  0-80 mcg/kg/min Intravenous Continuous JESSY Piper Jr..O.   Stopped at 08/30/20 0428   • Pharmacy Consult: Enteral tube insertion - review meds/change route/product selection  1 Each Other PHARMACY TO DOSE JESSY Piper Jr..LILIANE       • Respiratory Therapy Consult   Nebulization Continuous RT Brock Murdock M.D.       • ipratropium-albuterol (DUONEB) nebulizer solution  3 mL Nebulization Q2HRS PRN (RT) Brock Murdock M.D.       • famotidine (PEPCID) tablet 20 mg  20 mg Enteral Tube Q12HRS Brock Murdock M.D.   20 mg at 08/30/20 0450    Or   • famotidine (PEPCID) injection 20 mg  20 mg Intravenous Q12HRS Brock Murdock,  M.D.   20 mg at 08/29/20 1656   • senna-docusate (PERICOLACE or SENOKOT S) 8.6-50 MG per tablet 2 Tab  2 Tab Enteral Tube BID Brock Murdock M.D.   2 Tab at 08/30/20 0450    And   • polyethylene glycol/lytes (MIRALAX) PACKET 1 Packet  1 Packet Enteral Tube QDAY PRN Brock Murdock M.D.        And   • magnesium hydroxide (MILK OF MAGNESIA) suspension 30 mL  30 mL Enteral Tube QDAY PRN Brock Murdock M.D.        And   • bisacodyl (DULCOLAX) suppository 10 mg  10 mg Rectal QDAY PRN Brock Murdock M.D.       • MD Alert...ICU Electrolyte Replacement per Pharmacy   Other PHARMACY TO DOSE Brock Murdock M.D.       • lidocaine (XYLOCAINE) 1 % injection 1-2 mL  1-2 mL Tracheal Tube Q30 MIN PRN Brock Murdock M.D.       • hydrALAZINE (APRESOLINE) injection 10-20 mg  10-20 mg Intravenous Q6HRS PRN Brock Murdock M.D.       • enoxaparin (LOVENOX) inj 40 mg  40 mg Subcutaneous DAILY Boogie Augustine Jr., D.O.   40 mg at 08/30/20 0449       Fluids    Intake/Output Summary (Last 24 hours) at 8/30/2020 1353  Last data filed at 8/30/2020 1200  Gross per 24 hour   Intake 1592.93 ml   Output 2115 ml   Net -522.07 ml       Laboratory  Recent Labs     08/29/20  0401 08/30/20  0305 08/30/20  0500   ISTATAPH 7.405 7.355* 7.395*   ISTATAPCO2 40.5* 42.9* 35.6   ISTATAPO2 89* 39* 108*   ISTATATCO2 27 25 23   [id number] 97 71* 98   ISTATARTHCO3 25.4* 24.0 21.8   ISTATARTBE 1 -2 -3   ISTATTEMP 37.1 C 96.4 F 37.2 C   ISTATFIO2 30 30 30   ISTATSPEC Arterial Arterial Arterial   ISTATAPHTC 7.404 7.373* 7.392*   [id number] 90* 36* 109*         Recent Labs     08/28/20  0435 08/29/20  0516 08/30/20  0257   SODIUM 138 138 135   POTASSIUM 4.4 4.5 4.3   CHLORIDE 108 104 100   CO2 21 22 23   BUN 12 16 15   CREATININE 0.51 0.43* 0.39*   MAGNESIUM 1.8  --   --    CALCIUM 8.7 8.7 9.0     Recent Labs     08/28/20 0435 08/29/20  0516 08/30/20  0257   ALTSGPT 82* 65* 58*   ASTSGOT 75* 63* 52*   ALKPHOSPHAT 94 86 87   TBILIRUBIN 0.2 0.2 <0.2   GLUCOSE 104*  112* 102*     Recent Labs     08/28/20  0435 08/29/20  0516 08/30/20  0257   WBC 9.4 10.1 12.0*   NEUTSPOLYS 70.50 64.60 78.00*   LYMPHOCYTES 15.40* 16.30* 8.70*   MONOCYTES 11.20 14.90* 10.40   EOSINOPHILS 1.60 1.90 0.80   BASOPHILS 0.40 0.40 0.30   ASTSGOT 75* 63* 52*   ALTSGPT 82* 65* 58*   ALKPHOSPHAT 94 86 87   TBILIRUBIN 0.2 0.2 <0.2     Recent Labs     08/28/20  0435 08/29/20  0516 08/30/20  0257   RBC 4.22 3.96* 3.81*   HEMOGLOBIN 13.2 12.4 12.3   HEMATOCRIT 40.5 37.8 36.2*   PLATELETCT 257 290 298       Imaging  X-Ray:  I have personally reviewed the images and compared with prior images. and My impression is: Endotracheal tube is 2 cm above the may.  There is a left subclavian central venous catheter with the tip near the cavoatrial junction.  Mild pulmonary edema is unchanged.  Cannot exclude underlying infiltrate.  MRI:   Reviewed   IMPRESSION:     The diffusion-weighted sequences demonstrates areas of restricted diffusion in the bilateral basal ganglia and some of the frontal gray matter. The predominant portion of the brain parenchyma does not demonstrate any restricted diffusion. Therefore this   findings likely represent mild diffuse anoxic brain injury.    Assessment/Plan  Drug overdose- (present on admission)  Assessment & Plan  Methamphetamines, oxycodone and EtOH per boyfriend  Counseling when extubated    Metabolic acidosis- (present on admission)  Assessment & Plan  Resolving, secondary to cardiac arrest      Acute respiratory failure with hypoxia (HCC)- (present on admission)  Assessment & Plan  Secondary to drug overdose  Lung protective ventilation strategies  Titrate ventilator prescription to optimize oxygenation, ventilation, and acid base balance.  Daily ABC trials          Cardiac arrest (HCC)- (present on admission)  Assessment & Plan  Pulseless electrical activity in ambulance while en route to hospital            PEA (Pulseless electrical activity) (HCC)- (present on  admission)  Assessment & Plan  PEA enroute to hospital  Hypoxia and aspiration contributory  MRI reveals bilateral basal ganglial anoxic injury.  Neuron-specific enolase sent for analysis.  Maintain normothermia  Maintain euglycemia  PCO2 35-40    Hypoglycemia- (present on admission)  Assessment & Plan  Possibly due to mild shock liver  Continue to monitor serum blood glucose    Encephalopathy acute- (present on admission)  Assessment & Plan  Anoxic encephalopathy, improving  Initial EEG without an NCSE  Serial neurologic exams  NSE sent      Elevated transaminase level- (present on admission)  Assessment & Plan  Likely due to ischemic hepatitis, improving today  Monitor synthetic functions including INR and glucose  Avoid hepatotoxins  Initially hypoglycemic, now titrated off of dextrose    Cerebral edema (HCC)- (present on admission)  Assessment & Plan  Mild/possible per radiologist on CT  MRI on 8/27/2020 without edema or severe changes       VTE:  Lovenox  Ulcer: H2 Antagonist  Lines: Central Line  Ongoing indication addressed, Arterial Line  Ongoing indication addressed and Pearce Catheter  Ongoing indication addressed    I have performed a physical exam and reviewed and updated ROS and Plan today (8/30/2020). In review of yesterday's note (8/29/2020), there are no changes except as documented above.     Titrating ventilator, sedatives and evaluating post-arrest encephalopathy. This patient is critically ill, at high risk for decompensation leading to worsening vital organ dysfunction and death without critical care interventions.    Discussed patient condition and risk of morbidity and/or mortality with RN, RT, Pharmacy, Dietary, , Code status disscussed, Charge nurse / hot rounds and neurology       The patient remains critically ill.  I have assessed and reassessed the respiratory status and made ventilator adjustments based upon arterial blood gas analysis, ventilator waveforms and airway  mechanics.  I have assessed and reassessed the blood pressure, hemodynamics, cardiovascular status. This patient remains at high risk for worsening cardiopulmonary dysfunction and death without the above critical care interventions.      Critical care time 45 minutes in directly providing and coordinating critical care and extensive data review.  No time overlap and excludes procedures.

## 2020-08-30 NOTE — CARE PLAN
Ventilator Daily Summary    Vent Day #7  CMV:  26  320  8  30%    Ventilator settings changed this shift: none    Plan: Continue current ventilator settings and wean mechanical ventilation as tolerated per physician orders.

## 2020-08-30 NOTE — CARE PLAN
Problem: Respiratory:  Goal: Respiratory status will improve  Outcome: PROGRESSING AS EXPECTED  Note: Frequent suctioning for moderate secretions. Aspiration precautions. Oral care provided.      Problem: Infection  Goal: Will remain free from infection  Outcome: PROGRESSING AS EXPECTED  Note: Standard precautions. Hand hygiene done. Oral care. Perineal care.      Problem: Pain Management  Goal: Pain level will decrease to patient's comfort goal  Outcome: PROGRESSING AS EXPECTED

## 2020-08-31 ENCOUNTER — APPOINTMENT (OUTPATIENT)
Dept: RADIOLOGY | Facility: MEDICAL CENTER | Age: 20
DRG: 004 | End: 2020-08-31
Attending: INTERNAL MEDICINE
Payer: MEDICAID

## 2020-08-31 LAB
ACTION RANGE TRIGGERED IACRT: NO
ALBUMIN SERPL BCP-MCNC: 3.3 G/DL (ref 3.2–4.9)
ALBUMIN/GLOB SERPL: 1 G/DL
ALP SERPL-CCNC: 88 U/L (ref 30–99)
ALT SERPL-CCNC: 66 U/L (ref 2–50)
ANION GAP SERPL CALC-SCNC: 10 MMOL/L (ref 7–16)
APPEARANCE UR: ABNORMAL
AST SERPL-CCNC: 76 U/L (ref 12–45)
BACTERIA #/AREA URNS HPF: NEGATIVE /HPF
BASE EXCESS BLDA CALC-SCNC: 2 MMOL/L (ref -4–3)
BASOPHILS # BLD AUTO: 0.4 % (ref 0–1.8)
BASOPHILS # BLD: 0.05 K/UL (ref 0–0.12)
BILIRUB SERPL-MCNC: 0.2 MG/DL (ref 0.1–1.5)
BILIRUB UR QL STRIP.AUTO: NEGATIVE
BLOOD CULTURE HOLD CXBCH: NORMAL
BLOOD CULTURE HOLD CXBCH: NORMAL
BODY TEMPERATURE: ABNORMAL DEGREES
BUN SERPL-MCNC: 23 MG/DL (ref 8–22)
CALCIUM SERPL-MCNC: 8.9 MG/DL (ref 8.5–10.5)
CHLORIDE SERPL-SCNC: 98 MMOL/L (ref 96–112)
CO2 BLDA-SCNC: 27 MMOL/L (ref 20–33)
CO2 SERPL-SCNC: 25 MMOL/L (ref 20–33)
COLOR UR: YELLOW
CREAT SERPL-MCNC: 0.33 MG/DL (ref 0.5–1.4)
EOSINOPHIL # BLD AUTO: 0.08 K/UL (ref 0–0.51)
EOSINOPHIL NFR BLD: 0.6 % (ref 0–6.9)
EPI CELLS #/AREA URNS HPF: ABNORMAL /HPF
ERYTHROCYTE [DISTWIDTH] IN BLOOD BY AUTOMATED COUNT: 43.3 FL (ref 35.9–50)
FUNGUS SPEC FUNGUS STN: NORMAL
GLOBULIN SER CALC-MCNC: 3.2 G/DL (ref 1.9–3.5)
GLUCOSE BLD-MCNC: 117 MG/DL (ref 65–99)
GLUCOSE SERPL-MCNC: 122 MG/DL (ref 65–99)
GLUCOSE UR STRIP.AUTO-MCNC: NEGATIVE MG/DL
GRAM STN SPEC: NORMAL
HCO3 BLDA-SCNC: 25.7 MMOL/L (ref 17–25)
HCT VFR BLD AUTO: 37.8 % (ref 37–47)
HGB BLD-MCNC: 12.4 G/DL (ref 12–16)
HOROWITZ INDEX BLDA+IHG-RTO: 330 MM[HG]
HYALINE CASTS #/AREA URNS LPF: ABNORMAL /LPF
IMM GRANULOCYTES # BLD AUTO: 0.31 K/UL (ref 0–0.11)
IMM GRANULOCYTES NFR BLD AUTO: 2.3 % (ref 0–0.9)
INST. QUALIFIED PATIENT IIQPT: YES
KETONES UR STRIP.AUTO-MCNC: 80 MG/DL
LEUKOCYTE ESTERASE UR QL STRIP.AUTO: ABNORMAL
LYMPHOCYTES # BLD AUTO: 1.53 K/UL (ref 1–4.8)
LYMPHOCYTES NFR BLD: 11.4 % (ref 22–41)
MCH RBC QN AUTO: 30.8 PG (ref 27–33)
MCHC RBC AUTO-ENTMCNC: 32.8 G/DL (ref 33.6–35)
MCV RBC AUTO: 93.8 FL (ref 81.4–97.8)
MICRO URNS: ABNORMAL
MONOCYTES # BLD AUTO: 1.75 K/UL (ref 0–0.85)
MONOCYTES NFR BLD AUTO: 13 % (ref 0–13.4)
MRSA DNA SPEC QL NAA+PROBE: ABNORMAL
NEUTROPHILS # BLD AUTO: 9.7 K/UL (ref 2–7.15)
NEUTROPHILS NFR BLD: 72.3 % (ref 44–72)
NITRITE UR QL STRIP.AUTO: NEGATIVE
NRBC # BLD AUTO: 0 K/UL
NRBC BLD-RTO: 0 /100 WBC
NSE SERPL-MCNC: 12.5 UG/L (ref 3.7–8.9)
O2/TOTAL GAS SETTING VFR VENT: 30 %
PCO2 BLDA: 37 MMHG (ref 26–37)
PCO2 TEMP ADJ BLDA: 38.6 MMHG (ref 26–37)
PH BLDA: 7.45 [PH] (ref 7.4–7.5)
PH TEMP ADJ BLDA: 7.44 [PH] (ref 7.4–7.5)
PH UR STRIP.AUTO: 7.5 [PH] (ref 5–8)
PLATELET # BLD AUTO: 458 K/UL (ref 164–446)
PMV BLD AUTO: 9.4 FL (ref 9–12.9)
PO2 BLDA: 99 MMHG (ref 64–87)
PO2 TEMP ADJ BLDA: 105 MMHG (ref 64–87)
POTASSIUM SERPL-SCNC: 4.4 MMOL/L (ref 3.6–5.5)
PROCALCITONIN SERPL-MCNC: <0.05 NG/ML
PROT SERPL-MCNC: 6.5 G/DL (ref 6–8.2)
PROT UR QL STRIP: NEGATIVE MG/DL
RBC # BLD AUTO: 4.03 M/UL (ref 4.2–5.4)
RBC # URNS HPF: ABNORMAL /HPF
RBC UR QL AUTO: NEGATIVE
SAO2 % BLDA: 98 % (ref 93–99)
SIGNIFICANT IND 70042: ABNORMAL
SIGNIFICANT IND 70042: NORMAL
SIGNIFICANT IND 70042: NORMAL
SITE SITE: ABNORMAL
SITE SITE: NORMAL
SITE SITE: NORMAL
SODIUM SERPL-SCNC: 133 MMOL/L (ref 135–145)
SOURCE SOURCE: ABNORMAL
SOURCE SOURCE: NORMAL
SOURCE SOURCE: NORMAL
SP GR UR STRIP.AUTO: 1.02
SPECIMEN DRAWN FROM PATIENT: ABNORMAL
UROBILINOGEN UR STRIP.AUTO-MCNC: 0.2 MG/DL
WBC # BLD AUTO: 13.4 K/UL (ref 4.8–10.8)
WBC #/AREA URNS HPF: ABNORMAL /HPF

## 2020-08-31 PROCEDURE — 302978 HCHG BRONCHOSCOPY-DIAGNOSTIC

## 2020-08-31 PROCEDURE — A9270 NON-COVERED ITEM OR SERVICE: HCPCS | Performed by: INTERNAL MEDICINE

## 2020-08-31 PROCEDURE — 81001 URINALYSIS AUTO W/SCOPE: CPT

## 2020-08-31 PROCEDURE — 0B9F8ZX DRAINAGE OF RIGHT LOWER LUNG LOBE, VIA NATURAL OR ARTIFICIAL OPENING ENDOSCOPIC, DIAGNOSTIC: ICD-10-PCS | Performed by: INTERNAL MEDICINE

## 2020-08-31 PROCEDURE — 0B9J8ZZ DRAINAGE OF LEFT LOWER LUNG LOBE, VIA NATURAL OR ARTIFICIAL OPENING ENDOSCOPIC: ICD-10-PCS | Performed by: INTERNAL MEDICINE

## 2020-08-31 PROCEDURE — 37799 UNLISTED PX VASCULAR SURGERY: CPT

## 2020-08-31 PROCEDURE — 700102 HCHG RX REV CODE 250 W/ 637 OVERRIDE(OP): Performed by: INTERNAL MEDICINE

## 2020-08-31 PROCEDURE — 85025 COMPLETE CBC W/AUTO DIFF WBC: CPT

## 2020-08-31 PROCEDURE — 87040 BLOOD CULTURE FOR BACTERIA: CPT

## 2020-08-31 PROCEDURE — 80053 COMPREHEN METABOLIC PANEL: CPT

## 2020-08-31 PROCEDURE — 87186 SC STD MICRODIL/AGAR DIL: CPT | Mod: 91

## 2020-08-31 PROCEDURE — 87102 FUNGUS ISOLATION CULTURE: CPT

## 2020-08-31 PROCEDURE — 700102 HCHG RX REV CODE 250 W/ 637 OVERRIDE(OP): Performed by: PHYSICAL MEDICINE & REHABILITATION

## 2020-08-31 PROCEDURE — 700111 HCHG RX REV CODE 636 W/ 250 OVERRIDE (IP): Performed by: INTERNAL MEDICINE

## 2020-08-31 PROCEDURE — 71045 X-RAY EXAM CHEST 1 VIEW: CPT

## 2020-08-31 PROCEDURE — 87070 CULTURE OTHR SPECIMN AEROBIC: CPT

## 2020-08-31 PROCEDURE — 94003 VENT MGMT INPAT SUBQ DAY: CPT

## 2020-08-31 PROCEDURE — 99254 IP/OBS CNSLTJ NEW/EST MOD 60: CPT | Performed by: PHYSICAL MEDICINE & REHABILITATION

## 2020-08-31 PROCEDURE — 87205 SMEAR GRAM STAIN: CPT

## 2020-08-31 PROCEDURE — 87077 CULTURE AEROBIC IDENTIFY: CPT

## 2020-08-31 PROCEDURE — 87150 DNA/RNA AMPLIFIED PROBE: CPT

## 2020-08-31 PROCEDURE — 87147 CULTURE TYPE IMMUNOLOGIC: CPT

## 2020-08-31 PROCEDURE — 84145 PROCALCITONIN (PCT): CPT

## 2020-08-31 PROCEDURE — 31624 DX BRONCHOSCOPE/LAVAGE: CPT | Performed by: INTERNAL MEDICINE

## 2020-08-31 PROCEDURE — 94770 HCHG CO2 EXPIRED GAS DETERMINATION: CPT

## 2020-08-31 PROCEDURE — 99291 CRITICAL CARE FIRST HOUR: CPT | Mod: 25 | Performed by: INTERNAL MEDICINE

## 2020-08-31 PROCEDURE — A9270 NON-COVERED ITEM OR SERVICE: HCPCS | Performed by: PHYSICAL MEDICINE & REHABILITATION

## 2020-08-31 PROCEDURE — 94150 VITAL CAPACITY TEST: CPT

## 2020-08-31 PROCEDURE — 82803 BLOOD GASES ANY COMBINATION: CPT

## 2020-08-31 PROCEDURE — 74018 RADEX ABDOMEN 1 VIEW: CPT

## 2020-08-31 PROCEDURE — 87641 MR-STAPH DNA AMP PROBE: CPT

## 2020-08-31 PROCEDURE — 82962 GLUCOSE BLOOD TEST: CPT

## 2020-08-31 PROCEDURE — 770022 HCHG ROOM/CARE - ICU (200)

## 2020-08-31 PROCEDURE — 700105 HCHG RX REV CODE 258: Performed by: INTERNAL MEDICINE

## 2020-08-31 RX ORDER — ACETAMINOPHEN 500 MG
1000 TABLET ORAL EVERY 4 HOURS PRN
Status: DISCONTINUED | OUTPATIENT
Start: 2020-08-31 | End: 2020-09-28 | Stop reason: HOSPADM

## 2020-08-31 RX ORDER — BACLOFEN 10 MG/1
20 TABLET ORAL 3 TIMES DAILY
Status: DISCONTINUED | OUTPATIENT
Start: 2020-08-31 | End: 2020-09-02

## 2020-08-31 RX ORDER — FLUCONAZOLE 2 MG/ML
200 INJECTION, SOLUTION INTRAVENOUS EVERY 24 HOURS
Status: COMPLETED | OUTPATIENT
Start: 2020-08-31 | End: 2020-09-02

## 2020-08-31 RX ORDER — BACLOFEN 10 MG/1
20 TABLET ORAL 3 TIMES DAILY
Status: DISCONTINUED | OUTPATIENT
Start: 2020-08-31 | End: 2020-08-31

## 2020-08-31 RX ORDER — SODIUM CHLORIDE 9 MG/ML
INJECTION, SOLUTION INTRAVENOUS
Status: ACTIVE
Start: 2020-08-31 | End: 2020-09-01

## 2020-08-31 RX ADMIN — FENTANYL CITRATE 100 MCG: 50 INJECTION INTRAMUSCULAR; INTRAVENOUS at 22:34

## 2020-08-31 RX ADMIN — MEPERIDINE HYDROCHLORIDE 25 MG: 50 INJECTION INTRAMUSCULAR; INTRAVENOUS; SUBCUTANEOUS at 03:46

## 2020-08-31 RX ADMIN — ACETAMINOPHEN 1000 MG: 500 TABLET ORAL at 00:05

## 2020-08-31 RX ADMIN — MEPERIDINE HYDROCHLORIDE 25 MG: 50 INJECTION INTRAMUSCULAR; INTRAVENOUS; SUBCUTANEOUS at 02:15

## 2020-08-31 RX ADMIN — BACLOFEN 20 MG: 10 TABLET ORAL at 17:48

## 2020-08-31 RX ADMIN — LEVETIRACETAM INJECTION 500 MG: 5 INJECTION INTRAVENOUS at 18:17

## 2020-08-31 RX ADMIN — DOCUSATE SODIUM 50 MG AND SENNOSIDES 8.6 MG 2 TABLET: 8.6; 5 TABLET, FILM COATED ORAL at 17:48

## 2020-08-31 RX ADMIN — LEVETIRACETAM INJECTION 500 MG: 5 INJECTION INTRAVENOUS at 06:26

## 2020-08-31 RX ADMIN — BUSPIRONE HYDROCHLORIDE 15 MG: 10 TABLET ORAL at 06:27

## 2020-08-31 RX ADMIN — CEFEPIME 2 G: 2 INJECTION, POWDER, FOR SOLUTION INTRAVENOUS at 12:19

## 2020-08-31 RX ADMIN — DOCUSATE SODIUM 50 MG AND SENNOSIDES 8.6 MG 2 TABLET: 8.6; 5 TABLET, FILM COATED ORAL at 06:27

## 2020-08-31 RX ADMIN — Medication 100 MCG/HR: at 14:45

## 2020-08-31 RX ADMIN — ENOXAPARIN SODIUM 40 MG: 40 INJECTION SUBCUTANEOUS at 06:26

## 2020-08-31 RX ADMIN — FENTANYL CITRATE 100 MCG: 50 INJECTION INTRAMUSCULAR; INTRAVENOUS at 13:38

## 2020-08-31 RX ADMIN — VANCOMYCIN HYDROCHLORIDE 1750 MG: 500 INJECTION, POWDER, LYOPHILIZED, FOR SOLUTION INTRAVENOUS at 12:30

## 2020-08-31 RX ADMIN — CEFEPIME 2 G: 2 INJECTION, POWDER, FOR SOLUTION INTRAVENOUS at 22:12

## 2020-08-31 RX ADMIN — FAMOTIDINE 20 MG: 20 TABLET, FILM COATED ORAL at 06:27

## 2020-08-31 RX ADMIN — FENTANYL CITRATE 100 MCG: 50 INJECTION INTRAMUSCULAR; INTRAVENOUS at 09:08

## 2020-08-31 RX ADMIN — BUSPIRONE HYDROCHLORIDE 15 MG: 10 TABLET ORAL at 17:49

## 2020-08-31 RX ADMIN — FLUCONAZOLE 200 MG: 2 INJECTION, SOLUTION INTRAVENOUS at 12:22

## 2020-08-31 RX ADMIN — VANCOMYCIN HYDROCHLORIDE 1250 MG: 500 INJECTION, POWDER, LYOPHILIZED, FOR SOLUTION INTRAVENOUS at 22:12

## 2020-08-31 RX ADMIN — BACLOFEN 20 MG: 10 TABLET ORAL at 12:31

## 2020-08-31 RX ADMIN — FAMOTIDINE 20 MG: 10 INJECTION INTRAVENOUS at 17:49

## 2020-08-31 ASSESSMENT — PULMONARY FUNCTION TESTS: FVC: 1

## 2020-08-31 NOTE — PROGRESS NOTES
Updated Dr Tomlin on patient temp of >38 for several hours with arctic sun H2O temp ranging from 4-6 degrees celcius, per MD continue current plan of care.

## 2020-08-31 NOTE — CARE PLAN
Problem: Ventilation Defect:  Goal: Ability to achieve and maintain unassisted ventilation or tolerate decreased levels of ventilator support  Outcome: NOT MET      Ventilator Daily Summary    Vent Day #8    Ventilator settings changed this shift:none    Weaning trials:yes    Respiratory Procedures:none    Plan: Continue current ventilator settings and wean mechanical ventilation as tolerated per physician orders.

## 2020-08-31 NOTE — PROGRESS NOTES
Updated Dr Tomlin on patient increasing temperature despite arctic sun pads, and asynchrony with ventilator. Per MD continue current plan of care.

## 2020-08-31 NOTE — CARE PLAN
Problem: Infection  Goal: Will remain free from infection  Outcome: PROGRESSING AS EXPECTED  Note: Blood cultures, UA, procalcitonin, abx and antifungal therapy started. Aspiration precautions.      Problem: Skin Integrity  Goal: Risk for impaired skin integrity will decrease  Outcome: PROGRESSING AS EXPECTED  Note: Repositioning q2hr, waffle mattress in place, mepilex for protection.

## 2020-08-31 NOTE — CONSULTS
Physical Medicine and Rehabilitation Consultation         Initial Consult      Date of initial consultation: 2020  Consulting provider: Ty Mccann MD  Reason for consultation: assess for acute inpatient rehab appropriateness  LOS: 7 Day(s)    Chief complaint: anoxic brain injury     HPI: The patient is a 19 y.o. white female with a past medical history of drug abuse;  who presented on 2020  9:57 PM with drug overdose symptoms with methamphetamines, ETOH and oxycodone, found by her boyfriend.  Patient had cardiac arrest in route to hospital and received Narcan, epinephrine and CPR.  Intubated with LMA at the scene, ET tube placed in the ER, quickly sedated on propofol due to vent asynchrony, and placed in therapeutic hypothermia.  Patient developed encephalopathy, cerebral edema, CT concerning for anoxic brain injury.  Patient has been slowly returning to consciousness and notes indicate as of  she was able to open eyes and follow commands.  MRI showing mild anoxic brain injury affecting bilateral basal ganglia    The patient currently is intubated and on a fentanyl drip. She is not following commands and shows signs of severe anoxic brain injury. She in still on cooling therapy.     THERAPY:  PT: Functional mobility   None at this time     OT: ADLs  None at this time     SLP:   None at this time     IMAGIN/27 MRI brain   The diffusion-weighted sequences demonstrates areas of restricted diffusion in the bilateral basal ganglia and some of the frontal gray matter. The predominant portion of the brain parenchyma does not demonstrate any restricted diffusion. Therefore this findings likely represent mild diffuse anoxic brain injury.    PROCEDURES:  EEG  Garland Beatty MD  This is a normal routine EEG recording in the awake, drowsy/sleep state(s). Mild events of shivering and posturing like movements of the upper extremities captured, without eeg correlated. Patient woke up but did not  follow any commands. No seizures captured. Clinical correlation is recommended.    8/31 BAL Dar Red MD   1.  Mildly inflamed airways  2.  Small amount of thick, white secretions  3.  Right lower lobe and left lower lobe airways therapeutically lavaged with small amounts of saline  4.  BAL right lower lobe sent for culture    Social Hx:  Unclear at this time   + Drug use history   Was due to start classes at [employer] this semester       PMH:  No past medical history on file.    PSH:  No past surgical history on file.    FHX:  Non-pertinent to today's issues    Medications:  Current Facility-Administered Medications   Medication Dose   • cefepime (MAXIPIME) 2 g in  mL IVPB  2 g   • MD Alert...Vancomycin per Pharmacy     • vancomycin (VANCOCIN) 1,750 mg in  mL IVPB  25 mg/kg   • fluconazole (DIFLUCAN) in NS IVPB 200 mg  200 mg   • FENTANYL CITRATE (PF) 0.05 MG/ML INJ SOLN (WRAPPED)     • levETIRAcetam (Keppra) 500 mg in 100 mL NaCl IV premix  500 mg   • fentaNYL (SUBLIMAZE) 50 mcg/mL in 50mL (Continuous Infusion)     • busPIRone (BUSPAR) tablet 15 mg  15 mg   • acetaminophen (TYLENOL) tablet 1,000 mg  1,000 mg   • labetalol (NORMODYNE/TRANDATE) injection 10 mg  10 mg   • midazolam (VERSED) 2 MG/2ML injection 2-4 mg  2-4 mg   • Pharmacy Consult: pharmacy to discontinue all other orders for acetaminophen     • meperidine (DEMEROL) injection 25 mg  25 mg   • propofol (DIPRIVAN) injection  0-80 mcg/kg/min   • Pharmacy Consult: Enteral tube insertion - review meds/change route/product selection  1 Each   • Respiratory Therapy Consult     • ipratropium-albuterol (DUONEB) nebulizer solution  3 mL   • famotidine (PEPCID) tablet 20 mg  20 mg    Or   • famotidine (PEPCID) injection 20 mg  20 mg   • senna-docusate (PERICOLACE or SENOKOT S) 8.6-50 MG per tablet 2 Tab  2 Tab    And   • polyethylene glycol/lytes (MIRALAX) PACKET 1 Packet  1 Packet    And   • magnesium hydroxide (MILK OF MAGNESIA) suspension 30 mL  30 mL  "   And   • bisacodyl (DULCOLAX) suppository 10 mg  10 mg   • MD Alert...ICU Electrolyte Replacement per Pharmacy     • lidocaine (XYLOCAINE) 1 % injection 1-2 mL  1-2 mL   • hydrALAZINE (APRESOLINE) injection 10-20 mg  10-20 mg   • enoxaparin (LOVENOX) inj 40 mg  40 mg       Allergies:  Allergies   Allergen Reactions   • Penicillins Hives     Childhood reaction - data obtained from alternative chart  Tolerated ceftriaxone 8/2020       Physical Exam:  Vitals: /77   Pulse (!) 101   Temp 37.6 °C (99.7 °F) (Bladder)   Resp 18   Ht 1.6 m (5' 2.99\")   Wt 69.9 kg (154 lb 1.6 oz)   SpO2 99%   Gen: NAD  Head: NC/AT  Eyes/ Nose/ Mouth: Pupils minimally reactive but EOMI are intact , moist mucous membranes  Cardio: RRR, no mumurs  Pulm: CTAB, with normal respiratory effort  Abd: Soft NTND, active bowel sounds,   Ext: No peripheral edema. No calf tenderness. No clubbing/cyanosis.     Mental status: intubated but awake, not following commands   Speech: none     DTRs: 3+ in bilateral  brachioradialis, 3+ in bilateral patellar tendons  POSITIVE babinski b/l  POSITIVE Lemus b/l     Tone: increased tone throughout the upper and lower extremities     Labs: Reviewed and significant for   Recent Labs     08/29/20 0516 08/30/20  0257 08/31/20  0400   RBC 3.96* 3.81* 4.03*   HEMOGLOBIN 12.4 12.3 12.4   HEMATOCRIT 37.8 36.2* 37.8   PLATELETCT 290 298 458*     Recent Labs     08/29/20  0516 08/30/20  0257 08/31/20  0400   SODIUM 138 135 133*   POTASSIUM 4.5 4.3 4.4   CHLORIDE 104 100 98   CO2 22 23 25   GLUCOSE 112* 102* 122*   BUN 16 15 23*   CREATININE 0.43* 0.39* 0.33*   CALCIUM 8.7 9.0 8.9     Recent Results (from the past 24 hour(s))   ACCU-CHEK GLUCOSE    Collection Time: 08/30/20  5:45 PM   Result Value Ref Range    Glucose - Accu-Ck 101 (H) 65 - 99 mg/dL   ISTAT ARTERIAL BLOOD GAS    Collection Time: 08/31/20  2:10 AM   Result Value Ref Range    Ph 7.451 7.400 - 7.500    Pco2 37.0 26.0 - 37.0 mmHg    Po2 99 (H) 64 - " 87 mmHg    Tco2 27 20 - 33 mmol/L    S02 98 93 - 99 %    Hco3 25.7 (H) 17.0 - 25.0 mmol/L    BE 2 -4 - 3 mmol/L    Body Temp 38.0 C degrees    O2 Therapy 30 %    iPF Ratio 330     Ph Temp Barb 7.436 7.400 - 7.500    Pco2 Temp Co 38.6 (H) 26.0 - 37.0 mmHg    Po2 Temp Cor 105 (H) 64 - 87 mmHg    Specimen Arterial     Action Range Triggered NO     Inst. Qualified Patient YES    ACCU-CHEK GLUCOSE    Collection Time: 08/31/20  3:56 AM   Result Value Ref Range    Glucose - Accu-Ck 117 (H) 65 - 99 mg/dL   CBC with Differential    Collection Time: 08/31/20  4:00 AM   Result Value Ref Range    WBC 13.4 (H) 4.8 - 10.8 K/uL    RBC 4.03 (L) 4.20 - 5.40 M/uL    Hemoglobin 12.4 12.0 - 16.0 g/dL    Hematocrit 37.8 37.0 - 47.0 %    MCV 93.8 81.4 - 97.8 fL    MCH 30.8 27.0 - 33.0 pg    MCHC 32.8 (L) 33.6 - 35.0 g/dL    RDW 43.3 35.9 - 50.0 fL    Platelet Count 458 (H) 164 - 446 K/uL    MPV 9.4 9.0 - 12.9 fL    Neutrophils-Polys 72.30 (H) 44.00 - 72.00 %    Lymphocytes 11.40 (L) 22.00 - 41.00 %    Monocytes 13.00 0.00 - 13.40 %    Eosinophils 0.60 0.00 - 6.90 %    Basophils 0.40 0.00 - 1.80 %    Immature Granulocytes 2.30 (H) 0.00 - 0.90 %    Nucleated RBC 0.00 /100 WBC    Neutrophils (Absolute) 9.70 (H) 2.00 - 7.15 K/uL    Lymphs (Absolute) 1.53 1.00 - 4.80 K/uL    Monos (Absolute) 1.75 (H) 0.00 - 0.85 K/uL    Eos (Absolute) 0.08 0.00 - 0.51 K/uL    Baso (Absolute) 0.05 0.00 - 0.12 K/uL    Immature Granulocytes (abs) 0.31 (H) 0.00 - 0.11 K/uL    NRBC (Absolute) 0.00 K/uL   Comp Metabolic Panel    Collection Time: 08/31/20  4:00 AM   Result Value Ref Range    Sodium 133 (L) 135 - 145 mmol/L    Potassium 4.4 3.6 - 5.5 mmol/L    Chloride 98 96 - 112 mmol/L    Co2 25 20 - 33 mmol/L    Anion Gap 10.0 7.0 - 16.0    Glucose 122 (H) 65 - 99 mg/dL    Bun 23 (H) 8 - 22 mg/dL    Creatinine 0.33 (L) 0.50 - 1.40 mg/dL    Calcium 8.9 8.5 - 10.5 mg/dL    AST(SGOT) 76 (H) 12 - 45 U/L    ALT(SGPT) 66 (H) 2 - 50 U/L    Alkaline Phosphatase 88 30 - 99  U/L    Total Bilirubin 0.2 0.1 - 1.5 mg/dL    Albumin 3.3 3.2 - 4.9 g/dL    Total Protein 6.5 6.0 - 8.2 g/dL    Globulin 3.2 1.9 - 3.5 g/dL    A-G Ratio 1.0 g/dL   ESTIMATED GFR    Collection Time: 08/31/20  4:00 AM   Result Value Ref Range    GFR If African American >60 >60 mL/min/1.73 m 2    GFR If Non African American >60 >60 mL/min/1.73 m 2   URINALYSIS    Collection Time: 08/31/20  8:46 AM    Specimen: Urine, Pearce Cath   Result Value Ref Range    Color Yellow     Character Turbid (A)     Specific Gravity 1.024 <1.035    Ph 7.5 5.0 - 8.0    Glucose Negative Negative mg/dL    Ketones 80 (A) Negative mg/dL    Protein Negative Negative mg/dL    Bilirubin Negative Negative    Urobilinogen, Urine 0.2 Negative    Nitrite Negative Negative    Leukocyte Esterase Trace (A) Negative    Occult Blood Negative Negative    Micro Urine Req Microscopic    URINE MICROSCOPIC (W/UA)    Collection Time: 08/31/20  8:46 AM   Result Value Ref Range    WBC 5-10 (A) /hpf    RBC 5-10 (A) /hpf    Bacteria Negative None /hpf    Epithelial Cells Few /hpf    Hyaline Cast 3-5 (A) /lpf       Imaging:   DX-CHEST-PORTABLE (1 VIEW)   Final Result         1.  Pulmonary edema and/or infiltrates are identified, which are stable since the prior exam.                     DX-CHEST-PORTABLE (1 VIEW)   Final Result      Stable chest x-ray findings with right lower lobe consolidation.      DX-CHEST-PORTABLE (1 VIEW)   Final Result         1.  Pulmonary edema and/or infiltrates are identified, which are stable since the prior exam.                  DX-CHEST-PORTABLE (1 VIEW)   Final Result         1.  Pulmonary edema and/or infiltrates are identified, which are stable since the prior exam.               MR-BRAIN-W/O   Final Result      The diffusion-weighted sequences demonstrates areas of restricted diffusion in the bilateral basal ganglia and some of the frontal gray matter. The predominant portion of the brain parenchyma does not demonstrate any  restricted diffusion. Therefore this    findings likely represent mild diffuse anoxic brain injury.      DX-CHEST-PORTABLE (1 VIEW)   Final Result         1.  Pulmonary edema and/or infiltrates are identified, which are somewhat decreased since the prior exam.            DX-CHEST-PORTABLE (1 VIEW)   Final Result         1.  Pulmonary edema and/or infiltrates are identified, which are stable since the prior exam.         DX-ABDOMEN FOR TUBE PLACEMENT   Final Result         1.  Nonspecific bowel gas pattern.   2.  Dobbhoff tube tip overlying the expected location of the pylorus or first duodenal segment.      DX-CHEST-PORTABLE (1 VIEW)   Final Result         1.  Patchy bilateral pulmonary infiltrates, somewhat increased since prior.      EC-ECHOCARDIOGRAM COMPLETE W/O CONT   Final Result      DX-CHEST-PORTABLE (1 VIEW)   Final Result         1.  Patchy bilateral pulmonary infiltrates, somewhat increased since prior.      CT-HEAD W/O   Final Result         1.  Possible subtle sulcal effacement and slight loss of differentiation of gray-white matter, consider component of cerebral edema. Recommend radiographic follow-up   2.  Bilateral sphenoid and maxillary sinus air-fluid levels      DX-CHEST-PORTABLE (1 VIEW)   Final Result         1.  No acute cardiopulmonary disease.              ASSESSMENT:  Patient is a 19 y.o. female admitted with clinically severe anoxic brain injury now with respiratory distress requiring ventilatory support.     [facility] Code / Diagnosis to Support: 0002.9 - Brain Dysfunction: Other Brain      Rehabilitation: Impaired ADLs and mobility  Not a candidate for IPR at this time.     Barriers to transfer include: Insurance authorization, TCCs to verify disposition, medical clearance and bed availability     Additional Recommendations:  - This is a clinically severe case of anoxic brain injury despite the MRI citing mild anoxic changes. Given her young age, she does have the possibility for a clinically  significant recovery with a timeline in the months to years range, and is not guaranteed.  - Recommend PEG tube to sustain nutrition, hydration and medication delivery   - Rigid Prafo boots to bilateral feet to prevent contracture formation   - Baclofen 20mg TID for spasticity   - PT/OT/SLP when patient is able to participate   - Anticipate she will need SNF placement, possibly neuro-restorative. Less likely to qualify for IPR on this admission given her presentation today, but will continue to follow for improvements.   - Hold on neuro stimulants until patient is past agitation phase ([place] 4)  - avoid benzos and haldol for agitation as these medications have been shown to slow neurologic recovery   - Consider Seroquel QID for first line agitation management with the addition of propranolol 10mg TID.     DVT PPX: lovenox 40mg inj daily       Thank you for allowing us to participate in the care of this patient.     Patient was seen for 83 minutes on unit/floor of which > 50% of time was spent on counseling and coordination of care regarding the above, including prognosis, risk reduction, benefits of treatment, and options for next stage of care.    Chris Coy, DO   Physical Medicine and Rehabilitation

## 2020-08-31 NOTE — PROCEDURES
"Date of Service: 8/31/2020    Procedure:  Diagnostic and therapeutic bronchoscopy with BAL    Indication: Respiratory failure, ? pneumonia    Physician:  Dr. Dar Red MD    Post Procedure Diagnosis:  1.  Mildly inflamed airways  2.  Small amount of thick, white secretions  3.  Right lower lobe and left lower lobe airways therapeutically lavaged with small amounts of saline  4.  BAL right lower lobe sent for culture    Narrative:  Appropriate consent was obtained and \"time out\" was performed.  A flexible fiberoptic bronchoscope was then inserted through the ETT without difficulty.  All airways were evaluated to the sub-segmental level.  The airway mucosa was mildly inflamed.  There was a small amount of whitish, slightly creamy secretions in the airways.  No endobronchial lesions were seen.  The right lower lobe and left lower lobe airways were therapeutically lavaged with a small amount of saline.  The bronchoscope was then wedged in a segment of the right lower lobe bronchus.  30 cc of saline was instilled with moderate return of slightly cloudy BAL fluid.  The BAL specimen will be sent for appropriate culture.  No immediate complications.  EBL = 0.      Dar Red M.D.        "

## 2020-08-31 NOTE — PROGRESS NOTES
Patient appears to be having seizures. The first one noted at 1725 lasting about 10 seconds and the second one at 1824 lasting about 60 seconds. Dr. Gonda paged for updates.   During suspected seizures patient presents dilated pupils (6mm), saturations dropped the 80s.   Verbal orders from Dr Gonda: Give 2mg ativan IV once and restart Keppra.   Orders read back and confirmed.

## 2020-08-31 NOTE — DISCHARGE PLANNING
Physiatry consulted -    Anticipate she will need SNF placement, possibly neuro-restorative.  Less likely to qualify for IPR on this admission given her presentation today, but will continue to follow for improvements.    Please see RPG note from 8/31 for recommendations.     Thank you for referral.

## 2020-08-31 NOTE — PROGRESS NOTES
"Pharmacy Kinetics 19 y.o. female on vancomycin day # 1 2020    Currently on Vancomycin New Start   Provider specified end date: TBD  Other antibiotics/antifungals: cefepime 2 g IV q8h, fluconazole 200 mg IV q24h    Indication for Treatment: Empiric - pneumonia     Pertinent history per medical record: Admitted on 2020 with drug overdose and cardiac arrest.  Completed 5 days course of ceftriaxone and metronidazole.  Patient has remained intubated and now with fever and leukocytosis.  Broad spectrum empiric antibiotics initiated.      Allergies: Penicillins     List concerns for renal function: none currently     Pertinent cultures to date:   20 - BAL: many GPC on gram stain   20 - Peripheral BC x 2: in process     MRSA nares swab if pneumonia is a concern (ordered/positive/negative/n-a): in process     Recent Labs     20  0516 20  0257 20  0400   WBC 10.1 12.0* 13.4*   NEUTSPOLYS 64.60 78.00* 72.30*     Recent Labs     20  0516 20  0257 20  0400   BUN 16 15 23*   CREATININE 0.43* 0.39* 0.33*   ALBUMIN 2.9* 3.1* 3.3     No results for input(s): VANCOTROUGH, VANCOPEAK, VANCORANDOM in the last 72 hours.    Intake/Output Summary (Last 24 hours) at 2020 1600  Last data filed at 2020 1500  Gross per 24 hour   Intake 1630.05 ml   Output 1895 ml   Net -264.95 ml      Blood Pressure 119/69   Pulse (Abnormal) 110   Temperature (Abnormal) 38.4 °C (101.1 °F) (Bladder)   Respiration 17   Height 1.6 m (5' 2.99\")   Weight 69.9 kg (154 lb 1.6 oz)   Oxygen Saturation 100%  Temp (24hrs), Av.1 °C (100.5 °F), Min:37.4 °C (99.3 °F), Max:38.5 °C (101.3 °F)      A/P   1. Vancomycin dose change: 1750 mg IV x 1 followed by 1250 mg IV q8h (0500/1300/2100)  2. Next vancomycin level:  at 0430 (not ordered)   3. Goal trough: 15-20 mcg/mL   4. Comments: Patient started on empiric antibiotics for fever and leukocytosis.  No previous vancomycin dosing history to assess " clearance.  Minimal accumulation concerns.  Will start patient on 18 mg/kg q8h per protocol and plan for steady state level as above or sooner if decline in renal function.  Will continue to follow    Lee Ann Dent, PharmD, BCPS, BCCCP

## 2020-08-31 NOTE — PROGRESS NOTES
"Critical Care Progress Note    Date of admission  8/23/2020    Chief Complaint  19 y.o. female admitted 8/23/2020 with drug overdose, cardiac arrest    Hospital Course    \"19 y.o. female who presented 8/23/2020 with drug overdose with methamphetamines, oxycodone and found by boyfriend.  Cardiac arrest enroute to hospital and received narcan, epinephrine and cpr and ROSC achieved.  Difficult airway at scene and LMA placed. ET tube placed in ER.  She was vomiting during airway attempt.  On propofol due to vent asynchrony.  Propofol turned off for neuro assessment and possible hypothermic protocol.  Family no longer at hospital and unable to reach per phone number given for mother.\"    8/24 - TTM protocol initiated, EEG without NCSE  8/25 - rewarmed, awake, not following  8/26 - following occasional commands  8/27- seizure like activity vs shivering--> Keppra, versed, Propofol  8/28- no seizures on EEG  8/29- remains unresponsive.  8/30 -mental status improved, now opening eyes and following commands.  Keppra discontinued  8/31 - started cefepime/vancomycin for possible pneumonia,?  UTI, fever and increasing WBC fever; full vent        Interval Problem Update  Reviewed last 24 hour events:  Shivering; [place] in place and correcting for fever, Tm 101.1  WBC increasing 9.4 - 10.1 - 12 - 13.4 with L shift  C3 8/24 - 8/28  Flagyl 8/24 - 8/29  No abx currently  CXR patchy bl infs  *2 seizures reported by RN; back on Keppra  Arouses, int follows, delayed, +c/g/c  SR/ST   - 150  Cortrak; michelle TF at 45, + BM  I/O = 1.2/1.6 -385  Vent day 9; -36  7.44/38/105    Cefepime/vanco      Review of Systems  Review of Systems   Unable to perform ROS: Intubated        Vital Signs for last 24 hours   Temp:  [37.3 °C (99.1 °F)-38.4 °C (101.1 °F)] 37.6 °C (99.7 °F)  Pulse:  [] 107  Resp:  [12-27] 22  BP: (101-179)/() 113/77  SpO2:  [81 %-100 %] 100 %    Hemodynamic parameters for last 24 hours  CVP:  [-2 MM HG-234 MM " HG] 234 MM HG    Respiratory Information for the last 24 hours  Vent Mode: APVCMV  Rate (breaths/min): 26  Vt Target (mL): 32  PEEP/CPAP: 8  P Support: 5  MAP: 11  Length of Weaning Trial (Hours): 1.45 hours  Control VTE (exp VT): 781    Physical Exam   Physical Exam  Vitals signs and nursing note reviewed.   Constitutional:       General: She is not in acute distress.     Appearance: She is not ill-appearing or toxic-appearing.      Interventions: She is intubated.   HENT:      Head: Normocephalic and atraumatic.      Right Ear: External ear normal.      Left Ear: External ear normal.      Nose: No congestion or rhinorrhea.      Comments: Feeding tube in place     Mouth/Throat:      Mouth: Mucous membranes are dry.      Pharynx: No oropharyngeal exudate or posterior oropharyngeal erythema.      Comments: ET tube in place  Eyes:      General: No scleral icterus.     Conjunctiva/sclera: Conjunctivae normal.      Pupils: Pupils are equal, round, and reactive to light.      Comments: Gaze normalized   Neck:      Musculoskeletal: Neck supple. No neck rigidity or muscular tenderness.   Cardiovascular:      Rate and Rhythm: Normal rate and regular rhythm.      Pulses: Normal pulses.      Heart sounds: Normal heart sounds. No murmur.   Pulmonary:      Effort: She is intubated.      Breath sounds: Rhonchi present. No wheezing or rales.   Abdominal:      General: Abdomen is flat. There is no distension.      Palpations: There is no mass.      Tenderness: There is no abdominal tenderness.   Musculoskeletal:         General: No swelling or tenderness.      Right lower leg: No edema.      Left lower leg: No edema.   Skin:     General: Skin is warm and dry.      Capillary Refill: Capillary refill takes less than 2 seconds.      Findings: No erythema or rash.   Neurological:      GCS: GCS eye subscore is 3. GCS verbal subscore is 1. GCS motor subscore is 5.      Cranial Nerves: No cranial nerve deficit or facial asymmetry.       Motor: Abnormal muscle tone present.      Comments: Arouses, moves all 4, int follows   Psychiatric:      Comments: Unable to assess         Medications  Current Facility-Administered Medications   Medication Dose Route Frequency Provider Last Rate Last Dose   • levETIRAcetam (Keppra) 500 mg in 100 mL NaCl IV premix  500 mg Intravenous Q12HRS Jeremy M Gonda, M.D.   Stopped at 08/31/20 0641   • fentaNYL (SUBLIMAZE) 50 mcg/mL in 50mL (Continuous Infusion)   Intravenous Continuous Boogie Stokes M.D. 2 mL/hr at 08/31/20 0707 100 mcg/hr at 08/31/20 0707   • busPIRone (BUSPAR) tablet 15 mg  15 mg Enteral Tube BID Boogie Stokes M.D.   15 mg at 08/31/20 0627   • acetaminophen (TYLENOL) tablet 1,000 mg  1,000 mg Enteral Tube Q6HRS Boogie Stokes M.D.   Stopped at 08/31/20 0600   • labetalol (NORMODYNE/TRANDATE) injection 10 mg  10 mg Intravenous Q4HRS PRN Yosef Tomlin M.D.   10 mg at 08/30/20 2256   • midazolam (VERSED) 2 MG/2ML injection 2-4 mg  2-4 mg Intravenous Q15 MIN PRN Yosef Tomlin M.D.   2 mg at 08/28/20 0854   • Pharmacy Consult: pharmacy to discontinue all other orders for acetaminophen   Other PHARMACY TO DOSE Boogie Stokes M.D.       • meperidine (DEMEROL) injection 25 mg  25 mg Intravenous Q HOUR PRN Boogie Stokes M.D.   25 mg at 08/31/20 0346   • propofol (DIPRIVAN) injection  0-80 mcg/kg/min Intravenous Continuous JESSY Piper Jr..OYue   Stopped at 08/30/20 0428   • Pharmacy Consult: Enteral tube insertion - review meds/change route/product selection  1 Each Other PHARMACY TO DOSE JESSY Piper Jr..OYue       • Respiratory Therapy Consult   Nebulization Continuous RT Brock Murdock M.D.       • ipratropium-albuterol (DUONEB) nebulizer solution  3 mL Nebulization Q2HRS PRN (RT) Brock Murdock M.D.       • famotidine (PEPCID) tablet 20 mg  20 mg Enteral Tube Q12HRS Brock Murdock M.D.   20 mg at 08/31/20 0627    Or   • famotidine (PEPCID) injection 20 mg  20 mg Intravenous Q12HRS Brock WARNER  BAILEE Murdock   20 mg at 08/30/20 1710   • senna-docusate (PERICOLACE or SENOKOT S) 8.6-50 MG per tablet 2 Tab  2 Tab Enteral Tube BID Brock Murdock M.D.   2 Tab at 08/31/20 0627    And   • polyethylene glycol/lytes (MIRALAX) PACKET 1 Packet  1 Packet Enteral Tube QDAY PRN Brock Murdock M.D.        And   • magnesium hydroxide (MILK OF MAGNESIA) suspension 30 mL  30 mL Enteral Tube QDAY PRN Brock Murdock M.D.        And   • bisacodyl (DULCOLAX) suppository 10 mg  10 mg Rectal QDAY PRN Brock Murdock M.D.       • MD Alert...ICU Electrolyte Replacement per Pharmacy   Other PHARMACY TO DOSE Brock Murdock M.D.       • lidocaine (XYLOCAINE) 1 % injection 1-2 mL  1-2 mL Tracheal Tube Q30 MIN PRN Brock Murdock M.D.       • hydrALAZINE (APRESOLINE) injection 10-20 mg  10-20 mg Intravenous Q6HRS PRN Brock Murdock M.D.       • enoxaparin (LOVENOX) inj 40 mg  40 mg Subcutaneous DAILY Boogie Augustine Jr., D.O.   40 mg at 08/31/20 0626       Fluids    Intake/Output Summary (Last 24 hours) at 8/31/2020 0726  Last data filed at 8/31/2020 0600  Gross per 24 hour   Intake 1230 ml   Output 1615 ml   Net -385 ml       Laboratory  Recent Labs     08/30/20  0305 08/30/20  0500 08/31/20  0210   ISTATAPH 7.355* 7.395* 7.451   ISTATAPCO2 42.9* 35.6 37.0   ISTATAPO2 39* 108* 99*   ISTATATCO2 25 23 27   [id number] 71* 98 98   ISTATARTHCO3 24.0 21.8 25.7*   ISTATARTBE -2 -3 2   ISTATTEMP 96.4 F 37.2 C 38.0 C   ISTATFIO2 30 30 30   ISTATSPEC Arterial Arterial Arterial   ISTATAPHTC 7.373* 7.392* 7.436   [id number] 36* 109* 105*         Recent Labs     08/29/20  0516 08/30/20  0257 08/31/20  0400   SODIUM 138 135 133*   POTASSIUM 4.5 4.3 4.4   CHLORIDE 104 100 98   CO2 22 23 25   BUN 16 15 23*   CREATININE 0.43* 0.39* 0.33*   CALCIUM 8.7 9.0 8.9     Recent Labs     08/29/20  0516 08/30/20  0257 08/31/20  0400   ALTSGPT 65* 58* 66*   ASTSGOT 63* 52* 76*   ALKPHOSPHAT 86 87 88   TBILIRUBIN 0.2 <0.2 0.2   GLUCOSE 112* 102* 122*     Recent Labs      08/29/20  0516 08/30/20 0257 08/31/20  0400   WBC 10.1 12.0* 13.4*   NEUTSPOLYS 64.60 78.00* 72.30*   LYMPHOCYTES 16.30* 8.70* 11.40*   MONOCYTES 14.90* 10.40 13.00   EOSINOPHILS 1.90 0.80 0.60   BASOPHILS 0.40 0.30 0.40   ASTSGOT 63* 52* 76*   ALTSGPT 65* 58* 66*   ALKPHOSPHAT 86 87 88   TBILIRUBIN 0.2 <0.2 0.2     Recent Labs     08/29/20  0516 08/30/20 0257 08/31/20  0400   RBC 3.96* 3.81* 4.03*   HEMOGLOBIN 12.4 12.3 12.4   HEMATOCRIT 37.8 36.2* 37.8   PLATELETCT 290 298 458*       Imaging  X-Ray:  I have personally reviewed the images and compared with prior images. and My impression is: Endotracheal tube is 2 cm above the may.  There is a left subclavian central venous catheter with the tip near the cavoatrial junction.  Mild pulmonary edema is unchanged.  Cannot exclude underlying infiltrate.  MRI:   Reviewed   IMPRESSION:     The diffusion-weighted sequences demonstrates areas of restricted diffusion in the bilateral basal ganglia and some of the frontal gray matter. The predominant portion of the brain parenchyma does not demonstrate any restricted diffusion. Therefore this   findings likely represent mild diffuse anoxic brain injury.    Assessment/Plan  Drug overdose- (present on admission)  Assessment & Plan  Methamphetamines, oxycodone and EtOH per boyfriend  Counseling when extubated    Metabolic acidosis- (present on admission)  Assessment & Plan  Resolving, secondary to cardiac arrest      Acute respiratory failure with hypoxia (HCC)- (present on admission)  Assessment & Plan  Secondary to drug overdose  Lung protective ventilation strategies  Titrate ventilator prescription to optimize oxygenation, ventilation, and acid base balance.  Daily ABC trials          Cardiac arrest (HCC)- (present on admission)  Assessment & Plan  Pulseless electrical activity in ambulance while en route to hospital            PEA (Pulseless electrical activity) (HCC)- (present on admission)  Assessment & Plan  PEA  enroute to hospital  Hypoxia and aspiration contributory  MRI reveals bilateral basal ganglial anoxic injury.  Neuron-specific enolase sent for analysis.  Maintain normothermia  Maintain euglycemia  PCO2 35-40    Hypoglycemia- (present on admission)  Assessment & Plan  Possibly due to mild shock liver  Continue to monitor serum blood glucose    Encephalopathy acute- (present on admission)  Assessment & Plan  Anoxic encephalopathy, improving  Initial EEG without an NCSE  Serial neurologic exams  NSE sent      Elevated transaminase level- (present on admission)  Assessment & Plan  Likely due to ischemic hepatitis, improving today  Monitor synthetic functions including INR and glucose  Avoid hepatotoxins  Initially hypoglycemic, now titrated off of dextrose    Cerebral edema (HCC)- (present on admission)  Assessment & Plan  Mild/possible per radiologist on CT  MRI on 8/27/2020 without edema or severe changes     Updated plan:  Fever, increasing white blood cell count: Initiating broad-spectrum antimicrobial therapy with cefepime/Vanco  Blood cultures x2, urinalysis, bronchoscopy/BAL, PCT, nasal MRSA screen  Continue full ventilator support, change to ASV for synchrony as needed  Continue on Keppra, review with neurology,?  cEEG if further sz  Not yet appropriate for liberation but hope to avoid tracheostomy  Ongoing critical care management as above.    VTE:  Lovenox  Ulcer: H2 Antagonist  Lines: Central Line  Ongoing indication addressed, Arterial Line  Ongoing indication addressed and Pearce Catheter  Ongoing indication addressed    I have performed a physical exam and reviewed and updated ROS and Plan today (8/31/2020). In review of yesterday's note (8/30/2020), there are no changes except as documented above.     Discussed patient condition and risk of morbidity and/or mortality with RN, RT, Pharmacy, Dietary, , Code status disscussed, Charge nurse / hot rounds and neurology     The patient remains  critically ill.  I have assessed and reassessed the patient multiple times.  She is at high risk for further vital organ deterioration.  Critical care time = 42 minutes in directly providing and coordinating critical care and extensive data review.  No time overlap and excludes procedures.

## 2020-09-01 ENCOUNTER — APPOINTMENT (OUTPATIENT)
Dept: RADIOLOGY | Facility: MEDICAL CENTER | Age: 20
DRG: 004 | End: 2020-09-01
Attending: INTERNAL MEDICINE
Payer: MEDICAID

## 2020-09-01 LAB
ACTION RANGE TRIGGERED IACRT: NO
ALBUMIN SERPL BCP-MCNC: 3.1 G/DL (ref 3.2–4.9)
ALBUMIN/GLOB SERPL: 1 G/DL
ALP SERPL-CCNC: 81 U/L (ref 30–99)
ALT SERPL-CCNC: 79 U/L (ref 2–50)
ANION GAP SERPL CALC-SCNC: 12 MMOL/L (ref 7–16)
AST SERPL-CCNC: 91 U/L (ref 12–45)
BASE EXCESS BLDA CALC-SCNC: 0 MMOL/L (ref -4–3)
BASOPHILS # BLD AUTO: 0.5 % (ref 0–1.8)
BASOPHILS # BLD: 0.06 K/UL (ref 0–0.12)
BILIRUB SERPL-MCNC: 0.2 MG/DL (ref 0.1–1.5)
BODY TEMPERATURE: ABNORMAL DEGREES
BUN SERPL-MCNC: 21 MG/DL (ref 8–22)
CALCIUM SERPL-MCNC: 8.7 MG/DL (ref 8.5–10.5)
CHLORIDE SERPL-SCNC: 100 MMOL/L (ref 96–112)
CO2 BLDA-SCNC: 26 MMOL/L (ref 20–33)
CO2 SERPL-SCNC: 23 MMOL/L (ref 20–33)
CREAT SERPL-MCNC: 0.5 MG/DL (ref 0.5–1.4)
EOSINOPHIL # BLD AUTO: 0.11 K/UL (ref 0–0.51)
EOSINOPHIL NFR BLD: 0.9 % (ref 0–6.9)
ERYTHROCYTE [DISTWIDTH] IN BLOOD BY AUTOMATED COUNT: 43.5 FL (ref 35.9–50)
GLOBULIN SER CALC-MCNC: 3.1 G/DL (ref 1.9–3.5)
GLUCOSE BLD-MCNC: 112 MG/DL (ref 65–99)
GLUCOSE BLD-MCNC: 119 MG/DL (ref 65–99)
GLUCOSE BLD-MCNC: 98 MG/DL (ref 65–99)
GLUCOSE SERPL-MCNC: 131 MG/DL (ref 65–99)
HCO3 BLDA-SCNC: 24.5 MMOL/L (ref 17–25)
HCT VFR BLD AUTO: 35 % (ref 37–47)
HGB BLD-MCNC: 11.7 G/DL (ref 12–16)
HOROWITZ INDEX BLDA+IHG-RTO: 287 MM[HG]
IMM GRANULOCYTES # BLD AUTO: 0.34 K/UL (ref 0–0.11)
IMM GRANULOCYTES NFR BLD AUTO: 2.6 % (ref 0–0.9)
INST. QUALIFIED PATIENT IIQPT: YES
LYMPHOCYTES # BLD AUTO: 1.56 K/UL (ref 1–4.8)
LYMPHOCYTES NFR BLD: 12.1 % (ref 22–41)
MCH RBC QN AUTO: 31.3 PG (ref 27–33)
MCHC RBC AUTO-ENTMCNC: 33.4 G/DL (ref 33.6–35)
MCV RBC AUTO: 93.6 FL (ref 81.4–97.8)
MONOCYTES # BLD AUTO: 1.55 K/UL (ref 0–0.85)
MONOCYTES NFR BLD AUTO: 12 % (ref 0–13.4)
NEUTROPHILS # BLD AUTO: 9.3 K/UL (ref 2–7.15)
NEUTROPHILS NFR BLD: 71.9 % (ref 44–72)
NRBC # BLD AUTO: 0 K/UL
NRBC BLD-RTO: 0 /100 WBC
O2/TOTAL GAS SETTING VFR VENT: 30 %
PCO2 BLDA: 39 MMHG (ref 26–37)
PCO2 TEMP ADJ BLDA: 41.3 MMHG (ref 26–37)
PH BLDA: 7.41 [PH] (ref 7.4–7.5)
PH TEMP ADJ BLDA: 7.39 [PH] (ref 7.4–7.5)
PLATELET # BLD AUTO: 431 K/UL (ref 164–446)
PMV BLD AUTO: 9 FL (ref 9–12.9)
PO2 BLDA: 86 MMHG (ref 64–87)
PO2 TEMP ADJ BLDA: 94 MMHG (ref 64–87)
POTASSIUM SERPL-SCNC: 3.7 MMOL/L (ref 3.6–5.5)
PROT SERPL-MCNC: 6.2 G/DL (ref 6–8.2)
RBC # BLD AUTO: 3.74 M/UL (ref 4.2–5.4)
SAO2 % BLDA: 97 % (ref 93–99)
SODIUM SERPL-SCNC: 135 MMOL/L (ref 135–145)
SPECIMEN DRAWN FROM PATIENT: ABNORMAL
TRIGL SERPL-MCNC: 168 MG/DL (ref 0–149)
WBC # BLD AUTO: 12.9 K/UL (ref 4.8–10.8)

## 2020-09-01 PROCEDURE — 71045 X-RAY EXAM CHEST 1 VIEW: CPT

## 2020-09-01 PROCEDURE — 85025 COMPLETE CBC W/AUTO DIFF WBC: CPT

## 2020-09-01 PROCEDURE — 700102 HCHG RX REV CODE 250 W/ 637 OVERRIDE(OP): Performed by: INTERNAL MEDICINE

## 2020-09-01 PROCEDURE — 80053 COMPREHEN METABOLIC PANEL: CPT

## 2020-09-01 PROCEDURE — A9270 NON-COVERED ITEM OR SERVICE: HCPCS | Performed by: INTERNAL MEDICINE

## 2020-09-01 PROCEDURE — 700105 HCHG RX REV CODE 258: Performed by: INTERNAL MEDICINE

## 2020-09-01 PROCEDURE — 770022 HCHG ROOM/CARE - ICU (200)

## 2020-09-01 PROCEDURE — A9270 NON-COVERED ITEM OR SERVICE: HCPCS | Performed by: PHYSICAL MEDICINE & REHABILITATION

## 2020-09-01 PROCEDURE — 51798 US URINE CAPACITY MEASURE: CPT

## 2020-09-01 PROCEDURE — 700111 HCHG RX REV CODE 636 W/ 250 OVERRIDE (IP): Performed by: INTERNAL MEDICINE

## 2020-09-01 PROCEDURE — L4398 FOOT DROP SPLINT PRE OTS: HCPCS

## 2020-09-01 PROCEDURE — 95822 EEG COMA OR SLEEP ONLY: CPT | Performed by: PSYCHIATRY & NEUROLOGY

## 2020-09-01 PROCEDURE — 99291 CRITICAL CARE FIRST HOUR: CPT | Performed by: INTERNAL MEDICINE

## 2020-09-01 PROCEDURE — 82803 BLOOD GASES ANY COMBINATION: CPT

## 2020-09-01 PROCEDURE — 94003 VENT MGMT INPAT SUBQ DAY: CPT

## 2020-09-01 PROCEDURE — 94690 O2 UPTK REST INDIRECT: CPT

## 2020-09-01 PROCEDURE — 94770 HCHG CO2 EXPIRED GAS DETERMINATION: CPT

## 2020-09-01 PROCEDURE — 700102 HCHG RX REV CODE 250 W/ 637 OVERRIDE(OP): Performed by: PHYSICAL MEDICINE & REHABILITATION

## 2020-09-01 PROCEDURE — 4A10X4Z MONITORING OF CENTRAL NERVOUS ELECTRICAL ACTIVITY, EXTERNAL APPROACH: ICD-10-PCS | Performed by: PSYCHIATRY & NEUROLOGY

## 2020-09-01 PROCEDURE — 36600 WITHDRAWAL OF ARTERIAL BLOOD: CPT

## 2020-09-01 PROCEDURE — 82962 GLUCOSE BLOOD TEST: CPT | Mod: 91

## 2020-09-01 PROCEDURE — 84478 ASSAY OF TRIGLYCERIDES: CPT

## 2020-09-01 PROCEDURE — 95822 EEG COMA OR SLEEP ONLY: CPT | Mod: 26 | Performed by: PSYCHIATRY & NEUROLOGY

## 2020-09-01 RX ORDER — LEVETIRACETAM 10 MG/ML
1000 INJECTION INTRAVASCULAR ONCE
Status: COMPLETED | OUTPATIENT
Start: 2020-09-01 | End: 2020-09-01

## 2020-09-01 RX ORDER — LEVETIRACETAM 10 MG/ML
1000 INJECTION INTRAVASCULAR EVERY 12 HOURS
Status: DISCONTINUED | OUTPATIENT
Start: 2020-09-02 | End: 2020-09-08

## 2020-09-01 RX ORDER — LEVETIRACETAM 10 MG/ML
1000 INJECTION INTRAVASCULAR EVERY 12 HOURS
Status: DISCONTINUED | OUTPATIENT
Start: 2020-09-01 | End: 2020-09-01

## 2020-09-01 RX ADMIN — VANCOMYCIN HYDROCHLORIDE 1250 MG: 500 INJECTION, POWDER, LYOPHILIZED, FOR SOLUTION INTRAVENOUS at 20:53

## 2020-09-01 RX ADMIN — POTASSIUM BICARBONATE 25 MEQ: 978 TABLET, EFFERVESCENT ORAL at 09:00

## 2020-09-01 RX ADMIN — BACLOFEN 20 MG: 10 TABLET ORAL at 04:57

## 2020-09-01 RX ADMIN — DOCUSATE SODIUM 50 MG AND SENNOSIDES 8.6 MG 2 TABLET: 8.6; 5 TABLET, FILM COATED ORAL at 17:15

## 2020-09-01 RX ADMIN — LEVETIRACETAM INJECTION 500 MG: 5 INJECTION INTRAVENOUS at 04:48

## 2020-09-01 RX ADMIN — BUSPIRONE HYDROCHLORIDE 15 MG: 10 TABLET ORAL at 17:15

## 2020-09-01 RX ADMIN — VANCOMYCIN HYDROCHLORIDE 1250 MG: 500 INJECTION, POWDER, LYOPHILIZED, FOR SOLUTION INTRAVENOUS at 12:31

## 2020-09-01 RX ADMIN — FENTANYL CITRATE 100 MCG: 50 INJECTION INTRAMUSCULAR; INTRAVENOUS at 17:15

## 2020-09-01 RX ADMIN — BACLOFEN 20 MG: 10 TABLET ORAL at 17:15

## 2020-09-01 RX ADMIN — VANCOMYCIN HYDROCHLORIDE 1250 MG: 500 INJECTION, POWDER, LYOPHILIZED, FOR SOLUTION INTRAVENOUS at 04:47

## 2020-09-01 RX ADMIN — FAMOTIDINE 20 MG: 10 INJECTION INTRAVENOUS at 17:15

## 2020-09-01 RX ADMIN — CEFEPIME 2 G: 2 INJECTION, POWDER, FOR SOLUTION INTRAVENOUS at 04:47

## 2020-09-01 RX ADMIN — LEVETIRACETAM INJECTION 1000 MG: 10 INJECTION INTRAVENOUS at 15:36

## 2020-09-01 RX ADMIN — FAMOTIDINE 20 MG: 20 TABLET, FILM COATED ORAL at 04:57

## 2020-09-01 RX ADMIN — BACLOFEN 20 MG: 10 TABLET ORAL at 12:19

## 2020-09-01 RX ADMIN — BUSPIRONE HYDROCHLORIDE 15 MG: 10 TABLET ORAL at 04:56

## 2020-09-01 RX ADMIN — FENTANYL CITRATE 100 MCG: 50 INJECTION INTRAMUSCULAR; INTRAVENOUS at 12:22

## 2020-09-01 RX ADMIN — FLUCONAZOLE 200 MG: 2 INJECTION, SOLUTION INTRAVENOUS at 04:47

## 2020-09-01 RX ADMIN — DOCUSATE SODIUM 50 MG AND SENNOSIDES 8.6 MG 2 TABLET: 8.6; 5 TABLET, FILM COATED ORAL at 04:57

## 2020-09-01 RX ADMIN — ENOXAPARIN SODIUM 40 MG: 40 INJECTION SUBCUTANEOUS at 04:48

## 2020-09-01 RX ADMIN — CEFTRIAXONE SODIUM 1 G: 1 INJECTION, POWDER, FOR SOLUTION INTRAMUSCULAR; INTRAVENOUS at 17:31

## 2020-09-01 ASSESSMENT — FIBROSIS 4 INDEX: FIB4 SCORE: 0.5

## 2020-09-01 NOTE — FLOWSHEET NOTE
Conscious Sedation Respiratory Update    FiO2%: 30 % (09/01/20 0600)          Events/Summary/Plan: Metabolic study done.  Pt tolerated well, sleeping through the test. (09/01/20 0730)

## 2020-09-01 NOTE — CARE PLAN
Problem: Respiratory:  Goal: Respiratory status will improve  Outcome: PROGRESSING AS EXPECTED  Note: On ventilator, ASV, Sat 100%, frequent suctioning. Aspiration precautions. Oral care.      Problem: Infection  Goal: Will remain free from infection  Outcome: PROGRESSING AS EXPECTED  Note: Contact and droplet precautions. Abx administered. Daily labs

## 2020-09-01 NOTE — CARE PLAN
Problem: Ventilation Defect:  Goal: Ability to achieve and maintain unassisted ventilation or tolerate decreased levels of ventilator support  Outcome: NOT MET      Ventilator Daily Summary    Vent Day #9    Ventilator settings changed this shift: 8 40    Weaning trials:yes, pt not following    Respiratory Procedures:bronched today, specimen sent.    Plan: Continue current ventilator settings and wean mechanical ventilation as tolerated per physician orders.

## 2020-09-01 NOTE — PROGRESS NOTES
Updated MD on neuro changes, including decerebrate posturing with upper extremities and no withdraw from lower extremities. Per Dr Tomlin continue current plan of care.

## 2020-09-01 NOTE — PROCEDURES
ROUTINE ELECTROENCEPHALOGRAM REPORT      Referring provider: Dr. Red.     DOS: 9/1/2020 (total recording of 25 minutes)    INDICATION:  Annika He 19 y.o. female presenting with abnormal involuntary movements, altered mental status.    CURRENT ANTIEPILEPTIC REGIMEN: Levetiracetam.    TECHNIQUE: 30 channel routine electroencephalogram (EEG) was performed in accordance with the international 10-20 system. The study was reviewed in bipolar and referential montages. The recording examined a comatose patient.    DESCRIPTION OF THE RECORD:  Generalized slow delta activity.  Frontal intermittent rhythmic delta activity (FIRDA) noted.  One left hemispheric seizure captured, consisting of head jerking and left shoulder jerking.  On EEG seizure as high amplitude rhythmic sharply contoured delta activity.  The patient was comatose throughout the study.    ACTIVATION PROCEDURES:   Not performed.    EKG: sampling of the EKG recording demonstrated sinus rhythm.       INTERPRETATION:  This is an abnormal routine EEG recording in a comatose patient.  A moderate to severe encephalopathy is suggested.  Frontal intermittent rhythmic delta activity noted (FIRDA), which may suggest underlying structural abnormality, increased cortical irritability and or increased intracranial pressure.  A left hemispheric seizure was captured during the study, clinically associated with head and left shoulder jerking.  The patient remained comatose, did not wake up or follow any commands.  Clinical and radiological correlation is recommended.    Updates provided to Dr. Red and Dr. Mccann.       Garland Beatty MD   Epilepsy and Neurodiagnostics.   Clinical  of Neurology [facility].   Diplomate in Neurology, Epilepsy, and Electrodiagnostic Medicine.   Office: [phone]  Fax: [phone]

## 2020-09-01 NOTE — PROGRESS NOTES
"Pharmacy Kinetics 19 y.o. female on vancomycin day # 2 2020    Currently on Vancomycin 1250 mg iv q8hr  Vancomycin 1750 mg x1  @ 1230    Indication for Treatment: Empric treatment for possible PNA     Proposed duration of treatment: TBD      Pertinent history per medical record: Admitted on 2020 for Admitted for drug overdose, cardiac arrest. Previously on CTX + flagyl x 5 days. Patient is currently intubated, : fevers and leukocytosis, broad spectrum antibiotics started. .    Other antibiotics: cefepime 2gm Q8h and fluconazole 200 mg Q24hr     Allergies: Penicillins     List concerns for renal function: none     Pertinent cultures to date:   : BCX: NGTD x2   : tracheal aspirate: many GPC, no fungal elements seen   MRSA Nares: positive     Recent Labs     20  0257 20  0400 20  0445   WBC 12.0* 13.4* 12.9*   NEUTSPOLYS 78.00* 72.30* 71.90     Recent Labs     20  0257 20  0400 20  0445   BUN 15 23* 21   CREATININE 0.39* 0.33* 0.50   ALBUMIN 3.1* 3.3 3.1*       Intake/Output Summary (Last 24 hours) at 2020 0920  Last data filed at 2020 0800  Gross per 24 hour   Intake 2630.1 ml   Output 1620 ml   Net 1010.1 ml      /60   Pulse (!) 101   Temp (!) 38.1 °C (100.6 °F) (Bladder)   Resp 15   Ht 1.6 m (5' 2.99\")   Wt 69.9 kg (154 lb 1.6 oz)   SpO2 100%  Temp (24hrs), Av.1 °C (100.6 °F), Min:37.8 °C (100 °F), Max:38.5 °C (101.3 °F)      A/P   1. Vancomycin dose change: continue Vancomycin 1250 mg Q 8 hr (18 mg/kg) (0500 1300 2100)   2. Next vancomycin level:  @0430 (before 5th 1250 mg dose)   3. Goal trough: 15-20 mcg/ml  4. Comments: Renal function stable?, historical dosing not noted. Dosing done per nomagram. Will get level at steady state before 5th 1250 mg dose. Patient has low risk for accumulation. Pharmacy will follow renal function, cultures, troughs, and patient's clinical status to guide subsequent recommendations.      Timbo" Jessenia PharmD  [phone]

## 2020-09-01 NOTE — PROGRESS NOTES
"Critical Care Progress Note    Date of admission  8/23/2020    Chief Complaint  19 y.o. female admitted 8/23/2020 with drug overdose, cardiac arrest    Hospital Course    \"19 y.o. female who presented 8/23/2020 with drug overdose with methamphetamines, oxycodone and found by boyfriend.  Cardiac arrest enroute to hospital and received narcan, epinephrine and cpr and ROSC achieved.  Difficult airway at scene and LMA placed. ET tube placed in ER.  She was vomiting during airway attempt.  On propofol due to vent asynchrony.  Propofol turned off for neuro assessment and possible hypothermic protocol.  Family no longer at hospital and unable to reach per phone number given for mother.\"    8/24 - TTM protocol initiated, EEG without NCSE  8/25 - rewarmed, awake, not following  8/26 - following occasional commands  8/27- seizure like activity vs shivering--> Keppra, versed, Propofol  8/28- no seizures on EEG  8/29- remains unresponsive.  8/30 -mental status improved, now opening eyes and following commands.  Keppra discontinued  8/31 - started cefepime/vancomycin for possible pneumonia,?  UTI, fever and increasing WBC fever; full vent  9/1 - Seizure activity noted on EEG today, reviewed with neurology; keppra load and increased to 1gm q 12; full vent support        Interval Problem Update  Reviewed last 24 hour events:  Not following now, posturing  SR/ 150  Tm 38.4  michelle TF at goal  I/O =   fent gtt off at 9 am today  CXR mild    30%  7.4/39/86  Cefepime/vanco  BAL many GPC  Replace K  Stat EEG    Addendum: Left hemispheric seizure noted on EEG.  Reviewed with neurology.  Given stat Keppra load of 1 g and increased to 1 g Keppra every 12.  Continue to monitor, cEEG as needed; cefepime d/c'd      Review of Systems  Review of Systems   Unable to perform ROS: Intubated        Vital Signs for last 24 hours   Temp:  [37.8 °C (100 °F)-38.5 °C (101.3 °F)] 38.1 °C (100.6 °F)  Pulse:  [] 101  Resp:  [13-26] 15  BP: " (103-133)/() 111/60  SpO2:  [96 %-100 %] 100 %    Hemodynamic parameters for last 24 hours  CVP:  [196 MM HG-204 MM HG] 204 MM HG    Respiratory Information for the last 24 hours  Vent Mode: ASV  PEEP/CPAP: 8  P Support: 5  MAP: 9.5  Length of Weaning Trial (Hours): 1 hour  Control VTE (exp VT): 441    Physical Exam   Physical Exam  Vitals signs and nursing note reviewed.   Constitutional:       General: She is not in acute distress.     Appearance: She is not ill-appearing or toxic-appearing.      Interventions: She is intubated.   HENT:      Head: Normocephalic and atraumatic.      Right Ear: External ear normal.      Left Ear: External ear normal.      Nose: No congestion or rhinorrhea.      Comments: Feeding tube in place     Mouth/Throat:      Mouth: Mucous membranes are dry.      Pharynx: No oropharyngeal exudate or posterior oropharyngeal erythema.      Comments: ET tube in place  Eyes:      General: No scleral icterus.     Conjunctiva/sclera: Conjunctivae normal.      Pupils: Pupils are equal, round, and reactive to light.      Comments: Gaze normalized   Neck:      Musculoskeletal: Neck supple. No neck rigidity or muscular tenderness.   Cardiovascular:      Rate and Rhythm: Normal rate and regular rhythm.      Pulses: Normal pulses.      Heart sounds: Normal heart sounds. No murmur.   Pulmonary:      Effort: She is intubated.      Breath sounds: Rhonchi present. No wheezing or rales.   Abdominal:      General: Abdomen is flat. There is no distension.      Palpations: There is no mass.      Tenderness: There is no abdominal tenderness.   Musculoskeletal:         General: No swelling or tenderness.      Right lower leg: No edema.      Left lower leg: No edema.   Skin:     General: Skin is warm and dry.      Capillary Refill: Capillary refill takes less than 2 seconds.      Findings: No erythema or rash.   Neurological:      GCS: GCS eye subscore is 3. GCS verbal subscore is 1. GCS motor subscore is 5.       Cranial Nerves: No cranial nerve deficit or facial asymmetry.      Motor: Abnormal muscle tone present.      Comments: Arouses, moves all 4, int follows   Psychiatric:      Comments: Unable to assess         Medications  Current Facility-Administered Medications   Medication Dose Route Frequency Provider Last Rate Last Dose   • MD Alert...Vancomycin per Pharmacy   Other PHARMACY TO DOSE Dar Red M.D.       • fluconazole (DIFLUCAN) in NS IVPB 200 mg  200 mg Intravenous Q24HRS Dar Red M.D.   Stopped at 09/01/20 0547   • acetaminophen (TYLENOL) tablet 1,000 mg  1,000 mg Enteral Tube Q4HRS PRN Dar Red M.D.   Stopped at 08/31/20 1407   • fentaNYL (SUBLIMAZE) injection  mcg   mcg Intravenous Q HOUR PRN Dar Red M.D.   100 mcg at 08/31/20 2234   • cefepime (MAXIPIME) 2 g in  mL IVPB  2 g Intravenous Q8HRS Dar Red M.D.   Stopped at 09/01/20 0517   • baclofen (LIORESAL) tablet 20 mg  20 mg Enteral Tube TID Chris Coy D.O.   20 mg at 09/01/20 0457   • pancrelipase-NaHCO3 feeding tube solution 10 mL  10 mL Nasogastric Once Dar Red M.D.   Stopped at 08/31/20 1345   • vancomycin (VANCOCIN) 1,250 mg in  mL IVPB  18 mg/kg Intravenous Q8HR Dar Red M.D.   Stopped at 09/01/20 0647   • levETIRAcetam (Keppra) 500 mg in 100 mL NaCl IV premix  500 mg Intravenous Q12HRS Jeremy M Gonda, M.D.   Stopped at 09/01/20 0503   • fentaNYL (SUBLIMAZE) 50 mcg/mL in 50mL (Continuous Infusion)   Intravenous Continuous Boogie Stokes M.D.   Stopped at 09/01/20 0904   • busPIRone (BUSPAR) tablet 15 mg  15 mg Enteral Tube BID Boogie Stokes M.D.   15 mg at 09/01/20 0456   • labetalol (NORMODYNE/TRANDATE) injection 10 mg  10 mg Intravenous Q4HRS PRN Yosef Tomlin M.D.   10 mg at 08/30/20 2256   • midazolam (VERSED) 2 MG/2ML injection 2-4 mg  2-4 mg Intravenous Q15 MIN PRN Yosef Tomlin M.D.   2 mg at 08/28/20 0854   • propofol (DIPRIVAN) injection  0-80  mcg/kg/min Intravenous Continuous JESSY Piper Jr..O.   Stopped at 08/30/20 0428   • Pharmacy Consult: Enteral tube insertion - review meds/change route/product selection  1 Each Other PHARMACY TO DOSE JESSY Piper Jr..ANTHONY.       • Respiratory Therapy Consult   Nebulization Continuous RT Brock Murdock M.D.       • ipratropium-albuterol (DUONEB) nebulizer solution  3 mL Nebulization Q2HRS PRN (RT) Brock Murdock M.D.       • famotidine (PEPCID) tablet 20 mg  20 mg Enteral Tube Q12HRS Brock Murdock M.D.   20 mg at 09/01/20 0457    Or   • famotidine (PEPCID) injection 20 mg  20 mg Intravenous Q12HRS Brock Murdock M.D.   20 mg at 08/31/20 1749   • senna-docusate (PERICOLACE or SENOKOT S) 8.6-50 MG per tablet 2 Tab  2 Tab Enteral Tube BID Brock Murdock M.D.   2 Tab at 09/01/20 0457    And   • polyethylene glycol/lytes (MIRALAX) PACKET 1 Packet  1 Packet Enteral Tube QDAY PRN Brock Murdock M.D.        And   • magnesium hydroxide (MILK OF MAGNESIA) suspension 30 mL  30 mL Enteral Tube QDAY PRN Brock Murdock M.D.        And   • bisacodyl (DULCOLAX) suppository 10 mg  10 mg Rectal QDAY PRN Brock Murdock M.D.       • MD Alert...ICU Electrolyte Replacement per Pharmacy   Other PHARMACY TO DOSE Brock Murdock M.D.       • lidocaine (XYLOCAINE) 1 % injection 1-2 mL  1-2 mL Tracheal Tube Q30 MIN PRN Brock Murdock M.D.       • hydrALAZINE (APRESOLINE) injection 10-20 mg  10-20 mg Intravenous Q6HRS PRN Brock Murdock M.D.       • enoxaparin (LOVENOX) inj 40 mg  40 mg Subcutaneous DAILY JESSY Piper Jr..O.   40 mg at 09/01/20 0448       Fluids    Intake/Output Summary (Last 24 hours) at 9/1/2020 1020  Last data filed at 9/1/2020 0800  Gross per 24 hour   Intake 2540.1 ml   Output 1520 ml   Net 1020.1 ml       Laboratory  Recent Labs     08/30/20  0500 08/31/20  0210 09/01/20  0448   ISTATAPH 7.395* 7.451 7.406   ISTATAPCO2 35.6 37.0 39.0*   ISTATAPO2 108* 99* 86   ISTATATCO2 23 27 26   [id number] 98 98 97    ISTATARTHCO3 21.8 25.7* 24.5   ISTATARTBE -3 2 0   ISTATTEMP 37.2 C 38.0 C 38.3 C   ISTATFIO2 30 30 30   ISTATSPEC Arterial Arterial Arterial   ISTATAPHTC 7.392* 7.436 7.386*   [id number] 109* 105* 94*         Recent Labs     08/30/20 0257 08/31/20 0400 09/01/20  0445   SODIUM 135 133* 135   POTASSIUM 4.3 4.4 3.7   CHLORIDE 100 98 100   CO2 23 25 23   BUN 15 23* 21   CREATININE 0.39* 0.33* 0.50   CALCIUM 9.0 8.9 8.7     Recent Labs     08/30/20 0257 08/31/20 0400 09/01/20  0445   ALTSGPT 58* 66* 79*   ASTSGOT 52* 76* 91*   ALKPHOSPHAT 87 88 81   TBILIRUBIN <0.2 0.2 0.2   GLUCOSE 102* 122* 131*     Recent Labs     08/30/20 0257 08/31/20 0400 09/01/20  0445   WBC 12.0* 13.4* 12.9*   NEUTSPOLYS 78.00* 72.30* 71.90   LYMPHOCYTES 8.70* 11.40* 12.10*   MONOCYTES 10.40 13.00 12.00   EOSINOPHILS 0.80 0.60 0.90   BASOPHILS 0.30 0.40 0.50   ASTSGOT 52* 76* 91*   ALTSGPT 58* 66* 79*   ALKPHOSPHAT 87 88 81   TBILIRUBIN <0.2 0.2 0.2     Recent Labs     08/30/20 0257 08/31/20 0400 09/01/20  0445   RBC 3.81* 4.03* 3.74*   HEMOGLOBIN 12.3 12.4 11.7*   HEMATOCRIT 36.2* 37.8 35.0*   PLATELETCT 298 458* 431       Imaging  X-Ray:  I have personally reviewed the images and compared with prior images. and My impression is: Endotracheal tube is 2 cm above the may.  There is a left subclavian central venous catheter with the tip near the cavoatrial junction.  Mild pulmonary edema is unchanged.  Cannot exclude underlying infiltrate.  MRI:   Reviewed   IMPRESSION:     The diffusion-weighted sequences demonstrates areas of restricted diffusion in the bilateral basal ganglia and some of the frontal gray matter. The predominant portion of the brain parenchyma does not demonstrate any restricted diffusion. Therefore this   findings likely represent mild diffuse anoxic brain injury.    Assessment/Plan  Drug overdose- (present on admission)  Assessment & Plan  Methamphetamines, oxycodone and EtOH per boyfriend  Counseling when  extubated    Metabolic acidosis- (present on admission)  Assessment & Plan  Resolving, secondary to cardiac arrest      Acute respiratory failure with hypoxia (HCC)- (present on admission)  Assessment & Plan  Secondary to drug overdose  Lung protective ventilation strategies  Titrate ventilator prescription to optimize oxygenation, ventilation, and acid base balance.  Daily ABC trials          Cardiac arrest (HCC)- (present on admission)  Assessment & Plan  Pulseless electrical activity in ambulance while en route to hospital            PEA (Pulseless electrical activity) (HCC)- (present on admission)  Assessment & Plan  PEA enroute to hospital  Hypoxia and aspiration contributory  MRI reveals bilateral basal ganglial anoxic injury.  Neuron-specific enolase sent for analysis.  Maintain normothermia  Maintain euglycemia  PCO2 35-40    Hypoglycemia- (present on admission)  Assessment & Plan  Possibly due to mild shock liver  Continue to monitor serum blood glucose    Encephalopathy acute- (present on admission)  Assessment & Plan  Anoxic encephalopathy, improving  Initial EEG without an NCSE  Serial neurologic exams  NSE sent      Elevated transaminase level- (present on admission)  Assessment & Plan  Likely due to ischemic hepatitis, improving today  Monitor synthetic functions including INR and glucose  Avoid hepatotoxins  Initially hypoglycemic, now titrated off of dextrose    Cerebral edema (HCC)- (present on admission)  Assessment & Plan  Mild/possible per radiologist on CT  MRI on 8/27/2020 without edema or severe changes     Updated plan:  Continue full ventilator support, not appropriate for liberation  Stat EEG for some posturing, question seizure activity and not following commands consistently   left hemispheric seizure noted today on EEG  Keppra load 1 g and increase scheduled Keppra to 1 g every 12  Discussed with neurology, not recommending current cEEG  Cefepime changed to ceftriaxone given seizure  activity  BAL with > 100,000 and MRSA and some GNR  Blood cultures x2-  Continue vancomycin and ceftriaxone      VTE:  Lovenox  Ulcer: H2 Antagonist  Lines: Central Line  Ongoing indication addressed    I have performed a physical exam and reviewed and updated ROS and Plan today (9/1/2020). In review of yesterday's note (8/31/2020), there are no changes except as documented above.     Discussed patient condition and risk of morbidity and/or mortality with RN, RT, Pharmacy, Dietary, , Code status disscussed, Charge nurse / hot rounds and neurology     The patient remains critically ill.  I have assessed and reassessed the patient multiple times.  She is at high risk for further vital organ deterioration.  Critical care time = 38 minutes in directly providing and coordinating critical care and extensive data review.  No time overlap and excludes procedures.

## 2020-09-01 NOTE — DIETARY
"Nutrition support weekly update:  Day 8 of admit.  Annika He is a 19 y.o. female with admitting DX of Cardiac arrest and drug overdose. Tube feeding initiated on . Current TF via Cortrak in the proximal duodenum is Impact Peptide 1.5 @ 45 mL/hr, providing 1620 kcal, 102 gm protein, and 832 mL free water per day.     Assessment:  Weight last recorded  is 69.9 kg (154 lb) via bed scale, which is slightly lower than wt of 71.2 kg (157 lb) via bed scale recorded . Suspect small fluctuations in wt r/t fluid status. Pt is noted with general overall edema and general edema of BLE and BUE. Wt of 69.9 kg may be closer to dry wt.     Estimate Nutritional Needs based on:   Height: 160 cm (5' 3\")  Weight: 69.9 kg (154 lb 1.6 oz)  Weight to use in calculation: 69.9 kg (154 lb 1.6 oz)  Body mass index is 27.3 kg/m²., BMI classification: Overweight.    Calculation/Equation: REE using MSJ 1.0 - 1.1= 1434 - 1578 kcal/day   PSU using VE= 6.6 , Tmax over past 24 hrs = 38.5: 1798 kcal/day    Total Calories / day: 1500 - 1800  (Calories/k - 26 )   Total Grams Protein / day: 84 - 105 (Grams Protein/k.2 - 1.5)     Metabolic Cart Study completed today. REE 2890 kcal/day. Strong study based on observed parameters. Pt temp: 100.9'F at time of study.     Evaluation:   1. Pt has been tolerating TF @ goal.   2. Pt remains on the vent (day #9).   3. Dependent/generalized edema: general/BLE/BUE.  4. Labs: Glucose= 131, Glucose-Accu-Lo=189, AST=91, ALT=79, and TG's=168.  5. Medications: maxipime, lovenox, electrolyte replacement therapy, vancomycin, and pericolace. Propofol is currently stopped.   6. Last BM:  (large/formed).   7. Based on results of metabolic cart study, will need to increase caloric provisions.Per clinical judgement, will feed approx 75% of measured REE, as pt is overweight and had a slight fever at time of study. Current feeding of Impact Peptide 1.5 remains appropriate to meet the " elevated protein and caloric needs for this ventilated pt.   8. Of note, new formula rate will provide additional protein. However, no indication of impaired renal function. RD will monitor.       Malnutrition risk: At risk due to generalized/dependent edema in BUE/BLE noted. No other criteria noted at this time.     Recommendations/Plan:  1. Off propofol: continue TF formula Impact Peptide 1.5 and increase to new goal rate of 60 mL/hr providing 2160 kcal/day (31 kcal/kg), 135 gm protein/day (1.9 gm protein/kg), and 1109 mL free water per day.     2. Fluids per MD.   3. Continue to monitor weight and labs.      RD will be following.

## 2020-09-02 ENCOUNTER — APPOINTMENT (OUTPATIENT)
Dept: RADIOLOGY | Facility: MEDICAL CENTER | Age: 20
DRG: 004 | End: 2020-09-02
Attending: INTERNAL MEDICINE
Payer: MEDICAID

## 2020-09-02 PROBLEM — J15.212 PNEUMONIA OF BOTH LUNGS DUE TO METHICILLIN RESISTANT STAPHYLOCOCCUS AUREUS (MRSA) (HCC): Status: ACTIVE | Noted: 2020-09-02

## 2020-09-02 LAB
ACTION RANGE TRIGGERED IACRT: NO
ALBUMIN SERPL BCP-MCNC: 3.3 G/DL (ref 3.2–4.9)
ALBUMIN/GLOB SERPL: 1.1 G/DL
ALP SERPL-CCNC: 85 U/L (ref 30–99)
ALT SERPL-CCNC: 67 U/L (ref 2–50)
ANION GAP SERPL CALC-SCNC: 13 MMOL/L (ref 7–16)
AST SERPL-CCNC: 63 U/L (ref 12–45)
BACTERIA BLD CULT: ABNORMAL
BACTERIA BLD CULT: ABNORMAL
BASE EXCESS BLDA CALC-SCNC: 1 MMOL/L (ref -4–3)
BASOPHILS # BLD AUTO: 0.4 % (ref 0–1.8)
BASOPHILS # BLD: 0.06 K/UL (ref 0–0.12)
BILIRUB SERPL-MCNC: 0.2 MG/DL (ref 0.1–1.5)
BODY TEMPERATURE: ABNORMAL DEGREES
BUN SERPL-MCNC: 18 MG/DL (ref 8–22)
BURR CELLS/RBC NFR CSF MANUAL: 0 %
CALCIUM SERPL-MCNC: 9 MG/DL (ref 8.5–10.5)
CHLORIDE SERPL-SCNC: 99 MMOL/L (ref 96–112)
CLARITY CSF: CLEAR
CO2 BLDA-SCNC: 26 MMOL/L (ref 20–33)
CO2 SERPL-SCNC: 24 MMOL/L (ref 20–33)
COLOR CSF: COLORLESS
COLOR SPUN CSF: COLORLESS
CREAT SERPL-MCNC: 0.41 MG/DL (ref 0.5–1.4)
EOSINOPHIL # BLD AUTO: 0.17 K/UL (ref 0–0.51)
EOSINOPHIL NFR BLD: 1.1 % (ref 0–6.9)
ERYTHROCYTE [DISTWIDTH] IN BLOOD BY AUTOMATED COUNT: 42.4 FL (ref 35.9–50)
GLOBULIN SER CALC-MCNC: 3.1 G/DL (ref 1.9–3.5)
GLUCOSE BLD-MCNC: 104 MG/DL (ref 65–99)
GLUCOSE BLD-MCNC: 109 MG/DL (ref 65–99)
GLUCOSE CSF-MCNC: 65 MG/DL (ref 40–80)
GLUCOSE SERPL-MCNC: 127 MG/DL (ref 65–99)
GRAM STN SPEC: NORMAL
HCO3 BLDA-SCNC: 25.1 MMOL/L (ref 17–25)
HCT VFR BLD AUTO: 37 % (ref 37–47)
HGB BLD-MCNC: 12.3 G/DL (ref 12–16)
HOROWITZ INDEX BLDA+IHG-RTO: 303 MM[HG]
IMM GRANULOCYTES # BLD AUTO: 0.46 K/UL (ref 0–0.11)
IMM GRANULOCYTES NFR BLD AUTO: 2.9 % (ref 0–0.9)
INST. QUALIFIED PATIENT IIQPT: YES
LYMPHOCYTES # BLD AUTO: 1.98 K/UL (ref 1–4.8)
LYMPHOCYTES NFR BLD: 12.4 % (ref 22–41)
LYMPHOCYTES NFR CSF: 65 %
MCH RBC QN AUTO: 30.8 PG (ref 27–33)
MCHC RBC AUTO-ENTMCNC: 33.2 G/DL (ref 33.6–35)
MCV RBC AUTO: 92.7 FL (ref 81.4–97.8)
MONOCYTES # BLD AUTO: 1.51 K/UL (ref 0–0.85)
MONOCYTES NFR BLD AUTO: 9.5 % (ref 0–13.4)
MONONUC CELLS NFR CSF: 32 %
NEUTROPHILS # BLD AUTO: 11.73 K/UL (ref 2–7.15)
NEUTROPHILS NFR BLD: 73.7 % (ref 44–72)
NEUTROPHILS NFR CSF: 3 %
NRBC # BLD AUTO: 0 K/UL
NRBC BLD-RTO: 0 /100 WBC
O2/TOTAL GAS SETTING VFR VENT: 30 %
PCO2 BLDA: 36.1 MMHG (ref 26–37)
PCO2 TEMP ADJ BLDA: 37.6 MMHG (ref 26–37)
PH BLDA: 7.45 [PH] (ref 7.4–7.5)
PH TEMP ADJ BLDA: 7.44 [PH] (ref 7.4–7.5)
PLATELET # BLD AUTO: 494 K/UL (ref 164–446)
PMV BLD AUTO: 9.1 FL (ref 9–12.9)
PO2 BLDA: 91 MMHG (ref 64–87)
PO2 TEMP ADJ BLDA: 96 MMHG (ref 64–87)
POTASSIUM SERPL-SCNC: 3.9 MMOL/L (ref 3.6–5.5)
PROT CSF-MCNC: 23 MG/DL (ref 15–45)
PROT SERPL-MCNC: 6.4 G/DL (ref 6–8.2)
RBC # BLD AUTO: 3.99 M/UL (ref 4.2–5.4)
RBC # CSF: <10 CELLS/UL
SAO2 % BLDA: 97 % (ref 93–99)
SIGNIFICANT IND 70042: ABNORMAL
SIGNIFICANT IND 70042: NORMAL
SITE SITE: ABNORMAL
SITE SITE: NORMAL
SODIUM SERPL-SCNC: 136 MMOL/L (ref 135–145)
SOURCE SOURCE: ABNORMAL
SOURCE SOURCE: NORMAL
SPECIMEN DRAWN FROM PATIENT: ABNORMAL
SPECIMEN VOL CSF: 1 ML
TUBE # CSF: 1
TUBE # CSF: 1
VANCOMYCIN TROUGH SERPL-MCNC: 8.2 UG/ML (ref 10–20)
WBC # BLD AUTO: 15.9 K/UL (ref 4.8–10.8)
WBC # CSF: 1 CELLS/UL (ref 0–10)

## 2020-09-02 PROCEDURE — 84157 ASSAY OF PROTEIN OTHER: CPT

## 2020-09-02 PROCEDURE — A9270 NON-COVERED ITEM OR SERVICE: HCPCS | Performed by: INTERNAL MEDICINE

## 2020-09-02 PROCEDURE — A9576 INJ PROHANCE MULTIPACK: HCPCS | Performed by: INTERNAL MEDICINE

## 2020-09-02 PROCEDURE — 80053 COMPREHEN METABOLIC PANEL: CPT

## 2020-09-02 PROCEDURE — 95720 EEG PHY/QHP EA INCR W/VEEG: CPT | Performed by: PSYCHIATRY & NEUROLOGY

## 2020-09-02 PROCEDURE — 87070 CULTURE OTHR SPECIMN AEROBIC: CPT

## 2020-09-02 PROCEDURE — 62272 THER SPI PNXR DRG CSF: CPT

## 2020-09-02 PROCEDURE — 62270 DX LMBR SPI PNXR: CPT | Performed by: INTERNAL MEDICINE

## 2020-09-02 PROCEDURE — 700102 HCHG RX REV CODE 250 W/ 637 OVERRIDE(OP): Performed by: INTERNAL MEDICINE

## 2020-09-02 PROCEDURE — 89051 BODY FLUID CELL COUNT: CPT

## 2020-09-02 PROCEDURE — 95714 VEEG EA 12-26 HR UNMNTR: CPT | Performed by: PSYCHIATRY & NEUROLOGY

## 2020-09-02 PROCEDURE — 82962 GLUCOSE BLOOD TEST: CPT

## 2020-09-02 PROCEDURE — 36600 WITHDRAWAL OF ARTERIAL BLOOD: CPT

## 2020-09-02 PROCEDURE — 99232 SBSQ HOSP IP/OBS MODERATE 35: CPT | Performed by: PHYSICAL MEDICINE & REHABILITATION

## 2020-09-02 PROCEDURE — 87205 SMEAR GRAM STAIN: CPT

## 2020-09-02 PROCEDURE — L4398 FOOT DROP SPLINT PRE OTS: HCPCS

## 2020-09-02 PROCEDURE — 71045 X-RAY EXAM CHEST 1 VIEW: CPT

## 2020-09-02 PROCEDURE — 009U3ZZ DRAINAGE OF SPINAL CANAL, PERCUTANEOUS APPROACH: ICD-10-PCS | Performed by: INTERNAL MEDICINE

## 2020-09-02 PROCEDURE — 94003 VENT MGMT INPAT SUBQ DAY: CPT

## 2020-09-02 PROCEDURE — 99292 CRITICAL CARE ADDL 30 MIN: CPT | Mod: 25 | Performed by: INTERNAL MEDICINE

## 2020-09-02 PROCEDURE — 94770 HCHG CO2 EXPIRED GAS DETERMINATION: CPT

## 2020-09-02 PROCEDURE — 70553 MRI BRAIN STEM W/O & W/DYE: CPT

## 2020-09-02 PROCEDURE — 80202 ASSAY OF VANCOMYCIN: CPT

## 2020-09-02 PROCEDURE — 99291 CRITICAL CARE FIRST HOUR: CPT | Mod: 25 | Performed by: INTERNAL MEDICINE

## 2020-09-02 PROCEDURE — 95700 EEG CONT REC W/VID EEG TECH: CPT | Performed by: PSYCHIATRY & NEUROLOGY

## 2020-09-02 PROCEDURE — 82945 GLUCOSE OTHER FLUID: CPT

## 2020-09-02 PROCEDURE — 700105 HCHG RX REV CODE 258: Performed by: INTERNAL MEDICINE

## 2020-09-02 PROCEDURE — 700102 HCHG RX REV CODE 250 W/ 637 OVERRIDE(OP): Performed by: PHYSICAL MEDICINE & REHABILITATION

## 2020-09-02 PROCEDURE — 4A10X4Z MONITORING OF CENTRAL NERVOUS ELECTRICAL ACTIVITY, EXTERNAL APPROACH: ICD-10-PCS | Performed by: PSYCHIATRY & NEUROLOGY

## 2020-09-02 PROCEDURE — A9270 NON-COVERED ITEM OR SERVICE: HCPCS | Performed by: PHYSICAL MEDICINE & REHABILITATION

## 2020-09-02 PROCEDURE — 700111 HCHG RX REV CODE 636 W/ 250 OVERRIDE (IP): Performed by: INTERNAL MEDICINE

## 2020-09-02 PROCEDURE — 700117 HCHG RX CONTRAST REV CODE 255: Performed by: INTERNAL MEDICINE

## 2020-09-02 PROCEDURE — 82803 BLOOD GASES ANY COMBINATION: CPT

## 2020-09-02 PROCEDURE — 770022 HCHG ROOM/CARE - ICU (200)

## 2020-09-02 PROCEDURE — 85025 COMPLETE CBC W/AUTO DIFF WBC: CPT

## 2020-09-02 PROCEDURE — 700101 HCHG RX REV CODE 250: Performed by: INTERNAL MEDICINE

## 2020-09-02 RX ORDER — LORAZEPAM 2 MG/ML
0-4 INJECTION INTRAMUSCULAR ONCE
Status: COMPLETED | OUTPATIENT
Start: 2020-09-02 | End: 2020-09-02

## 2020-09-02 RX ORDER — ACETAMINOPHEN 650 MG/1
325 SUPPOSITORY RECTAL EVERY 6 HOURS PRN
Status: DISCONTINUED | OUTPATIENT
Start: 2020-09-02 | End: 2020-09-23

## 2020-09-02 RX ORDER — SODIUM CHLORIDE 9 MG/ML
INJECTION, SOLUTION INTRAVENOUS
Status: ACTIVE
Start: 2020-09-02 | End: 2020-09-02

## 2020-09-02 RX ORDER — POLYETHYLENE GLYCOL 3350 17 G/17G
1 POWDER, FOR SOLUTION ORAL 2 TIMES DAILY
Status: DISCONTINUED | OUTPATIENT
Start: 2020-09-02 | End: 2020-09-18

## 2020-09-02 RX ORDER — LORAZEPAM 2 MG/ML
INJECTION INTRAMUSCULAR
Status: COMPLETED
Start: 2020-09-02 | End: 2020-09-02

## 2020-09-02 RX ORDER — LORAZEPAM 2 MG/ML
4 INJECTION INTRAMUSCULAR EVERY 4 HOURS PRN
Status: DISCONTINUED | OUTPATIENT
Start: 2020-09-02 | End: 2020-09-02

## 2020-09-02 RX ADMIN — VANCOMYCIN HYDROCHLORIDE 1250 MG: 500 INJECTION, POWDER, LYOPHILIZED, FOR SOLUTION INTRAVENOUS at 05:02

## 2020-09-02 RX ADMIN — LORAZEPAM 4 MG: 2 INJECTION INTRAMUSCULAR; INTRAVENOUS at 11:37

## 2020-09-02 RX ADMIN — CEFTRIAXONE SODIUM 1 G: 1 INJECTION, POWDER, FOR SOLUTION INTRAMUSCULAR; INTRAVENOUS at 17:08

## 2020-09-02 RX ADMIN — ACETAMINOPHEN 1000 MG: 325 TABLET, FILM COATED ORAL at 05:01

## 2020-09-02 RX ADMIN — LEVETIRACETAM 1000 MG: 10 INJECTION INTRAVENOUS at 01:55

## 2020-09-02 RX ADMIN — LEVETIRACETAM 1000 MG: 10 INJECTION INTRAVENOUS at 16:48

## 2020-09-02 RX ADMIN — BUSPIRONE HYDROCHLORIDE 15 MG: 10 TABLET ORAL at 05:01

## 2020-09-02 RX ADMIN — DOCUSATE SODIUM 50 MG AND SENNOSIDES 8.6 MG 2 TABLET: 8.6; 5 TABLET, FILM COATED ORAL at 17:08

## 2020-09-02 RX ADMIN — ACETAMINOPHEN 650 MG: 650 SUPPOSITORY RECTAL at 16:32

## 2020-09-02 RX ADMIN — FAMOTIDINE 20 MG: 10 INJECTION INTRAVENOUS at 05:01

## 2020-09-02 RX ADMIN — FENTANYL CITRATE 100 MCG: 50 INJECTION INTRAMUSCULAR; INTRAVENOUS at 01:55

## 2020-09-02 RX ADMIN — FAMOTIDINE 20 MG: 20 TABLET, FILM COATED ORAL at 17:08

## 2020-09-02 RX ADMIN — VANCOMYCIN HYDROCHLORIDE 1500 MG: 500 INJECTION, POWDER, LYOPHILIZED, FOR SOLUTION INTRAVENOUS at 12:31

## 2020-09-02 RX ADMIN — GADOTERIDOL 15 ML: 279.3 INJECTION, SOLUTION INTRAVENOUS at 12:36

## 2020-09-02 RX ADMIN — LORAZEPAM 4 MG: 2 INJECTION INTRAMUSCULAR; INTRAVENOUS at 11:05

## 2020-09-02 RX ADMIN — ENOXAPARIN SODIUM 40 MG: 40 INJECTION SUBCUTANEOUS at 05:01

## 2020-09-02 RX ADMIN — VANCOMYCIN HYDROCHLORIDE 1500 MG: 500 INJECTION, POWDER, LYOPHILIZED, FOR SOLUTION INTRAVENOUS at 17:08

## 2020-09-02 RX ADMIN — POTASSIUM BICARBONATE 25 MEQ: 978 TABLET, EFFERVESCENT ORAL at 08:21

## 2020-09-02 RX ADMIN — POLYETHYLENE GLYCOL 3350 1 PACKET: 17 POWDER, FOR SOLUTION ORAL at 12:31

## 2020-09-02 RX ADMIN — FENTANYL CITRATE 100 MCG: 50 INJECTION INTRAMUSCULAR; INTRAVENOUS at 00:41

## 2020-09-02 RX ADMIN — FENTANYL CITRATE 100 MCG: 50 INJECTION INTRAMUSCULAR; INTRAVENOUS at 11:36

## 2020-09-02 RX ADMIN — POLYETHYLENE GLYCOL 3350 1 PACKET: 17 POWDER, FOR SOLUTION ORAL at 17:07

## 2020-09-02 RX ADMIN — BACLOFEN 20 MG: 10 TABLET ORAL at 05:02

## 2020-09-02 RX ADMIN — DOCUSATE SODIUM 50 MG AND SENNOSIDES 8.6 MG 2 TABLET: 8.6; 5 TABLET, FILM COATED ORAL at 05:01

## 2020-09-02 RX ADMIN — FLUCONAZOLE 200 MG: 2 INJECTION, SOLUTION INTRAVENOUS at 05:07

## 2020-09-02 NOTE — PROGRESS NOTES
"Critical Care Progress Note    Date of admission  8/23/2020    Chief Complaint  19 y.o. female admitted 8/23/2020 with drug overdose, cardiac arrest    Hospital Course    \"19 y.o. female who presented 8/23/2020 with drug overdose with methamphetamines, oxycodone and found by boyfriend.  Cardiac arrest enroute to hospital and received narcan, epinephrine and cpr and ROSC achieved.  Difficult airway at scene and LMA placed. ET tube placed in ER.  She was vomiting during airway attempt.  On propofol due to vent asynchrony.  Propofol turned off for neuro assessment and possible hypothermic protocol.  Family no longer at hospital and unable to reach per phone number given for mother.\"    8/24 - TTM protocol initiated, EEG without NCSE  8/25 - rewarmed, awake, not following  8/26 - following occasional commands  8/27- seizure like activity vs shivering--> Keppra, versed, Propofol  8/28- no seizures on EEG  8/29- remains unresponsive.  8/30 -mental status improved, now opening eyes and following commands.  Keppra discontinued  8/31 - started cefepime/vancomycin for possible pneumonia,?  UTI, fever and increasing WBC fever; full vent  9/1 - Seizure activity noted on EEG today, reviewed with neurology; keppra load and increased to 1gm q 12; full vent support  9/2 - remains unresponsive with only some posture and grimace to stimulation, neurology reconsulted, stat MRI brain, LP, cEEG        Interval Problem Update  Reviewed last 24 hour events:  Some decort posturing, opens eyes, open eyes, not following  SR/ST   - 150  Tc = 99.5, Tm = 100.4  I/O = 1.7/3.3 -2.2, net -1.9L since admit  No sedation   TF at goal  + BM today  Vent day 11  %, 30%; michelle SBT, not following  CXR clearing  lovenox ppx   Vanco/C3  CNS staph 1 of 2 BC  Stop baclofen, stop buspar    Update: Reviewed with neurology.  Given worsening mental status, not following commands and documented seizure activity will continue on the higher dose Keppra, " obtain stat MRI brain, LP to rule out CNS infection then initiate continuous EEG monitoring.      Review of Systems  Review of Systems   Unable to perform ROS: Intubated        Vital Signs for last 24 hours   Temp:  [37.4 °C (99.3 °F)-38 °C (100.4 °F)] 37.7 °C (99.9 °F)  Pulse:  [] 117  Resp:  [14-29] 23  BP: (100-143)/(50-82) 134/70  SpO2:  [97 %-100 %] 99 %    Hemodynamic parameters for last 24 hours       Respiratory Information for the last 24 hours  Vent Mode: ASV  PEEP/CPAP: 8  P Support: 5  MAP: 10  Length of Weaning Trial (Hours): 1 hour   Control VTE (exp VT): 480    Physical Exam   Physical Exam  Vitals signs and nursing note reviewed.   Constitutional:       General: She is not in acute distress.     Appearance: She is not ill-appearing or toxic-appearing.      Interventions: She is intubated.   HENT:      Head: Normocephalic and atraumatic.      Right Ear: External ear normal.      Left Ear: External ear normal.      Nose: No congestion or rhinorrhea.      Comments: Feeding tube in place     Mouth/Throat:      Mouth: Mucous membranes are moist.      Pharynx: No oropharyngeal exudate or posterior oropharyngeal erythema.      Comments: ET tube in place  Eyes:      General: No scleral icterus.     Conjunctiva/sclera: Conjunctivae normal.      Pupils: Pupils are equal, round, and reactive to light.      Comments: Sluggish pupillary response   Neck:      Musculoskeletal: Neck supple. No neck rigidity or muscular tenderness.   Cardiovascular:      Rate and Rhythm: Normal rate and regular rhythm.      Pulses: Normal pulses.      Heart sounds: Normal heart sounds. No murmur.   Pulmonary:      Effort: She is intubated.      Breath sounds: No wheezing or rales.      Comments: Full ventilator support, low support settings  Abdominal:      General: Abdomen is flat. There is no distension.      Palpations: Abdomen is soft. There is no mass.      Tenderness: There is no abdominal tenderness.   Musculoskeletal:          General: No swelling or tenderness.      Right lower leg: No edema.      Left lower leg: No edema.   Skin:     General: Skin is warm and dry.      Capillary Refill: Capillary refill takes less than 2 seconds.      Findings: No erythema or rash.   Neurological:      GCS: GCS eye subscore is 3. GCS verbal subscore is 1. GCS motor subscore is 5.      Cranial Nerves: No cranial nerve deficit or facial asymmetry.      Motor: Abnormal muscle tone present.      Comments: Mild grimace to stimulation, not following any commands, no spontaneous eye opening, some decorticate posturing with stimulation, no clear generalized seizure activity noted   Psychiatric:      Comments: Unable to assess         Medications  Current Facility-Administered Medications   Medication Dose Route Frequency Provider Last Rate Last Dose   • SODIUM CHLORIDE 0.9 % IV SOLN            • vancomycin (VANCOCIN) 1,500 mg in  mL IVPB  21 mg/kg Intravenous Q6HRS Dar Red M.D.       • cefTRIAXone (ROCEPHIN) 1 g in  mL IVPB  1 g Intravenous Q24HRS Dar Red M.D.   Stopped at 09/01/20 1801   • levETIRAcetam (Keppra) 1000 mg in 100 mL NaCl IV premix  1,000 mg Intravenous Q12HRS Dar Red M.D.   Stopped at 09/02/20 0210   • MD Alert...Vancomycin per Pharmacy   Other PHARMACY TO DOSE Dar Red M.D.       • acetaminophen (TYLENOL) tablet 1,000 mg  1,000 mg Enteral Tube Q4HRS PRN Dar Red M.D.   1,000 mg at 09/02/20 0501   • fentaNYL (SUBLIMAZE) injection  mcg   mcg Intravenous Q HOUR PRN Dar Red M.D.   100 mcg at 09/02/20 0155   • baclofen (LIORESAL) tablet 20 mg  20 mg Enteral Tube TID REJI ParikhOYue   20 mg at 09/02/20 0502   • busPIRone (BUSPAR) tablet 15 mg  15 mg Enteral Tube BID Boogie Stokes M.D.   15 mg at 09/02/20 0501   • labetalol (NORMODYNE/TRANDATE) injection 10 mg  10 mg Intravenous Q4HRS PRN Yosef Tomlin M.D.   10 mg at 08/30/20 0746   • midazolam (VERSED) 2 MG/2ML  injection 2-4 mg  2-4 mg Intravenous Q15 MIN PRN Yosef Tomlin M.D.   2 mg at 08/28/20 0854   • propofol (DIPRIVAN) injection  0-80 mcg/kg/min Intravenous Continuous Boogie Augustine Jr. D.O.   Stopped at 08/30/20 0428   • Pharmacy Consult: Enteral tube insertion - review meds/change route/product selection  1 Each Other PHARMACY TO DOSE JESSY Piper Jr..O.       • Respiratory Therapy Consult   Nebulization Continuous RT Brock Murdock M.D.       • ipratropium-albuterol (DUONEB) nebulizer solution  3 mL Nebulization Q2HRS PRN (RT) Brock Murdock M.D.       • famotidine (PEPCID) tablet 20 mg  20 mg Enteral Tube Q12HRS Brock Murdock M.D.   20 mg at 09/01/20 0457    Or   • famotidine (PEPCID) injection 20 mg  20 mg Intravenous Q12HRS Brock Murdock M.D.   20 mg at 09/02/20 0501   • senna-docusate (PERICOLACE or SENOKOT S) 8.6-50 MG per tablet 2 Tab  2 Tab Enteral Tube BID Brock Murdock M.D.   2 Tab at 09/02/20 0501    And   • polyethylene glycol/lytes (MIRALAX) PACKET 1 Packet  1 Packet Enteral Tube QDAY PRN Brock Murdock M.D.        And   • magnesium hydroxide (MILK OF MAGNESIA) suspension 30 mL  30 mL Enteral Tube QDAY PRN Brock Mrudock M.D.        And   • bisacodyl (DULCOLAX) suppository 10 mg  10 mg Rectal QDAY PRN Brock Murdock M.D.       • MD Alert...ICU Electrolyte Replacement per Pharmacy   Other PHARMACY TO DOSE Brock Murdock M.D.       • lidocaine (XYLOCAINE) 1 % injection 1-2 mL  1-2 mL Tracheal Tube Q30 MIN PRN Brock Murdock M.D.       • hydrALAZINE (APRESOLINE) injection 10-20 mg  10-20 mg Intravenous Q6HRS PRN Brock Murdock M.D.       • enoxaparin (LOVENOX) inj 40 mg  40 mg Subcutaneous DAILY JESSY Piper Jr..O.   40 mg at 09/02/20 0501       Fluids    Intake/Output Summary (Last 24 hours) at 9/2/2020 0925  Last data filed at 9/2/2020 0702  Gross per 24 hour   Intake 920 ml   Output 3070 ml   Net -2150 ml       Laboratory  Recent Labs     08/31/20  0210 09/01/20  0448 09/02/20  035    ISTATAPH 7.451 7.406 7.450   ISTATAPCO2 37.0 39.0* 36.1   ISTATAPO2 99* 86 91*   ISTATATCO2 27 26 26   NNIKJPI3EFX 98 97 97   ISTATARTHCO3 25.7* 24.5 25.1*   ISTATARTBE 2 0 1   ISTATTEMP 38.0 C 38.3 C 37.9 C   ISTATFIO2 30 30 30   ISTATSPEC Arterial Arterial Arterial   ISTATAPHTC 7.436 7.386* 7.436   [id number] 105* 94* 96*         Recent Labs     08/31/20 0400 09/01/20 0445 09/02/20  0430   SODIUM 133* 135 136   POTASSIUM 4.4 3.7 3.9   CHLORIDE 98 100 99   CO2 25 23 24   BUN 23* 21 18   CREATININE 0.33* 0.50 0.41*   CALCIUM 8.9 8.7 9.0     Recent Labs     08/31/20 0400 09/01/20 0445 09/02/20  0430   ALTSGPT 66* 79* 67*   ASTSGOT 76* 91* 63*   ALKPHOSPHAT 88 81 85   TBILIRUBIN 0.2 0.2 0.2   GLUCOSE 122* 131* 127*     Recent Labs     08/31/20 0400 09/01/20 0445 09/02/20  0430   WBC 13.4* 12.9* 15.9*   NEUTSPOLYS 72.30* 71.90 73.70*   LYMPHOCYTES 11.40* 12.10* 12.40*   MONOCYTES 13.00 12.00 9.50   EOSINOPHILS 0.60 0.90 1.10   BASOPHILS 0.40 0.50 0.40   ASTSGOT 76* 91* 63*   ALTSGPT 66* 79* 67*   ALKPHOSPHAT 88 81 85   TBILIRUBIN 0.2 0.2 0.2     Recent Labs     08/31/20 0400 09/01/20 0445 09/02/20  0430   RBC 4.03* 3.74* 3.99*   HEMOGLOBIN 12.4 11.7* 12.3   HEMATOCRIT 37.8 35.0* 37.0   PLATELETCT 458* 431 494*       Imaging  X-Ray:  I have personally reviewed the images and compared with prior images.     MRI 8/27:  The diffusion-weighted sequences demonstrates areas of restricted diffusion in the bilateral basal ganglia and some of the frontal gray matter. The predominant portion of the brain parenchyma does not demonstrate any restricted diffusion. Therefore this   findings likely represent mild diffuse anoxic brain injury.    Assessment/Plan  PEA (Pulseless electrical activity) (HCC)  PEA enroute to hospital  Hypoxia and aspiration contributory  MRI reveals bilateral basal ganglial anoxic injury.  Status post therapeutic hypothermia and rewarmed    Acute respiratory failure with hypoxia (HCC)  Secondary  to drug overdose  Lung protective ventilation strategies  Not appropriate for liberation  Will likely require tracheostomy  D/w family today    Drug overdose  Methamphetamines, oxycodone and EtOH per boyfriend    Cerebral edema (HCC)  Mild/possible per radiologist on CT  MRI on 8/27/2020 without edema or severe changes  Repeat MRI 9/2 -results pending    Elevated transaminase level  Follow    Encephalopathy acute  Anoxic encephalopathy, was improving  Reevaluate now with repeat MRI, LP and continuous EEG for worsening encephalopathy    Hypoglycemia  Follow    Pneumonia of both lungs due to methicillin resistant Staphylococcus aureus (MRSA) (HCC)  BAL 8/31 - > 100K CFU MRSA  Vanco 8/31 - P  BCs neg (1 of 2 with CNS - suspected contaminant)         Updated plan:  Continue on full ventilator support.  Not appropriate for liberation given altered mentation/unresponsive.  Will likely require tracheostomy after work-up for worsening neuro status  Stat MRI brain with and without contrast  Briefly reviewed with radiology, okay to proceed with lumbar puncture  LP performed 3 cc of clear CSF fluid obtained, sent for appropriate studies  Initiate continuous EEG  Cardiology will reevaluate and continue to follow  Continue antibiotics for MRSA pneumonia and follow the remainder of cultures  Discontinue any drugs that may reduce seizure threshold; baclofen discontinued today  Continue to minimize any sedatives unless active seizure noted      VTE:  Lovenox  Ulcer: H2 Antagonist  Lines: Central Line  Ongoing indication addressed    I have performed a physical exam and reviewed and updated ROS and Plan today (9/2/2020). In review of yesterday's note (9/1/2020), there are no changes except as documented above.     Discussed patient condition and risk of morbidity and/or mortality with RN, RT, Pharmacy, Dietary, , Code status disscussed, Charge nurse / hot rounds and neurology     The patient remains critically ill.  I  have assessed and reassessed the patient multiple times.  She is at high risk for further vital organ deterioration with significant ongoing critical care management as above.  Critical care time = 80 minutes in directly providing and coordinating critical care and extensive data review.  No time overlap and excludes procedures.

## 2020-09-02 NOTE — PROGRESS NOTES
"Pharmacy Kinetics 19 y.o. female on vancomycin day # 3 2020    Currently on Vancomycin 1250 mg iv q8hr  Vancomycin 1750 mg x1  @ 1230     Indication for Treatment: Empric treatment for possible PNA     Proposed duration of treatment: TBD       Pertinent history per medical record: Admitted on 2020 for Admitted for drug overdose, cardiac arrest. Previously on CTX + flagyl x 5 days. Patient is currently intubated, : fevers and leukocytosis, broad spectrum antibiotics started. .     Other antibiotics: Ceftriaxone 1gm IV Q24hr     Allergies: Penicillins      List concerns for renal function: none      Pertinent cultures to date:   : BCX: NGTD x2   : tracheal aspirate: MRSA, no fungal elements seen   : CoNS    MRSA Nares: positive     Recent Labs     20  0400 20  0445 20  0430   WBC 13.4* 12.9* 15.9*   NEUTSPOLYS 72.30* 71.90 73.70*     Recent Labs     20  0400 20  0445 20  0430   BUN 23* 21 18   CREATININE 0.33* 0.50 0.41*   ALBUMIN 3.3 3.1* 3.3     Recent Labs     20  0430   VANCOTROUGH 8.2*       Intake/Output Summary (Last 24 hours) at 2020 0816  Last data filed at 2020 0600  Gross per 24 hour   Intake 890 ml   Output 3070 ml   Net -2180 ml      /70   Pulse (!) 117   Temp 37.7 °C (99.9 °F) (Bladder)   Resp (!) 23   Ht 1.6 m (5' 2.99\")   Wt 69.5 kg (153 lb 3.5 oz)   SpO2 99%  Temp (24hrs), Av.8 °C (100 °F), Min:37.4 °C (99.3 °F), Max:38 °C (100.4 °F)      A/P   1. Vancomycin dose change: start Vancomycin 1500 mg Q 6 hr (21 mg/kg) (0500, 1100, 1700, 2300)   2. Next vancomycin level: 20 @2230 (before 3rd 1500mg dose)    3. Goal trough: 15-20 mcg/ml  4. Comments: Renal function stable?, historical dosing not noted. Dosing done per troughs. Trough is subtheraputic, trough drawn appropriately 7.5 hr before 5th dose. The maintence dose was given 12hr after the loading dose. At 1500mg patient will be at max mg/kg dosing (21 " mg/kg).  Patient is also a methamphetamine making hypermetabolism of vancomycin likely. Will start patient on 1500 mg Q6hr dosing to get patient within goal. Please frequently monitor troughs to assess for accumulation and deescalate from Q6h or reduce dose once patient is within goal to avoid accumulation.  Pharmacy will follow renal function, cultures, troughs, and patient's clinical status to guide subsequent recommendations.    Addendum   First dose of 1500 mg Q6h was was given 1.5hr late. I have adjusted the schedule to 0000, 0600, 1200, and 1800. I have also adjusted the trough to 9/2/20 @ 2330 in light of the timing adjustment.     Timbo Ruelas PharmD  [phone]

## 2020-09-02 NOTE — ASSESSMENT & PLAN NOTE
Sputum culture with MRSA, Acinetobacter and Stenotrophomonas species on 9/6  Completed 10 days of Bactrim on 9/13  Sputum culture from 9/17 with Pseudomonas aeruginosa  Stop Fortaz today- reported to rarely be associated with seizures

## 2020-09-02 NOTE — PROGRESS NOTES
Foot drop boot delivered to nurse for future application per protocol. Any further assistance please please contact traction.

## 2020-09-02 NOTE — PROCEDURES
Lumbar Puncture Procedure Note    Date: 9/2/2020    Procedure: Lumbar puncture    Level: L4-5    Indication: Encephalopathy, seizures, fever    Consent: Informed consent obtained from patient or designated decision maker after explaining the benefits/risks of the procedure including but not limited to bleeding, infection, nerve or other deep structure injury, paralysis, or death. Patient or surrogate expressed understanding and agreement.    Procedure: After obtaining consent, a time-out was performed. Appropriate site confirmed with landmarks and patient positioned left lateral decubitus position in bed on full ventilator support, prepped, and draped in sterile fashion. All those present wearing cap and mask and those physically participating remained sterile with cap, mask, and gloves.  Using a spinal needle, CSF was obtained. After obtaining 3 cc of clear CSF, the flow stopped and I cannot get further fluid.  The needle removed with stylet in place and site bandaged. Patient instructed to lay flat for 30-60 minutes as tolerated. Patient tolerated procedure well without any difficulties and left in care of bedside nurse.     CSF appearance: Clear    CSF amount collected: 3 cc    Opening pressure: Not obtained     EBL: minimal    Complications: None    Sample will be sent to the lab for the appropriate studies.

## 2020-09-02 NOTE — CARE PLAN
Problem: Respiratory:  Goal: Respiratory status will improve  Outcome: PROGRESSING AS EXPECTED     Problem: Fluid Volume:  Goal: Will maintain balanced intake and output  Outcome: PROGRESSING AS EXPECTED     Problem: Communication  Goal: The ability to communicate needs accurately and effectively will improve  Outcome: PROGRESSING AS EXPECTED     Problem: Safety  Goal: Will remain free from injury  Outcome: PROGRESSING AS EXPECTED  Goal: Will remain free from falls  Outcome: PROGRESSING AS EXPECTED     Problem: Infection  Goal: Will remain free from infection  Outcome: PROGRESSING AS EXPECTED     Problem: Venous Thromboembolism (VTW)/Deep Vein Thrombosis (DVT) Prevention:  Goal: Patient will participate in Venous Thrombosis (VTE)/Deep Vein Thrombosis (DVT)Prevention Measures  Outcome: PROGRESSING AS EXPECTED     Problem: Bowel/Gastric:  Goal: Normal bowel function is maintained or improved  Outcome: PROGRESSING AS EXPECTED  Goal: Will not experience complications related to bowel motility  Outcome: PROGRESSING AS EXPECTED     Problem: Knowledge Deficit  Goal: Knowledge of disease process/condition, treatment plan, diagnostic tests, and medications will improve  Outcome: PROGRESSING AS EXPECTED  Goal: Knowledge of the prescribed therapeutic regimen will improve  Outcome: PROGRESSING AS EXPECTED     Problem: Discharge Barriers/Planning  Goal: Patient's continuum of care needs will be met  Outcome: PROGRESSING AS EXPECTED     Problem: Skin Integrity  Goal: Risk for impaired skin integrity will decrease  Outcome: PROGRESSING AS EXPECTED     Problem: Pain Management  Goal: Pain level will decrease to patient's comfort goal  Outcome: PROGRESSING AS EXPECTED     Problem: Psychosocial Needs:  Goal: Level of anxiety will decrease  Outcome: PROGRESSING AS EXPECTED

## 2020-09-02 NOTE — PROGRESS NOTES
Brief Neurology Note    The patient's chart has been reviewed. Case discussed with Dr. Beatty and Dr. Red    Cefepime started 9/1/20 AM which seemingly provoked seizure like activity.  An EEG showed seizure.    Cefepime since discontinued and Keppra increased to 1000mg BID    Ty Mccann MD  Medical Director, [facility], [facility]  Neurohospitalist, & Vascular Neurology, [facility]  Clinical  of Neurology, [facility] [employer]

## 2020-09-02 NOTE — PROGRESS NOTES
Case Care Coordination    After a discussion between Insensivist, Dr. Red & Epileptologist, Dr. Beatty, it was determined that given current clinical impression, it is appropriate to initiate 24hr VEEG continuous monitoring in an effort to identify evidence of sub clinical seizures.    Continue Keppra 1000mg BID    MRI performed previously this admission showing mild anoxic injury s/p polysubstance overdose and subsequent cardiac arrest with ROSC, s/p therapeutic hypothermia protocol.    Formal neurological progress documentation and notation to resume 9/3/20 after discussion with expertise re: Dr. Beatty.    Dr. Kobe Gonzales to continue to follow closely from a neurological perspective.    MOIZ Mccann MD  Neurology

## 2020-09-02 NOTE — PROGRESS NOTES
No acute events overnight     NEUROLOGICAL: No moments to propose. Open eyes spontaneous does not track or follow commands. Cough gag and corneal reflexes. Decerebrate posturing but no decerebrate rigidity noted;     CARDIAC: Pt tachycardic in the presents of any stimulation. She has been low grade fever / afebrile most of the night 37.7C. Fentanyl helped sustained 's slow to 90's SKIN: Skin   Monitor Summary:   Pr 14, QRS 08 Qt 30  HR 90-140s      GI/: Bowels hypoactive, excellent UO.     Integumentary:  Moisture-Associated Skin irritation at site of groin, barrier cream applied     All labs reviewed. WBC moderate upward trend, Liver enzymes improving, vancomycin  trough low.      Results for JO ULLOA (MRN [id number]) as of 9/2/2020 05:56   9/1/2020 04:45 9/2/2020 04:30   WBC 12.9 (H) 15.9 (H)   RBC 3.74 (L) 3.99 (L)   Hemoglobin 11.7 (L) 12.3   Hematocrit 35.0 (L) 37.0   MCV 93.6 92.7   MCH 31.3 30.8   MCHC 33.4 (L) 33.2 (L)   RDW 43.5 42.4   Platelet Count 431 494 (H)   MPV 9.0 9.1   Neutrophils-Polys 71.90 73.70 (H)   Neutrophils (Absolute) 9.30 (H) 11.73 (H)   Lymphocytes 12.10 (L) 12.40 (L)   Lymphs (Absolute) 1.56 1.98   Monocytes 12.00 9.50   Monos (Absolute) 1.55 (H) 1.51 (H)   Eosinophils 0.90 1.10   Eos (Absolute) 0.11 0.17   Basophils 0.50 0.40   Baso (Absolute) 0.06 0.06     Results for JO ULLOA (MRN [id number]) as of 9/2/2020 05:56   9/1/2020 04:45 9/2/2020 04:30   Sodium 135 136   Potassium 3.7 3.9   Chloride 100 99   Co2 23 24   Anion Gap 12.0 13.0   Glucose 131 (H) 127 (H)   Bun 21 18   Creatinine 0.50 0.41 (L)   GFR If  >60 >60   GFR If Non African American >60 >60   Calcium 8.7 9.0   AST(SGOT) 91 (H) 63 (H)   ALT(SGPT) 79 (H) 67 (H)   Alkaline Phosphatase 81 85   Total Bilirubin 0.2 0.2   Albumin 3.1 (L) 3.3   Total Protein 6.2 6.4   Globulin 3.1 3.1   A-G Ratio 1.0 1.1   Triglycerides 168 (H)    Vancomycin Trough  8.2 (L)

## 2020-09-02 NOTE — PROGRESS NOTES
Physical Medicine and Rehabilitation Consultation         Initial Consult      Date of initial consultation: 2020  Consulting provider: Ty Mccann MD  Reason for consultation: assess for acute inpatient rehab appropriateness  LOS: 9 Day(s)    Chief complaint: anoxic brain injury     HPI: The patient is a 19 y.o. white female with a past medical history of drug abuse;  who presented on 2020  9:57 PM with drug overdose symptoms with methamphetamines, ETOH and oxycodone, found by her boyfriend.  Patient had cardiac arrest in route to hospital and received Narcan, epinephrine and CPR.  Intubated with LMA at the scene, ET tube placed in the ER, quickly sedated on propofol due to vent asynchrony, and placed in therapeutic hypothermia.  Patient developed encephalopathy, cerebral edema, CT concerning for anoxic brain injury.  Patient has been slowly returning to consciousness and notes indicate as of  she was able to open eyes and follow commands.  MRI showing mild anoxic brain injury affecting bilateral basal ganglia    The patient currently is intubated and on a fentanyl drip. She is not following commands and shows signs of severe anoxic brain injury. She in still on cooling therapy.     2020  Patient unimproved since prior. Repeat MRI now shows severe anoxic changes. Patient being prepared for EEG. Has posturing.     THERAPY:  PT: Functional mobility   None at this time     OT: ADLs  None at this time     SLP:   None at this time     IMAGIN/27 MRI brain   The diffusion-weighted sequences demonstrates areas of restricted diffusion in the bilateral basal ganglia and some of the frontal gray matter. The predominant portion of the brain parenchyma does not demonstrate any restricted diffusion. Therefore this findings likely represent mild diffuse anoxic brain injury.     MRI Brain   1.  Severe diffuse anoxic brain injury pattern which is more extensive and conspicuous than on previous  exam.  2.  No evidence of intracranial mass effect, extra-axial fluid collection, or interval development of hydrocephalus.  3.  Diffuse mucosal inflammatory changes filling the ethmoid and sphenoid sinuses.    PROCEDURES:  EEG 8/28 Garland Beatty MD  This is a normal routine EEG recording in the awake, drowsy/sleep state(s). Mild events of shivering and posturing like movements of the upper extremities captured, without eeg correlated. Patient woke up but did not follow any commands. No seizures captured. Clinical correlation is recommended.    8/31 BAL Dar Red MD   1.  Mildly inflamed airways  2.  Small amount of thick, white secretions  3.  Right lower lobe and left lower lobe airways therapeutically lavaged with small amounts of saline  4.  BAL right lower lobe sent for culture    Social Hx:  Unclear at this time   + Drug use history   Was due to start classes at [employer] this semester       PMH:  No past medical history on file.    PSH:  No past surgical history on file.    FHX:  Non-pertinent to today's issues    Medications:  Current Facility-Administered Medications   Medication Dose   • SODIUM CHLORIDE 0.9 % IV SOLN     • vancomycin (VANCOCIN) 1,500 mg in  mL IVPB  21 mg/kg   • polyethylene glycol/lytes (MIRALAX) PACKET 1 Packet  1 Packet   • LORazepam (ATIVAN) injection 4 mg  4 mg   • cefTRIAXone (ROCEPHIN) 1 g in  mL IVPB  1 g   • levETIRAcetam (Keppra) 1000 mg in 100 mL NaCl IV premix  1,000 mg   • MD Alert...Vancomycin per Pharmacy     • acetaminophen (TYLENOL) tablet 1,000 mg  1,000 mg   • fentaNYL (SUBLIMAZE) injection  mcg   mcg   • labetalol (NORMODYNE/TRANDATE) injection 10 mg  10 mg   • propofol (DIPRIVAN) injection  0-80 mcg/kg/min   • Pharmacy Consult: Enteral tube insertion - review meds/change route/product selection  1 Each   • Respiratory Therapy Consult     • ipratropium-albuterol (DUONEB) nebulizer solution  3 mL   • famotidine (PEPCID) tablet 20 mg  20 mg    Or   •  "famotidine (PEPCID) injection 20 mg  20 mg   • senna-docusate (PERICOLACE or SENOKOT S) 8.6-50 MG per tablet 2 Tab  2 Tab    And   • polyethylene glycol/lytes (MIRALAX) PACKET 1 Packet  1 Packet    And   • magnesium hydroxide (MILK OF MAGNESIA) suspension 30 mL  30 mL    And   • bisacodyl (DULCOLAX) suppository 10 mg  10 mg   • MD Alert...ICU Electrolyte Replacement per Pharmacy     • lidocaine (XYLOCAINE) 1 % injection 1-2 mL  1-2 mL   • hydrALAZINE (APRESOLINE) injection 10-20 mg  10-20 mg   • enoxaparin (LOVENOX) inj 40 mg  40 mg       Allergies:  Allergies   Allergen Reactions   • Penicillins Hives     Childhood reaction - data obtained from alternative chart  Tolerated ceftriaxone 8/2020       Physical Exam:  Vitals: /68   Pulse (!) 114   Temp 37.5 °C (99.5 °F) (Bladder)   Resp (!) 23   Ht 1.6 m (5' 2.99\")   Wt 69.5 kg (153 lb 3.5 oz)   SpO2 100%   Gen: NAD  Head: NC/AT  Eyes/ Nose/ Mouth: Pupils minimally reactive but EOMI are intact , moist mucous membranes  Cardio: RRR, no mumurs  Pulm: CTAB, with normal respiratory effort  Abd: Soft NTND, active bowel sounds,   Ext: No peripheral edema. No calf tenderness. No clubbing/cyanosis.     Mental status: intubated but awake, not following commands   Speech: none     DTRs: 3+ in bilateral  brachioradialis, 3+ in bilateral patellar tendons  POSITIVE babinski b/l  POSITIVE Lemus b/l     Tone: increased tone throughout the upper and lower extremities     Labs: Reviewed and significant for   Recent Labs     08/31/20 0400 09/01/20 0445 09/02/20  0430   RBC 4.03* 3.74* 3.99*   HEMOGLOBIN 12.4 11.7* 12.3   HEMATOCRIT 37.8 35.0* 37.0   PLATELETCT 458* 431 494*     Recent Labs     08/31/20 0400 09/01/20 0445 09/02/20  0430   SODIUM 133* 135 136   POTASSIUM 4.4 3.7 3.9   CHLORIDE 98 100 99   CO2 25 23 24   GLUCOSE 122* 131* 127*   BUN 23* 21 18   CREATININE 0.33* 0.50 0.41*   CALCIUM 8.9 8.7 9.0     Recent Results (from the past 24 hour(s))   ACCU-CHEK " GLUCOSE    Collection Time: 09/01/20  5:25 PM   Result Value Ref Range    Glucose - Accu-Ck 112 (H) 65 - 99 mg/dL   ACCU-CHEK GLUCOSE    Collection Time: 09/01/20  8:20 PM   Result Value Ref Range    Glucose - Accu-Ck 109 (H) 65 - 99 mg/dL   ACCU-CHEK GLUCOSE    Collection Time: 09/02/20  2:37 AM   Result Value Ref Range    Glucose - Accu-Ck 104 (H) 65 - 99 mg/dL   ISTAT ARTERIAL BLOOD GAS    Collection Time: 09/02/20  3:57 AM   Result Value Ref Range    Ph 7.450 7.400 - 7.500    Pco2 36.1 26.0 - 37.0 mmHg    Po2 91 (H) 64 - 87 mmHg    Tco2 26 20 - 33 mmol/L    S02 97 93 - 99 %    Hco3 25.1 (H) 17.0 - 25.0 mmol/L    BE 1 -4 - 3 mmol/L    Body Temp 37.9 C degrees    O2 Therapy 30 %    iPF Ratio 303     Ph Temp Barb 7.436 7.400 - 7.500    Pco2 Temp Co 37.6 (H) 26.0 - 37.0 mmHg    Po2 Temp Cor 96 (H) 64 - 87 mmHg    Specimen Arterial     Action Range Triggered NO     Inst. Qualified Patient YES    VANCOMYCIN TROUGH    Collection Time: 09/02/20  4:30 AM   Result Value Ref Range    Vancomycin Trough 8.2 (L) 10.0 - 20.0 ug/mL   CBC with Differential    Collection Time: 09/02/20  4:30 AM   Result Value Ref Range    WBC 15.9 (H) 4.8 - 10.8 K/uL    RBC 3.99 (L) 4.20 - 5.40 M/uL    Hemoglobin 12.3 12.0 - 16.0 g/dL    Hematocrit 37.0 37.0 - 47.0 %    MCV 92.7 81.4 - 97.8 fL    MCH 30.8 27.0 - 33.0 pg    MCHC 33.2 (L) 33.6 - 35.0 g/dL    RDW 42.4 35.9 - 50.0 fL    Platelet Count 494 (H) 164 - 446 K/uL    MPV 9.1 9.0 - 12.9 fL    Neutrophils-Polys 73.70 (H) 44.00 - 72.00 %    Lymphocytes 12.40 (L) 22.00 - 41.00 %    Monocytes 9.50 0.00 - 13.40 %    Eosinophils 1.10 0.00 - 6.90 %    Basophils 0.40 0.00 - 1.80 %    Immature Granulocytes 2.90 (H) 0.00 - 0.90 %    Nucleated RBC 0.00 /100 WBC    Neutrophils (Absolute) 11.73 (H) 2.00 - 7.15 K/uL    Lymphs (Absolute) 1.98 1.00 - 4.80 K/uL    Monos (Absolute) 1.51 (H) 0.00 - 0.85 K/uL    Eos (Absolute) 0.17 0.00 - 0.51 K/uL    Baso (Absolute) 0.06 0.00 - 0.12 K/uL    Immature  Granulocytes (abs) 0.46 (H) 0.00 - 0.11 K/uL    NRBC (Absolute) 0.00 K/uL   Comp Metabolic Panel    Collection Time: 09/02/20  4:30 AM   Result Value Ref Range    Sodium 136 135 - 145 mmol/L    Potassium 3.9 3.6 - 5.5 mmol/L    Chloride 99 96 - 112 mmol/L    Co2 24 20 - 33 mmol/L    Anion Gap 13.0 7.0 - 16.0    Glucose 127 (H) 65 - 99 mg/dL    Bun 18 8 - 22 mg/dL    Creatinine 0.41 (L) 0.50 - 1.40 mg/dL    Calcium 9.0 8.5 - 10.5 mg/dL    AST(SGOT) 63 (H) 12 - 45 U/L    ALT(SGPT) 67 (H) 2 - 50 U/L    Alkaline Phosphatase 85 30 - 99 U/L    Total Bilirubin 0.2 0.1 - 1.5 mg/dL    Albumin 3.3 3.2 - 4.9 g/dL    Total Protein 6.4 6.0 - 8.2 g/dL    Globulin 3.1 1.9 - 3.5 g/dL    A-G Ratio 1.1 g/dL   ESTIMATED GFR    Collection Time: 09/02/20  4:30 AM   Result Value Ref Range    GFR If African American >60 >60 mL/min/1.73 m 2    GFR If Non African American >60 >60 mL/min/1.73 m 2       Imaging:   MR-BRAIN-WITH & W/O   Final Result         1.  Severe diffuse anoxic brain injury pattern which is more extensive and conspicuous than on previous exam.      2.  No evidence of intracranial mass effect, extra-axial fluid collection, or interval development of hydrocephalus.      3.  Diffuse mucosal inflammatory changes filling the ethmoid and sphenoid sinuses.      DX-CHEST-PORTABLE (1 VIEW)   Final Result         1.  Pulmonary edema and/or infiltrates are identified, which are somewhat decreased since the prior exam.                        [id number] VIEW   Final Result      Feeding tube is noted with tip at the level of the proximal duodenum.                  DX-CHEST-PORTABLE (1 VIEW)   Final Result         1.  Pulmonary edema and/or infiltrates are identified, which are stable since the prior exam.                     DX-CHEST-PORTABLE (1 VIEW)   Final Result      Stable chest x-ray findings with right lower lobe consolidation.      DX-CHEST-PORTABLE (1 VIEW)   Final Result         1.  Pulmonary edema and/or infiltrates are  identified, which are stable since the prior exam.                  DX-CHEST-PORTABLE (1 VIEW)   Final Result         1.  Pulmonary edema and/or infiltrates are identified, which are stable since the prior exam.               MR-BRAIN-W/O   Final Result      The diffusion-weighted sequences demonstrates areas of restricted diffusion in the bilateral basal ganglia and some of the frontal gray matter. The predominant portion of the brain parenchyma does not demonstrate any restricted diffusion. Therefore this    findings likely represent mild diffuse anoxic brain injury.      DX-CHEST-PORTABLE (1 VIEW)   Final Result         1.  Pulmonary edema and/or infiltrates are identified, which are somewhat decreased since the prior exam.            DX-CHEST-PORTABLE (1 VIEW)   Final Result         1.  Pulmonary edema and/or infiltrates are identified, which are stable since the prior exam.         DX-ABDOMEN FOR TUBE PLACEMENT   Final Result         1.  Nonspecific bowel gas pattern.   2.  Dobbhoff tube tip overlying the expected location of the pylorus or first duodenal segment.      DX-CHEST-PORTABLE (1 VIEW)   Final Result         1.  Patchy bilateral pulmonary infiltrates, somewhat increased since prior.      EC-ECHOCARDIOGRAM COMPLETE W/O CONT   Final Result      DX-CHEST-PORTABLE (1 VIEW)   Final Result         1.  Patchy bilateral pulmonary infiltrates, somewhat increased since prior.      CT-HEAD W/O   Final Result         1.  Possible subtle sulcal effacement and slight loss of differentiation of gray-white matter, consider component of cerebral edema. Recommend radiographic follow-up   2.  Bilateral sphenoid and maxillary sinus air-fluid levels      DX-CHEST-PORTABLE (1 VIEW)   Final Result         1.  No acute cardiopulmonary disease.      DX-CHEST-PORTABLE (1 VIEW)    (Results Pending)           ASSESSMENT:  Patient is a 19 y.o. female admitted with clinically severe anoxic brain injury now with respiratory  distress requiring ventilatory support.     Psychiatric Code / Diagnosis to Support: 0002.9 - Brain Dysfunction: Other Brain      Rehabilitation: Impaired ADLs and mobility  Not a candidate for IPR at this time.     Barriers to transfer include: Insurance authorization, TCCs to verify disposition, medical clearance and bed availability     Additional Recommendations:  - This is a clinically severe case of anoxic brain injury, now confirmed on MRI. Given her young age, she does have the possibility for a clinically significant recovery with a timeline in the months to years range, and is not guaranteed.  -Again,  Recommend PEG tube to sustain nutrition, hydration and medication delivery. Functional swallow unable to return in the next 2 months.   - Rigid Prafo boots to bilateral feet to prevent contracture formation - soft prafos in place now   - Baclofen 20mg TID for spasticity - Cancelled for unknown reasons, recommend restarting.  - PT/OT/SLP when patient is able to participate   - Anticipate she will need SNF placement, possibly neuro-restorative. Less likely to qualify for IPR on this admission given her presentation today, but will continue to follow for improvements.   - Hold on neuro stimulants until patient is past agitation phase ([place] 4)  - avoid benzos and haldol for agitation as these medications have been shown to slow neurologic recovery   - Consider Seroquel QID for first line agitation management with the addition of propranolol 10mg TID.     DVT PPX: lovenox 40mg inj daily       Thank you for allowing us to participate in the care of this patient.     Patient was seen for 28 minutes on unit/floor of which > 50% of time was spent on counseling and coordination of care regarding the above, including prognosis, risk reduction, benefits of treatment, and options for next stage of care.    Chris Coy, DO   Physical Medicine and Rehabilitation

## 2020-09-02 NOTE — PROCEDURES
VIDEO ELECTROENCEPHALOGRAM REPORT      Referring provider: Dr. Red.     DOS: 9/2/2020 (total recording of 16 hours and 56 minutes).     INDICATION:  Annika He 19 y.o. female presenting with s/p cardiac arrest, altered mental status, abnormal involuntary movements.     CURRENT ANTIEPILEPTIC REGIMEN: Levetiracetam 1000 mg bid.     TECHNIQUE: 30 channel video electroencephalogram (EEG) was performed in accordance with the international 10-20 system. The study was reviewed in bipolar and referential montages. The recording examined a comatose patient.     DESCRIPTION OF THE RECORD:  The background is severely attenuated. Intermittently, runs of Frontal Intermittent Rhythmic Delta Activity (FIRDA), and frequent generalized sharps with frontal maximum. A few seizures captured overnight, on eeg these demonstrated rhythmic frontal sharply contoured delta activity. Seizures clinically associated with jerky movements involving head, shoulders, and suckling / chewing movements. Seizures were mostly brief.     ACTIVATION PROCEDURES:   Not performed.     EKG: sampling of the EKG recording demonstrated sinus rhythm.     EVENTS:  See above.     INTERPRETATION:  This is an abnormal video EEG recording in a comatose patient. A severe encephalopathy is suggested.The background is severely attenuated. Intermittently, runs of Frontal Intermittent Rhythmic Delta Activity (FIRDA), and frequent generalized sharps with frontal maximum. A few seizures captured overnight, on eeg these demonstrated rhythmic frontal sharply contoured delta activity. Seizures clinically associated with jerky movements involving head, shoulders, and suckling / chewing movements. Seizures were mostly brief, lasting up to a minute at times.  Clinical and radiological correlation is recommended.    Updates provided to Dr. Red and Dr. Gonzales.       Garland Beatty MD   Epilepsy and Neurodiagnostics.   Clinical  of  Neurology [facility].   Diplomate in Neurology, Epilepsy, and Electrodiagnostic Medicine.   Office: [phone]  Fax: [phone]

## 2020-09-03 ENCOUNTER — APPOINTMENT (OUTPATIENT)
Dept: RADIOLOGY | Facility: MEDICAL CENTER | Age: 20
DRG: 004 | End: 2020-09-03
Attending: INTERNAL MEDICINE
Payer: MEDICAID

## 2020-09-03 LAB
ACTION RANGE TRIGGERED IACRT: NO
ALBUMIN SERPL BCP-MCNC: 3.5 G/DL (ref 3.2–4.9)
ALBUMIN/GLOB SERPL: 1.1 G/DL
ALP SERPL-CCNC: 81 U/L (ref 30–99)
ALT SERPL-CCNC: 76 U/L (ref 2–50)
ANION GAP SERPL CALC-SCNC: 13 MMOL/L (ref 7–16)
AST SERPL-CCNC: 60 U/L (ref 12–45)
BACTERIA BRONCH AEROBE CULT: ABNORMAL
BASE EXCESS BLDA CALC-SCNC: 0 MMOL/L (ref -4–3)
BASOPHILS # BLD AUTO: 0.3 % (ref 0–1.8)
BASOPHILS # BLD: 0.05 K/UL (ref 0–0.12)
BILIRUB SERPL-MCNC: 0.3 MG/DL (ref 0.1–1.5)
BODY TEMPERATURE: ABNORMAL DEGREES
BUN SERPL-MCNC: 19 MG/DL (ref 8–22)
CALCIUM SERPL-MCNC: 9.1 MG/DL (ref 8.5–10.5)
CHLORIDE SERPL-SCNC: 100 MMOL/L (ref 96–112)
CO2 BLDA-SCNC: 24 MMOL/L (ref 20–33)
CO2 SERPL-SCNC: 22 MMOL/L (ref 20–33)
CREAT SERPL-MCNC: 0.4 MG/DL (ref 0.5–1.4)
EOSINOPHIL # BLD AUTO: 0.29 K/UL (ref 0–0.51)
EOSINOPHIL NFR BLD: 1.7 % (ref 0–6.9)
ERYTHROCYTE [DISTWIDTH] IN BLOOD BY AUTOMATED COUNT: 43.6 FL (ref 35.9–50)
GLOBULIN SER CALC-MCNC: 3.2 G/DL (ref 1.9–3.5)
GLUCOSE SERPL-MCNC: 117 MG/DL (ref 65–99)
GRAM STN SPEC: ABNORMAL
HCO3 BLDA-SCNC: 23 MMOL/L (ref 17–25)
HCT VFR BLD AUTO: 37.3 % (ref 37–47)
HGB BLD-MCNC: 12.4 G/DL (ref 12–16)
HOROWITZ INDEX BLDA+IHG-RTO: 277 MM[HG]
IMM GRANULOCYTES # BLD AUTO: 0.33 K/UL (ref 0–0.11)
IMM GRANULOCYTES NFR BLD AUTO: 2 % (ref 0–0.9)
INST. QUALIFIED PATIENT IIQPT: YES
LYMPHOCYTES # BLD AUTO: 2.19 K/UL (ref 1–4.8)
LYMPHOCYTES NFR BLD: 13 % (ref 22–41)
MCH RBC QN AUTO: 31 PG (ref 27–33)
MCHC RBC AUTO-ENTMCNC: 33.2 G/DL (ref 33.6–35)
MCV RBC AUTO: 93.3 FL (ref 81.4–97.8)
MONOCYTES # BLD AUTO: 1.14 K/UL (ref 0–0.85)
MONOCYTES NFR BLD AUTO: 6.8 % (ref 0–13.4)
NEUTROPHILS # BLD AUTO: 12.79 K/UL (ref 2–7.15)
NEUTROPHILS NFR BLD: 76.2 % (ref 44–72)
NRBC # BLD AUTO: 0 K/UL
NRBC BLD-RTO: 0 /100 WBC
O2/TOTAL GAS SETTING VFR VENT: 30 %
PCO2 BLDA: 30.1 MMHG (ref 26–37)
PCO2 TEMP ADJ BLDA: 31.8 MMHG (ref 26–37)
PH BLDA: 7.49 [PH] (ref 7.4–7.5)
PH TEMP ADJ BLDA: 7.47 [PH] (ref 7.4–7.5)
PLATELET # BLD AUTO: 567 K/UL (ref 164–446)
PMV BLD AUTO: 9 FL (ref 9–12.9)
PO2 BLDA: 83 MMHG (ref 64–87)
PO2 TEMP ADJ BLDA: 90 MMHG (ref 64–87)
POTASSIUM SERPL-SCNC: 4.2 MMOL/L (ref 3.6–5.5)
PROT SERPL-MCNC: 6.7 G/DL (ref 6–8.2)
RBC # BLD AUTO: 4 M/UL (ref 4.2–5.4)
SAO2 % BLDA: 97 % (ref 93–99)
SIGNIFICANT IND 70042: ABNORMAL
SITE SITE: ABNORMAL
SODIUM SERPL-SCNC: 135 MMOL/L (ref 135–145)
SOURCE SOURCE: ABNORMAL
SPECIMEN DRAWN FROM PATIENT: ABNORMAL
VANCOMYCIN TROUGH SERPL-MCNC: 13.8 UG/ML (ref 10–20)
WBC # BLD AUTO: 16.8 K/UL (ref 4.8–10.8)

## 2020-09-03 PROCEDURE — A9270 NON-COVERED ITEM OR SERVICE: HCPCS | Performed by: INTERNAL MEDICINE

## 2020-09-03 PROCEDURE — 36600 WITHDRAWAL OF ARTERIAL BLOOD: CPT

## 2020-09-03 PROCEDURE — 700111 HCHG RX REV CODE 636 W/ 250 OVERRIDE (IP): Performed by: INTERNAL MEDICINE

## 2020-09-03 PROCEDURE — 700102 HCHG RX REV CODE 250 W/ 637 OVERRIDE(OP): Performed by: INTERNAL MEDICINE

## 2020-09-03 PROCEDURE — 80053 COMPREHEN METABOLIC PANEL: CPT

## 2020-09-03 PROCEDURE — C9254 INJECTION, LACOSAMIDE: HCPCS | Performed by: INTERNAL MEDICINE

## 2020-09-03 PROCEDURE — 99291 CRITICAL CARE FIRST HOUR: CPT | Performed by: INTERNAL MEDICINE

## 2020-09-03 PROCEDURE — 82803 BLOOD GASES ANY COMBINATION: CPT

## 2020-09-03 PROCEDURE — 770022 HCHG ROOM/CARE - ICU (200)

## 2020-09-03 PROCEDURE — 95714 VEEG EA 12-26 HR UNMNTR: CPT | Performed by: PSYCHIATRY & NEUROLOGY

## 2020-09-03 PROCEDURE — 99233 SBSQ HOSP IP/OBS HIGH 50: CPT | Mod: 25 | Performed by: PSYCHIATRY & NEUROLOGY

## 2020-09-03 PROCEDURE — 95720 EEG PHY/QHP EA INCR W/VEEG: CPT | Performed by: PSYCHIATRY & NEUROLOGY

## 2020-09-03 PROCEDURE — 94003 VENT MGMT INPAT SUBQ DAY: CPT

## 2020-09-03 PROCEDURE — 700105 HCHG RX REV CODE 258: Performed by: INTERNAL MEDICINE

## 2020-09-03 PROCEDURE — 71045 X-RAY EXAM CHEST 1 VIEW: CPT

## 2020-09-03 PROCEDURE — 85025 COMPLETE CBC W/AUTO DIFF WBC: CPT

## 2020-09-03 PROCEDURE — 99292 CRITICAL CARE ADDL 30 MIN: CPT | Performed by: INTERNAL MEDICINE

## 2020-09-03 PROCEDURE — 80202 ASSAY OF VANCOMYCIN: CPT

## 2020-09-03 PROCEDURE — 4A10X4Z MONITORING OF CENTRAL NERVOUS ELECTRICAL ACTIVITY, EXTERNAL APPROACH: ICD-10-PCS | Performed by: PSYCHIATRY & NEUROLOGY

## 2020-09-03 PROCEDURE — 94770 HCHG CO2 EXPIRED GAS DETERMINATION: CPT

## 2020-09-03 RX ADMIN — DOCUSATE SODIUM 50 MG AND SENNOSIDES 8.6 MG 2 TABLET: 8.6; 5 TABLET, FILM COATED ORAL at 05:04

## 2020-09-03 RX ADMIN — LEVETIRACETAM 1000 MG: 10 INJECTION INTRAVENOUS at 05:24

## 2020-09-03 RX ADMIN — VANCOMYCIN HYDROCHLORIDE 1500 MG: 500 INJECTION, POWDER, LYOPHILIZED, FOR SOLUTION INTRAVENOUS at 17:14

## 2020-09-03 RX ADMIN — VANCOMYCIN HYDROCHLORIDE 1500 MG: 500 INJECTION, POWDER, LYOPHILIZED, FOR SOLUTION INTRAVENOUS at 12:33

## 2020-09-03 RX ADMIN — VANCOMYCIN HYDROCHLORIDE 1500 MG: 500 INJECTION, POWDER, LYOPHILIZED, FOR SOLUTION INTRAVENOUS at 01:45

## 2020-09-03 RX ADMIN — POLYETHYLENE GLYCOL 3350 1 PACKET: 17 POWDER, FOR SOLUTION ORAL at 06:00

## 2020-09-03 RX ADMIN — FAMOTIDINE 20 MG: 10 INJECTION INTRAVENOUS at 17:14

## 2020-09-03 RX ADMIN — ACETAMINOPHEN 1000 MG: 325 TABLET, FILM COATED ORAL at 12:12

## 2020-09-03 RX ADMIN — LEVETIRACETAM 1000 MG: 10 INJECTION INTRAVENOUS at 17:13

## 2020-09-03 RX ADMIN — FAMOTIDINE 20 MG: 10 INJECTION INTRAVENOUS at 05:04

## 2020-09-03 RX ADMIN — VANCOMYCIN HYDROCHLORIDE 1500 MG: 500 INJECTION, POWDER, LYOPHILIZED, FOR SOLUTION INTRAVENOUS at 23:55

## 2020-09-03 RX ADMIN — ACETAMINOPHEN 1000 MG: 325 TABLET, FILM COATED ORAL at 17:03

## 2020-09-03 RX ADMIN — ENOXAPARIN SODIUM 40 MG: 40 INJECTION SUBCUTANEOUS at 05:04

## 2020-09-03 RX ADMIN — SODIUM CHLORIDE 300 MG: 9 INJECTION, SOLUTION INTRAVENOUS at 11:36

## 2020-09-03 RX ADMIN — ACETAMINOPHEN 1000 MG: 325 TABLET, FILM COATED ORAL at 23:55

## 2020-09-03 RX ADMIN — SODIUM CHLORIDE 1000 MG: 9 INJECTION, SOLUTION INTRAVENOUS at 17:45

## 2020-09-03 RX ADMIN — CEFTRIAXONE SODIUM 1 G: 1 INJECTION, POWDER, FOR SOLUTION INTRAMUSCULAR; INTRAVENOUS at 18:45

## 2020-09-03 RX ADMIN — SODIUM CHLORIDE 200 MG: 9 INJECTION, SOLUTION INTRAVENOUS at 23:25

## 2020-09-03 RX ADMIN — VANCOMYCIN HYDROCHLORIDE 1500 MG: 500 INJECTION, POWDER, LYOPHILIZED, FOR SOLUTION INTRAVENOUS at 05:18

## 2020-09-03 ASSESSMENT — COGNITIVE AND FUNCTIONAL STATUS - GENERAL
HELP NEEDED FOR BATHING: TOTAL
STANDING UP FROM CHAIR USING ARMS: TOTAL
PERSONAL GROOMING: TOTAL
EATING MEALS: TOTAL
TOILETING: TOTAL
DRESSING REGULAR UPPER BODY CLOTHING: TOTAL
SUGGESTED CMS G CODE MODIFIER DAILY ACTIVITY: CN
DRESSING REGULAR LOWER BODY CLOTHING: TOTAL
TURNING FROM BACK TO SIDE WHILE IN FLAT BAD: UNABLE
MOVING FROM LYING ON BACK TO SITTING ON SIDE OF FLAT BED: UNABLE
SUGGESTED CMS G CODE MODIFIER MOBILITY: CN
DAILY ACTIVITIY SCORE: 6
WALKING IN HOSPITAL ROOM: TOTAL
MOBILITY SCORE: 6
MOVING TO AND FROM BED TO CHAIR: UNABLE
CLIMB 3 TO 5 STEPS WITH RAILING: TOTAL

## 2020-09-03 ASSESSMENT — LIFESTYLE VARIABLES
HAVE YOU EVER FELT YOU SHOULD CUT DOWN ON YOUR DRINKING: NO
TOTAL SCORE: 0
ALCOHOL_USE: YES
EVER HAD A DRINK FIRST THING IN THE MORNING TO STEADY YOUR NERVES TO GET RID OF A HANGOVER: NO
HOW MANY TIMES IN THE PAST YEAR HAVE YOU HAD 5 OR MORE DRINKS IN A DAY: 0
DOES PATIENT WANT TO STOP DRINKING: CANNOT ASSESS
ON A TYPICAL DAY WHEN YOU DRINK ALCOHOL HOW MANY DRINKS DO YOU HAVE: 0
AVERAGE NUMBER OF DAYS PER WEEK YOU HAVE A DRINK CONTAINING ALCOHOL: 0
HAVE PEOPLE ANNOYED YOU BY CRITICIZING YOUR DRINKING: NO
EVER FELT BAD OR GUILTY ABOUT YOUR DRINKING: NO
TOTAL SCORE: 0
TOTAL SCORE: 0
CONSUMPTION TOTAL: NEGATIVE

## 2020-09-03 ASSESSMENT — PATIENT HEALTH QUESTIONNAIRE - PHQ9
SUM OF ALL RESPONSES TO PHQ9 QUESTIONS 1 AND 2: 0
2. FEELING DOWN, DEPRESSED, IRRITABLE, OR HOPELESS: NOT AT ALL
1. LITTLE INTEREST OR PLEASURE IN DOING THINGS: NOT AT ALL

## 2020-09-03 ASSESSMENT — FIBROSIS 4 INDEX: FIB4 SCORE: 0.25

## 2020-09-03 NOTE — RESPIRATORY CARE
Ventilator Daily Summary     Vent Day #12     Ventilator settings changed this shift:none          Weaning trials:none     Respiratory Procedures:none      Plan: Continue current ventilator settings and wean mechanical ventilation as tolerated per physician orders.

## 2020-09-03 NOTE — CARE PLAN
Problem: Respiratory:  Goal: Respiratory status will improve  Outcome: PROGRESSING AS EXPECTED     Problem: Fluid Volume:  Goal: Will maintain balanced intake and output  Outcome: PROGRESSING AS EXPECTED     Problem: Safety  Goal: Will remain free from injury  Outcome: PROGRESSING AS EXPECTED  Goal: Will remain free from falls  Outcome: PROGRESSING AS EXPECTED     Problem: Venous Thromboembolism (VTW)/Deep Vein Thrombosis (DVT) Prevention:  Goal: Patient will participate in Venous Thrombosis (VTE)/Deep Vein Thrombosis (DVT)Prevention Measures  Outcome: PROGRESSING AS EXPECTED     Problem: Bowel/Gastric:  Goal: Normal bowel function is maintained or improved  Outcome: PROGRESSING AS EXPECTED  Goal: Will not experience complications related to bowel motility  Outcome: PROGRESSING AS EXPECTED     Problem: Discharge Barriers/Planning  Goal: Patient's continuum of care needs will be met  Outcome: PROGRESSING AS EXPECTED     Problem: Skin Integrity  Goal: Risk for impaired skin integrity will decrease  Outcome: PROGRESSING AS EXPECTED     Problem: Pain Management  Goal: Pain level will decrease to patient's comfort goal  Outcome: PROGRESSING AS EXPECTED     Problem: Psychosocial Needs:  Goal: Level of anxiety will decrease  Outcome: PROGRESSING AS EXPECTED     Problem: Communication  Goal: The ability to communicate needs accurately and effectively will improve  Outcome: PROGRESSING SLOWER THAN EXPECTED     Problem: Infection  Goal: Will remain free from infection  Outcome: PROGRESSING SLOWER THAN EXPECTED     Problem: Knowledge Deficit  Goal: Knowledge of disease process/condition, treatment plan, diagnostic tests, and medications will improve  Outcome: PROGRESSING SLOWER THAN EXPECTED  Goal: Knowledge of the prescribed therapeutic regimen will improve  Outcome: PROGRESSING SLOWER THAN EXPECTED

## 2020-09-03 NOTE — PROGRESS NOTES
Pharmacy Kinetics Addendum:    19 y.o. female on vancomycin day # 4 9/3/2020    Currently on Vancomycin 1500 mg iv q6hr  Provider specified end date: TBD    Indication for Treatment: Empiric treatment for possible PNA    Recent Labs     08/31/20  0400 09/01/20  0445 09/02/20  0430   BUN 23* 21 18   CREATININE 0.33* 0.50 0.41*   ALBUMIN 3.3 3.1* 3.3     Recent Labs     09/02/20  0430 09/03/20  0040   VANCOTROUGH 8.2* 13.8       A/P   1. Vancomycin dose change: No change  2. Next vancomycin level: Not currently scheduled.  3. Goal trough: 15-20 mcg/ml  4. Comments: Current trough drawn ~ 1 hour late and dose given ~1h early. Trough at this point mildly subtherapeutic but would expect trough to be therapeutic at exactly 5.5 hours after dose given. Please schedule another trough to monitor for accumulation as necessary. Will hand off to AM team for further dosing adjustments.     Joseph ArreolaD

## 2020-09-03 NOTE — PROGRESS NOTES
Neurology Progress Note        Subjective:  Lindsey is a 19-year-old woman with anoxic brain injury after cardiac arrest.  I was asked to resume following the patient after possible seizure activity was witnessed yesterday.  MRI of the brain was performed and she was started on continuous EEG monitoring which did confirm intermittent seizure activity.  Vimpat has been added to her medications this morning.    Objective:  Vitals:  Vitals:    09/03/20 1000 09/03/20 1100 09/03/20 1110 09/03/20 1200   BP: 116/86 102/63     Pulse: (!) 124 (!) 119 (!) 129    Resp: (!) 25 (!) 27 (!) 22    Temp: (!) 38.4 °C (101.1 °F) (!) 38.9 °C (102 °F)  (!) 38.6 °C (101.5 °F)   TempSrc: Bladder Bladder  Bladder   SpO2: 99% 99% 100%    Weight:       Height:         General: Intubated, eyes are open but does not track or look around the room or follow commands  Mental Status: Eyes are open, but no tracking or following commands  Cranial Nerves:  PERRL, EOMI with no nystagmus, face is symmetric  Motor: Withdraws all 4 limbs briskly, having some spontaneous left hand and finger movements that do not appear rhythmic or seizure-like.  Occasional coughing on the ventilator.  Reflexes:  toes downgoing bilaterally  Coordination: deferred due to altered mental status  Gait:  Deferred    Recent Labs     09/01/20 0445 09/02/20  0430 09/03/20  0455   WBC 12.9* 15.9* 16.8*   RBC 3.74* 3.99* 4.00*   HEMOGLOBIN 11.7* 12.3 12.4   HEMATOCRIT 35.0* 37.0 37.3   MCV 93.6 92.7 93.3   MCH 31.3 30.8 31.0   MCHC 33.4* 33.2* 33.2*   RDW 43.5 42.4 43.6   PLATELETCT 431 494* 567*   MPV 9.0 9.1 9.0     Recent Labs     09/01/20 0445 09/02/20  0430 09/03/20  0455   SODIUM 135 136 135   POTASSIUM 3.7 3.9 4.2   CHLORIDE 100 99 100   CO2 23 24 22   GLUCOSE 131* 127* 117*   BUN 21 18 19   CREATININE 0.50 0.41* 0.40*   CALCIUM 8.7 9.0 9.1                 Recent Labs     09/01/20  0445   TRIGLYCERIDE 168*     Recent Labs     09/01/20  0445 09/02/20  0430 09/03/20  0455    SODIUM 135 136 135   POTASSIUM 3.7 3.9 4.2   CHLORIDE 100 99 100   CO2 23 24 22   GLUCOSE 131* 127* 117*   BUN 21 18 19     Recent Labs     09/01/20  0445 09/02/20  0430 09/03/20  0455   SODIUM 135 136 135   POTASSIUM 3.7 3.9 4.2   CHLORIDE 100 99 100   CO2 23 24 22   BUN 21 18 19   CREATININE 0.50 0.41* 0.40*   CALCIUM 8.7 9.0 9.1         No results found for this or any previous visit.           Imaging: neuroimaging reviewed and directly visualized by me  DX-CHEST-PORTABLE (1 VIEW)   Final Result         1.  No focal infiltrates, previously visualized infiltrates are not readily apparent.                           MR-BRAIN-WITH & W/O   Final Result         1.  Severe diffuse anoxic brain injury pattern which is more extensive and conspicuous than on previous exam.      2.  No evidence of intracranial mass effect, extra-axial fluid collection, or interval development of hydrocephalus.      3.  Diffuse mucosal inflammatory changes filling the ethmoid and sphenoid sinuses.      DX-CHEST-PORTABLE (1 VIEW)   Final Result         1.  Pulmonary edema and/or infiltrates are identified, which are somewhat decreased since the prior exam.                        [id number] VIEW   Final Result      Feeding tube is noted with tip at the level of the proximal duodenum.                  DX-CHEST-PORTABLE (1 VIEW)   Final Result         1.  Pulmonary edema and/or infiltrates are identified, which are stable since the prior exam.                     DX-CHEST-PORTABLE (1 VIEW)   Final Result      Stable chest x-ray findings with right lower lobe consolidation.      DX-CHEST-PORTABLE (1 VIEW)   Final Result         1.  Pulmonary edema and/or infiltrates are identified, which are stable since the prior exam.                  DX-CHEST-PORTABLE (1 VIEW)   Final Result         1.  Pulmonary edema and/or infiltrates are identified, which are stable since the prior exam.               MR-BRAIN-W/O   Final Result      The  diffusion-weighted sequences demonstrates areas of restricted diffusion in the bilateral basal ganglia and some of the frontal gray matter. The predominant portion of the brain parenchyma does not demonstrate any restricted diffusion. Therefore this    findings likely represent mild diffuse anoxic brain injury.      DX-CHEST-PORTABLE (1 VIEW)   Final Result         1.  Pulmonary edema and/or infiltrates are identified, which are somewhat decreased since the prior exam.            DX-CHEST-PORTABLE (1 VIEW)   Final Result         1.  Pulmonary edema and/or infiltrates are identified, which are stable since the prior exam.         DX-ABDOMEN FOR TUBE PLACEMENT   Final Result         1.  Nonspecific bowel gas pattern.   2.  Dobbhoff tube tip overlying the expected location of the pylorus or first duodenal segment.      DX-CHEST-PORTABLE (1 VIEW)   Final Result         1.  Patchy bilateral pulmonary infiltrates, somewhat increased since prior.      EC-ECHOCARDIOGRAM COMPLETE W/O CONT   Final Result      DX-CHEST-PORTABLE (1 VIEW)   Final Result         1.  Patchy bilateral pulmonary infiltrates, somewhat increased since prior.      CT-HEAD W/O   Final Result         1.  Possible subtle sulcal effacement and slight loss of differentiation of gray-white matter, consider component of cerebral edema. Recommend radiographic follow-up   2.  Bilateral sphenoid and maxillary sinus air-fluid levels      DX-CHEST-PORTABLE (1 VIEW)   Final Result         1.  No acute cardiopulmonary disease.      DX-CHEST-PORTABLE (1 VIEW)    (Results Pending)       Impression:   Lindsey is a 19-year-old woman who had a cardiac arrest and is suffered anoxic brain injury.  Reportedly she was following commands over the weekend and then had a decline in her level of alertness and consciousness.  Follow-up MRI suggest some worsening of the anoxic brain injury but this likely represents just radiographic progression rather than clinical progression.   She has been found to have intermittent seizure activity which could explain her worsened mental state as well as her underlying infection.    Recommendations:  -Continue Keppra and Vimpat  -Continue EEG monitoring for another 24 hours  -In regards to prognosis, she is difficult to prognosticate on at this time given her underlying infection and uncontrolled seizures.  She is clearly suffered some component of anoxic brain injury and will be left with a long recovery process, but the endpoint of her recovery is difficult to predict at this point time.  -Neurology will continue to follow

## 2020-09-03 NOTE — PROGRESS NOTES
Discussed pt's persistent fever with Dr. Red. Cooling blanket on, ice packs in use. Will continue to give tylenol as ordered.

## 2020-09-03 NOTE — PROGRESS NOTES
"Critical Care Progress Note    Date of admission  8/23/2020    Chief Complaint  19 y.o. female admitted 8/23/2020 with drug overdose, cardiac arrest    Hospital Course    \"19 y.o. female who presented 8/23/2020 with drug overdose with methamphetamines, oxycodone and found by boyfriend.  Cardiac arrest enroute to hospital and received narcan, epinephrine and cpr and ROSC achieved.  Difficult airway at scene and LMA placed. ET tube placed in ER.  She was vomiting during airway attempt.  On propofol due to vent asynchrony.  Propofol turned off for neuro assessment and possible hypothermic protocol.  Family no longer at hospital and unable to reach per phone number given for mother.\"    8/24 - TTM protocol initiated, EEG without NCSE  8/25 - rewarmed, awake, not following  8/26 - following occasional commands  8/27- seizure like activity vs shivering--> Keppra, versed, Propofol  8/28- no seizures on EEG  8/29- remains unresponsive.  8/30 -mental status improved, now opening eyes and following commands.  Keppra discontinued  8/31 - started cefepime/vancomycin for possible pneumonia,?  UTI, fever and increasing WBC fever; full vent  9/1 - Seizure activity noted on EEG today, reviewed with neurology; keppra load and increased to 1gm q 12; full vent support  9/2 - remains unresponsive with only some posture and grimace to stimulation, neurology reconsulted, stat MRI brain, LP, cEEG  9/3 -ongoing seizures, Vimpat added, Depakote added, family conference today        Interval Problem Update  Reviewed last 24 hour events:  Ongoing posturing, some spont eye opening; not tracking or following  cEEG with sz noted  + C/G/C  SR/ - 140   - 130  1+ edema  Tm =  Cooling blanket  michelle TF  Lg liquid BM today  CXR clear, ETT OK  %, 8, 30%  Copious secretions ETT  Lovenox/pepcid ppx  C3/vanco  CSF neg  CNS 1 of 2 BC    Addendum, reviewed with neurology, ongoing seizures noted on EEG.  Added Vimpat than " Depakote.    Review of Systems  Review of Systems   Unable to perform ROS: Intubated        Vital Signs for last 24 hours   Temp:  [37.5 °C (99.5 °F)-38.9 °C (102 °F)] 38.5 °C (101.3 °F)  Pulse:  [] 120  Resp:  [12-37] 26  BP: ()/(46-92) 133/92  SpO2:  [96 %-100 %] 99 %    Hemodynamic parameters for last 24 hours       Respiratory Information for the last 24 hours  Vent Mode: ASV  PEEP/CPAP: 8  P Support: 5  MAP: 12  Control VTE (exp VT): 368    Physical Exam   Physical Exam  Vitals signs and nursing note reviewed.   Constitutional:       General: She is not in acute distress.     Appearance: She is not ill-appearing or toxic-appearing.      Interventions: She is intubated.   HENT:      Head: Normocephalic and atraumatic.      Right Ear: External ear normal.      Left Ear: External ear normal.      Nose: No congestion or rhinorrhea.      Comments: Feeding tube in place     Mouth/Throat:      Mouth: Mucous membranes are moist.      Pharynx: No oropharyngeal exudate or posterior oropharyngeal erythema.      Comments: ET tube in place  Eyes:      General: No scleral icterus.     Conjunctiva/sclera: Conjunctivae normal.      Pupils: Pupils are equal, round, and reactive to light.      Comments: Sluggish pupillary response   Neck:      Musculoskeletal: Neck supple. No neck rigidity or muscular tenderness.   Cardiovascular:      Rate and Rhythm: Normal rate and regular rhythm.      Pulses: Normal pulses.      Heart sounds: Normal heart sounds. No murmur.   Pulmonary:      Effort: She is intubated.      Breath sounds: No wheezing or rales.      Comments: Full ventilator support, low support settings  Abdominal:      General: Abdomen is flat. There is no distension.      Palpations: Abdomen is soft. There is no mass.      Tenderness: There is no abdominal tenderness.   Musculoskeletal:         General: No swelling or tenderness.      Right lower leg: No edema.      Left lower leg: No edema.   Skin:     General:  Skin is warm and dry.      Capillary Refill: Capillary refill takes less than 2 seconds.      Findings: No erythema or rash.   Neurological:      GCS: GCS eye subscore is 3. GCS verbal subscore is 1. GCS motor subscore is 5.      Cranial Nerves: No cranial nerve deficit or facial asymmetry.      Motor: Abnormal muscle tone present.      Comments: Mild grimace to stimulation, not following any commands, no spontaneous eye opening, some decorticate posturing with stimulation, no clear generalized seizure activity noted   Psychiatric:      Comments: Unable to assess         Medications  Current Facility-Administered Medications   Medication Dose Route Frequency Provider Last Rate Last Dose   • vancomycin (VANCOCIN) 1,500 mg in  mL IVPB  21 mg/kg Intravenous Q6HRS Dar Red M.D.   Stopped at 09/03/20 0718   • polyethylene glycol/lytes (MIRALAX) PACKET 1 Packet  1 Packet Enteral Tube BID Dar Red M.D.   1 Packet at 09/03/20 0600   • acetaminophen (TYLENOL) suppository 325 mg  325 mg Rectal Q6HRS PRN Yosef Tomlin M.D.   650 mg at 09/02/20 1632   • cefTRIAXone (ROCEPHIN) 1 g in  mL IVPB  1 g Intravenous Q24HRS Dar Red M.D.   Stopped at 09/02/20 1738   • levETIRAcetam (Keppra) 1000 mg in 100 mL NaCl IV premix  1,000 mg Intravenous Q12HRS Dar Red M.D.   Stopped at 09/03/20 0539   • MD Alert...Vancomycin per Pharmacy   Other PHARMACY TO DOSE Dar Red M.D.       • acetaminophen (TYLENOL) tablet 1,000 mg  1,000 mg Enteral Tube Q4HRS PRN Dar Red M.D.   1,000 mg at 09/02/20 0501   • labetalol (NORMODYNE/TRANDATE) injection 10 mg  10 mg Intravenous Q4HRS PRN Yosef Tomlin M.D.   10 mg at 08/30/20 2256   • propofol (DIPRIVAN) injection  0-80 mcg/kg/min Intravenous Continuous Boogie Augustine Jr., D.O.   Stopped at 08/30/20 0428   • Pharmacy Consult: Enteral tube insertion - review meds/change route/product selection  1 Each Other PHARMACY TO DOSE Boogie Augustine Jr.,  D.O.       • Respiratory Therapy Consult   Nebulization Continuous RT Brock Murdock M.D.       • ipratropium-albuterol (DUONEB) nebulizer solution  3 mL Nebulization Q2HRS PRN (RT) Brock Murdock M.D.       • famotidine (PEPCID) tablet 20 mg  20 mg Enteral Tube Q12HRS Brock Murdock M.D.   20 mg at 09/02/20 1708    Or   • famotidine (PEPCID) injection 20 mg  20 mg Intravenous Q12HRS Brock Murdock M.D.   20 mg at 09/03/20 0504   • senna-docusate (PERICOLACE or SENOKOT S) 8.6-50 MG per tablet 2 Tab  2 Tab Enteral Tube BID Brock Murdock M.D.   2 Tab at 09/03/20 0504    And   • polyethylene glycol/lytes (MIRALAX) PACKET 1 Packet  1 Packet Enteral Tube QDAY PRN Brock Murdock M.D.        And   • magnesium hydroxide (MILK OF MAGNESIA) suspension 30 mL  30 mL Enteral Tube QDAY PRN Brock Murdock M.D.        And   • bisacodyl (DULCOLAX) suppository 10 mg  10 mg Rectal QDAY PRN Brock Murdock M.D.       • MD Alert...ICU Electrolyte Replacement per Pharmacy   Other PHARMACY TO DOSE Brock Murdock M.D.       • lidocaine (XYLOCAINE) 1 % injection 1-2 mL  1-2 mL Tracheal Tube Q30 MIN PRN Brock Murdock M.D.       • hydrALAZINE (APRESOLINE) injection 10-20 mg  10-20 mg Intravenous Q6HRS PRN Brock Murdock M.D.       • enoxaparin (LOVENOX) inj 40 mg  40 mg Subcutaneous DAILY Boogie Augustine Jr., D.O.   40 mg at 09/03/20 0504       Fluids    Intake/Output Summary (Last 24 hours) at 9/3/2020 0747  Last data filed at 9/3/2020 0600  Gross per 24 hour   Intake 1887.5 ml   Output 1600 ml   Net 287.5 ml       Laboratory  Recent Labs     09/01/20  0448 09/02/20  0357 09/03/20  0308   ISTATAPH 7.406 7.450 7.492   ISTATAPCO2 39.0* 36.1 30.1   ISTATAPO2 86 91* 83   ISTATATCO2 26 26 24   OFPSLRP5LXC 97 97 97   ISTATARTHCO3 24.5 25.1* 23.0   ISTATARTBE 0 1 0   ISTATTEMP 38.3 C 37.9 C 38.3 C   ISTATFIO2 30 30 30   ISTATSPEC Arterial Arterial Arterial   ISTATAPHTC 7.386* 7.436 7.472   [id number] 94* 96* 90*         Recent Labs      09/01/20 0445 09/02/20 0430 09/03/20  0455   SODIUM 135 136 135   POTASSIUM 3.7 3.9 4.2   CHLORIDE 100 99 100   CO2 23 24 22   BUN 21 18 19   CREATININE 0.50 0.41* 0.40*   CALCIUM 8.7 9.0 9.1     Recent Labs     09/01/20 0445 09/02/20 0430 09/03/20  0455   ALTSGPT 79* 67* 76*   ASTSGOT 91* 63* 60*   ALKPHOSPHAT 81 85 81   TBILIRUBIN 0.2 0.2 0.3   GLUCOSE 131* 127* 117*     Recent Labs     09/01/20 0445 09/02/20 0430 09/03/20  0455   WBC 12.9* 15.9* 16.8*   NEUTSPOLYS 71.90 73.70* 76.20*   LYMPHOCYTES 12.10* 12.40* 13.00*   MONOCYTES 12.00 9.50 6.80   EOSINOPHILS 0.90 1.10 1.70   BASOPHILS 0.50 0.40 0.30   ASTSGOT 91* 63* 60*   ALTSGPT 79* 67* 76*   ALKPHOSPHAT 81 85 81   TBILIRUBIN 0.2 0.2 0.3     Recent Labs     09/01/20 0445 09/02/20 0430 09/03/20  0455   RBC 3.74* 3.99* 4.00*   HEMOGLOBIN 11.7* 12.3 12.4   HEMATOCRIT 35.0* 37.0 37.3   PLATELETCT 431 494* 567*       Imaging  X-Ray:  I have personally reviewed the images and compared with prior images.     MRI 8/27:  The diffusion-weighted sequences demonstrates areas of restricted diffusion in the bilateral basal ganglia and some of the frontal gray matter. The predominant portion of the brain parenchyma does not demonstrate any restricted diffusion. Therefore this   findings likely represent mild diffuse anoxic brain injury.    Assessment/Plan  PEA (Pulseless electrical activity) (HCC)  PEA enroute to hospital  Hypoxia and aspiration contributory  MRI reveals bilateral basal ganglial anoxic injury.  Status post therapeutic hypothermia and rewarmed    Acute respiratory failure with hypoxia (HCC)  Secondary to drug overdose  Lung protective ventilation strategies  Not appropriate for liberation  Will likely require tracheostomy  D/w family today    Drug overdose  Methamphetamines, oxycodone and EtOH per boyfriend    Cerebral edema (HCC)  Mild/possible per radiologist on CT  MRI on 8/27/2020 without edema or severe changes  Repeat MRI 9/2 -results  pending    Elevated transaminase level  Follow    Encephalopathy acute  Anoxic encephalopathy, was improving  Reevaluate now with repeat MRI, LP and continuous EEG for worsening encephalopathy    Hypoglycemia  Follow    Pneumonia of both lungs due to methicillin resistant Staphylococcus aureus (MRSA) (HCC)  BAL 8/31 - > 100K CFU MRSA  Vanco 8/31 - P  BCs neg (1 of 2 with CNS - suspected contaminant)         Updated plan:  Continue full ventilator support, not appropriate for liberation  Continue antibiotics, consider change to Bactrim only given Acinetobacter in addition to MRSA in sputum  Ongoing seizure activity noted on EEG.  Reviewed with neurology in detail  Adding additional antiepileptics, Vimpat, Depakote  Prolonged family conference today with palliative care and family members  Continue current level of care.  Will reevaluate neurologic assessment and prognosis after seizures controlled if we are able to control them with AEDs.  We will try to avoid sedatives to facilitate neuro prognostication.        VTE:  Lovenox  Ulcer: H2 Antagonist  Lines: Central Line  Ongoing indication addressed    I have performed a physical exam and reviewed and updated ROS and Plan today (9/3/2020). In review of yesterday's note (9/2/2020), there are no changes except as documented above.     Discussed patient condition and risk of morbidity and/or mortality with RN, RT, Pharmacy, Dietary, , Code status disscussed, Charge nurse / hot rounds and neurology     The patient remains critically ill.  I have assessed and reassessed the patient multiple times.  She is at high risk for further vital organ deterioration with significant ongoing critical care management as above.  Critical care time = 110 minutes in directly providing and coordinating critical care and extensive data review.  No time overlap and excludes procedures.

## 2020-09-03 NOTE — PROGRESS NOTES
"Pharmacy Kinetics      19 y.o. female on vancomycin day # 4           2020     Currently on Vancomycin 1500 mg Q6hr   Vancomycin 1750 mg x1  @ 1230      Indication for Treatment: MRSA Pneumonia      Proposed duration of treatment: TBD       Pertinent history per medical record: Admitted on 2020 for Admitted for drug overdose, cardiac arrest. Previously on CTX + flagyl x 5 days. Patient is currently intubated, : fevers and leukocytosis, broad spectrum antibiotics started. .     Other antibiotics: Ceftriaxone 1gm IV Q24hr     Allergies: Penicillins      List concerns for renal function: none      Pertinent cultures to date:   : BCX: NGTD x2   : tracheal aspirate: MRSA, no fungal elements seen   : CoNS    MRSA Nares: positive     Recent Labs     205 20  0430 20  0455   WBC 12.9* 15.9* 16.8*   NEUTSPOLYS 71.90 73.70* 76.20*     Recent Labs     205 20  0430 20  0455   BUN 21 18 19   CREATININE 0.50 0.41* 0.40*   ALBUMIN 3.1* 3.3 3.5     Recent Labs     20  0430 20  0040   VANCOTROUGH 8.2* 13.8       Intake/Output Summary (Last 24 hours) at 9/3/2020 0836  Last data filed at 9/3/2020 0600  Gross per 24 hour   Intake 1887.5 ml   Output 1600 ml   Net 287.5 ml      /92   Pulse (!) 120   Temp 38 °C (100.4 °F) (Bladder)   Resp (!) 26   Ht 1.6 m (5' 2.99\")   Wt 69.5 kg (153 lb 3.5 oz)   SpO2 99%  Temp (24hrs), Av.3 °C (100.9 °F), Min:37.5 °C (99.5 °F), Max:38.9 °C (102 °F)      A/P   1. Vancomycin dose change: continue Vancomycin 1500 mg Q 6 hr (21 mg/kg) (0000, 0600, 1200, and 1800)   1. Next vancomycin level: 20 @ 0530  2. Goal trough: 15-20 mcg/ml  3. Comments: Renal function stable?, historical dosing not noted. Dosing done per troughs. Will get a trough at steady state today to assess level given frequency of dosing. Patient is also a methamphetamine making hypermetabolism of vancomycin likely.Please frequently " monitor troughs to assess for accumulation and deescalate from Q6h or reduce dose once patient is within goal to avoid accumulation. UOP is 2 ml/kg/hr (per RN). Given patient is having good urine output, stable creatinine and a l before steady state of 13.8, and Patient has Pharmacy will follow renal function, cultures, troughs, and patient's clinical status to guide subsequent recommendations.      Timbo Ruelas PharmD  [phone]

## 2020-09-03 NOTE — PROCEDURES
VIDEO ELECTROENCEPHALOGRAM REPORT        Referring provider: Dr. Red.      DOS: 9/3/2020 (total recording of 23 hours and 48 minutes).      INDICATION:  Annika He 19 y.o. female presenting with s/p cardiac arrest, altered mental status, seizures.      CURRENT ANTIEPILEPTIC REGIMEN: Levetiracetam 1000 mg bid. Lacosamide 200 mg bid, and Valproic Acid 500 mg bid added.      TECHNIQUE: 30 channel video electroencephalogram (EEG) was performed in accordance with the international 10-20 system. The study was reviewed in bipolar and referential montages. The recording examined a comatose patient.      DESCRIPTION OF THE RECORD:  The background is severely attenuated. Intermittently, runs of Frontal Intermittent Rhythmic Delta Activity (FIRDA), and frequent generalized sharps with frontal maximum. Several seizures captured during the study, with seizures improving overnight, becoming more rare and less organized. On eeg seizures demonstrated rhythmic frontal sharply contoured delta activity, most exhibited a generalized pattern, but few were focal on the left frontal region. Some of the seizures were clinically associated with jerky movements involving head, shoulders, and suckling / chewing movements. The patient is not in status and seizures are not in clusters but rather far apart.      ACTIVATION PROCEDURES:   Not performed.      EKG: sampling of the EKG recording demonstrated sinus rhythm.      EVENTS:  See above.      INTERPRETATION:  This is an abnormal 24 hrs video EEG recording in a comatose patient. A severe encephalopathy is suggested. The background is severely attenuated. Intermittently, runs of Frontal Intermittent Rhythmic Delta Activity (FIRDA), and frequent generalized sharps with frontal maximum. Several seizures captured during the study, with seizures improving overnight, becoming more rare and less organized. On eeg seizures demonstrated rhythmic frontal sharply contoured delta  activity, most exhibited a generalized pattern, but few were focal on the left frontal region. Some of the seizures were clinically associated with jerky movements involving head, shoulders, and suckling / chewing movements. The patient is not in status and seizures are not in clusters but rather far apart. The findings suggest underlying areas of increased cortical irritability and structural abnormality. Clinical and radiological correlation is recommended.     Updates provided to Dr. Red and Dr. Gonzales.         Garland Beatty MD   Epilepsy and Neurodiagnostics.   Clinical  of Neurology [facility].   Diplomate in Neurology, Epilepsy, and Electrodiagnostic Medicine.   Office: [phone]  Fax: [phone]

## 2020-09-04 ENCOUNTER — HOSPITAL ENCOUNTER (OUTPATIENT)
Dept: RADIOLOGY | Facility: MEDICAL CENTER | Age: 20
End: 2020-09-04
Attending: INTERNAL MEDICINE
Payer: MEDICAID

## 2020-09-04 LAB
ACTION RANGE TRIGGERED IACRT: NO
ALBUMIN SERPL BCP-MCNC: 3.2 G/DL (ref 3.2–4.9)
ALBUMIN/GLOB SERPL: 0.9 G/DL
ALP SERPL-CCNC: 79 U/L (ref 30–99)
ALT SERPL-CCNC: 77 U/L (ref 2–50)
ANION GAP SERPL CALC-SCNC: 13 MMOL/L (ref 7–16)
AST SERPL-CCNC: 59 U/L (ref 12–45)
BASE EXCESS BLDA CALC-SCNC: -1 MMOL/L (ref -4–3)
BASOPHILS # BLD AUTO: 0.6 % (ref 0–1.8)
BASOPHILS # BLD: 0.1 K/UL (ref 0–0.12)
BILIRUB SERPL-MCNC: 0.4 MG/DL (ref 0.1–1.5)
BODY TEMPERATURE: ABNORMAL DEGREES
BUN SERPL-MCNC: 22 MG/DL (ref 8–22)
CALCIUM SERPL-MCNC: 9.1 MG/DL (ref 8.5–10.5)
CHLORIDE SERPL-SCNC: 103 MMOL/L (ref 96–112)
CO2 BLDA-SCNC: 22 MMOL/L (ref 20–33)
CO2 SERPL-SCNC: 19 MMOL/L (ref 20–33)
CREAT SERPL-MCNC: 0.49 MG/DL (ref 0.5–1.4)
EOSINOPHIL # BLD AUTO: 0.42 K/UL (ref 0–0.51)
EOSINOPHIL NFR BLD: 2.4 % (ref 0–6.9)
ERYTHROCYTE [DISTWIDTH] IN BLOOD BY AUTOMATED COUNT: 44.4 FL (ref 35.9–50)
GLOBULIN SER CALC-MCNC: 3.4 G/DL (ref 1.9–3.5)
GLUCOSE SERPL-MCNC: 125 MG/DL (ref 65–99)
HCO3 BLDA-SCNC: 21.6 MMOL/L (ref 17–25)
HCT VFR BLD AUTO: 36.5 % (ref 37–47)
HGB BLD-MCNC: 11.8 G/DL (ref 12–16)
HOROWITZ INDEX BLDA+IHG-RTO: 607 MM[HG]
IMM GRANULOCYTES # BLD AUTO: 0.28 K/UL (ref 0–0.11)
IMM GRANULOCYTES NFR BLD AUTO: 1.6 % (ref 0–0.9)
INST. QUALIFIED PATIENT IIQPT: YES
LYMPHOCYTES # BLD AUTO: 1.43 K/UL (ref 1–4.8)
LYMPHOCYTES NFR BLD: 8.1 % (ref 22–41)
MCH RBC QN AUTO: 30.9 PG (ref 27–33)
MCHC RBC AUTO-ENTMCNC: 32.3 G/DL (ref 33.6–35)
MCV RBC AUTO: 95.5 FL (ref 81.4–97.8)
MONOCYTES # BLD AUTO: 1.25 K/UL (ref 0–0.85)
MONOCYTES NFR BLD AUTO: 7.1 % (ref 0–13.4)
NEUTROPHILS # BLD AUTO: 14.19 K/UL (ref 2–7.15)
NEUTROPHILS NFR BLD: 80.2 % (ref 44–72)
NRBC # BLD AUTO: 0 K/UL
NRBC BLD-RTO: 0 /100 WBC
O2/TOTAL GAS SETTING VFR VENT: 30 %
PCO2 BLDA: 29.8 MMHG (ref 26–37)
PCO2 TEMP ADJ BLDA: 29.1 MMHG (ref 26–37)
PH BLDA: 7.47 [PH] (ref 7.4–7.5)
PH TEMP ADJ BLDA: 7.48 [PH] (ref 7.4–7.5)
PLATELET # BLD AUTO: 549 K/UL (ref 164–446)
PMV BLD AUTO: 8.9 FL (ref 9–12.9)
PO2 BLDA: 182 MMHG (ref 64–87)
PO2 TEMP ADJ BLDA: 180 MMHG (ref 64–87)
POTASSIUM SERPL-SCNC: 4.2 MMOL/L (ref 3.6–5.5)
PROT SERPL-MCNC: 6.6 G/DL (ref 6–8.2)
RBC # BLD AUTO: 3.82 M/UL (ref 4.2–5.4)
SAO2 % BLDA: 100 % (ref 93–99)
SODIUM SERPL-SCNC: 135 MMOL/L (ref 135–145)
SPECIMEN DRAWN FROM PATIENT: ABNORMAL
TRIGL SERPL-MCNC: 80 MG/DL (ref 0–149)
VANCOMYCIN TROUGH SERPL-MCNC: 40.6 UG/ML (ref 10–20)
WBC # BLD AUTO: 17.7 K/UL (ref 4.8–10.8)

## 2020-09-04 PROCEDURE — C9254 INJECTION, LACOSAMIDE: HCPCS | Performed by: INTERNAL MEDICINE

## 2020-09-04 PROCEDURE — A9270 NON-COVERED ITEM OR SERVICE: HCPCS | Performed by: INTERNAL MEDICINE

## 2020-09-04 PROCEDURE — 94003 VENT MGMT INPAT SUBQ DAY: CPT

## 2020-09-04 PROCEDURE — 4A10X4Z MONITORING OF CENTRAL NERVOUS ELECTRICAL ACTIVITY, EXTERNAL APPROACH: ICD-10-PCS | Performed by: PSYCHIATRY & NEUROLOGY

## 2020-09-04 PROCEDURE — 700105 HCHG RX REV CODE 258: Performed by: INTERNAL MEDICINE

## 2020-09-04 PROCEDURE — 770022 HCHG ROOM/CARE - ICU (200)

## 2020-09-04 PROCEDURE — 36600 WITHDRAWAL OF ARTERIAL BLOOD: CPT

## 2020-09-04 PROCEDURE — 700102 HCHG RX REV CODE 250 W/ 637 OVERRIDE(OP): Performed by: INTERNAL MEDICINE

## 2020-09-04 PROCEDURE — 95714 VEEG EA 12-26 HR UNMNTR: CPT | Performed by: PSYCHIATRY & NEUROLOGY

## 2020-09-04 PROCEDURE — 85025 COMPLETE CBC W/AUTO DIFF WBC: CPT

## 2020-09-04 PROCEDURE — 99232 SBSQ HOSP IP/OBS MODERATE 35: CPT | Mod: 25 | Performed by: PSYCHIATRY & NEUROLOGY

## 2020-09-04 PROCEDURE — 82803 BLOOD GASES ANY COMBINATION: CPT

## 2020-09-04 PROCEDURE — 700111 HCHG RX REV CODE 636 W/ 250 OVERRIDE (IP): Performed by: INTERNAL MEDICINE

## 2020-09-04 PROCEDURE — 95720 EEG PHY/QHP EA INCR W/VEEG: CPT | Performed by: PSYCHIATRY & NEUROLOGY

## 2020-09-04 PROCEDURE — 84478 ASSAY OF TRIGLYCERIDES: CPT

## 2020-09-04 PROCEDURE — 71045 X-RAY EXAM CHEST 1 VIEW: CPT

## 2020-09-04 PROCEDURE — 94770 HCHG CO2 EXPIRED GAS DETERMINATION: CPT

## 2020-09-04 PROCEDURE — 80053 COMPREHEN METABOLIC PANEL: CPT

## 2020-09-04 PROCEDURE — 99291 CRITICAL CARE FIRST HOUR: CPT | Performed by: INTERNAL MEDICINE

## 2020-09-04 PROCEDURE — 80202 ASSAY OF VANCOMYCIN: CPT

## 2020-09-04 RX ORDER — MIDAZOLAM HYDROCHLORIDE 1 MG/ML
5 INJECTION INTRAMUSCULAR; INTRAVENOUS ONCE
Status: COMPLETED | OUTPATIENT
Start: 2020-09-04 | End: 2020-09-04

## 2020-09-04 RX ORDER — SULFAMETHOXAZOLE AND TRIMETHOPRIM 800; 160 MG/1; MG/1
1 TABLET ORAL EVERY 12 HOURS
Status: COMPLETED | OUTPATIENT
Start: 2020-09-04 | End: 2020-09-13

## 2020-09-04 RX ADMIN — VALPROATE SODIUM 500 MG: 100 INJECTION, SOLUTION INTRAVENOUS at 17:26

## 2020-09-04 RX ADMIN — VALPROATE SODIUM 500 MG: 100 INJECTION, SOLUTION INTRAVENOUS at 04:54

## 2020-09-04 RX ADMIN — LEVETIRACETAM 1000 MG: 10 INJECTION INTRAVENOUS at 04:54

## 2020-09-04 RX ADMIN — SODIUM CHLORIDE 200 MG: 9 INJECTION, SOLUTION INTRAVENOUS at 22:30

## 2020-09-04 RX ADMIN — FAMOTIDINE 20 MG: 20 TABLET, FILM COATED ORAL at 16:46

## 2020-09-04 RX ADMIN — ACETAMINOPHEN 1000 MG: 325 TABLET, FILM COATED ORAL at 08:58

## 2020-09-04 RX ADMIN — SULFAMETHOXAZOLE AND TRIMETHOPRIM 1 TABLET: 800; 160 TABLET ORAL at 16:46

## 2020-09-04 RX ADMIN — POLYETHYLENE GLYCOL 3350 1 PACKET: 17 POWDER, FOR SOLUTION ORAL at 16:46

## 2020-09-04 RX ADMIN — LEVETIRACETAM 1000 MG: 10 INJECTION INTRAVENOUS at 16:46

## 2020-09-04 RX ADMIN — PHENYLEPHRINE HYDROCHLORIDE 25 MCG/MIN: 10 INJECTION INTRAVENOUS at 20:23

## 2020-09-04 RX ADMIN — MIDAZOLAM HYDROCHLORIDE 5 MG: 1 INJECTION, SOLUTION INTRAMUSCULAR; INTRAVENOUS at 15:29

## 2020-09-04 RX ADMIN — ENOXAPARIN SODIUM 40 MG: 40 INJECTION SUBCUTANEOUS at 04:54

## 2020-09-04 RX ADMIN — MIDAZOLAM 8 MG/HR: 5 INJECTION INTRAMUSCULAR; INTRAVENOUS at 16:47

## 2020-09-04 RX ADMIN — FAMOTIDINE 20 MG: 10 INJECTION INTRAVENOUS at 04:54

## 2020-09-04 RX ADMIN — ACETAMINOPHEN 1000 MG: 325 TABLET, FILM COATED ORAL at 18:00

## 2020-09-04 RX ADMIN — VANCOMYCIN HYDROCHLORIDE 1500 MG: 500 INJECTION, POWDER, LYOPHILIZED, FOR SOLUTION INTRAVENOUS at 05:05

## 2020-09-04 RX ADMIN — DOCUSATE SODIUM 50 MG AND SENNOSIDES 8.6 MG 2 TABLET: 8.6; 5 TABLET, FILM COATED ORAL at 16:46

## 2020-09-04 RX ADMIN — SODIUM CHLORIDE 200 MG: 9 INJECTION, SOLUTION INTRAVENOUS at 11:29

## 2020-09-04 ASSESSMENT — FIBROSIS 4 INDEX: FIB4 SCORE: 0.26

## 2020-09-04 NOTE — CARE PLAN
Problem: Respiratory:  Goal: Respiratory status will improve  Outcome: PROGRESSING AS EXPECTED     Problem: Fluid Volume:  Goal: Will maintain balanced intake and output  Outcome: PROGRESSING AS EXPECTED     Problem: Safety  Goal: Will remain free from injury  Outcome: PROGRESSING AS EXPECTED  Goal: Will remain free from falls  Outcome: PROGRESSING AS EXPECTED     Problem: Infection  Goal: Will remain free from infection  Outcome: PROGRESSING AS EXPECTED     Problem: Venous Thromboembolism (VTW)/Deep Vein Thrombosis (DVT) Prevention:  Goal: Patient will participate in Venous Thrombosis (VTE)/Deep Vein Thrombosis (DVT)Prevention Measures  Outcome: PROGRESSING AS EXPECTED     Problem: Bowel/Gastric:  Goal: Normal bowel function is maintained or improved  Outcome: PROGRESSING AS EXPECTED  Goal: Will not experience complications related to bowel motility  Outcome: PROGRESSING AS EXPECTED     Problem: Knowledge Deficit  Goal: Knowledge of disease process/condition, treatment plan, diagnostic tests, and medications will improve  Outcome: PROGRESSING AS EXPECTED  Goal: Knowledge of the prescribed therapeutic regimen will improve  Outcome: PROGRESSING AS EXPECTED     Problem: Discharge Barriers/Planning  Goal: Patient's continuum of care needs will be met  Outcome: PROGRESSING AS EXPECTED     Problem: Skin Integrity  Goal: Risk for impaired skin integrity will decrease  Outcome: PROGRESSING AS EXPECTED     Problem: Pain Management  Goal: Pain level will decrease to patient's comfort goal  Outcome: PROGRESSING AS EXPECTED     Problem: Psychosocial Needs:  Goal: Level of anxiety will decrease  Outcome: PROGRESSING AS EXPECTED     Problem: Communication  Goal: The ability to communicate needs accurately and effectively will improve  Outcome: PROGRESSING SLOWER THAN EXPECTED

## 2020-09-04 NOTE — PROGRESS NOTES
Palliative Care follow-up  Care conference held with Dr. Red, pt's mother Mitch Segovia and on the phone Alonzo the pt's step- mother who is also a nurse.  Dr. Red provided an overview of the pt's clinical condition, hospital course and current treatment. Dr. Red discussed the pt's seizures and the goal to try to control them with medications. The possibility of the pt's condition improving or declining addressed.  Family asked about treatment options for both situations.  Family trying to remaining hopeful but recognize that Lindsey may not recover. All questions answered. Support provided.     Updated: Dr. Red    Plan: Another care conference to be held Sunday 9/6/2020 at 3:00pm to review pt's updated clinical status and review plan of care.    Thank you for allowing Palliative Care to support this patient and family. Contact [phone] for additional assistance, change in patient status, or with any questions/concerns.

## 2020-09-04 NOTE — PROGRESS NOTES
"Critical Care Progress Note    Date of admission  8/23/2020    Chief Complaint  19 y.o. female admitted 8/23/2020 with drug overdose, cardiac arrest    Hospital Course    \"19 y.o. female who presented 8/23/2020 with drug overdose with methamphetamines, oxycodone and found by boyfriend.  Cardiac arrest enroute to hospital and received narcan, epinephrine and cpr and ROSC achieved.  Difficult airway at scene and LMA placed. ET tube placed in ER.  She was vomiting during airway attempt.  On propofol due to vent asynchrony.  Propofol turned off for neuro assessment and possible hypothermic protocol.  Family no longer at hospital and unable to reach per phone number given for mother.\"    8/24 - TTM protocol initiated, EEG without NCSE  8/25 - rewarmed, awake, not following  8/26 - following occasional commands  8/27- seizure like activity vs shivering--> Keppra, versed, Propofol  8/28- no seizures on EEG  8/29- remains unresponsive.  8/30 -mental status improved, now opening eyes and following commands.  Keppra discontinued  8/31 - started cefepime/vancomycin for possible pneumonia,?  UTI, fever and increasing WBC fever; full vent  9/1 - Seizure activity noted on EEG today, reviewed with neurology; keppra load and increased to 1gm q 12; full vent support  9/2 - remains unresponsive with only some posture and grimace to stimulation, neurology reconsulted, stat MRI brain, LP, cEEG  9/3 -ongoing seizures, Vimpat added, Depakote added, family conference today  9/4 -ongoing seizure noted on cEEG, started on midazolam infusion, full ventilator support        Interval Problem Update  Reviewed last 24 hour events:  + C/G, unresponsive, hyperreflexive; cEEG, ongoing Sz  SR/ST  Tm = 102  's  2+ edema  + BMs  I/O =   Vent day 13, % 30% FiO2  7.47/29/180  CXR reviewed  Abx day 4; vanco/c3; change to bactrim  Lytes OK     Ongoing posturing, some spont eye opening; not tracking or following  cEEG with sz noted  + " C/G/C  SR/ - 140   - 130  1+ edema  Tm =  Cooling blanket  michelle TF  Lg liquid BM today  CXR clear, ETT OK  %, 8, 30%  Copious secretions ETT  Lovenox/pepcid ppx  C3/vanco  CSF neg  CNS 1 of 2 BC    Addendum, reviewed with neurology, ongoing seizures noted on EEG.  Added Vimpat than Depakote.    Review of Systems  Review of Systems   Unable to perform ROS: Intubated        Vital Signs for last 24 hours   Temp:  [38 °C (100.4 °F)-38.9 °C (102 °F)] 38.2 °C (100.8 °F)  Pulse:  [] 108  Resp:  [19-47] 28  BP: ()/(51-78) 118/59  SpO2:  [92 %-100 %] 97 %    Hemodynamic parameters for last 24 hours       Respiratory Information for the last 24 hours  Vent Mode: ASV  PEEP/CPAP: 8  MAP: 11  Control VTE (exp VT): 325    Physical Exam   Physical Exam  Vitals signs and nursing note reviewed.   Constitutional:       General: She is not in acute distress.     Appearance: She is not ill-appearing or toxic-appearing.      Interventions: She is intubated.   HENT:      Head: Normocephalic and atraumatic.      Right Ear: External ear normal.      Left Ear: External ear normal.      Nose: No congestion or rhinorrhea.      Comments: Feeding tube in place     Mouth/Throat:      Mouth: Mucous membranes are moist.      Pharynx: No oropharyngeal exudate or posterior oropharyngeal erythema.      Comments: ET tube in place  Eyes:      General: No scleral icterus.     Conjunctiva/sclera: Conjunctivae normal.      Pupils: Pupils are equal, round, and reactive to light.      Comments: Sluggish pupillary response   Neck:      Musculoskeletal: Neck supple. No neck rigidity or muscular tenderness.   Cardiovascular:      Rate and Rhythm: Normal rate and regular rhythm.      Pulses: Normal pulses.      Heart sounds: Normal heart sounds. No murmur.   Pulmonary:      Effort: She is intubated.      Breath sounds: No wheezing or rales.      Comments: Full ventilator support, low support settings  Abdominal:      General:  Abdomen is flat. There is no distension.      Palpations: Abdomen is soft. There is no mass.      Tenderness: There is no abdominal tenderness.   Musculoskeletal:         General: No swelling or tenderness.      Right lower leg: No edema.      Left lower leg: No edema.   Skin:     General: Skin is warm and dry.      Capillary Refill: Capillary refill takes less than 2 seconds.      Findings: No erythema or rash.   Neurological:      GCS: GCS eye subscore is 3. GCS verbal subscore is 1. GCS motor subscore is 5.      Cranial Nerves: No cranial nerve deficit or facial asymmetry.      Motor: Abnormal muscle tone present.      Comments: Mild grimace to stimulation, not following any commands, no spontaneous eye opening, some decorticate posturing with stimulation, no clear generalized seizure activity noted   Psychiatric:      Comments: Unable to assess         Medications  Current Facility-Administered Medications   Medication Dose Route Frequency Provider Last Rate Last Dose   • lacosamide (VIMPAT) 200 mg in  mL ivpb  200 mg Intravenous Q12HRS Dar Red M.D.   Stopped at 09/04/20 0025   • valproate (DEPACON) 500 mg in D5W 50 mL IVPB  500 mg Intravenous Q12HRS Dar Red M.D.   Stopped at 09/04/20 0554   • vancomycin (VANCOCIN) 1,500 mg in  mL IVPB  21 mg/kg Intravenous Q6HRS Dar Red M.D.   Stopped at 09/04/20 0705   • polyethylene glycol/lytes (MIRALAX) PACKET 1 Packet  1 Packet Enteral Tube BID Dar Red M.D.   Stopped at 09/03/20 1800   • acetaminophen (TYLENOL) suppository 325 mg  325 mg Rectal Q6HRS PRN Yosef Tomlin M.D.   650 mg at 09/02/20 1632   • cefTRIAXone (ROCEPHIN) 1 g in  mL IVPB  1 g Intravenous Q24HRS Dar Red M.D.   Stopped at 09/03/20 1915   • levETIRAcetam (Keppra) 1000 mg in 100 mL NaCl IV premix  1,000 mg Intravenous Q12HRS Dar Red M.D.   Stopped at 09/04/20 0509   • MD Alert...Vancomycin per Pharmacy   Other PHARMACY TO DOSE Dar FISHER  BAILEE Red       • acetaminophen (TYLENOL) tablet 1,000 mg  1,000 mg Enteral Tube Q4HRS PRN Dar Red M.D.   1,000 mg at 09/04/20 0858   • labetalol (NORMODYNE/TRANDATE) injection 10 mg  10 mg Intravenous Q4HRS PRN Yosef Tomlin M.D.   10 mg at 08/30/20 2256   • propofol (DIPRIVAN) injection  0-80 mcg/kg/min Intravenous Continuous Boogie Augustine Jr., D.OYue   Stopped at 08/30/20 0428   • Pharmacy Consult: Enteral tube insertion - review meds/change route/product selection  1 Each Other PHARMACY TO DOSE Boogie Augustine Jr. D.O.       • Respiratory Therapy Consult   Nebulization Continuous RT Brock Murdock M.D.       • ipratropium-albuterol (DUONEB) nebulizer solution  3 mL Nebulization Q2HRS PRN (RT) Brock Murdock M.D.       • famotidine (PEPCID) tablet 20 mg  20 mg Enteral Tube Q12HRS Brock Murdock M.D.   20 mg at 09/02/20 1708    Or   • famotidine (PEPCID) injection 20 mg  20 mg Intravenous Q12HRS Brock Murdock M.D.   20 mg at 09/04/20 0454   • senna-docusate (PERICOLACE or SENOKOT S) 8.6-50 MG per tablet 2 Tab  2 Tab Enteral Tube BID Brock Murdock M.D.   Stopped at 09/03/20 1800    And   • polyethylene glycol/lytes (MIRALAX) PACKET 1 Packet  1 Packet Enteral Tube QDAY PRN Brock Murdock M.D.        And   • magnesium hydroxide (MILK OF MAGNESIA) suspension 30 mL  30 mL Enteral Tube QDAY PRN Brock Murdock M.D.        And   • bisacodyl (DULCOLAX) suppository 10 mg  10 mg Rectal QDAY PRN Brock Murdock M.D.       • MD Alert...ICU Electrolyte Replacement per Pharmacy   Other PHARMACY TO DOSE Brock Murdock M.D.       • lidocaine (XYLOCAINE) 1 % injection 1-2 mL  1-2 mL Tracheal Tube Q30 MIN PRN Brock K Collette, M.D.       • hydrALAZINE (APRESOLINE) injection 10-20 mg  10-20 mg Intravenous Q6HRS PRN Brock Murdock M.D.       • enoxaparin (LOVENOX) inj 40 mg  40 mg Subcutaneous DAILY Boogie Augustine Jr., D.O.   40 mg at 09/04/20 0454       Fluids    Intake/Output Summary (Last 24 hours) at 9/4/2020  1013  Last data filed at 9/4/2020 1000  Gross per 24 hour   Intake 2250 ml   Output 1985 ml   Net 265 ml       Laboratory  Recent Labs     09/02/20  0357 09/03/20  0308   ISTATAPH 7.450 7.492   ISTATAPCO2 36.1 30.1   ISTATAPO2 91* 83   ISTATATCO2 26 24   NEHERKH9SCC 97 97   ISTATARTHCO3 25.1* 23.0   ISTATARTBE 1 0   ISTATTEMP 37.9 C 38.3 C   ISTATFIO2 30 30   ISTATSPEC Arterial Arterial   ISTATAPHTC 7.436 7.472   [id number] 96* 90*         Recent Labs     09/02/20 0430 09/03/20 0455 09/04/20  0342   SODIUM 136 135 135   POTASSIUM 3.9 4.2 4.2   CHLORIDE 99 100 103   CO2 24 22 19*   BUN 18 19 22   CREATININE 0.41* 0.40* 0.49*   CALCIUM 9.0 9.1 9.1     Recent Labs     09/02/20 0430 09/03/20 0455 09/04/20  0342   ALTSGPT 67* 76* 77*   ASTSGOT 63* 60* 59*   ALKPHOSPHAT 85 81 79   TBILIRUBIN 0.2 0.3 0.4   GLUCOSE 127* 117* 125*     Recent Labs     09/02/20 0430 09/03/20 0455 09/04/20  0342   WBC 15.9* 16.8* 17.7*   NEUTSPOLYS 73.70* 76.20* 80.20*   LYMPHOCYTES 12.40* 13.00* 8.10*   MONOCYTES 9.50 6.80 7.10   EOSINOPHILS 1.10 1.70 2.40   BASOPHILS 0.40 0.30 0.60   ASTSGOT 63* 60* 59*   ALTSGPT 67* 76* 77*   ALKPHOSPHAT 85 81 79   TBILIRUBIN 0.2 0.3 0.4     Recent Labs     09/02/20 0430 09/03/20 0455 09/04/20  0342   RBC 3.99* 4.00* 3.82*   HEMOGLOBIN 12.3 12.4 11.8*   HEMATOCRIT 37.0 37.3 36.5*   PLATELETCT 494* 567* 549*       Imaging  X-Ray:  I have personally reviewed the images and compared with prior images.     MRI 8/27:  The diffusion-weighted sequences demonstrates areas of restricted diffusion in the bilateral basal ganglia and some of the frontal gray matter. The predominant portion of the brain parenchyma does not demonstrate any restricted diffusion. Therefore this   findings likely represent mild diffuse anoxic brain injury.    Assessment/Plan  PEA (Pulseless electrical activity) (HCC)  PEA enroute to hospital  Hypoxia and aspiration contributory  MRI reveals bilateral basal ganglial anoxic  injury.  Status post therapeutic hypothermia and rewarmed    Acute respiratory failure with hypoxia (HCC)  Secondary to drug overdose  Lung protective ventilation strategies  Not appropriate for liberation  Will likely require tracheostomy  D/w family today    Drug overdose  Methamphetamines, oxycodone and EtOH per boyfriend    Cerebral edema (HCC)  Mild/possible per radiologist on CT  MRI on 8/27/2020 without edema or severe changes  Repeat MRI 9/2 -results pending    Elevated transaminase level  Follow    Encephalopathy acute  Anoxic encephalopathy, was improving  Reevaluate now with repeat MRI, LP and continuous EEG for worsening encephalopathy    Hypoglycemia  Follow    Pneumonia of both lungs due to methicillin resistant Staphylococcus aureus (MRSA) (HCC)  BAL 8/31 - > 100K CFU MRSA  Vanco 8/31 - P  BCs neg (1 of 2 with CNS - suspected contaminant)         Updated plan:  Ventilator settings adjusted  Continue multiple AEDs and initiation of midazolam infusion, goal burst suppression of 6 continuous seizures at this point  Ongoing discussion with family regarding neurologic prognosis and goals of care      VTE:  Lovenox  Ulcer: H2 Antagonist  Lines: Central Line  Ongoing indication addressed    I have performed a physical exam and reviewed and updated ROS and Plan today (9/4/2020). In review of yesterday's note (9/3/2020), there are no changes except as documented above.     Discussed patient condition and risk of morbidity and/or mortality with RN, RT, Pharmacy, Dietary, , Code status disscussed, Charge nurse / hot rounds and neurology     The patient remains critically ill.  I have assessed and reassessed the patient multiple times.  She is at high risk for further vital organ deterioration with significant ongoing critical care management as above.  Critical care time = 38 minutes in directly providing and coordinating critical care and extensive data review.  No time overlap and excludes  procedures.

## 2020-09-04 NOTE — CARE PLAN
Problem: Ventilation Defect:  Goal: Ability to achieve and maintain unassisted ventilation or tolerate decreased levels of ventilator support  Intervention: Support and monitor invasive and noninvasive mechanical ventilation  Note:   Adult Ventilation Update    Total Vent Days: 13  7.5 24  ASV   120% +8 30%    Patient Lines/Drains/Airways Status      Active Airway       Name: Placement date: Placement time: Site: Days:    Airway ETT 7.5  08/23/20   2210   --  11                  Sputum/Suction  Cough: Productive;Strong (09/04/20 0400)  Sputum Amount: Copious (09/04/20 0400)  Sputum Color: Clear (09/04/20 0400)  Sputum Consistency: Thin;Thick (09/04/20 0400)      Events/Summary/Plan: SBT's held, continuous EEG

## 2020-09-05 ENCOUNTER — APPOINTMENT (OUTPATIENT)
Dept: RADIOLOGY | Facility: MEDICAL CENTER | Age: 20
DRG: 004 | End: 2020-09-05
Attending: INTERNAL MEDICINE
Payer: MEDICAID

## 2020-09-05 LAB
ACTION RANGE TRIGGERED IACRT: NO
ALBUMIN SERPL BCP-MCNC: 3.3 G/DL (ref 3.2–4.9)
ALBUMIN/GLOB SERPL: 1.1 G/DL
ALP SERPL-CCNC: 70 U/L (ref 30–99)
ALT SERPL-CCNC: 55 U/L (ref 2–50)
ANION GAP SERPL CALC-SCNC: 10 MMOL/L (ref 7–16)
AST SERPL-CCNC: 41 U/L (ref 12–45)
BACTERIA BLD CULT: NORMAL
BACTERIA CSF CULT: NORMAL
BASE EXCESS BLDA CALC-SCNC: -1 MMOL/L (ref -4–3)
BASOPHILS # BLD AUTO: 0.6 % (ref 0–1.8)
BASOPHILS # BLD: 0.05 K/UL (ref 0–0.12)
BILIRUB SERPL-MCNC: 0.2 MG/DL (ref 0.1–1.5)
BODY TEMPERATURE: ABNORMAL DEGREES
BUN SERPL-MCNC: 23 MG/DL (ref 8–22)
CALCIUM SERPL-MCNC: 9 MG/DL (ref 8.5–10.5)
CHLORIDE SERPL-SCNC: 105 MMOL/L (ref 96–112)
CO2 BLDA-SCNC: 24 MMOL/L (ref 20–33)
CO2 SERPL-SCNC: 22 MMOL/L (ref 20–33)
CREAT SERPL-MCNC: 0.42 MG/DL (ref 0.5–1.4)
EOSINOPHIL # BLD AUTO: 0.34 K/UL (ref 0–0.51)
EOSINOPHIL NFR BLD: 4.2 % (ref 0–6.9)
ERYTHROCYTE [DISTWIDTH] IN BLOOD BY AUTOMATED COUNT: 44.5 FL (ref 35.9–50)
GLOBULIN SER CALC-MCNC: 3.1 G/DL (ref 1.9–3.5)
GLUCOSE SERPL-MCNC: 108 MG/DL (ref 65–99)
GRAM STN SPEC: NORMAL
HCO3 BLDA-SCNC: 23 MMOL/L (ref 17–25)
HCT VFR BLD AUTO: 33.3 % (ref 37–47)
HGB BLD-MCNC: 10.7 G/DL (ref 12–16)
HOROWITZ INDEX BLDA+IHG-RTO: 353 MM[HG]
IMM GRANULOCYTES # BLD AUTO: 0.12 K/UL (ref 0–0.11)
IMM GRANULOCYTES NFR BLD AUTO: 1.5 % (ref 0–0.9)
INST. QUALIFIED PATIENT IIQPT: YES
LYMPHOCYTES # BLD AUTO: 1.15 K/UL (ref 1–4.8)
LYMPHOCYTES NFR BLD: 14.1 % (ref 22–41)
MCH RBC QN AUTO: 30.4 PG (ref 27–33)
MCHC RBC AUTO-ENTMCNC: 32.1 G/DL (ref 33.6–35)
MCV RBC AUTO: 94.6 FL (ref 81.4–97.8)
MONOCYTES # BLD AUTO: 0.96 K/UL (ref 0–0.85)
MONOCYTES NFR BLD AUTO: 11.7 % (ref 0–13.4)
NEUTROPHILS # BLD AUTO: 5.56 K/UL (ref 2–7.15)
NEUTROPHILS NFR BLD: 67.9 % (ref 44–72)
NRBC # BLD AUTO: 0 K/UL
NRBC BLD-RTO: 0 /100 WBC
O2/TOTAL GAS SETTING VFR VENT: 30 %
PCO2 BLDA: 34.2 MMHG (ref 26–37)
PCO2 TEMP ADJ BLDA: 33.3 MMHG (ref 26–37)
PH BLDA: 7.44 [PH] (ref 7.4–7.5)
PH TEMP ADJ BLDA: 7.45 [PH] (ref 7.4–7.5)
PLATELET # BLD AUTO: 494 K/UL (ref 164–446)
PMV BLD AUTO: 9 FL (ref 9–12.9)
PO2 BLDA: 106 MMHG (ref 64–87)
PO2 TEMP ADJ BLDA: 103 MMHG (ref 64–87)
POTASSIUM SERPL-SCNC: 4.3 MMOL/L (ref 3.6–5.5)
PROT SERPL-MCNC: 6.4 G/DL (ref 6–8.2)
RBC # BLD AUTO: 3.52 M/UL (ref 4.2–5.4)
SAO2 % BLDA: 98 % (ref 93–99)
SIGNIFICANT IND 70042: NORMAL
SIGNIFICANT IND 70042: NORMAL
SITE SITE: NORMAL
SITE SITE: NORMAL
SODIUM SERPL-SCNC: 137 MMOL/L (ref 135–145)
SOURCE SOURCE: NORMAL
SOURCE SOURCE: NORMAL
SPECIMEN DRAWN FROM PATIENT: ABNORMAL
TRIGL SERPL-MCNC: 70 MG/DL (ref 0–149)
WBC # BLD AUTO: 8.2 K/UL (ref 4.8–10.8)

## 2020-09-05 PROCEDURE — 700111 HCHG RX REV CODE 636 W/ 250 OVERRIDE (IP): Performed by: INTERNAL MEDICINE

## 2020-09-05 PROCEDURE — 71045 X-RAY EXAM CHEST 1 VIEW: CPT

## 2020-09-05 PROCEDURE — 700105 HCHG RX REV CODE 258: Performed by: INTERNAL MEDICINE

## 2020-09-05 PROCEDURE — 80053 COMPREHEN METABOLIC PANEL: CPT

## 2020-09-05 PROCEDURE — 700102 HCHG RX REV CODE 250 W/ 637 OVERRIDE(OP): Performed by: INTERNAL MEDICINE

## 2020-09-05 PROCEDURE — 4A10X4Z MONITORING OF CENTRAL NERVOUS ELECTRICAL ACTIVITY, EXTERNAL APPROACH: ICD-10-PCS | Performed by: PSYCHIATRY & NEUROLOGY

## 2020-09-05 PROCEDURE — A9270 NON-COVERED ITEM OR SERVICE: HCPCS | Performed by: INTERNAL MEDICINE

## 2020-09-05 PROCEDURE — 95714 VEEG EA 12-26 HR UNMNTR: CPT | Performed by: PSYCHIATRY & NEUROLOGY

## 2020-09-05 PROCEDURE — 85025 COMPLETE CBC W/AUTO DIFF WBC: CPT

## 2020-09-05 PROCEDURE — 99232 SBSQ HOSP IP/OBS MODERATE 35: CPT | Mod: 25 | Performed by: PSYCHIATRY & NEUROLOGY

## 2020-09-05 PROCEDURE — 94760 N-INVAS EAR/PLS OXIMETRY 1: CPT

## 2020-09-05 PROCEDURE — 770022 HCHG ROOM/CARE - ICU (200)

## 2020-09-05 PROCEDURE — 94003 VENT MGMT INPAT SUBQ DAY: CPT

## 2020-09-05 PROCEDURE — 99291 CRITICAL CARE FIRST HOUR: CPT | Performed by: INTERNAL MEDICINE

## 2020-09-05 PROCEDURE — C9254 INJECTION, LACOSAMIDE: HCPCS | Performed by: INTERNAL MEDICINE

## 2020-09-05 PROCEDURE — 84478 ASSAY OF TRIGLYCERIDES: CPT

## 2020-09-05 PROCEDURE — 95720 EEG PHY/QHP EA INCR W/VEEG: CPT | Performed by: PSYCHIATRY & NEUROLOGY

## 2020-09-05 PROCEDURE — 94770 HCHG CO2 EXPIRED GAS DETERMINATION: CPT

## 2020-09-05 PROCEDURE — 82803 BLOOD GASES ANY COMBINATION: CPT

## 2020-09-05 PROCEDURE — 36600 WITHDRAWAL OF ARTERIAL BLOOD: CPT

## 2020-09-05 RX ORDER — SODIUM CHLORIDE 9 MG/ML
INJECTION, SOLUTION INTRAVENOUS
Status: ACTIVE
Start: 2020-09-05 | End: 2020-09-05

## 2020-09-05 RX ADMIN — PHENYLEPHRINE HYDROCHLORIDE 25 MCG/MIN: 10 INJECTION INTRAVENOUS at 14:12

## 2020-09-05 RX ADMIN — VALPROATE SODIUM 500 MG: 100 INJECTION, SOLUTION INTRAVENOUS at 04:44

## 2020-09-05 RX ADMIN — PROPOFOL 40 MCG/KG/MIN: 10 INJECTION, EMULSION INTRAVENOUS at 12:02

## 2020-09-05 RX ADMIN — MIDAZOLAM 8 MG/HR: 5 INJECTION INTRAMUSCULAR; INTRAVENOUS at 06:10

## 2020-09-05 RX ADMIN — VALPROATE SODIUM 500 MG: 100 INJECTION, SOLUTION INTRAVENOUS at 18:24

## 2020-09-05 RX ADMIN — SULFAMETHOXAZOLE AND TRIMETHOPRIM 1 TABLET: 800; 160 TABLET ORAL at 18:03

## 2020-09-05 RX ADMIN — SODIUM CHLORIDE 200 MG: 9 INJECTION, SOLUTION INTRAVENOUS at 23:16

## 2020-09-05 RX ADMIN — LEVETIRACETAM 1000 MG: 10 INJECTION INTRAVENOUS at 04:41

## 2020-09-05 RX ADMIN — SULFAMETHOXAZOLE AND TRIMETHOPRIM 1 TABLET: 800; 160 TABLET ORAL at 04:43

## 2020-09-05 RX ADMIN — SODIUM CHLORIDE 200 MG: 9 INJECTION, SOLUTION INTRAVENOUS at 12:02

## 2020-09-05 RX ADMIN — LEVETIRACETAM 1000 MG: 10 INJECTION INTRAVENOUS at 18:03

## 2020-09-05 RX ADMIN — FAMOTIDINE 20 MG: 20 TABLET, FILM COATED ORAL at 18:03

## 2020-09-05 RX ADMIN — PROPOFOL 40 MCG/KG/MIN: 10 INJECTION, EMULSION INTRAVENOUS at 07:52

## 2020-09-05 RX ADMIN — MIDAZOLAM 8 MG/HR: 5 INJECTION INTRAMUSCULAR; INTRAVENOUS at 21:19

## 2020-09-05 RX ADMIN — ENOXAPARIN SODIUM 40 MG: 40 INJECTION SUBCUTANEOUS at 04:41

## 2020-09-05 RX ADMIN — DOCUSATE SODIUM 50 MG AND SENNOSIDES 8.6 MG 2 TABLET: 8.6; 5 TABLET, FILM COATED ORAL at 04:43

## 2020-09-05 RX ADMIN — PROPOFOL 50 MCG/KG/MIN: 10 INJECTION, EMULSION INTRAVENOUS at 22:36

## 2020-09-05 RX ADMIN — PROPOFOL 50 MCG/KG/MIN: 10 INJECTION, EMULSION INTRAVENOUS at 17:43

## 2020-09-05 RX ADMIN — FAMOTIDINE 20 MG: 20 TABLET, FILM COATED ORAL at 04:43

## 2020-09-05 ASSESSMENT — FIBROSIS 4 INDEX: FIB4 SCORE: 0.22

## 2020-09-05 NOTE — PROGRESS NOTES
"Critical Care Progress Note    Date of admission  8/23/2020    Chief Complaint  19 y.o. female admitted 8/23/2020 with drug overdose, cardiac arrest    Hospital Course    \"19 y.o. female who presented 8/23/2020 with drug overdose with methamphetamines, oxycodone and found by boyfriend.  Cardiac arrest enroute to hospital and received narcan, epinephrine and cpr and ROSC achieved.  Difficult airway at scene and LMA placed. ET tube placed in ER.  She was vomiting during airway attempt.  On propofol due to vent asynchrony.  Propofol turned off for neuro assessment and possible hypothermic protocol.  Family no longer at hospital and unable to reach per phone number given for mother.\"    8/24 - TTM protocol initiated, EEG without NCSE  8/25 - rewarmed, awake, not following  8/26 - following occasional commands  8/27- seizure like activity vs shivering--> Keppra, versed, Propofol  8/28- no seizures on EEG  8/29- remains unresponsive.  8/30 -mental status improved, now opening eyes and following commands.  Keppra discontinued  8/31 - started cefepime/vancomycin for possible pneumonia,?  UTI, fever and increasing WBC fever; full vent  9/1 - Seizure activity noted on EEG today, reviewed with neurology; keppra load and increased to 1gm q 12; full vent support  9/2 - remains unresponsive with only some posture and grimace to stimulation, neurology reconsulted, stat MRI brain, LP, cEEG  9/3 -ongoing seizures, Vimpat added, Depakote added, family conference today  9/4 -ongoing seizure noted on cEEG, started on midazolam infusion, full ventilator support  9/5 -ongoing seizures noted, propofol added and increased today, full ventilator support        Interval Problem Update  Reviewed last 24 hour events:  + C/G, unresponsive, hyperreflexive; cEEG, ongoing Sz  SR/ST  Tm = 102  's  2+ edema  + BMs  I/O = 1.9/2.1  Vent day 13, % 30% FiO2  7.47/29/180  CXR reviewed  Abx day 4; vanco/c3; change to bactrim  Lytes OK   TAMIKA " gtt 40  + C/G, PERRL  decort posturing with stim  SR 70 - 90  michelle TF; loose stools  loveonx pepcid  abx changed to bactrim for pna  lytes    Review of Systems  Review of Systems   Unable to perform ROS: Intubated        Vital Signs for last 24 hours   Temp:  [37 °C (98.6 °F)-38.9 °C (102 °F)] 37.1 °C (98.8 °F)  Pulse:  [] 91  Resp:  [15-45] 21  BP: ()/(38-78) 100/39  SpO2:  [96 %-100 %] 98 %    Hemodynamic parameters for last 24 hours       Respiratory Information for the last 24 hours  Vent Mode: ASV  PEEP/CPAP: 8  MAP: 12  Control VTE (exp VT): 369    Physical Exam   Physical Exam  Vitals signs and nursing note reviewed.   Constitutional:       General: She is not in acute distress.     Appearance: She is not ill-appearing or toxic-appearing.      Interventions: She is intubated.   HENT:      Head: Normocephalic and atraumatic.      Right Ear: External ear normal.      Left Ear: External ear normal.      Nose: No congestion or rhinorrhea.      Comments: Feeding tube in place     Mouth/Throat:      Mouth: Mucous membranes are moist.      Pharynx: No oropharyngeal exudate or posterior oropharyngeal erythema.      Comments: ET tube in place  Eyes:      General: No scleral icterus.     Conjunctiva/sclera: Conjunctivae normal.      Pupils: Pupils are equal, round, and reactive to light.      Comments: Sluggish pupillary response   Neck:      Musculoskeletal: Neck supple. No neck rigidity or muscular tenderness.   Cardiovascular:      Rate and Rhythm: Normal rate and regular rhythm.      Pulses: Normal pulses.      Heart sounds: Normal heart sounds. No murmur.   Pulmonary:      Effort: She is intubated.      Breath sounds: No wheezing or rales.      Comments: Full ventilator support, low support settings  Abdominal:      General: Abdomen is flat. There is no distension.      Palpations: Abdomen is soft. There is no mass.      Tenderness: There is no abdominal tenderness.   Musculoskeletal:         General:  No swelling or tenderness.      Right lower leg: No edema.      Left lower leg: No edema.   Skin:     General: Skin is warm and dry.      Capillary Refill: Capillary refill takes less than 2 seconds.      Findings: No erythema or rash.   Neurological:      GCS: GCS eye subscore is 3. GCS verbal subscore is 1. GCS motor subscore is 5.      Cranial Nerves: No cranial nerve deficit or facial asymmetry.      Motor: Abnormal muscle tone present.      Comments: Mild grimace to stimulation, not following any commands, no spontaneous eye opening, some decorticate posturing with stimulation, no clear generalized seizure activity noted   Psychiatric:      Comments: Unable to assess         Medications  Current Facility-Administered Medications   Medication Dose Route Frequency Provider Last Rate Last Dose   • SODIUM CHLORIDE 0.9 % IV SOLN            • MD Alert...PROPOFOL CRITICAL CARE PROTOCOL   Other PRN Dar Red M.D.       • sulfamethoxazole-trimethoprim (BACTRIM DS) 800-160 MG tablet 1 Tab  1 Tab Enteral Tube Q12HRS Dar Red M.D.   1 Tab at 09/05/20 0443   • midazolam (VERSED) premix 125 mg/125 mL NS  0-15 mg/hr Intravenous Continuous Dar Red M.D. 8 mL/hr at 09/05/20 0610 8 mg/hr at 09/05/20 0610   • phenylephrine (TAMIKA-SYNEPHRINE) 40 mg in  mL Infusion  0-300 mcg/min Intravenous Continuous Mariaa Jaimes M.D. 9.4 mL/hr at 09/04/20 2023 25 mcg/min at 09/04/20 2023   • lacosamide (VIMPAT) 200 mg in  mL ivpb  200 mg Intravenous Q12HRS Dar Red M.D.   Stopped at 09/04/20 2330   • valproate (DEPACON) 500 mg in D5W 50 mL IVPB  500 mg Intravenous Q12HRS Dar Red M.D.   Stopped at 09/05/20 0544   • polyethylene glycol/lytes (MIRALAX) PACKET 1 Packet  1 Packet Enteral Tube BID Dar Red M.D.   Stopped at 09/05/20 0600   • acetaminophen (TYLENOL) suppository 325 mg  325 mg Rectal Q6HRS PRN Yosef Tomlin M.D.   650 mg at 09/02/20 1632   • levETIRAcetam (Keppra) 1000 mg in  100 mL NaCl IV premix  1,000 mg Intravenous Q12HRS Dar Red M.D.   Stopped at 09/05/20 0456   • acetaminophen (TYLENOL) tablet 1,000 mg  1,000 mg Enteral Tube Q4HRS PRN Dar Red M.D.   1,000 mg at 09/04/20 1800   • labetalol (NORMODYNE/TRANDATE) injection 10 mg  10 mg Intravenous Q4HRS PRN Yosef Tomlin M.D.   10 mg at 08/30/20 2256   • Pharmacy Consult: Enteral tube insertion - review meds/change route/product selection  1 Each Other PHARMACY TO DOSE Boogie Augustine Jr. D.O.       • Respiratory Therapy Consult   Nebulization Continuous RT Brock Murdock M.D.       • ipratropium-albuterol (DUONEB) nebulizer solution  3 mL Nebulization Q2HRS PRN (RT) Brock Murdock M.D.       • famotidine (PEPCID) tablet 20 mg  20 mg Enteral Tube Q12HRS Brock Murdock M.D.   20 mg at 09/05/20 0443    Or   • famotidine (PEPCID) injection 20 mg  20 mg Intravenous Q12HRS Brock Murdock M.D.   20 mg at 09/04/20 0454   • senna-docusate (PERICOLACE or SENOKOT S) 8.6-50 MG per tablet 2 Tab  2 Tab Enteral Tube BID Brock Murdock M.D.   2 Tab at 09/05/20 0443    And   • polyethylene glycol/lytes (MIRALAX) PACKET 1 Packet  1 Packet Enteral Tube QDAY PRN Brock Murdock M.D.        And   • magnesium hydroxide (MILK OF MAGNESIA) suspension 30 mL  30 mL Enteral Tube QDAY PRN Brock Murdock M.D.        And   • bisacodyl (DULCOLAX) suppository 10 mg  10 mg Rectal QDAY PRN Brock Murdock M.D.       • MD Alert...ICU Electrolyte Replacement per Pharmacy   Other PHARMACY TO DOSE Brock Murdock M.D.       • lidocaine (XYLOCAINE) 1 % injection 1-2 mL  1-2 mL Tracheal Tube Q30 MIN PRN Brock Murdock M.D.       • hydrALAZINE (APRESOLINE) injection 10-20 mg  10-20 mg Intravenous Q6HRS PRN Brock Murdock M.D.       • enoxaparin (LOVENOX) inj 40 mg  40 mg Subcutaneous DAILY Boogie Augustine Jr., D.O.   40 mg at 09/05/20 0441       Fluids    Intake/Output Summary (Last 24 hours) at 9/5/2020 0738  Last data filed at 9/5/2020 0600  Gross per 24  hour   Intake 1894.53 ml   Output 2100 ml   Net -205.47 ml       Laboratory  Recent Labs     09/03/20  0308 09/04/20  0415 09/05/20  0249   ISTATAPH 7.492 7.468 7.437   ISTATAPCO2 30.1 29.8 34.2   ISTATAPO2 83 182* 106*   ISTATATCO2 24 22 24   OUFCPNR7WEX 97 100* 98   ISTATARTHCO3 23.0 21.6 23.0   ISTATARTBE 0 -1 -1   ISTATTEMP 38.3 C 97.6 F 97.6 F   ISTATFIO2 30 30 30   ISTATSPEC Arterial Arterial Arterial   ISTATAPHTC 7.472 7.477 7.445   MDTHACQG1RB 90* 180* 103*         Recent Labs     09/03/20 0455 09/04/20  0342 09/05/20  0420   SODIUM 135 135 137   POTASSIUM 4.2 4.2 4.3   CHLORIDE 100 103 105   CO2 22 19* 22   BUN 19 22 23*   CREATININE 0.40* 0.49* 0.42*   CALCIUM 9.1 9.1 9.0     Recent Labs     09/03/20 0455 09/04/20  0342 09/05/20  0420   ALTSGPT 76* 77* 55*   ASTSGOT 60* 59* 41   ALKPHOSPHAT 81 79 70   TBILIRUBIN 0.3 0.4 0.2   GLUCOSE 117* 125* 108*     Recent Labs     09/03/20 0455 09/04/20  0342 09/05/20  0420   WBC 16.8* 17.7* 8.2   NEUTSPOLYS 76.20* 80.20* 67.90   LYMPHOCYTES 13.00* 8.10* 14.10*   MONOCYTES 6.80 7.10 11.70   EOSINOPHILS 1.70 2.40 4.20   BASOPHILS 0.30 0.60 0.60   ASTSGOT 60* 59* 41   ALTSGPT 76* 77* 55*   ALKPHOSPHAT 81 79 70   TBILIRUBIN 0.3 0.4 0.2     Recent Labs     09/03/20 0455 09/04/20  0342 09/05/20  0420   RBC 4.00* 3.82* 3.52*   HEMOGLOBIN 12.4 11.8* 10.7*   HEMATOCRIT 37.3 36.5* 33.3*   PLATELETCT 567* 549* 494*       Imaging  X-Ray:  I have personally reviewed the images and compared with prior images.     MRI 8/27:  The diffusion-weighted sequences demonstrates areas of restricted diffusion in the bilateral basal ganglia and some of the frontal gray matter. The predominant portion of the brain parenchyma does not demonstrate any restricted diffusion. Therefore this   findings likely represent mild diffuse anoxic brain injury.    Assessment/Plan  PEA (Pulseless electrical activity) (HCC)  PEA enroute to hospital  Hypoxia and aspiration contributory  MRI reveals bilateral  basal ganglial anoxic injury.  Status post therapeutic hypothermia and rewarmed    Acute respiratory failure with hypoxia (HCC)  Secondary to drug overdose  Lung protective ventilation strategies  Not appropriate for liberation  Will likely require tracheostomy  D/w family today    Drug overdose  Methamphetamines, oxycodone and EtOH per boyfriend    Cerebral edema (HCC)  Mild/possible per radiologist on CT  MRI on 8/27/2020 without edema or severe changes  Repeat MRI 9/2 -results pending    Elevated transaminase level  Follow    Encephalopathy acute  Anoxic encephalopathy, was improving  Reevaluate now with repeat MRI, LP and continuous EEG for worsening encephalopathy    Hypoglycemia  Follow    Pneumonia of both lungs due to methicillin resistant Staphylococcus aureus (MRSA) (HCC)  BAL 8/31 - > 100K CFU MRSA  Vanco 8/31 - P  BCs neg (1 of 2 with CNS - suspected contaminant)         Updated plan:  Continue full ventilator support, reviewed with RT  Propofol added to Versed drip and increased today for ongoing seizures  Goal of burst suppression for 48 hours then reassess neurologic status      VTE:  Lovenox  Ulcer: H2 Antagonist  Lines: Central Line  Ongoing indication addressed    I have performed a physical exam and reviewed and updated ROS and Plan today (9/5/2020). In review of yesterday's note (9/4/2020), there are no changes except as documented above.     Discussed patient condition and risk of morbidity and/or mortality with RN, RT, Pharmacy, Dietary, , Code status disscussed, Charge nurse / hot rounds and neurology     The patient remains critically ill.  I have assessed and reassessed the patient multiple times.  She is at high risk for further vital organ deterioration with significant ongoing critical care management as above.  Critical care time = 34 minutes in directly providing and coordinating critical care and extensive data review.  No time overlap and excludes procedures.

## 2020-09-05 NOTE — CARE PLAN
Problem: Ventilation Defect:  Goal: Ability to achieve and maintain unassisted ventilation or tolerate decreased levels of ventilator support  Outcome: PROGRESSING SLOWER THAN EXPECTED      Ventilator Daily Summary    Vent Day # 14    Ventilator settings changed this shift: N/A    Weaning trials: N/A    Plan: Continue current ventilator settings and wean mechanical ventilation as tolerated per physician orders.

## 2020-09-05 NOTE — PROGRESS NOTES
Neurology Progress Note        Subjective:    No significant clinical change noted.  Continued to have electrographic seizures yesterday.  Versed was added but she has not yet achieved burst suppression on EEG.  Propofol will be added this morning.    Objective:  Vitals:  Vitals:    09/05/20 1000 09/05/20 1100 09/05/20 1115 09/05/20 1200   BP: (!) 98/46 (!) 94/40  107/49   Pulse: 85 82 78 97   Resp: 19 (!) 23 (!) 11 (!) 28   Temp: 36.6 °C (97.9 °F)      TempSrc: Bladder      SpO2: 99% 99% 100% 98%   Weight:       Height:         General: Intubated, sedated, does not open eyes are have spontaneous movements  Mental Status: Eyes closed, does not open eyes to noxious stimuli.  Cranial Nerves:  PERRL, EOMI with no nystagmus, face is symmetric  Motor: Minimal withdrawal in all 4 limbs,  Occasional coughing on the ventilator.  Reflexes:  toes downgoing bilaterally  Coordination: deferred due to altered mental status  Gait:  Deferred    Recent Labs     09/03/20 0455 09/04/20 0342 09/05/20 0420   WBC 16.8* 17.7* 8.2   RBC 4.00* 3.82* 3.52*   HEMOGLOBIN 12.4 11.8* 10.7*   HEMATOCRIT 37.3 36.5* 33.3*   MCV 93.3 95.5 94.6   MCH 31.0 30.9 30.4   MCHC 33.2* 32.3* 32.1*   RDW 43.6 44.4 44.5   PLATELETCT 567* 549* 494*   MPV 9.0 8.9* 9.0     Recent Labs     09/03/20 0455 09/04/20 0342 09/05/20  0420   SODIUM 135 135 137   POTASSIUM 4.2 4.2 4.3   CHLORIDE 100 103 105   CO2 22 19* 22   GLUCOSE 117* 125* 108*   BUN 19 22 23*   CREATININE 0.40* 0.49* 0.42*   CALCIUM 9.1 9.1 9.0                 Recent Labs     09/04/20 0342 09/05/20  0420   TRIGLYCERIDE 80 70     Recent Labs     09/03/20  0455 09/04/20 0342 09/05/20  0420   SODIUM 135 135 137   POTASSIUM 4.2 4.2 4.3   CHLORIDE 100 103 105   CO2 22 19* 22   GLUCOSE 117* 125* 108*   BUN 19 22 23*     Recent Labs     09/03/20  0455 09/04/20  0342 09/05/20  0420   SODIUM 135 135 137   POTASSIUM 4.2 4.2 4.3   CHLORIDE 100 103 105   CO2 22 19* 22   BUN 19 22 23*   CREATININE 0.40*  0.49* 0.42*   CALCIUM 9.1 9.1 9.0         No results found for this or any previous visit.           Imaging: neuroimaging reviewed and directly visualized by me  DX-CHEST-PORTABLE (1 VIEW)   Final Result         1.  No acute cardiopulmonary disease.                                 DX-CHEST-PORTABLE (1 VIEW)   Final Result         1.  No acute cardiopulmonary disease.                              DX-CHEST-PORTABLE (1 VIEW)   Final Result         1.  No focal infiltrates, previously visualized infiltrates are not readily apparent.                           MR-BRAIN-WITH & W/O   Final Result         1.  Severe diffuse anoxic brain injury pattern which is more extensive and conspicuous than on previous exam.      2.  No evidence of intracranial mass effect, extra-axial fluid collection, or interval development of hydrocephalus.      3.  Diffuse mucosal inflammatory changes filling the ethmoid and sphenoid sinuses.      DX-CHEST-PORTABLE (1 VIEW)   Final Result         1.  Pulmonary edema and/or infiltrates are identified, which are somewhat decreased since the prior exam.                        [id number] VIEW   Final Result      Feeding tube is noted with tip at the level of the proximal duodenum.                  DX-CHEST-PORTABLE (1 VIEW)   Final Result         1.  Pulmonary edema and/or infiltrates are identified, which are stable since the prior exam.                     DX-CHEST-PORTABLE (1 VIEW)   Final Result      Stable chest x-ray findings with right lower lobe consolidation.      DX-CHEST-PORTABLE (1 VIEW)   Final Result         1.  Pulmonary edema and/or infiltrates are identified, which are stable since the prior exam.                  DX-CHEST-PORTABLE (1 VIEW)   Final Result         1.  Pulmonary edema and/or infiltrates are identified, which are stable since the prior exam.               MR-BRAIN-W/O   Final Result      The diffusion-weighted sequences demonstrates areas of restricted diffusion in the  bilateral basal ganglia and some of the frontal gray matter. The predominant portion of the brain parenchyma does not demonstrate any restricted diffusion. Therefore this    findings likely represent mild diffuse anoxic brain injury.      DX-CHEST-PORTABLE (1 VIEW)   Final Result         1.  Pulmonary edema and/or infiltrates are identified, which are somewhat decreased since the prior exam.            DX-CHEST-PORTABLE (1 VIEW)   Final Result         1.  Pulmonary edema and/or infiltrates are identified, which are stable since the prior exam.         DX-ABDOMEN FOR TUBE PLACEMENT   Final Result         1.  Nonspecific bowel gas pattern.   2.  Dobbhoff tube tip overlying the expected location of the pylorus or first duodenal segment.      DX-CHEST-PORTABLE (1 VIEW)   Final Result         1.  Patchy bilateral pulmonary infiltrates, somewhat increased since prior.      EC-ECHOCARDIOGRAM COMPLETE W/O CONT   Final Result      DX-CHEST-PORTABLE (1 VIEW)   Final Result         1.  Patchy bilateral pulmonary infiltrates, somewhat increased since prior.      CT-HEAD W/O   Final Result         1.  Possible subtle sulcal effacement and slight loss of differentiation of gray-white matter, consider component of cerebral edema. Recommend radiographic follow-up   2.  Bilateral sphenoid and maxillary sinus air-fluid levels      DX-CHEST-PORTABLE (1 VIEW)   Final Result         1.  No acute cardiopulmonary disease.      DX-CHEST-PORTABLE (1 VIEW)    (Results Pending)       Impression:   Lindsey is a 19-year-old woman who had a cardiac arrest and is suffered anoxic brain injury.  Reportedly she was following commands over the weekend and then had a decline in her level of alertness and consciousness.  Follow-up MRI suggest some worsening of the anoxic brain injury but this likely represents just radiographic progression rather than clinical progression.  She has  since developed a pneumonia which is now improving and been found to  have intermittent seizure activity which could explain some of her worsened mental state.     Recommendations:  -Continue Keppra and Vimpat and Depakote.  -Continue Versed to achieve burst suppression for the next 48 hours.  -Not yet in burst suppression as of 10 AM, will increase propofol to 50  -In regards to prognosis, she is difficult to prognosticate on at this time given her underlying infection and uncontrolled seizures.  She is clearly suffered some component of anoxic brain injury and will be left with a long recovery process, but the endpoint of her recovery is difficult to predict at this point time.  -Neurology will continue to follow

## 2020-09-05 NOTE — CARE PLAN
Problem: Ventilation Defect:  Goal: Ability to achieve and maintain unassisted ventilation or tolerate decreased levels of ventilator support  Outcome: NOT MET   Vent day 14  , +8, 30%  EEG in progress, SBT held

## 2020-09-05 NOTE — PROGRESS NOTES
Neurology Progress Note        Subjective:    No significant clinical change, EEG showed continued electrographic seizure activity which necessitated adding Depakote to her medications and ultimately deciding to sedate her with a goal of burst suppression.    Objective:  Vitals:  Vitals:    09/04/20 1600 09/04/20 1700 09/04/20 1800 09/04/20 1900   BP: 110/63 121/78 (!) 99/48 (!) 99/48   Pulse: (!) 116 (!) 133 (!) 110 (!) 101   Resp: (!) 24 (!) 22 (!) 23 18   Temp: (!) 38.1 °C (100.6 °F) (!) 38.1 °C (100.6 °F) (!) 38.2 °C (100.8 °F)    TempSrc: Bladder Bladder Bladder    SpO2: 100% 99% 98% 96%   Weight:       Height:       General: Intubated, eyes are open but does not track or look around the room or follow commands  Mental Status: Eyes are open, but no tracking or following commands  Cranial Nerves:  PERRL, EOMI with no nystagmus, face is symmetric  Motor: Withdraws all 4 limbs briskly,  Occasional coughing on the ventilator.  Reflexes:  toes downgoing bilaterally  Coordination: deferred due to altered mental status  Gait:  Deferred    Recent Labs     09/02/20 0430 09/03/20 0455 09/04/20 0342   WBC 15.9* 16.8* 17.7*   RBC 3.99* 4.00* 3.82*   HEMOGLOBIN 12.3 12.4 11.8*   HEMATOCRIT 37.0 37.3 36.5*   MCV 92.7 93.3 95.5   MCH 30.8 31.0 30.9   MCHC 33.2* 33.2* 32.3*   RDW 42.4 43.6 44.4   PLATELETCT 494* 567* 549*   MPV 9.1 9.0 8.9*     Recent Labs     09/02/20  0430 09/03/20 0455 09/04/20 0342   SODIUM 136 135 135   POTASSIUM 3.9 4.2 4.2   CHLORIDE 99 100 103   CO2 24 22 19*   GLUCOSE 127* 117* 125*   BUN 18 19 22   CREATININE 0.41* 0.40* 0.49*   CALCIUM 9.0 9.1 9.1                 Recent Labs     09/04/20  0342   TRIGLYCERIDE 80     Recent Labs     09/02/20  0430 09/03/20  0455 09/04/20  0342   SODIUM 136 135 135   POTASSIUM 3.9 4.2 4.2   CHLORIDE 99 100 103   CO2 24 22 19*   GLUCOSE 127* 117* 125*   BUN 18 19 22     Recent Labs     09/02/20  0430 09/03/20  0455 09/04/20  0342   SODIUM 136 135 135   POTASSIUM  3.9 4.2 4.2   CHLORIDE 99 100 103   CO2 24 22 19*   BUN 18 19 22   CREATININE 0.41* 0.40* 0.49*   CALCIUM 9.0 9.1 9.1         No results found for this or any previous visit.           Imaging: neuroimaging reviewed and directly visualized by me  DX-CHEST-PORTABLE (1 VIEW)   Final Result         1.  No acute cardiopulmonary disease.                              DX-CHEST-PORTABLE (1 VIEW)   Final Result         1.  No focal infiltrates, previously visualized infiltrates are not readily apparent.                           MR-BRAIN-WITH & W/O   Final Result         1.  Severe diffuse anoxic brain injury pattern which is more extensive and conspicuous than on previous exam.      2.  No evidence of intracranial mass effect, extra-axial fluid collection, or interval development of hydrocephalus.      3.  Diffuse mucosal inflammatory changes filling the ethmoid and sphenoid sinuses.      DX-CHEST-PORTABLE (1 VIEW)   Final Result         1.  Pulmonary edema and/or infiltrates are identified, which are somewhat decreased since the prior exam.                        [id number] VIEW   Final Result      Feeding tube is noted with tip at the level of the proximal duodenum.                  DX-CHEST-PORTABLE (1 VIEW)   Final Result         1.  Pulmonary edema and/or infiltrates are identified, which are stable since the prior exam.                     DX-CHEST-PORTABLE (1 VIEW)   Final Result      Stable chest x-ray findings with right lower lobe consolidation.      DX-CHEST-PORTABLE (1 VIEW)   Final Result         1.  Pulmonary edema and/or infiltrates are identified, which are stable since the prior exam.                  DX-CHEST-PORTABLE (1 VIEW)   Final Result         1.  Pulmonary edema and/or infiltrates are identified, which are stable since the prior exam.               MR-BRAIN-W/O   Final Result      The diffusion-weighted sequences demonstrates areas of restricted diffusion in the bilateral basal ganglia and some of  the frontal gray matter. The predominant portion of the brain parenchyma does not demonstrate any restricted diffusion. Therefore this    findings likely represent mild diffuse anoxic brain injury.      DX-CHEST-PORTABLE (1 VIEW)   Final Result         1.  Pulmonary edema and/or infiltrates are identified, which are somewhat decreased since the prior exam.            DX-CHEST-PORTABLE (1 VIEW)   Final Result         1.  Pulmonary edema and/or infiltrates are identified, which are stable since the prior exam.         DX-ABDOMEN FOR TUBE PLACEMENT   Final Result         1.  Nonspecific bowel gas pattern.   2.  Dobbhoff tube tip overlying the expected location of the pylorus or first duodenal segment.      DX-CHEST-PORTABLE (1 VIEW)   Final Result         1.  Patchy bilateral pulmonary infiltrates, somewhat increased since prior.      EC-ECHOCARDIOGRAM COMPLETE W/O CONT   Final Result      DX-CHEST-PORTABLE (1 VIEW)   Final Result         1.  Patchy bilateral pulmonary infiltrates, somewhat increased since prior.      CT-HEAD W/O   Final Result         1.  Possible subtle sulcal effacement and slight loss of differentiation of gray-white matter, consider component of cerebral edema. Recommend radiographic follow-up   2.  Bilateral sphenoid and maxillary sinus air-fluid levels      DX-CHEST-PORTABLE (1 VIEW)   Final Result         1.  No acute cardiopulmonary disease.      DX-CHEST-PORTABLE (1 VIEW)    (Results Pending)       Impression:   Lindsey is a 19-year-old woman who had a cardiac arrest and is suffered anoxic brain injury.  Reportedly she was following commands over the weekend and then had a decline in her level of alertness and consciousness.  Follow-up MRI suggest some worsening of the anoxic brain injury but this likely represents just radiographic progression rather than clinical progression.  She has follow-up pneumonia which is now improving and been found to have intermittent seizure activity which could  explain her worsened mental state.     Recommendations:  -Continue Keppra and Vimpat and Depakote.  -Continue Versed to achieve burst suppression for the next 48 hours.  -Continue EEG monitoring for another 48 hours  -In regards to prognosis, she is difficult to prognosticate on at this time given her underlying infection and uncontrolled seizures.  She is clearly suffered some component of anoxic brain injury and will be left with a long recovery process, but the endpoint of her recovery is difficult to predict at this point time.  -Neurology will continue to follow

## 2020-09-05 NOTE — PROCEDURES
VIDEO ELECTROENCEPHALOGRAM REPORT        Referring provider: Dr. Red.      DOS: 9/5/2020 (total recording of 23 hours and 13 minutes).      INDICATION:  Annika He 19 y.o. female presenting with s/p cardiac arrest, altered mental status, seizures.      CURRENT ANTIEPILEPTIC REGIMEN: Levetiracetam 1000 mg bid, Lacosamide 200 mg bid, and Valproic Acid 500 mg bid. Sedation with Versed, then Propofol added.      TECHNIQUE: 30 channel video electroencephalogram (EEG) was performed in accordance with the international 10-20 system. The study was reviewed in bipolar and referential montages. The recording examined a sedated patient.      DESCRIPTION OF THE RECORD:  Generalized delta / theta activity. With the use of sedation, slow wave sleep elements are noted, including sleep spindles. Waxing and weaning of the cerebral electrical activity and/or a mild burst suppression noted at times. Intermittently, runs of Frontal Intermittent Rhythmic Delta Activity (FIRDA), and frequent generalized sharps with frontal maximum and triphasic morphology. No seizures noted during the study, but eeg remains abnormal with frequent sharps and intermittently FIRDA.      ACTIVATION PROCEDURES:   Not performed.      EKG: sampling of the EKG recording demonstrated sinus rhythm.      EVENTS:  See above.      INTERPRETATION:  This is an abnormal 24 hrs video EEG recording in a sedated patient. A mild to moderate encephalopathy is suggested. With the use of sedation, a mild burst suppression noted at times, but overall no burst suppression noted. Intermittently, runs of Frontal Intermittent Rhythmic Delta Activity (FIRDA), and frequent generalized sharps with frontal maximum and triphasic morphology. No seizures noted during the study, but eeg remains abnormal with frequent sharps and intermittently FIRDA. The findings suggest underlying areas of increased cortical irritability and structural abnormality. The patient remains at  risk for further seizures. Clinical and radiological correlation is recommended.     Updates provided to Dr. Red and Dr. Gonzales.         Garland Beatty MD   Epilepsy and Neurodiagnostics.   Clinical  of Neurology [facility].   Diplomate in Neurology, Epilepsy, and Electrodiagnostic Medicine.   Office: [phone]  Fax: [phone]

## 2020-09-06 ENCOUNTER — APPOINTMENT (OUTPATIENT)
Dept: RADIOLOGY | Facility: MEDICAL CENTER | Age: 20
DRG: 004 | End: 2020-09-06
Attending: INTERNAL MEDICINE
Payer: MEDICAID

## 2020-09-06 LAB
ACTION RANGE TRIGGERED IACRT: NO
ALBUMIN SERPL BCP-MCNC: 3.4 G/DL (ref 3.2–4.9)
ALBUMIN/GLOB SERPL: 1.1 G/DL
ALP SERPL-CCNC: 67 U/L (ref 30–99)
ALT SERPL-CCNC: 51 U/L (ref 2–50)
ANION GAP SERPL CALC-SCNC: 12 MMOL/L (ref 7–16)
AST SERPL-CCNC: 42 U/L (ref 12–45)
BASE EXCESS BLDA CALC-SCNC: -3 MMOL/L (ref -4–3)
BASOPHILS # BLD AUTO: 0.4 % (ref 0–1.8)
BASOPHILS # BLD: 0.04 K/UL (ref 0–0.12)
BILIRUB SERPL-MCNC: <0.2 MG/DL (ref 0.1–1.5)
BODY TEMPERATURE: ABNORMAL DEGREES
BUN SERPL-MCNC: 27 MG/DL (ref 8–22)
CALCIUM SERPL-MCNC: 9.1 MG/DL (ref 8.5–10.5)
CHLORIDE SERPL-SCNC: 105 MMOL/L (ref 96–112)
CO2 BLDA-SCNC: 22 MMOL/L (ref 20–33)
CO2 SERPL-SCNC: 22 MMOL/L (ref 20–33)
CREAT SERPL-MCNC: 0.47 MG/DL (ref 0.5–1.4)
EOSINOPHIL # BLD AUTO: 0.38 K/UL (ref 0–0.51)
EOSINOPHIL NFR BLD: 4.3 % (ref 0–6.9)
ERYTHROCYTE [DISTWIDTH] IN BLOOD BY AUTOMATED COUNT: 47.1 FL (ref 35.9–50)
GLOBULIN SER CALC-MCNC: 3.1 G/DL (ref 1.9–3.5)
GLUCOSE SERPL-MCNC: 86 MG/DL (ref 65–99)
HCO3 BLDA-SCNC: 21.4 MMOL/L (ref 17–25)
HCT VFR BLD AUTO: 33.7 % (ref 37–47)
HGB BLD-MCNC: 10.8 G/DL (ref 12–16)
HOROWITZ INDEX BLDA+IHG-RTO: 260 MM[HG]
IMM GRANULOCYTES # BLD AUTO: 0.11 K/UL (ref 0–0.11)
IMM GRANULOCYTES NFR BLD AUTO: 1.2 % (ref 0–0.9)
INST. QUALIFIED PATIENT IIQPT: YES
LYMPHOCYTES # BLD AUTO: 1.7 K/UL (ref 1–4.8)
LYMPHOCYTES NFR BLD: 19.1 % (ref 22–41)
MCH RBC QN AUTO: 31.1 PG (ref 27–33)
MCHC RBC AUTO-ENTMCNC: 32 G/DL (ref 33.6–35)
MCV RBC AUTO: 97.1 FL (ref 81.4–97.8)
MONOCYTES # BLD AUTO: 0.78 K/UL (ref 0–0.85)
MONOCYTES NFR BLD AUTO: 8.7 % (ref 0–13.4)
NEUTROPHILS # BLD AUTO: 5.91 K/UL (ref 2–7.15)
NEUTROPHILS NFR BLD: 66.3 % (ref 44–72)
NRBC # BLD AUTO: 0 K/UL
NRBC BLD-RTO: 0 /100 WBC
O2/TOTAL GAS SETTING VFR VENT: 30 %
PCO2 BLDA: 34.9 MMHG (ref 26–37)
PCO2 TEMP ADJ BLDA: 34 MMHG (ref 26–37)
PH BLDA: 7.4 [PH] (ref 7.4–7.5)
PH TEMP ADJ BLDA: 7.41 [PH] (ref 7.4–7.5)
PLATELET # BLD AUTO: 560 K/UL (ref 164–446)
PMV BLD AUTO: 9.2 FL (ref 9–12.9)
PO2 BLDA: 78 MMHG (ref 64–87)
PO2 TEMP ADJ BLDA: 75 MMHG (ref 64–87)
POTASSIUM SERPL-SCNC: 5 MMOL/L (ref 3.6–5.5)
PROT SERPL-MCNC: 6.5 G/DL (ref 6–8.2)
RBC # BLD AUTO: 3.47 M/UL (ref 4.2–5.4)
SAO2 % BLDA: 96 % (ref 93–99)
SODIUM SERPL-SCNC: 139 MMOL/L (ref 135–145)
SPECIMEN DRAWN FROM PATIENT: ABNORMAL
WBC # BLD AUTO: 8.9 K/UL (ref 4.8–10.8)

## 2020-09-06 PROCEDURE — 87077 CULTURE AEROBIC IDENTIFY: CPT | Mod: 91

## 2020-09-06 PROCEDURE — 95714 VEEG EA 12-26 HR UNMNTR: CPT | Performed by: PSYCHIATRY & NEUROLOGY

## 2020-09-06 PROCEDURE — L4398 FOOT DROP SPLINT PRE OTS: HCPCS

## 2020-09-06 PROCEDURE — 87147 CULTURE TYPE IMMUNOLOGIC: CPT

## 2020-09-06 PROCEDURE — 94003 VENT MGMT INPAT SUBQ DAY: CPT

## 2020-09-06 PROCEDURE — 700111 HCHG RX REV CODE 636 W/ 250 OVERRIDE (IP): Performed by: INTERNAL MEDICINE

## 2020-09-06 PROCEDURE — 770022 HCHG ROOM/CARE - ICU (200)

## 2020-09-06 PROCEDURE — 99232 SBSQ HOSP IP/OBS MODERATE 35: CPT | Mod: 25 | Performed by: PSYCHIATRY & NEUROLOGY

## 2020-09-06 PROCEDURE — 94770 HCHG CO2 EXPIRED GAS DETERMINATION: CPT

## 2020-09-06 PROCEDURE — 87205 SMEAR GRAM STAIN: CPT

## 2020-09-06 PROCEDURE — 302978 HCHG BRONCHOSCOPY-DIAGNOSTIC

## 2020-09-06 PROCEDURE — 700105 HCHG RX REV CODE 258: Performed by: INTERNAL MEDICINE

## 2020-09-06 PROCEDURE — 87102 FUNGUS ISOLATION CULTURE: CPT

## 2020-09-06 PROCEDURE — A9270 NON-COVERED ITEM OR SERVICE: HCPCS | Performed by: INTERNAL MEDICINE

## 2020-09-06 PROCEDURE — 80053 COMPREHEN METABOLIC PANEL: CPT

## 2020-09-06 PROCEDURE — 700102 HCHG RX REV CODE 250 W/ 637 OVERRIDE(OP): Performed by: INTERNAL MEDICINE

## 2020-09-06 PROCEDURE — 36600 WITHDRAWAL OF ARTERIAL BLOOD: CPT

## 2020-09-06 PROCEDURE — 700101 HCHG RX REV CODE 250: Performed by: INTERNAL MEDICINE

## 2020-09-06 PROCEDURE — C9254 INJECTION, LACOSAMIDE: HCPCS | Performed by: INTERNAL MEDICINE

## 2020-09-06 PROCEDURE — 87070 CULTURE OTHR SPECIMN AEROBIC: CPT

## 2020-09-06 PROCEDURE — 82803 BLOOD GASES ANY COMBINATION: CPT

## 2020-09-06 PROCEDURE — 99291 CRITICAL CARE FIRST HOUR: CPT | Mod: 25 | Performed by: INTERNAL MEDICINE

## 2020-09-06 PROCEDURE — 0B9F8ZX DRAINAGE OF RIGHT LOWER LUNG LOBE, VIA NATURAL OR ARTIFICIAL OPENING ENDOSCOPIC, DIAGNOSTIC: ICD-10-PCS | Performed by: INTERNAL MEDICINE

## 2020-09-06 PROCEDURE — 71045 X-RAY EXAM CHEST 1 VIEW: CPT

## 2020-09-06 PROCEDURE — 31624 DX BRONCHOSCOPE/LAVAGE: CPT | Performed by: INTERNAL MEDICINE

## 2020-09-06 PROCEDURE — 95720 EEG PHY/QHP EA INCR W/VEEG: CPT | Performed by: PSYCHIATRY & NEUROLOGY

## 2020-09-06 PROCEDURE — 87186 SC STD MICRODIL/AGAR DIL: CPT

## 2020-09-06 PROCEDURE — 4A10X4Z MONITORING OF CENTRAL NERVOUS ELECTRICAL ACTIVITY, EXTERNAL APPROACH: ICD-10-PCS | Performed by: PSYCHIATRY & NEUROLOGY

## 2020-09-06 PROCEDURE — 85025 COMPLETE CBC W/AUTO DIFF WBC: CPT

## 2020-09-06 RX ORDER — KETAMINE HYDROCHLORIDE 50 MG/ML
2 INJECTION, SOLUTION INTRAMUSCULAR; INTRAVENOUS ONCE
Status: COMPLETED | OUTPATIENT
Start: 2020-09-06 | End: 2020-09-06

## 2020-09-06 RX ADMIN — VALPROATE SODIUM 500 MG: 100 INJECTION, SOLUTION INTRAVENOUS at 06:02

## 2020-09-06 RX ADMIN — LEVETIRACETAM 1000 MG: 10 INJECTION INTRAVENOUS at 06:02

## 2020-09-06 RX ADMIN — SULFAMETHOXAZOLE AND TRIMETHOPRIM 1 TABLET: 800; 160 TABLET ORAL at 16:32

## 2020-09-06 RX ADMIN — PROPOFOL 80 MCG/KG/MIN: 10 INJECTION, EMULSION INTRAVENOUS at 12:45

## 2020-09-06 RX ADMIN — ENOXAPARIN SODIUM 40 MG: 40 INJECTION SUBCUTANEOUS at 05:57

## 2020-09-06 RX ADMIN — PROPOFOL 50 MCG/KG/MIN: 10 INJECTION, EMULSION INTRAVENOUS at 06:34

## 2020-09-06 RX ADMIN — PROPOFOL 80 MCG/KG/MIN: 10 INJECTION, EMULSION INTRAVENOUS at 07:36

## 2020-09-06 RX ADMIN — PROPOFOL 50 MCG/KG/MIN: 10 INJECTION, EMULSION INTRAVENOUS at 05:58

## 2020-09-06 RX ADMIN — LEVETIRACETAM 1000 MG: 10 INJECTION INTRAVENOUS at 16:32

## 2020-09-06 RX ADMIN — KETAMINE HYDROCHLORIDE 0.3 MG/KG/HR: 50 INJECTION, SOLUTION INTRAMUSCULAR; INTRAVENOUS at 10:22

## 2020-09-06 RX ADMIN — SULFAMETHOXAZOLE AND TRIMETHOPRIM 1 TABLET: 800; 160 TABLET ORAL at 05:57

## 2020-09-06 RX ADMIN — SODIUM CHLORIDE 200 MG: 9 INJECTION, SOLUTION INTRAVENOUS at 10:48

## 2020-09-06 RX ADMIN — PHENYLEPHRINE HYDROCHLORIDE 20 MCG/MIN: 10 INJECTION INTRAVENOUS at 13:44

## 2020-09-06 RX ADMIN — PROPOFOL 80 MCG/KG/MIN: 10 INJECTION, EMULSION INTRAVENOUS at 17:19

## 2020-09-06 RX ADMIN — FAMOTIDINE 20 MG: 20 TABLET, FILM COATED ORAL at 16:32

## 2020-09-06 RX ADMIN — SODIUM CHLORIDE 200 MG: 9 INJECTION, SOLUTION INTRAVENOUS at 23:32

## 2020-09-06 RX ADMIN — KETAMINE HYDROCHLORIDE 4 MG/KG/HR: 50 INJECTION, SOLUTION INTRAMUSCULAR; INTRAVENOUS at 16:31

## 2020-09-06 RX ADMIN — SODIUM CHLORIDE: 9 INJECTION, SOLUTION INTRAVENOUS at 19:04

## 2020-09-06 RX ADMIN — KETAMINE HYDROCHLORIDE 87 MG: 50 INJECTION INTRAMUSCULAR; INTRAVENOUS at 10:19

## 2020-09-06 RX ADMIN — VALPROATE SODIUM 500 MG: 100 INJECTION, SOLUTION INTRAVENOUS at 17:19

## 2020-09-06 RX ADMIN — PROPOFOL 80 MCG/KG/MIN: 10 INJECTION, EMULSION INTRAVENOUS at 22:22

## 2020-09-06 RX ADMIN — SODIUM CHLORIDE: 9 INJECTION, SOLUTION INTRAVENOUS at 23:59

## 2020-09-06 RX ADMIN — FAMOTIDINE 20 MG: 20 TABLET, FILM COATED ORAL at 05:57

## 2020-09-06 NOTE — CARE PLAN
Problem: Bowel/Gastric:  Goal: Normal bowel function is maintained or improved  Intervention: Educate patient and significant other/support system about diet, fluid intake, medications and activity to promote bowel function  Note: Patient with flexiseal BMS in place and tube feedings     Problem: Skin Integrity  Goal: Risk for impaired skin integrity will decrease  Intervention: Assess risk factors for impaired skin integrity and/or pressure ulcers  Note: Patient being turned every 2 hours.

## 2020-09-06 NOTE — PROGRESS NOTES
"Critical Care Progress Note    Date of admission  8/23/2020    Chief Complaint  19 y.o. female admitted 8/23/2020 with drug overdose, cardiac arrest    Hospital Course    \"19 y.o. female who presented 8/23/2020 with drug overdose with methamphetamines, oxycodone and found by boyfriend.  Cardiac arrest enroute to hospital and received narcan, epinephrine and cpr and ROSC achieved.  Difficult airway at scene and LMA placed. ET tube placed in ER.  She was vomiting during airway attempt.  On propofol due to vent asynchrony.  Propofol turned off for neuro assessment and possible hypothermic protocol.  Family no longer at hospital and unable to reach per phone number given for mother.\"    8/24 - TTM protocol initiated, EEG without NCSE  8/25 - rewarmed, awake, not following  8/26 - following occasional commands  8/27- seizure like activity vs shivering--> Keppra, versed, Propofol  8/28- no seizures on EEG  8/29- remains unresponsive.  8/30 -mental status improved, now opening eyes and following commands.  Keppra discontinued  8/31 - started cefepime/vancomycin for possible pneumonia,?  UTI, fever and increasing WBC fever; full vent  9/1 - Seizure activity noted on EEG today, reviewed with neurology; keppra load and increased to 1gm q 12; full vent support  9/2 - remains unresponsive with only some posture and grimace to stimulation, neurology reconsulted, stat MRI brain, LP, cEEG  9/3 -ongoing seizures, Vimpat added, Depakote added, family conference today  9/4 -ongoing seizure noted on cEEG, started on midazolam infusion, full ventilator support  9/5 -ongoing seizures noted, propofol added and increased today, full ventilator support  9/6 -have not yet achieved burst suppression on cEEG; continue propofol, change Versed to ketamine infusion, discussed with neurology, full ventilator support        Interval Problem Update  Reviewed last 24 hour events:  Int decerbrate posturing  Not following or responding, +C/G,   Prop " 80, versed 8  Davey 30  SR  MAP > 65  michelle TF  I/O =   CXR: min int edema  Vent day 15  , 8, 30%  7.4/34/75  Pepcid, lovenox ppx  Day 7 of 14 abx - bactrim  Change versed to Ketamine       Review of Systems  Review of Systems   Unable to perform ROS: Intubated        Vital Signs for last 24 hours   Temp:  [36.6 °C (97.9 °F)-37.1 °C (98.8 °F)] 37.1 °C (98.8 °F)  Pulse:  [] 87  Resp:  [11-37] 21  BP: ()/(37-87) 120/68  SpO2:  [94 %-100 %] 100 %    Hemodynamic parameters for last 24 hours       Respiratory Information for the last 24 hours  Vent Mode: ASV  PEEP/CPAP: 8  MAP: 12  Control VTE (exp VT): 351    Physical Exam   Physical Exam  Vitals signs and nursing note reviewed.   Constitutional:       General: She is not in acute distress.     Appearance: She is not ill-appearing or toxic-appearing.      Interventions: She is intubated.   HENT:      Head: Normocephalic and atraumatic.      Right Ear: External ear normal.      Left Ear: External ear normal.      Nose: No congestion or rhinorrhea.      Comments: Feeding tube in place     Mouth/Throat:      Mouth: Mucous membranes are moist.      Pharynx: No oropharyngeal exudate or posterior oropharyngeal erythema.      Comments: ET tube in place  Eyes:      General: No scleral icterus.     Conjunctiva/sclera: Conjunctivae normal.      Pupils: Pupils are equal, round, and reactive to light.      Comments: Sluggish pupillary response   Neck:      Musculoskeletal: Neck supple. No neck rigidity or muscular tenderness.   Cardiovascular:      Rate and Rhythm: Normal rate and regular rhythm.      Pulses: Normal pulses.      Heart sounds: Normal heart sounds. No murmur.   Pulmonary:      Effort: She is intubated.      Breath sounds: No wheezing or rales.      Comments: Full ventilator support, low support settings  Abdominal:      General: Abdomen is flat. There is no distension.      Palpations: Abdomen is soft. There is no mass.      Tenderness: There is no  abdominal tenderness.   Musculoskeletal:         General: No swelling or tenderness.      Right lower leg: No edema.      Left lower leg: No edema.   Skin:     General: Skin is warm and dry.      Capillary Refill: Capillary refill takes less than 2 seconds.      Findings: No erythema or rash.   Neurological:      GCS: GCS eye subscore is 3. GCS verbal subscore is 1. GCS motor subscore is 5.      Cranial Nerves: No cranial nerve deficit or facial asymmetry.      Motor: Abnormal muscle tone present.      Comments: Unresponsive, moves all extremities, some posturing with stimulation, positive cough and gag, on continuous EEG   Psychiatric:      Comments: Unable to assess         Medications  Current Facility-Administered Medications   Medication Dose Route Frequency Provider Last Rate Last Dose   • propofol (DIPRIVAN) injection  0-80 mcg/kg/min Intravenous Continuous Dar Red M.D. 20.9 mL/hr at 09/06/20 0736 80 mcg/kg/min at 09/06/20 0736   • ketamine 500 mg in  mL infusion (critical care only)  0-2.5 mg/kg/hr Intravenous Continuous Dar Red M.D.       • ketamine (KETALAR) 50 mg/ml injection  2 mg/kg Intravenous Once Dar Red M.D.       • sulfamethoxazole-trimethoprim (BACTRIM DS) 800-160 MG tablet 1 Tab  1 Tab Enteral Tube Q12HRS Dar Red M.D.   1 Tab at 09/06/20 0557   • phenylephrine (TAMIKA-SYNEPHRINE) 40 mg in  mL Infusion  0-300 mcg/min Intravenous Continuous Mariaa Jaimes M.D. 11.3 mL/hr at 09/05/20 1918 30 mcg/min at 09/05/20 1918   • lacosamide (VIMPAT) 200 mg in  mL ivpb  200 mg Intravenous Q12HRS Dar Red M.D.   Stopped at 09/06/20 0016   • valproate (DEPACON) 500 mg in D5W 50 mL IVPB  500 mg Intravenous Q12HRS Dar Red M.D.   Stopped at 09/06/20 0702   • polyethylene glycol/lytes (MIRALAX) PACKET 1 Packet  1 Packet Enteral Tube BID Dar Red M.D.   Stopped at 09/05/20 0600   • acetaminophen (TYLENOL) suppository 325 mg  325 mg Rectal Q6HRS  PRN Yosef Tomlin M.D.   650 mg at 09/02/20 1632   • levETIRAcetam (Keppra) 1000 mg in 100 mL NaCl IV premix  1,000 mg Intravenous Q12HRS Dar Red M.D.   Stopped at 09/06/20 0617   • acetaminophen (TYLENOL) tablet 1,000 mg  1,000 mg Enteral Tube Q4HRS PRN Dar Red M.D.   1,000 mg at 09/04/20 1800   • labetalol (NORMODYNE/TRANDATE) injection 10 mg  10 mg Intravenous Q4HRS PRN Yosef Tomlin M.D.   10 mg at 08/30/20 2256   • Pharmacy Consult: Enteral tube insertion - review meds/change route/product selection  1 Each Other PHARMACY TO DOSE Boogie Augustine Jr., D.O.       • Respiratory Therapy Consult   Nebulization Continuous RT Brock Murdock M.D.       • ipratropium-albuterol (DUONEB) nebulizer solution  3 mL Nebulization Q2HRS PRN (RT) Brock Murdock M.D.       • famotidine (PEPCID) tablet 20 mg  20 mg Enteral Tube Q12HRS Dar Red M.D.   20 mg at 09/06/20 0557   • senna-docusate (PERICOLACE or SENOKOT S) 8.6-50 MG per tablet 2 Tab  2 Tab Enteral Tube BID Brock Murdock M.D.   Stopped at 09/05/20 1800    And   • polyethylene glycol/lytes (MIRALAX) PACKET 1 Packet  1 Packet Enteral Tube QDAY PRN Brock Murdock M.D.        And   • magnesium hydroxide (MILK OF MAGNESIA) suspension 30 mL  30 mL Enteral Tube QDAY PRN Brock Murdock M.D.        And   • bisacodyl (DULCOLAX) suppository 10 mg  10 mg Rectal QDAY PRN Brock Murdock M.D.       • MD Alert...ICU Electrolyte Replacement per Pharmacy   Other PHARMACY TO DOSE Brock Murdock M.D.       • lidocaine (XYLOCAINE) 1 % injection 1-2 mL  1-2 mL Tracheal Tube Q30 MIN PRN Brock Murdock M.D.       • hydrALAZINE (APRESOLINE) injection 10-20 mg  10-20 mg Intravenous Q6HRS PRN Brock Murdock M.D.       • enoxaparin (LOVENOX) inj 40 mg  40 mg Subcutaneous DAILY Boogie Augustine Jr., D.O.   40 mg at 09/06/20 0557       Fluids    Intake/Output Summary (Last 24 hours) at 9/6/2020 0920  Last data filed at 9/6/2020 0600  Gross per 24 hour   Intake 2163.14 ml    Output 2050 ml   Net 113.14 ml       Laboratory  Recent Labs     09/04/20  0415 09/05/20  0249 09/06/20  0420   ISTATAPH 7.468 7.437 7.397*   ISTATAPCO2 29.8 34.2 34.9   ISTATAPO2 182* 106* 78   ISTATATCO2 22 24 22   LBXTOFA3UAK 100* 98 96   ISTATARTHCO3 21.6 23.0 21.4   ISTATARTBE -1 -1 -3   ISTATTEMP 97.6 F 97.6 F 97.6 F   ISTATFIO2 30 30 30   ISTATSPEC Arterial Arterial Arterial   ISTATAPHTC 7.477 7.445 7.405   [id number] 180* 103* 75         Recent Labs     09/04/20 0342 09/05/20  0420 09/06/20  0438   SODIUM 135 137 139   POTASSIUM 4.2 4.3 5.0   CHLORIDE 103 105 105   CO2 19* 22 22   BUN 22 23* 27*   CREATININE 0.49* 0.42* 0.47*   CALCIUM 9.1 9.0 9.1     Recent Labs     09/04/20 0342 09/05/20  0420 09/06/20  0438   ALTSGPT 77* 55* 51*   ASTSGOT 59* 41 42   ALKPHOSPHAT 79 70 67   TBILIRUBIN 0.4 0.2 <0.2   GLUCOSE 125* 108* 86     Recent Labs     09/04/20 0342 09/05/20  0420 09/06/20  0438   WBC 17.7* 8.2 8.9   NEUTSPOLYS 80.20* 67.90 66.30   LYMPHOCYTES 8.10* 14.10* 19.10*   MONOCYTES 7.10 11.70 8.70   EOSINOPHILS 2.40 4.20 4.30   BASOPHILS 0.60 0.60 0.40   ASTSGOT 59* 41 42   ALTSGPT 77* 55* 51*   ALKPHOSPHAT 79 70 67   TBILIRUBIN 0.4 0.2 <0.2     Recent Labs     09/04/20 0342 09/05/20  0420 09/06/20  0438   RBC 3.82* 3.52* 3.47*   HEMOGLOBIN 11.8* 10.7* 10.8*   HEMATOCRIT 36.5* 33.3* 33.7*   PLATELETCT 549* 494* 560*       Imaging  X-Ray:  I have personally reviewed the images and compared with prior images.     MRI 8/27:  The diffusion-weighted sequences demonstrates areas of restricted diffusion in the bilateral basal ganglia and some of the frontal gray matter. The predominant portion of the brain parenchyma does not demonstrate any restricted diffusion. Therefore this   findings likely represent mild diffuse anoxic brain injury.    MRI 9/2:  1.  Severe diffuse anoxic brain injury pattern which is more extensive and conspicuous than on previous exam.  2.  No evidence of intracranial mass effect,  extra-axial fluid collection, or interval development of hydrocephalus.  3.  Diffuse mucosal inflammatory changes filling the ethmoid and sphenoid sinuses.    Assessment/Plan  PEA (Pulseless electrical activity) (HCC)  PEA enroute to hospital  Hypoxia and aspiration contributory  MRI reveals bilateral basal ganglial anoxic injury.  Status post therapeutic hypothermia and rewarmed    Acute respiratory failure with hypoxia (HCC)  Secondary to drug overdose  Lung protective ventilation strategies  Not appropriate for liberation  Will likely require tracheostomy  D/w family today    Drug overdose  Methamphetamines, oxycodone and EtOH per boyfriend    Cerebral edema (HCC)  Mild/possible per radiologist on CT  MRI on 8/27/2020 without edema or severe changes  Repeat MRI 9/2 -results pending    Elevated transaminase level  Follow    Encephalopathy acute  Anoxic encephalopathy, was improving  Reevaluate now with repeat MRI, LP and continuous EEG for worsening encephalopathy    Hypoglycemia  Follow    Pneumonia of both lungs due to methicillin resistant Staphylococcus aureus (MRSA) (HCC)  BAL 8/31 - > 100K CFU MRSA  Vanco 8/31 - P  BCs neg (1 of 2 with CNS - suspected contaminant)         Updated plan:  Continue full ventilator support  Progressive seizures then status epilepticus, subclinical  Now on continuous EEG with goal burst suppression 48 hours, not yet achieved  Versed discontinued, starting ketamine and continue high-dose propofol  Reviewed in detail with neurology  MRSA pneumonia with lower colony count of Acinetobacter  Treated with vancomycin 8/31 x 5 days now de-escalated to Bactrim  Repeated bronc today with minimal secretions, follow-up BAL and colony counts  Full ventilator support  Discussed with family again next week, likely proceed with tracheostomy and PEG tube in anticipation of long-term care as she did have neurologic recovery and was following commands after her arrest and likely want to give her  time to assess neurologic recovery after seizures controlled      VTE:  Lovenox  Ulcer: H2 Antagonist  Lines: Central Line  Ongoing indication addressed    I have performed a physical exam and reviewed and updated ROS and Plan today (9/6/2020). In review of yesterday's note (9/5/2020), there are no changes except as documented above.     Discussed patient condition and risk of morbidity and/or mortality with RN, RT, Pharmacy, Dietary, , Code status disscussed, Charge nurse / hot rounds and neurology     The patient remains critically ill.  I have assessed and reassessed the patient multiple times.  She is at high risk for further vital organ deterioration with significant ongoing critical care management as above.  Critical care time = 45 minutes in directly providing and coordinating critical care and extensive data review.  No time overlap and excludes procedures.

## 2020-09-06 NOTE — CARE PLAN
Problem: Safety  Goal: Will remain free from injury  Note: No falls this shift. Bed alarm activated and audible. Hourly rounds maintained      Problem: Venous Thromboembolism (VTW)/Deep Vein Thrombosis (DVT) Prevention:  Goal: Patient will participate in Venous Thrombosis (VTE)/Deep Vein Thrombosis (DVT)Prevention Measures  Note: SCDs in place. Lovenox given as ordered.

## 2020-09-06 NOTE — PROCEDURES
"Date of Service: 9/6/2020    Procedure:  Diagnostic and therapeutic bronchoscopy with BAL    Indication: Respiratory failure, pneumonia    Physician:  Dr. Dar Red MD    Post Procedure Diagnosis:  1.  Mild airway inflammation  2.  ETT repositioned appropriately  3.  No significant purulence  4.  BAL right lower lobe sent for culture    Narrative:  Appropriate consent was obtained and \"time out\" was performed.  A flexible fiberoptic bronchoscope was then inserted through the ETT without difficulty.  All airways were evaluated to the sub-segmental level.  The airway mucosa was mildly inflamed.  The ET tube was appropriately repositioned.  There was no significant purulent secretions.  No endobronchial lesions were seen.  The bronchoscope was then wedged in a segment of the right lower lobe bronchus.  30 cc of saline was instilled with moderate return of mostly clear, mildly cloudy BAL fluid.  The BAL specimen will be sent for appropriate culture.  No immediate complications.  EBL = 0.      Dar Red M.D.        "

## 2020-09-06 NOTE — PROGRESS NOTES
Monitor Summary:   SR : 70-90's  0.16/0.08/0.40      12 hour chart check   2 RN skin check complete

## 2020-09-06 NOTE — PROGRESS NOTES
Neurology Progress Note        Subjective:    No significant clinical change over the last 24 hours.  Likely seizure activity has been witnessed.    Objective:  Vitals:  Vitals:    09/06/20 1100 09/06/20 1200 09/06/20 1215 09/06/20 1230   BP: 107/56 122/62 114/56 108/58   Pulse: 87 72 99 (!) 102   Resp: 15 18 (!) 22 (!) 23   Temp:  37.3 °C (99.1 °F)     TempSrc:  Bladder     SpO2: 100% 100%     Weight:       Height:         General: Intubated, sedated, does not open eyes are have spontaneous movements  Mental Status: Eyes closed, does not open eyes to noxious stimuli.  Cranial Nerves:  PERRL, eyes are in the midline, face is symmetric  Motor: No withdrawal in the limbs to noxious stimuli today, she appears to be developing increased tone in her extremities with some fixed extensor type posturing in the upper extremities.  Reflexes:  toes downgoing bilaterally  Coordination: deferred due to altered mental status  Gait:  Deferred    Recent Labs     09/04/20 0342 09/05/20 0420 09/06/20  0438   WBC 17.7* 8.2 8.9   RBC 3.82* 3.52* 3.47*   HEMOGLOBIN 11.8* 10.7* 10.8*   HEMATOCRIT 36.5* 33.3* 33.7*   MCV 95.5 94.6 97.1   MCH 30.9 30.4 31.1   MCHC 32.3* 32.1* 32.0*   RDW 44.4 44.5 47.1   PLATELETCT 549* 494* 560*   MPV 8.9* 9.0 9.2     Recent Labs     09/04/20 0342 09/05/20 0420 09/06/20  0438   SODIUM 135 137 139   POTASSIUM 4.2 4.3 5.0   CHLORIDE 103 105 105   CO2 19* 22 22   GLUCOSE 125* 108* 86   BUN 22 23* 27*   CREATININE 0.49* 0.42* 0.47*   CALCIUM 9.1 9.0 9.1                 Recent Labs     09/04/20 0342 09/05/20  0420   TRIGLYCERIDE 80 70     Recent Labs     09/04/20 0342 09/05/20  0420 09/06/20  0438   SODIUM 135 137 139   POTASSIUM 4.2 4.3 5.0   CHLORIDE 103 105 105   CO2 19* 22 22   GLUCOSE 125* 108* 86   BUN 22 23* 27*     Recent Labs     09/04/20  0342 09/05/20  0420 09/06/20  0438   SODIUM 135 137 139   POTASSIUM 4.2 4.3 5.0   CHLORIDE 103 105 105   CO2 19* 22 22   BUN 22 23* 27*   CREATININE 0.49*  0.42* 0.47*   CALCIUM 9.1 9.0 9.1         No results found for this or any previous visit.           Imaging: neuroimaging reviewed and directly visualized by me  DX-CHEST-PORTABLE (1 VIEW)   Final Result      No significant change      DX-CHEST-PORTABLE (1 VIEW)   Final Result         1.  No acute cardiopulmonary disease.                                 DX-CHEST-PORTABLE (1 VIEW)   Final Result         1.  No acute cardiopulmonary disease.                              DX-CHEST-PORTABLE (1 VIEW)   Final Result         1.  No focal infiltrates, previously visualized infiltrates are not readily apparent.                           MR-BRAIN-WITH & W/O   Final Result         1.  Severe diffuse anoxic brain injury pattern which is more extensive and conspicuous than on previous exam.      2.  No evidence of intracranial mass effect, extra-axial fluid collection, or interval development of hydrocephalus.      3.  Diffuse mucosal inflammatory changes filling the ethmoid and sphenoid sinuses.      DX-CHEST-PORTABLE (1 VIEW)   Final Result         1.  Pulmonary edema and/or infiltrates are identified, which are somewhat decreased since the prior exam.                        EO-DRAJCMD-2 VIEW   Final Result      Feeding tube is noted with tip at the level of the proximal duodenum.                  DX-CHEST-PORTABLE (1 VIEW)   Final Result         1.  Pulmonary edema and/or infiltrates are identified, which are stable since the prior exam.                     DX-CHEST-PORTABLE (1 VIEW)   Final Result      Stable chest x-ray findings with right lower lobe consolidation.      DX-CHEST-PORTABLE (1 VIEW)   Final Result         1.  Pulmonary edema and/or infiltrates are identified, which are stable since the prior exam.                  DX-CHEST-PORTABLE (1 VIEW)   Final Result         1.  Pulmonary edema and/or infiltrates are identified, which are stable since the prior exam.               MR-BRAIN-W/O   Final Result      The  diffusion-weighted sequences demonstrates areas of restricted diffusion in the bilateral basal ganglia and some of the frontal gray matter. The predominant portion of the brain parenchyma does not demonstrate any restricted diffusion. Therefore this    findings likely represent mild diffuse anoxic brain injury.      DX-CHEST-PORTABLE (1 VIEW)   Final Result         1.  Pulmonary edema and/or infiltrates are identified, which are somewhat decreased since the prior exam.            DX-CHEST-PORTABLE (1 VIEW)   Final Result         1.  Pulmonary edema and/or infiltrates are identified, which are stable since the prior exam.         DX-ABDOMEN FOR TUBE PLACEMENT   Final Result         1.  Nonspecific bowel gas pattern.   2.  Dobbhoff tube tip overlying the expected location of the pylorus or first duodenal segment.      DX-CHEST-PORTABLE (1 VIEW)   Final Result         1.  Patchy bilateral pulmonary infiltrates, somewhat increased since prior.      EC-ECHOCARDIOGRAM COMPLETE W/O CONT   Final Result      DX-CHEST-PORTABLE (1 VIEW)   Final Result         1.  Patchy bilateral pulmonary infiltrates, somewhat increased since prior.      CT-HEAD W/O   Final Result         1.  Possible subtle sulcal effacement and slight loss of differentiation of gray-white matter, consider component of cerebral edema. Recommend radiographic follow-up   2.  Bilateral sphenoid and maxillary sinus air-fluid levels      DX-CHEST-PORTABLE (1 VIEW)   Final Result         1.  No acute cardiopulmonary disease.      DX-CHEST-PORTABLE (1 VIEW)    (Results Pending)       IImpression:   Lindsey is a 19-year-old woman who had a cardiac arrest and is suffered anoxic brain injury.  Reportedly she was following commands over the weekend and then had a decline in her level of alertness and consciousness.  Follow-up MRI suggest some worsening of the anoxic brain injury but this likely represents just radiographic progression rather than clinical progression.   She has  since developed a pneumonia which is now improving and been found to have intermittent seizure activity which could explain some of her worsened mental state.  Unfortunately the electrographic seizures have been difficult to control.       Recommendations:  -Continue Keppra and Vimpat and Depakote.  -She is yet to achieve meaningful burst suppression, after discussion with Dr. Beatty will stop Versed and add ketamine, continue propofol.  -In regards to prognosis, she is difficult to prognosticate on at this time given her underlying infection and uncontrolled seizures.  Fact that she was initially following commands is a good prognostic sign but her course over the last week likely suggest a worsening prognosis.  She has clearly suffered some component of anoxic brain injury and will be left with a long recovery process, but the endpoint of her recovery is difficult to predict at this point time.  -Neurology will continue to follow   Quality 130: Documentation Of Current Medications In The Medical Record: Current Medications Documented Quality 226: Preventive Care And Screening: Tobacco Use: Screening And Cessation Intervention: Patient screened for tobacco use and is an ex/non-smoker Detail Level: Detailed Quality 110: Preventive Care And Screening: Influenza Immunization: Influenza Immunization not Administered because Patient Refused.

## 2020-09-06 NOTE — CARE PLAN
Problem: Ventilation Defect:  Goal: Ability to achieve and maintain unassisted ventilation or tolerate decreased levels of ventilator support  9/6/2020 0427 by Bruce Guerrero, RRT  Outcome: NOT MET    Vent day 15  , +8, 30%  SBT held due to EEG

## 2020-09-06 NOTE — WOUND TEAM
Wound team note:   Pt seen for placement of BMS. MD order verified. Pt assessed, noted to be incontinent of loose brown stool. Sacrum/buttocks pink and intact. Sphincter tone determined to be adequate. BMS placed without difficulty,  40 mL of tap water placed in retention balloon, irrigated with  60 mL warm tap water. Confirmed irrigant/stool output in tube Yes. Nursing to irrigate q shift with 60 mL-120 mL warm tap water or until patent.

## 2020-09-06 NOTE — PALLIATIVE CARE
"Palliative Care follow-up  0800:Case discussed with Dr. Red this AM regarding planning f/u meeting today at 1500. He states the plan to just give family updates on the situation as medication changes are still taking place and PC not needed at meeting today.    1515: Call from father Angel Luis about meeting today. He confirmed plan to meet with Dr. Red soon. He is aware the meeting will take place with the MD and states he will have his friend on the phone who is a retired RN and his \".\" He was encouraged to call this RN with any f/u needs or questions he may have. He was appreciative.      Plan: Follow and support with POC as needed    Thank you for allowing Palliative Care to support this patient and family. Contact [phone] for additional assistance, change in patient status, or with any questions/concerns.   "

## 2020-09-06 NOTE — PROCEDURES
VIDEO ELECTROENCEPHALOGRAM REPORT        Referring provider: Dr. Red.      DOS: 9/6/2020 (total recording of 23 hours and 20 minutes).      INDICATION:  Annika He 19 y.o. female presenting with s/p cardiac arrest, altered mental status, seizures.      CURRENT ANTIEPILEPTIC REGIMEN: Levetiracetam 1000 mg bid, Lacosamide 200 mg bid, and Valproic Acid 500 mg bid. Sedation with Versed and Propofol, then Versed discontinued and Ketamine added and further titrated up.      TECHNIQUE: 30 channel video electroencephalogram (EEG) was performed in accordance with the international 10-20 system. The study was reviewed in bipolar and referential montages. The recording examined a sedated patient.      DESCRIPTION OF THE RECORD:  Generalized delta / theta activity. With the use of sedation, slow wave sleep elements are noted, including sleep spindles. Waxing and weaning of the cerebral electrical activity but without a burst suppression pattern. Intermittently, runs of Frontal Intermittent Rhythmic Delta Activity (FIRDA), and frequent generalized sharps with frontal maximum and triphasic morphology are noted. No seizures noted during the study, but eeg remains abnormal with frequent sharps and intermittently FIRDA.      ACTIVATION PROCEDURES:   Not performed.      EKG: sampling of the EKG recording demonstrated sinus rhythm.      EVENTS:  an event was marked by RN, patient noted coughing and swallowing. No seizures captured       INTERPRETATION:  This is an abnormal 24 hrs video EEG recording in a sedated patient. Waxing and waning of the cerebral electrical activity due to use of sedatives, but patient is not in burst suppression. Intermittently, runs of Frontal Intermittent Rhythmic Delta Activity (FIRDA), and frequent generalized sharps with frontal maximum and triphasic morphology. No seizures noted during the study, but eeg remains abnormal with frequent sharps and intermittently FIRDA. The findings suggest  underlying areas of increased cortical irritability and structural abnormality. The patient remains at risk for further seizures. Clinical and radiological correlation is recommended.     Updates provided to Dr. Nunez.         Garland Beatty MD   Epilepsy and Neurodiagnostics.   Clinical  of Neurology [facility].   Diplomate in Neurology, Epilepsy, and Electrodiagnostic Medicine.   Office: [phone]  Fax: [phone]

## 2020-09-07 ENCOUNTER — APPOINTMENT (OUTPATIENT)
Dept: RADIOLOGY | Facility: MEDICAL CENTER | Age: 20
DRG: 004 | End: 2020-09-07
Attending: INTERNAL MEDICINE
Payer: MEDICAID

## 2020-09-07 PROBLEM — E16.2 HYPOGLYCEMIA: Status: RESOLVED | Noted: 2020-08-24 | Resolved: 2020-09-07

## 2020-09-07 PROBLEM — R74.01 ELEVATED TRANSAMINASE LEVEL: Status: RESOLVED | Noted: 2020-08-24 | Resolved: 2020-09-07

## 2020-09-07 LAB
ALBUMIN SERPL BCP-MCNC: 3.8 G/DL (ref 3.2–4.9)
ALBUMIN/GLOB SERPL: 1.2 G/DL
ALP SERPL-CCNC: 67 U/L (ref 30–99)
ALT SERPL-CCNC: 49 U/L (ref 2–50)
ANION GAP SERPL CALC-SCNC: 15 MMOL/L (ref 7–16)
AST SERPL-CCNC: 36 U/L (ref 12–45)
BASOPHILS # BLD AUTO: 0.3 % (ref 0–1.8)
BASOPHILS # BLD: 0.02 K/UL (ref 0–0.12)
BILIRUB SERPL-MCNC: <0.2 MG/DL (ref 0.1–1.5)
BUN SERPL-MCNC: 18 MG/DL (ref 8–22)
CALCIUM SERPL-MCNC: 9 MG/DL (ref 8.5–10.5)
CHLORIDE SERPL-SCNC: 106 MMOL/L (ref 96–112)
CO2 SERPL-SCNC: 20 MMOL/L (ref 20–33)
CREAT SERPL-MCNC: 0.54 MG/DL (ref 0.5–1.4)
EOSINOPHIL # BLD AUTO: 0.25 K/UL (ref 0–0.51)
EOSINOPHIL NFR BLD: 3.3 % (ref 0–6.9)
ERYTHROCYTE [DISTWIDTH] IN BLOOD BY AUTOMATED COUNT: 45 FL (ref 35.9–50)
FUNGUS SPEC FUNGUS STN: NORMAL
GLOBULIN SER CALC-MCNC: 3.2 G/DL (ref 1.9–3.5)
GLUCOSE SERPL-MCNC: 102 MG/DL (ref 65–99)
GRAM STN SPEC: NORMAL
HCT VFR BLD AUTO: 34.9 % (ref 37–47)
HGB BLD-MCNC: 11.3 G/DL (ref 12–16)
IMM GRANULOCYTES # BLD AUTO: 0.06 K/UL (ref 0–0.11)
IMM GRANULOCYTES NFR BLD AUTO: 0.8 % (ref 0–0.9)
LYMPHOCYTES # BLD AUTO: 1.64 K/UL (ref 1–4.8)
LYMPHOCYTES NFR BLD: 21.5 % (ref 22–41)
MCH RBC QN AUTO: 30.7 PG (ref 27–33)
MCHC RBC AUTO-ENTMCNC: 32.4 G/DL (ref 33.6–35)
MCV RBC AUTO: 94.8 FL (ref 81.4–97.8)
MONOCYTES # BLD AUTO: 0.63 K/UL (ref 0–0.85)
MONOCYTES NFR BLD AUTO: 8.3 % (ref 0–13.4)
NEUTROPHILS # BLD AUTO: 5.02 K/UL (ref 2–7.15)
NEUTROPHILS NFR BLD: 65.8 % (ref 44–72)
NRBC # BLD AUTO: 0 K/UL
NRBC BLD-RTO: 0 /100 WBC
PLATELET # BLD AUTO: 586 K/UL (ref 164–446)
PMV BLD AUTO: 8.8 FL (ref 9–12.9)
POTASSIUM SERPL-SCNC: 4.5 MMOL/L (ref 3.6–5.5)
PROT SERPL-MCNC: 7 G/DL (ref 6–8.2)
RBC # BLD AUTO: 3.68 M/UL (ref 4.2–5.4)
SIGNIFICANT IND 70042: NORMAL
SIGNIFICANT IND 70042: NORMAL
SITE SITE: NORMAL
SITE SITE: NORMAL
SODIUM SERPL-SCNC: 141 MMOL/L (ref 135–145)
SOURCE SOURCE: NORMAL
SOURCE SOURCE: NORMAL
TRIGL SERPL-MCNC: 118 MG/DL (ref 0–149)
WBC # BLD AUTO: 7.6 K/UL (ref 4.8–10.8)

## 2020-09-07 PROCEDURE — 85025 COMPLETE CBC W/AUTO DIFF WBC: CPT

## 2020-09-07 PROCEDURE — 770022 HCHG ROOM/CARE - ICU (200)

## 2020-09-07 PROCEDURE — 80053 COMPREHEN METABOLIC PANEL: CPT

## 2020-09-07 PROCEDURE — 84134 ASSAY OF PREALBUMIN: CPT

## 2020-09-07 PROCEDURE — 95720 EEG PHY/QHP EA INCR W/VEEG: CPT | Performed by: PSYCHIATRY & NEUROLOGY

## 2020-09-07 PROCEDURE — 71045 X-RAY EXAM CHEST 1 VIEW: CPT

## 2020-09-07 PROCEDURE — 700105 HCHG RX REV CODE 258: Performed by: INTERNAL MEDICINE

## 2020-09-07 PROCEDURE — 700105 HCHG RX REV CODE 258

## 2020-09-07 PROCEDURE — 99291 CRITICAL CARE FIRST HOUR: CPT | Performed by: INTERNAL MEDICINE

## 2020-09-07 PROCEDURE — 94760 N-INVAS EAR/PLS OXIMETRY 1: CPT

## 2020-09-07 PROCEDURE — 95714 VEEG EA 12-26 HR UNMNTR: CPT | Performed by: PSYCHIATRY & NEUROLOGY

## 2020-09-07 PROCEDURE — 700101 HCHG RX REV CODE 250: Performed by: INTERNAL MEDICINE

## 2020-09-07 PROCEDURE — 700102 HCHG RX REV CODE 250 W/ 637 OVERRIDE(OP): Performed by: INTERNAL MEDICINE

## 2020-09-07 PROCEDURE — C9254 INJECTION, LACOSAMIDE: HCPCS | Performed by: INTERNAL MEDICINE

## 2020-09-07 PROCEDURE — 94770 HCHG CO2 EXPIRED GAS DETERMINATION: CPT

## 2020-09-07 PROCEDURE — A9270 NON-COVERED ITEM OR SERVICE: HCPCS | Performed by: INTERNAL MEDICINE

## 2020-09-07 PROCEDURE — 94003 VENT MGMT INPAT SUBQ DAY: CPT

## 2020-09-07 PROCEDURE — 99233 SBSQ HOSP IP/OBS HIGH 50: CPT | Performed by: PSYCHIATRY & NEUROLOGY

## 2020-09-07 PROCEDURE — 700111 HCHG RX REV CODE 636 W/ 250 OVERRIDE (IP): Performed by: INTERNAL MEDICINE

## 2020-09-07 PROCEDURE — 4A10X4Z MONITORING OF CENTRAL NERVOUS ELECTRICAL ACTIVITY, EXTERNAL APPROACH: ICD-10-PCS | Performed by: PSYCHIATRY & NEUROLOGY

## 2020-09-07 PROCEDURE — 84478 ASSAY OF TRIGLYCERIDES: CPT

## 2020-09-07 RX ORDER — SODIUM CHLORIDE 9 MG/ML
INJECTION, SOLUTION INTRAVENOUS
Status: COMPLETED
Start: 2020-09-07 | End: 2020-09-07

## 2020-09-07 RX ADMIN — PROPOFOL 80 MCG/KG/MIN: 10 INJECTION, EMULSION INTRAVENOUS at 02:04

## 2020-09-07 RX ADMIN — FAMOTIDINE 20 MG: 20 TABLET, FILM COATED ORAL at 05:45

## 2020-09-07 RX ADMIN — SODIUM CHLORIDE: 9 INJECTION, SOLUTION INTRAVENOUS at 04:55

## 2020-09-07 RX ADMIN — ENOXAPARIN SODIUM 40 MG: 40 INJECTION SUBCUTANEOUS at 05:44

## 2020-09-07 RX ADMIN — DOCUSATE SODIUM 50 MG AND SENNOSIDES 8.6 MG 2 TABLET: 8.6; 5 TABLET, FILM COATED ORAL at 17:36

## 2020-09-07 RX ADMIN — DOCUSATE SODIUM 50 MG AND SENNOSIDES 8.6 MG 2 TABLET: 8.6; 5 TABLET, FILM COATED ORAL at 05:45

## 2020-09-07 RX ADMIN — PROPOFOL 30 MCG/KG/MIN: 10 INJECTION, EMULSION INTRAVENOUS at 12:00

## 2020-09-07 RX ADMIN — SODIUM CHLORIDE: 9 INJECTION, SOLUTION INTRAVENOUS at 14:33

## 2020-09-07 RX ADMIN — SULFAMETHOXAZOLE AND TRIMETHOPRIM 1 TABLET: 800; 160 TABLET ORAL at 17:36

## 2020-09-07 RX ADMIN — LEVETIRACETAM 1000 MG: 10 INJECTION INTRAVENOUS at 17:36

## 2020-09-07 RX ADMIN — SULFAMETHOXAZOLE AND TRIMETHOPRIM 1 TABLET: 800; 160 TABLET ORAL at 05:45

## 2020-09-07 RX ADMIN — SODIUM CHLORIDE: 9 INJECTION, SOLUTION INTRAVENOUS at 19:26

## 2020-09-07 RX ADMIN — SODIUM CHLORIDE: 9 INJECTION, SOLUTION INTRAVENOUS at 09:29

## 2020-09-07 RX ADMIN — FENTANYL CITRATE 100 MCG: 50 INJECTION INTRAMUSCULAR; INTRAVENOUS at 22:18

## 2020-09-07 RX ADMIN — PROPOFOL 80 MCG/KG/MIN: 10 INJECTION, EMULSION INTRAVENOUS at 06:36

## 2020-09-07 RX ADMIN — SODIUM CHLORIDE 10 ML: 9 INJECTION, SOLUTION INTRAVENOUS at 01:20

## 2020-09-07 RX ADMIN — FAMOTIDINE 20 MG: 20 TABLET, FILM COATED ORAL at 17:37

## 2020-09-07 RX ADMIN — SODIUM CHLORIDE 200 MG: 9 INJECTION, SOLUTION INTRAVENOUS at 12:12

## 2020-09-07 RX ADMIN — VALPROATE SODIUM 500 MG: 100 INJECTION, SOLUTION INTRAVENOUS at 06:30

## 2020-09-07 RX ADMIN — SODIUM CHLORIDE 200 MG: 9 INJECTION, SOLUTION INTRAVENOUS at 23:37

## 2020-09-07 RX ADMIN — VALPROATE SODIUM 500 MG: 100 INJECTION, SOLUTION INTRAVENOUS at 17:36

## 2020-09-07 RX ADMIN — LEVETIRACETAM 1000 MG: 10 INJECTION INTRAVENOUS at 05:45

## 2020-09-07 RX ADMIN — POLYETHYLENE GLYCOL 3350 1 PACKET: 17 POWDER, FOR SOLUTION ORAL at 17:36

## 2020-09-07 ASSESSMENT — FIBROSIS 4 INDEX
FIB4 SCORE: 0.18
FIB4 SCORE: 0.22

## 2020-09-07 NOTE — PROGRESS NOTES
Ankle contracture boot was fitted and applied to pt LLE. Any questions or [place] [phone]. Thank you.

## 2020-09-07 NOTE — PROGRESS NOTES
Hospital Neurology Progress Note:     Interval History: No acute events overnight.  No further seizure activity.  Unable to obtain review of systems due to intubation.    Objective:   Vitals:    09/07/20 0700 09/07/20 0718 09/07/20 0800 09/07/20 0900   BP: 110/64  (!) 99/46 (!) 99/47   Pulse: 100 99 98 (!) 109   Resp: (!) 26 (!) 26 (!) 26 (!) 26   Temp:   37.3 °C (99.1 °F)    TempSrc:   Bladder    SpO2: 94% 95% 95% 97%   Weight:   64.5 kg (142 lb 3.2 oz)    Height:           Labs:     Lab Results   Component Value Date/Time    PROTHROMBTM 14.0 08/25/2020 09:20 PM    INR 1.05 08/25/2020 09:20 PM      Lab Results   Component Value Date/Time    WBC 7.6 09/07/2020 06:30 AM    RBC 3.68 (L) 09/07/2020 06:30 AM    HEMOGLOBIN 11.3 (L) 09/07/2020 06:30 AM    HEMATOCRIT 34.9 (L) 09/07/2020 06:30 AM    MCV 94.8 09/07/2020 06:30 AM    MCH 30.7 09/07/2020 06:30 AM    MCHC 32.4 (L) 09/07/2020 06:30 AM    MPV 8.8 (L) 09/07/2020 06:30 AM    NEUTSPOLYS 65.80 09/07/2020 06:30 AM    LYMPHOCYTES 21.50 (L) 09/07/2020 06:30 AM    MONOCYTES 8.30 09/07/2020 06:30 AM    EOSINOPHILS 3.30 09/07/2020 06:30 AM    BASOPHILS 0.30 09/07/2020 06:30 AM    ANISOCYTOSIS 1+ 08/23/2020 10:05 PM      Lab Results   Component Value Date/Time    SODIUM 141 09/07/2020 03:13 AM    POTASSIUM 4.5 09/07/2020 03:13 AM    CHLORIDE 106 09/07/2020 03:13 AM    CO2 20 09/07/2020 03:13 AM    GLUCOSE 102 (H) 09/07/2020 03:13 AM    BUN 18 09/07/2020 03:13 AM    CREATININE 0.54 09/07/2020 03:13 AM      Lab Results   Component Value Date/Time    TRIGLYCERIDE 118 09/07/2020 03:13 AM       Lab Results   Component Value Date/Time    ALKPHOSPHAT 67 09/07/2020 03:13 AM    ASTSGOT 36 09/07/2020 03:13 AM    ALTSGPT 49 09/07/2020 03:13 AM    TBILIRUBIN <0.2 09/07/2020 03:13 AM        Imaging/Testing:   MRI brain on 9/2/2020 reviewed in chart    MRI brain on 8/27/2020 reviewed in chart    Continuous EEG from 9/6/2020 through 9/7/2020 was discussed with the interpreting neurologist  without further seizure activity occurring for the last 48 hours.    Physical Exam:     General: Intubated 19-year-old female in no acute distress.  Cardio: Normal S1/S2. No peripheral edema.   Pulm: CTAX2. No respiratory distress.   Skin: Warm, dry, no rashes or lesions   Psychiatric: Unable to assess.  HEENT: Atraumatic head, normal sclera and conjunctiva, moist oral mucosa. No lid lag  Abdomen: Soft, non tender. No masses or hepatosplenomegaly.    Neurologic:  Mental Status: GCS 3 T (E1, M1, V1 T)  Cranial Nerves: Pupils equally round and reactive to light.  Oculocephalic reflex is absent.  Face appears symmetric.  Motor: Normal muscle tone and bulk.  No spontaneous movements observed.  Weak triple flexion response on the right lower extremity.  Reflexes: Deferred  Coordination: Unable to assess  Sensation: Triple flexion response in the right lower extremity.  Gait/Station: Unable to assess    Assessment/Plan:    Annika He is a 19 y.o. female with history of cardiac arrest and subsequent anoxic brain injury due to polysubstance abuse who has had a protracted hospital course complicated by seizure activity.  EEG has not shown seizure activity for the last 48 hours however burst suppression was not achieved.  Nevertheless, at this time will start weaning propofol to see if seizure activity recurs.  If propofol is weaned successfully then will start weaning ketamine.  Etiology of seizures is likely secondary to anoxic brain injury.    Plan:  1.  Continue Keppra, Vimpat and Depakote at current doses  2.  Start weaning propofol down.  3.  Continue ketamine  4.  Continue video monitored EEG  5.  Further recommendations pending on propofol weaning.  6.  Plan discussed with consulting physician and patient's nurse.     Andrew Petersen M.D., Diplomat of the American Board of Psychiatry and Neurology  Diplomat of [employer] Epilepsy Subspecialty   Assistant Clinical Professor, [facility]  Neurology Consultant

## 2020-09-07 NOTE — PROGRESS NOTES
"Critical Care Progress Note    Date of admission  8/23/2020    Chief Complaint  19 y.o. female admitted 8/23/2020 with drug overdose, cardiac arrest    Hospital Course    \"19 y.o. female who presented 8/23/2020 with drug overdose with methamphetamines, oxycodone and found by boyfriend.  Cardiac arrest enroute to hospital and received narcan, epinephrine and cpr and ROSC achieved.  Difficult airway at scene and LMA placed. ET tube placed in ER.  She was vomiting during airway attempt.  On propofol due to vent asynchrony.  Propofol turned off for neuro assessment and possible hypothermic protocol.  Family no longer at hospital and unable to reach per phone number given for mother.\"    8/24 - TTM protocol initiated, EEG without NCSE  8/25 - rewarmed, awake, not following  8/26 - following occasional commands  8/27- seizure like activity vs shivering--> Keppra, versed, Propofol  8/28- no seizures on EEG  8/29- remains unresponsive.  8/30 -mental status improved, now opening eyes and following commands.  Keppra discontinued  8/31 - started cefepime/vancomycin for possible pneumonia,?  UTI, fever and increasing WBC fever; full vent  9/1 - Seizure activity noted on EEG today, reviewed with neurology; keppra load and increased to 1gm q 12; full vent support  9/2 - remains unresponsive with only some posture and grimace to stimulation, neurology reconsulted, stat MRI brain, LP, cEEG  9/3 -ongoing seizures, Vimpat added, Depakote added, family conference today  9/4 -ongoing seizure noted on cEEG, started on midazolam infusion, full ventilator support  9/5 -ongoing seizures noted, propofol added and increased today, full ventilator support  9/6 -have not yet achieved burst suppression on cEEG; continue propofol, change Versed to ketamine infusion, discussed with neurology, full ventilator support  9/7 - still titrating propofol and ketamine for burst suppression on cEEG. Continue full ventilator support        Interval Problem " Update  Reviewed last 24 hour events:  Intubated, sedated  UOP 1 L  PPx lovenox + pepcid  Bactrim  Vimpat, valproate, ketamine, propofol    Review of Systems  Review of Systems   Unable to perform ROS: Intubated        Vital Signs for last 24 hours   Temp:  [37.2 °C (99 °F)-37.5 °C (99.5 °F)] 37.3 °C (99.1 °F)  Pulse:  [] 117  Resp:  [16-34] 28  BP: ()/() 122/81  SpO2:  [93 %-99 %] 96 %    Hemodynamic parameters for last 24 hours       Respiratory Information for the last 24 hours  Vent Mode: APVCMV  Rate (breaths/min): 26  Vt Target (mL): 320  PEEP/CPAP: 8  P Support: 5  MAP: 14  Control VTE (exp VT): 332    Physical Exam   Physical Exam  Vitals signs and nursing note reviewed.   Constitutional:       General: She is not in acute distress.     Appearance: She is not ill-appearing or toxic-appearing.      Interventions: She is intubated.   HENT:      Head: Normocephalic and atraumatic.      Right Ear: External ear normal.      Left Ear: External ear normal.      Nose: No congestion or rhinorrhea.      Comments: Feeding tube in place     Mouth/Throat:      Mouth: Mucous membranes are moist.      Pharynx: No oropharyngeal exudate or posterior oropharyngeal erythema.      Comments: ET tube in place  Eyes:      General: No scleral icterus.     Conjunctiva/sclera: Conjunctivae normal.      Pupils: Pupils are equal, round, and reactive to light.      Comments: Sluggish pupillary response   Neck:      Musculoskeletal: Neck supple. No neck rigidity or muscular tenderness.   Cardiovascular:      Rate and Rhythm: Normal rate and regular rhythm.      Pulses: Normal pulses.      Heart sounds: Normal heart sounds. No murmur.   Pulmonary:      Effort: She is intubated.      Breath sounds: No wheezing or rales.      Comments: Full ventilator support, low support settings  Abdominal:      General: Abdomen is flat. There is no distension.      Palpations: Abdomen is soft. There is no mass.      Tenderness: There is  no abdominal tenderness.   Musculoskeletal:         General: No swelling or tenderness.      Right lower leg: No edema.      Left lower leg: No edema.   Skin:     General: Skin is warm and dry.      Capillary Refill: Capillary refill takes less than 2 seconds.      Findings: No erythema or rash.   Neurological:      GCS: GCS eye subscore is 3. GCS verbal subscore is 1. GCS motor subscore is 5.      Cranial Nerves: No cranial nerve deficit or facial asymmetry.      Motor: Abnormal muscle tone present.      Comments: Unresponsive, moves all extremities, some posturing with stimulation, positive cough and gag, on continuous EEG   Psychiatric:      Comments: Unable to assess         Medications  Current Facility-Administered Medications   Medication Dose Route Frequency Provider Last Rate Last Dose   • propofol (DIPRIVAN) injection  0-80 mcg/kg/min Intravenous Continuous Dar Red M.D. 13.1 mL/hr at 09/07/20 1000 50 mcg/kg/min at 09/07/20 1000   • ketamine 1,000 mg in NS 500mL (critical care only)   Intravenous CONTINUOUS (1600 Start) Dar Red M.D. 117.6 mL/hr at 09/07/20 0929     • sulfamethoxazole-trimethoprim (BACTRIM DS) 800-160 MG tablet 1 Tab  1 Tab Enteral Tube Q12HRS Dar Red M.D.   1 Tab at 09/07/20 0545   • phenylephrine (TAMIKA-SYNEPHRINE) 40 mg in  mL Infusion  0-300 mcg/min Intravenous Continuous Mariaa Jaimes M.D.   Stopped at 09/06/20 1540   • lacosamide (VIMPAT) 200 mg in  mL ivpb  200 mg Intravenous Q12HRS Dar Red M.D.   Stopped at 09/07/20 0032   • valproate (DEPACON) 500 mg in D5W 50 mL IVPB  500 mg Intravenous Q12HRS Dar Red M.D.   Stopped at 09/07/20 0730   • polyethylene glycol/lytes (MIRALAX) PACKET 1 Packet  1 Packet Enteral Tube BID Dar Red M.D.   Stopped at 09/05/20 0600   • acetaminophen (TYLENOL) suppository 325 mg  325 mg Rectal Q6HRS PRN Yosef Tomlin M.D.   650 mg at 09/02/20 1632   • levETIRAcetam (Keppra) 1000 mg in 100 mL NaCl  IV premix  1,000 mg Intravenous Q12HRS Dar Red M.D.   Stopped at 09/07/20 0600   • acetaminophen (TYLENOL) tablet 1,000 mg  1,000 mg Enteral Tube Q4HRS PRN Dar Red M.D.   1,000 mg at 09/04/20 1800   • labetalol (NORMODYNE/TRANDATE) injection 10 mg  10 mg Intravenous Q4HRS PRN Ysoef Tomlin M.D.   10 mg at 08/30/20 2256   • Pharmacy Consult: Enteral tube insertion - review meds/change route/product selection  1 Each Other PHARMACY TO DOSE JESSY Piper Jr..ANTHONY.       • Respiratory Therapy Consult   Nebulization Continuous RT Brock Murdock M.D.       • ipratropium-albuterol (DUONEB) nebulizer solution  3 mL Nebulization Q2HRS PRN (RT) Borck Murdock M.D.       • famotidine (PEPCID) tablet 20 mg  20 mg Enteral Tube Q12HRS Dar Red M.D.   20 mg at 09/07/20 0545   • senna-docusate (PERICOLACE or SENOKOT S) 8.6-50 MG per tablet 2 Tab  2 Tab Enteral Tube BID Brock Murdock M.D.   2 Tab at 09/07/20 0545    And   • polyethylene glycol/lytes (MIRALAX) PACKET 1 Packet  1 Packet Enteral Tube QDAY PRN Brock Murdock M.D.        And   • magnesium hydroxide (MILK OF MAGNESIA) suspension 30 mL  30 mL Enteral Tube QDAY PRN Brock Murdock M.D.        And   • bisacodyl (DULCOLAX) suppository 10 mg  10 mg Rectal QDAY PRN Brock Murdock M.D.       • MD Alert...ICU Electrolyte Replacement per Pharmacy   Other PHARMACY TO DOSE Brock Murdock M.D.       • lidocaine (XYLOCAINE) 1 % injection 1-2 mL  1-2 mL Tracheal Tube Q30 MIN PRN Brock Murdock M.D.       • hydrALAZINE (APRESOLINE) injection 10-20 mg  10-20 mg Intravenous Q6HRS PRN Brock Murdock M.D.       • enoxaparin (LOVENOX) inj 40 mg  40 mg Subcutaneous DAILY JESSY Piper Jr..O.   40 mg at 09/07/20 0544       Fluids    Intake/Output Summary (Last 24 hours) at 9/7/2020 1211  Last data filed at 9/7/2020 1000  Gross per 24 hour   Intake 3490.39 ml   Output 6200 ml   Net -2709.61 ml       Laboratory  Recent Labs     09/05/20  0249 09/06/20  0926    ISTATAPH 7.437 7.397*   ISTATAPCO2 34.2 34.9   ISTATAPO2 106* 78   ISTATATCO2 24 22   XQTDCDZ0SBJ 98 96   ISTATARTHCO3 23.0 21.4   ISTATARTBE -1 -3   ISTATTEMP 97.6 F 97.6 F   ISTATFIO2 30 30   ISTATSPEC Arterial Arterial   ISTATAPHTC 7.445 7.405   [id number] 103* 75         Recent Labs     09/05/20 0420 09/06/20 0438 09/07/20  0313   SODIUM 137 139 141   POTASSIUM 4.3 5.0 4.5   CHLORIDE 105 105 106   CO2 22 22 20   BUN 23* 27* 18   CREATININE 0.42* 0.47* 0.54   CALCIUM 9.0 9.1 9.0     Recent Labs     09/05/20 0420 09/06/20 0438 09/07/20  0313   ALTSGPT 55* 51* 49   ASTSGOT 41 42 36   ALKPHOSPHAT 70 67 67   TBILIRUBIN 0.2 <0.2 <0.2   GLUCOSE 108* 86 102*     Recent Labs     09/05/20 0420 09/06/20 0438 09/07/20 0313 09/07/20  0630   WBC 8.2 8.9  --  7.6   NEUTSPOLYS 67.90 66.30  --  65.80   LYMPHOCYTES 14.10* 19.10*  --  21.50*   MONOCYTES 11.70 8.70  --  8.30   EOSINOPHILS 4.20 4.30  --  3.30   BASOPHILS 0.60 0.40  --  0.30   ASTSGOT 41 42 36  --    ALTSGPT 55* 51* 49  --    ALKPHOSPHAT 70 67 67  --    TBILIRUBIN 0.2 <0.2 <0.2  --      Recent Labs     09/05/20 0420 09/06/20 0438 09/07/20  0630   RBC 3.52* 3.47* 3.68*   HEMOGLOBIN 10.7* 10.8* 11.3*   HEMATOCRIT 33.3* 33.7* 34.9*   PLATELETCT 494* 560* 586*       Imaging  X-Ray:  I have personally reviewed the images and compared with prior images.     MRI 8/27:  The diffusion-weighted sequences demonstrates areas of restricted diffusion in the bilateral basal ganglia and some of the frontal gray matter. The predominant portion of the brain parenchyma does not demonstrate any restricted diffusion. Therefore this   findings likely represent mild diffuse anoxic brain injury.    MRI 9/2:  1.  Severe diffuse anoxic brain injury pattern which is more extensive and conspicuous than on previous exam.  2.  No evidence of intracranial mass effect, extra-axial fluid collection, or interval development of hydrocephalus.  3.  Diffuse mucosal inflammatory changes  filling the ethmoid and sphenoid sinuses.    Assessment/Plan  PEA (Pulseless electrical activity) (HCC)  PEA enroute to hospital  Hypoxia and aspiration contributory  MRI reveals bilateral basal ganglial anoxic injury.  Status post therapeutic hypothermia and rewarmed    Acute respiratory failure with hypoxia (HCC)  Secondary to drug overdose  Lung protective ventilation strategies  Not appropriate for liberation  Will likely require tracheostomy    Drug overdose  Methamphetamines, oxycodone and EtOH per boyfriend    Cerebral edema (HCC)  Mild/possible per radiologist on CT  MRI on 8/27/2020 without edema or severe changes  Repeat MRI 9/2 - radiographic evidence of anoxic brain injury more apparent then prior MRI    Elevated transaminase level  Follow    Encephalopathy acute  Anoxic encephalopathy, was improving  Currently cEEG for status, neurology following    Hypoglycemia  Follow    Pneumonia of both lungs due to methicillin resistant Staphylococcus aureus (MRSA) (HCC)  BAL 8/31 - > 100K CFU MRSA  Vanco 8/31 - P  BCs neg (1 of 2 with CNS - suspected contaminant)         Updated plan:  Continue full ventilator support  Continuous EEG with goal burst suppression 48 hours, not yet achieved    VTE:  Lovenox  Ulcer: H2 Antagonist  Lines: Central Line  Ongoing indication addressed    I have performed a physical exam and reviewed and updated ROS and Plan today (9/7/2020). In review of yesterday's note (9/6/2020), there are no changes except as documented above.      Lung protective ventilation strategies  Titrate ventilator prescription to optimize oxygenation, ventilation, and acid base balance.    Discussed patient condition and risk of morbidity and/or mortality with RN, RT, Pharmacy, Dietary, , Code status disscussed, Charge nurse / hot rounds and neurology     The patient remains critically ill.  I have assessed and reassessed the patient multiple times.  She is at high risk for further vital organ  deterioration with significant ongoing critical care management as above.  Critical care time = 31 minutes in directly providing and coordinating critical care and extensive data review.  No time overlap and excludes procedures.

## 2020-09-07 NOTE — CARE PLAN
Problem: Ventilation Defect:  Goal: Ability to achieve and maintain unassisted ventilation or tolerate decreased levels of ventilator support  Outcome: NOT MET   Vent day 16  26/320/+8, 30  EEG in place, SBT held

## 2020-09-07 NOTE — PROCEDURES
VIDEO ELECTROENCEPHALOGRAM REPORT        Referring provider: Dr. Red.      DOS: 9/7/2020 (total recording of 23 hours and 30 minutes).      INDICATION:  Annika He 19 y.o. female presenting with s/p cardiac arrest, altered mental status, seizures.      CURRENT ANTIEPILEPTIC REGIMEN: Levetiracetam 1000 mg bid, Lacosamide 200 mg bid, and Valproic Acid 500 mg bid. Sedation with Ketamine. Propofol was weaned off.      TECHNIQUE: 30 channel video electroencephalogram (EEG) was performed in accordance with the international 10-20 system. The study was reviewed in bipolar and referential montages. The recording examined a sedated patient.      DESCRIPTION OF THE RECORD:  Generalized delta / theta activity. With the use of sedation, slow wave sleep elements are noted, including sleep spindles. Waxing and weaning of the cerebral electrical activity but without a burst suppression pattern. Intermittently, runs of Frontal Intermittent Rhythmic Delta Activity (FIRDA) of up to 8 seconds in duration, and frequent generalized sharps with frontal maximum and triphasic morphology are noted. No seizures noted during the study, but eeg remains abnormal with frequent sharps and intermittently runs FIRDA that appear more frequent and longer lasting (up to 8 seconds) during the study.      ACTIVATION PROCEDURES:   Not performed.      EKG: sampling of the EKG recording demonstrated sinus rhythm.      EVENTS:  None.      INTERPRETATION:  This is an abnormal 24 hrs video EEG recording in a sedated patient. Waxing and waning of the cerebral electrical activity due to use of sedatives, but patient is not in burst suppression. Intermittently, runs of Frontal Intermittent Rhythmic Delta Activity (FIRDA), and frequent generalized sharps with frontal maximum and triphasic morphology. No seizures noted during the study, but eeg remains abnormal with frequent sharps and more frequent runs FIRDA lasting up to 8 seconds in duration.  The findings suggest underlying areas of increased cortical irritability and structural abnormality. The patient remains at risk for further seizures. Clinical and radiological correlation is recommended.     Updates provided to Dr. Agapito Beatty MD   Epilepsy and Neurodiagnostics.   Clinical  of Neurology [facility] [email or web address]] of Medicine.   Diplomate in Neurology, Epilepsy, and Electrodiagnostic Medicine.   Office: [phone]  Fax: [phone]

## 2020-09-07 NOTE — PROGRESS NOTES
Per Dr. Petersen, Propofol gtt to be decreased to 60 mcg/kg/min now and every subsequent hour to reduce by 10 mcg/kg/min every hour. RN repeated back order, verified by MD.. RN repeated back order, verified by MD. Acosta, PharMD updated.

## 2020-09-08 ENCOUNTER — APPOINTMENT (OUTPATIENT)
Dept: RADIOLOGY | Facility: MEDICAL CENTER | Age: 20
DRG: 004 | End: 2020-09-08
Attending: INTERNAL MEDICINE
Payer: MEDICAID

## 2020-09-08 LAB
ALBUMIN SERPL BCP-MCNC: 3.9 G/DL (ref 3.2–4.9)
ALBUMIN/GLOB SERPL: 1.1 G/DL
ALP SERPL-CCNC: 81 U/L (ref 30–99)
ALT SERPL-CCNC: 36 U/L (ref 2–50)
ANION GAP SERPL CALC-SCNC: 14 MMOL/L (ref 7–16)
AST SERPL-CCNC: 26 U/L (ref 12–45)
BASOPHILS # BLD AUTO: 0.4 % (ref 0–1.8)
BASOPHILS # BLD: 0.05 K/UL (ref 0–0.12)
BILIRUB SERPL-MCNC: 0.2 MG/DL (ref 0.1–1.5)
BUN SERPL-MCNC: 25 MG/DL (ref 8–22)
CALCIUM SERPL-MCNC: 9.3 MG/DL (ref 8.5–10.5)
CHLORIDE SERPL-SCNC: 107 MMOL/L (ref 96–112)
CO2 SERPL-SCNC: 21 MMOL/L (ref 20–33)
CREAT SERPL-MCNC: 0.48 MG/DL (ref 0.5–1.4)
CRP SERPL HS-MCNC: 0.8 MG/DL (ref 0–0.75)
EOSINOPHIL # BLD AUTO: 0.26 K/UL (ref 0–0.51)
EOSINOPHIL NFR BLD: 2 % (ref 0–6.9)
ERYTHROCYTE [DISTWIDTH] IN BLOOD BY AUTOMATED COUNT: 45.2 FL (ref 35.9–50)
GLOBULIN SER CALC-MCNC: 3.4 G/DL (ref 1.9–3.5)
GLUCOSE SERPL-MCNC: 123 MG/DL (ref 65–99)
HCT VFR BLD AUTO: 37.6 % (ref 37–47)
HGB BLD-MCNC: 12.4 G/DL (ref 12–16)
IMM GRANULOCYTES # BLD AUTO: 0.16 K/UL (ref 0–0.11)
IMM GRANULOCYTES NFR BLD AUTO: 1.2 % (ref 0–0.9)
LYMPHOCYTES # BLD AUTO: 1.63 K/UL (ref 1–4.8)
LYMPHOCYTES NFR BLD: 12.4 % (ref 22–41)
MCH RBC QN AUTO: 30.9 PG (ref 27–33)
MCHC RBC AUTO-ENTMCNC: 33 G/DL (ref 33.6–35)
MCV RBC AUTO: 93.8 FL (ref 81.4–97.8)
MONOCYTES # BLD AUTO: 0.84 K/UL (ref 0–0.85)
MONOCYTES NFR BLD AUTO: 6.4 % (ref 0–13.4)
NEUTROPHILS # BLD AUTO: 10.16 K/UL (ref 2–7.15)
NEUTROPHILS NFR BLD: 77.6 % (ref 44–72)
NRBC # BLD AUTO: 0 K/UL
NRBC BLD-RTO: 0 /100 WBC
PLATELET # BLD AUTO: 684 K/UL (ref 164–446)
PMV BLD AUTO: 8.8 FL (ref 9–12.9)
POTASSIUM SERPL-SCNC: 4.6 MMOL/L (ref 3.6–5.5)
PREALB SERPL-MCNC: 40.4 MG/DL (ref 18–38)
PROT SERPL-MCNC: 7.3 G/DL (ref 6–8.2)
RBC # BLD AUTO: 4.01 M/UL (ref 4.2–5.4)
SODIUM SERPL-SCNC: 142 MMOL/L (ref 135–145)
WBC # BLD AUTO: 13.1 K/UL (ref 4.8–10.8)

## 2020-09-08 PROCEDURE — 80053 COMPREHEN METABOLIC PANEL: CPT

## 2020-09-08 PROCEDURE — 99232 SBSQ HOSP IP/OBS MODERATE 35: CPT | Performed by: PSYCHIATRY & NEUROLOGY

## 2020-09-08 PROCEDURE — 94770 HCHG CO2 EXPIRED GAS DETERMINATION: CPT

## 2020-09-08 PROCEDURE — 700102 HCHG RX REV CODE 250 W/ 637 OVERRIDE(OP): Performed by: INTERNAL MEDICINE

## 2020-09-08 PROCEDURE — 700105 HCHG RX REV CODE 258: Performed by: INTERNAL MEDICINE

## 2020-09-08 PROCEDURE — 770022 HCHG ROOM/CARE - ICU (200)

## 2020-09-08 PROCEDURE — 700111 HCHG RX REV CODE 636 W/ 250 OVERRIDE (IP): Performed by: INTERNAL MEDICINE

## 2020-09-08 PROCEDURE — A9270 NON-COVERED ITEM OR SERVICE: HCPCS | Performed by: INTERNAL MEDICINE

## 2020-09-08 PROCEDURE — C9254 INJECTION, LACOSAMIDE: HCPCS | Performed by: INTERNAL MEDICINE

## 2020-09-08 PROCEDURE — 700105 HCHG RX REV CODE 258: Performed by: PSYCHIATRY & NEUROLOGY

## 2020-09-08 PROCEDURE — 700101 HCHG RX REV CODE 250: Performed by: INTERNAL MEDICINE

## 2020-09-08 PROCEDURE — 4A10X4Z MONITORING OF CENTRAL NERVOUS ELECTRICAL ACTIVITY, EXTERNAL APPROACH: ICD-10-PCS | Performed by: PSYCHIATRY & NEUROLOGY

## 2020-09-08 PROCEDURE — 95720 EEG PHY/QHP EA INCR W/VEEG: CPT | Performed by: PSYCHIATRY & NEUROLOGY

## 2020-09-08 PROCEDURE — 85025 COMPLETE CBC W/AUTO DIFF WBC: CPT

## 2020-09-08 PROCEDURE — 700111 HCHG RX REV CODE 636 W/ 250 OVERRIDE (IP): Performed by: PSYCHIATRY & NEUROLOGY

## 2020-09-08 PROCEDURE — 71045 X-RAY EXAM CHEST 1 VIEW: CPT

## 2020-09-08 PROCEDURE — 94003 VENT MGMT INPAT SUBQ DAY: CPT

## 2020-09-08 PROCEDURE — 86140 C-REACTIVE PROTEIN: CPT

## 2020-09-08 PROCEDURE — 99291 CRITICAL CARE FIRST HOUR: CPT | Performed by: INTERNAL MEDICINE

## 2020-09-08 PROCEDURE — 94760 N-INVAS EAR/PLS OXIMETRY 1: CPT

## 2020-09-08 PROCEDURE — 95714 VEEG EA 12-26 HR UNMNTR: CPT | Performed by: PSYCHIATRY & NEUROLOGY

## 2020-09-08 RX ORDER — LEVETIRACETAM 15 MG/ML
1500 INJECTION INTRAVASCULAR EVERY 12 HOURS
Status: DISCONTINUED | OUTPATIENT
Start: 2020-09-08 | End: 2020-09-11

## 2020-09-08 RX ADMIN — SODIUM CHLORIDE: 9 INJECTION, SOLUTION INTRAVENOUS at 11:08

## 2020-09-08 RX ADMIN — SODIUM CHLORIDE 200 MG: 9 INJECTION, SOLUTION INTRAVENOUS at 13:21

## 2020-09-08 RX ADMIN — ENOXAPARIN SODIUM 40 MG: 40 INJECTION SUBCUTANEOUS at 05:04

## 2020-09-08 RX ADMIN — FENTANYL CITRATE 200 MCG: 50 INJECTION INTRAMUSCULAR; INTRAVENOUS at 03:16

## 2020-09-08 RX ADMIN — LEVETIRACETAM 1000 MG: 10 INJECTION INTRAVENOUS at 05:28

## 2020-09-08 RX ADMIN — FLUCONAZOLE 150 MG: 50 TABLET ORAL at 16:55

## 2020-09-08 RX ADMIN — LEVETIRACETAM INJECTION 1500 MG: 15 INJECTION INTRAVENOUS at 18:00

## 2020-09-08 RX ADMIN — DOCUSATE SODIUM 50 MG AND SENNOSIDES 8.6 MG 2 TABLET: 8.6; 5 TABLET, FILM COATED ORAL at 05:04

## 2020-09-08 RX ADMIN — SODIUM CHLORIDE: 9 INJECTION, SOLUTION INTRAVENOUS at 05:28

## 2020-09-08 RX ADMIN — VALPROATE SODIUM 750 MG: 100 INJECTION, SOLUTION INTRAVENOUS at 17:00

## 2020-09-08 RX ADMIN — SULFAMETHOXAZOLE AND TRIMETHOPRIM 1 TABLET: 800; 160 TABLET ORAL at 16:55

## 2020-09-08 RX ADMIN — DOCUSATE SODIUM 50 MG AND SENNOSIDES 8.6 MG 2 TABLET: 8.6; 5 TABLET, FILM COATED ORAL at 16:55

## 2020-09-08 RX ADMIN — FENTANYL CITRATE 200 MCG: 50 INJECTION INTRAMUSCULAR; INTRAVENOUS at 00:13

## 2020-09-08 RX ADMIN — FAMOTIDINE 20 MG: 20 TABLET, FILM COATED ORAL at 16:55

## 2020-09-08 RX ADMIN — SULFAMETHOXAZOLE AND TRIMETHOPRIM 1 TABLET: 800; 160 TABLET ORAL at 05:04

## 2020-09-08 RX ADMIN — SODIUM CHLORIDE: 9 INJECTION, SOLUTION INTRAVENOUS at 00:27

## 2020-09-08 RX ADMIN — FAMOTIDINE 20 MG: 20 TABLET, FILM COATED ORAL at 05:04

## 2020-09-08 RX ADMIN — FENTANYL CITRATE 100 MCG: 50 INJECTION INTRAMUSCULAR; INTRAVENOUS at 21:18

## 2020-09-08 RX ADMIN — VALPROATE SODIUM 500 MG: 100 INJECTION, SOLUTION INTRAVENOUS at 05:28

## 2020-09-08 ASSESSMENT — FIBROSIS 4 INDEX: FIB4 SCORE: 0.13

## 2020-09-08 NOTE — CARE PLAN
Problem: Ventilation Defect:  Goal: Ability to achieve and maintain unassisted ventilation or tolerate decreased levels of ventilator support  Outcome: PROGRESSING SLOWER THAN EXPECTED      Ventilator Daily Summary    Vent Day # 17    Ventilator settings changed this shift: N/A    Weaning trials: N/A continuous EEG    Plan: Continue current ventilator settings and wean mechanical ventilation as tolerated per physician orders.

## 2020-09-08 NOTE — PROGRESS NOTES
"Critical Care Progress Note    Date of admission  8/23/2020    Chief Complaint  19 y.o. female admitted 8/23/2020 with drug overdose, cardiac arrest    Hospital Course    \"19 y.o. female who presented 8/23/2020 with drug overdose with methamphetamines, oxycodone and found by boyfriend.  Cardiac arrest enroute to hospital and received narcan, epinephrine and cpr and ROSC achieved.  Difficult airway at scene and LMA placed. ET tube placed in ER.  She was vomiting during airway attempt.  On propofol due to vent asynchrony.  Propofol turned off for neuro assessment and possible hypothermic protocol.  Family no longer at hospital and unable to reach per phone number given for mother.\"    8/24 - TTM protocol initiated, EEG without NCSE  8/25 - rewarmed, awake, not following  8/26 - following occasional commands  8/27- seizure like activity vs shivering--> Keppra, versed, Propofol  8/28- no seizures on EEG  8/29- remains unresponsive.  8/30 -mental status improved, now opening eyes and following commands.  Keppra discontinued  8/31 - started cefepime/vancomycin for possible pneumonia,?  UTI, fever and increasing WBC fever; full vent  9/1 - Seizure activity noted on EEG today, reviewed with neurology; keppra load and increased to 1gm q 12; full vent support  9/2 - remains unresponsive with only some posture and grimace to stimulation, neurology reconsulted, stat MRI brain, LP, cEEG  9/3 -ongoing seizures, Vimpat added, Depakote added, family conference today  9/4 -ongoing seizure noted on cEEG, started on midazolam infusion, full ventilator support  9/5 -ongoing seizures noted, propofol added and increased today, full ventilator support  9/6 -have not yet achieved burst suppression on cEEG; continue propofol, change Versed to ketamine infusion, discussed with neurology, full ventilator support  9/7 - still titrating propofol and ketamine for burst suppression on cEEG. Continue full ventilator support  9/8 - propofol off; " ketamine is being weaned; on vimpat, keppra, depakote. Requiring full ventilator support        Interval Problem Update  Reviewed last 24 hour events:  Intubated, sedated  UOP good  PPx lovenox + pepcid  Bactrim x 14 days for MRSA PNA  Vimpat, valproate, keppra; off propofol/titrating off ketamine    Review of Systems  Review of Systems   Unable to perform ROS: Intubated        Vital Signs for last 24 hours   Temp:  [36.2 °C (97.2 °F)-37.6 °C (99.7 °F)] 36.2 °C (97.2 °F)  Pulse:  [] 122  Resp:  [17-36] 32  BP: ()/(34-84) 129/78  SpO2:  [95 %-100 %] 100 %    Hemodynamic parameters for last 24 hours       Respiratory Information for the last 24 hours  Vent Mode: APVCMV  Rate (breaths/min): 26  Vt Target (mL): 320  PEEP/CPAP: 8  P Support: 5  MAP: 9  Control VTE (exp VT): 425    Physical Exam   Physical Exam  Vitals signs and nursing note reviewed.   Constitutional:       General: She is not in acute distress.     Appearance: She is not ill-appearing or toxic-appearing.      Interventions: She is intubated.   HENT:      Head: Normocephalic and atraumatic.      Right Ear: External ear normal.      Left Ear: External ear normal.      Nose: No congestion or rhinorrhea.      Comments: Feeding tube in place     Mouth/Throat:      Mouth: Mucous membranes are moist.      Pharynx: No oropharyngeal exudate or posterior oropharyngeal erythema.      Comments: ET tube in place  Eyes:      General: No scleral icterus.     Conjunctiva/sclera: Conjunctivae normal.      Pupils: Pupils are equal, round, and reactive to light.      Comments: Sluggish pupillary response   Neck:      Musculoskeletal: Neck supple. No neck rigidity or muscular tenderness.   Cardiovascular:      Rate and Rhythm: Normal rate and regular rhythm.      Pulses: Normal pulses.      Heart sounds: Normal heart sounds. No murmur.   Pulmonary:      Effort: She is intubated.      Breath sounds: No wheezing or rales.      Comments: Full ventilator support,  low support settings  Abdominal:      General: Abdomen is flat. There is no distension.      Palpations: Abdomen is soft. There is no mass.      Tenderness: There is no abdominal tenderness.   Musculoskeletal:         General: No swelling or tenderness.      Right lower leg: No edema.      Left lower leg: No edema.   Skin:     General: Skin is warm and dry.      Capillary Refill: Capillary refill takes less than 2 seconds.      Findings: No erythema or rash.   Neurological:      GCS: GCS eye subscore is 3. GCS verbal subscore is 1. GCS motor subscore is 5.      Cranial Nerves: No facial asymmetry.      Motor: Abnormal muscle tone present.      Comments: Unresponsive, moves all extremities, some posturing with stimulation, positive cough and gag, on continuous EEG   Psychiatric:      Comments: Unable to assess         Medications  Current Facility-Administered Medications   Medication Dose Route Frequency Provider Last Rate Last Dose   • ketamine 1,000 mg in  mL   Intravenous CONTINUOUS (1600 Start) Nithin Donohue M.D. 83.9 mL/hr at 09/08/20 1108     • fentaNYL (SUBLIMAZE) injection 100 mcg  100 mcg Intravenous Q15 MIN PRN Boogie Augustine Jr., D.O.   100 mcg at 09/07/20 2218    And   • fentaNYL (SUBLIMAZE) injection 200 mcg  200 mcg Intravenous Q15 MIN PRN Boogie Augustine Jr. D.O.   200 mcg at 09/08/20 0316    And   • fentaNYL (SUBLIMAZE) 50 mcg/mL in 50mL (Continuous Infusion)   Intravenous Continuous Boogie Augustine Jr. D.O.   Stopped at 09/07/20 2230    And   • propofol (DIPRIVAN) injection  0-80 mcg/kg/min Intravenous Continuous Boogie Augustine Jr. D.O.   Stopped at 09/07/20 2230   • sulfamethoxazole-trimethoprim (BACTRIM DS) 800-160 MG tablet 1 Tab  1 Tab Enteral Tube Q12HRS Dar Red M.D.   1 Tab at 09/08/20 0504   • phenylephrine (TAMIKA-SYNEPHRINE) 40 mg in  mL Infusion  0-300 mcg/min Intravenous Continuous Mariaa Jaimes M.D.   Stopped at 09/06/20 1540   • lacosamide (VIMPAT) 200 mg  in  mL ivpb  200 mg Intravenous Q12HRS Dar Red M.D. 120 mL/hr at 09/08/20 1321 200 mg at 09/08/20 1321   • valproate (DEPACON) 500 mg in D5W 50 mL IVPB  500 mg Intravenous Q12HRS Dar Red M.D.   Stopped at 09/08/20 0628   • polyethylene glycol/lytes (MIRALAX) PACKET 1 Packet  1 Packet Enteral Tube BID Dar Red M.D.   Stopped at 09/08/20 0600   • acetaminophen (TYLENOL) suppository 325 mg  325 mg Rectal Q6HRS PRN Yosef Tomlin M.D.   650 mg at 09/02/20 1632   • levETIRAcetam (Keppra) 1000 mg in 100 mL NaCl IV premix  1,000 mg Intravenous Q12HRS Dar Red M.D.   Stopped at 09/08/20 0543   • acetaminophen (TYLENOL) tablet 1,000 mg  1,000 mg Enteral Tube Q4HRS PRN Dar Red M.D.   1,000 mg at 09/04/20 1800   • labetalol (NORMODYNE/TRANDATE) injection 10 mg  10 mg Intravenous Q4HRS PRN Yosef Tomlin M.D.   10 mg at 08/30/20 2256   • Pharmacy Consult: Enteral tube insertion - review meds/change route/product selection  1 Each Other PHARMACY TO DOSE Boogie Augustine Jr., D.O.       • Respiratory Therapy Consult   Nebulization Continuous RT Brock Murdock M.D.       • ipratropium-albuterol (DUONEB) nebulizer solution  3 mL Nebulization Q2HRS PRN (RT) Brock Murdock M.D.       • famotidine (PEPCID) tablet 20 mg  20 mg Enteral Tube Q12HRS Dar Red M.D.   20 mg at 09/08/20 0504   • senna-docusate (PERICOLACE or SENOKOT S) 8.6-50 MG per tablet 2 Tab  2 Tab Enteral Tube BID Brock Murdock M.D.   2 Tab at 09/08/20 0504    And   • polyethylene glycol/lytes (MIRALAX) PACKET 1 Packet  1 Packet Enteral Tube QDAY PRN Brock Murdock M.D.        And   • magnesium hydroxide (MILK OF MAGNESIA) suspension 30 mL  30 mL Enteral Tube QDAY PRN Brock Murdock M.D.        And   • bisacodyl (DULCOLAX) suppository 10 mg  10 mg Rectal QDAY PRN Brock Murdock M.D.       • MD Alert...ICU Electrolyte Replacement per Pharmacy   Other PHARMACY TO DOSE Brock Murdock M.D.       • lidocaine (XYLOCAINE) 1  % injection 1-2 mL  1-2 mL Tracheal Tube Q30 MIN PRN Brock Murdock M.D.       • enoxaparin (LOVENOX) inj 40 mg  40 mg Subcutaneous DAILY Boogie Augustine Jr., D.O.   40 mg at 09/08/20 0504       Fluids    Intake/Output Summary (Last 24 hours) at 9/8/2020 1428  Last data filed at 9/8/2020 0600  Gross per 24 hour   Intake 3216.37 ml   Output 3750 ml   Net -533.63 ml       Laboratory  Recent Labs     09/06/20  0420   ISTATAPH 7.397*   ISTATAPCO2 34.9   ISTATAPO2 78   ISTATATCO2 22   QEBVBRR1LKB 96   ISTATARTHCO3 21.4   ISTATARTBE -3   ISTATTEMP 97.6 F   ISTATFIO2 30   ISTATSPEC Arterial   ISTATAPHTC 7.405   IMXHIHCZ1UB 75         Recent Labs     09/06/20 0438 09/07/20  0313 09/08/20  0500   SODIUM 139 141 142   POTASSIUM 5.0 4.5 4.6   CHLORIDE 105 106 107   CO2 22 20 21   BUN 27* 18 25*   CREATININE 0.47* 0.54 0.48*   CALCIUM 9.1 9.0 9.3     Recent Labs     09/06/20 0438 09/07/20  0313 09/08/20  0500   ALTSGPT 51* 49 36   ASTSGOT 42 36 26   ALKPHOSPHAT 67 67 81   TBILIRUBIN <0.2 <0.2 0.2   PREALBUMIN  --  40.4*  --    GLUCOSE 86 102* 123*     Recent Labs     09/06/20 0438 09/07/20 0313 09/07/20  0630 09/08/20  0500   WBC 8.9  --  7.6 13.1*   NEUTSPOLYS 66.30  --  65.80 77.60*   LYMPHOCYTES 19.10*  --  21.50* 12.40*   MONOCYTES 8.70  --  8.30 6.40   EOSINOPHILS 4.30  --  3.30 2.00   BASOPHILS 0.40  --  0.30 0.40   ASTSGOT 42 36  --  26   ALTSGPT 51* 49  --  36   ALKPHOSPHAT 67 67  --  81   TBILIRUBIN <0.2 <0.2  --  0.2     Recent Labs     09/06/20  0438 09/07/20  0630 09/08/20  0500   RBC 3.47* 3.68* 4.01*   HEMOGLOBIN 10.8* 11.3* 12.4   HEMATOCRIT 33.7* 34.9* 37.6   PLATELETCT 560* 586* 684*       Imaging  X-Ray:  I have personally reviewed the images and compared with prior images.     MRI 8/27:  The diffusion-weighted sequences demonstrates areas of restricted diffusion in the bilateral basal ganglia and some of the frontal gray matter. The predominant portion of the brain parenchyma does not demonstrate any  restricted diffusion. Therefore this   findings likely represent mild diffuse anoxic brain injury.    MRI 9/2:  1.  Severe diffuse anoxic brain injury pattern which is more extensive and conspicuous than on previous exam.  2.  No evidence of intracranial mass effect, extra-axial fluid collection, or interval development of hydrocephalus.  3.  Diffuse mucosal inflammatory changes filling the ethmoid and sphenoid sinuses.    Assessment/Plan  PEA (Pulseless electrical activity) (HCC)  PEA enroute to hospital  Hypoxia and aspiration contributory  MRI reveals bilateral basal ganglial anoxic injury.  Status post therapeutic hypothermia and rewarmed    Acute respiratory failure with hypoxia (HCC)  Secondary to drug overdose  Lung protective ventilation strategies  Planning for tracheostomy    Drug overdose  Methamphetamines, oxycodone and EtOH per boyfriend    Cerebral edema (HCC)  Mild/possible per radiologist on CT  MRI on 8/27/2020 without edema or severe changes  Repeat MRI 9/2 - radiographic evidence of anoxic brain injury more apparent then prior MRI    Elevated transaminase level  Follow    Encephalopathy acute  Anoxic encephalopathy, was improving  Currently cEEG for status, neurology following    Hypoglycemia  Follow    Pneumonia of both lungs due to methicillin resistant Staphylococcus aureus (MRSA) (HCC)  BAL 8/31 - > 100K CFU MRSA  Vanco 8/31 - P  BCs neg (1 of 2 with CNS - suspected contaminant)         Updated plan:  Continue full ventilator support  Continuous EEG s/p seizure suppression now titrating down AEDs and evaluating response      VTE:  Lovenox  Ulcer: H2 Antagonist  Lines: Central Line  Ongoing indication addressed    I have performed a physical exam and reviewed and updated ROS and Plan today (9/8/2020). In review of yesterday's note (9/7/2020), there are no changes except as documented above.      Lung protective ventilation strategies  Titrate ventilator prescription to optimize oxygenation,  ventilation, and acid base balance.    Discussed patient condition and risk of morbidity and/or mortality with RN, RT, Pharmacy, Dietary, , Code status disscussed, Charge nurse / hot rounds and neurology     The patient remains critically ill.  I have assessed and reassessed the patient multiple times.  She is at high risk for further vital organ deterioration with significant ongoing critical care management as above.  Critical care time = 32 minutes in directly providing and coordinating critical care and extensive data review.  No time overlap and excludes procedures.

## 2020-09-08 NOTE — CARE PLAN
Problem: Infection  Goal: Will remain free from infection  Intervention: Assess signs and symptoms of infection  Note: Oral care with purple ZACK completed every two hours with suctioning in place     Problem: Discharge Barriers/Planning  Goal: Patient's continuum of care needs will be met  Intervention: Assess potential discharge barriers on admission and throughout hospital stay  Note: Patient weaned off propofol gtt

## 2020-09-08 NOTE — PROCEDURES
VIDEO ELECTROENCEPHALOGRAM REPORT        Referring provider: Dr. Red.      DOS: 9/8/2020 (total recording of 23 hours and 17 minutes).      INDICATION:  Annika He 19 y.o. female presenting with s/p cardiac arrest, altered mental status, seizures.      CURRENT ANTIEPILEPTIC REGIMEN: Levetiracetam increased to 1500 mg bid, Lacosamide 200 mg bid, and Valproic Acid increased to 750 mg bid. Sedation with Ketamine, which was lowered.       TECHNIQUE: 30 channel video electroencephalogram (EEG) was performed in accordance with the international 10-20 system. The study was reviewed in bipolar and referential montages. The recording examined a sedated and/or comatose patient.      DESCRIPTION OF THE RECORD:  Generalized delta / theta activity, with elements of slow wave sleep elements noted, including sleep spindles. Frequent, blunted generalized sharps with frontal maximum and triphasic morphology, with frequent runs of Frontal Intermittent Rhythmic Delta Activity (FIRDA). Seizures have reappeared with weaning and/or lowering of sedation. The patient is not in status epilepticus, however as study progressed, seizures are noted more frequently and longer lasting, often appearing in clusters of several seizures per hour. Seizures are characterized on EEG by generalized, frontal maximum, rhythmic sharply contoured delta activity. Clinically, most seizures were associated with subtle tonic posturing of the upper extremities.      ACTIVATION PROCEDURES:   Not performed.      EKG: sampling of the EKG recording demonstrated sinus rhythm.      EVENTS:  None.      INTERPRETATION:  This is an abnormal 24 hrs video EEG recording in a sedated and/or comatose patient. A mild to moderate encephalopathy is suggested. Frequent, blunted generalized sharps with frontal maximum and triphasic morphology, with frequent runs of Frontal Intermittent Rhythmic Delta Activity (FIRDA) noted. Seizures have reappeared with weaning  and/or lowering of sedation, with progressive worsening of the eeg noted. The patient is not in status epilepticus, however as study progressed, seizures are noted more frequently and longer lasting, often appearing in clusters of several seizures per hour. Seizures are characterized on EEG by generalized,  rhythmic sharply contoured delta activity, with frontal maximum. Clinically, most seizures were associated with subtle tonic posturing of the upper extremities. The findings suggest underlying areas of persinstently increased cortical irritability and/or structural abnormality. Clinical and radiological correlation is recommended.     Updates provided to Dr. Agapito Beatty MD   Epilepsy and Neurodiagnostics.   Clinical  of Neurology [employer].   Diplomate in Neurology, Epilepsy, and Electrodiagnostic Medicine.   Office: [phone]  Fax: [phone]

## 2020-09-08 NOTE — PROGRESS NOTES
Palliative Care follow-up  Phone call to pt's father Angel Luis.  Angel Luis asking general questions about ICU care and plan of care.  All questions answered and support provided.     Update from :  Dr. Donohue     Plan: Continue to provide support as needed.     Thank you for allowing Palliative Care to support this patient and family. Contact [phone] for additional assistance, change in patient status, or with any questions/concerns.

## 2020-09-08 NOTE — PROGRESS NOTES
"Hospital Neurology Progress Note:     Interval History: No acute events overnight.  No further seizure activity.  Unable to obtain review of systems due to intubation.    Objective:   Vitals:    09/08/20 0400 09/08/20 0500 09/08/20 0600 09/08/20 0731   BP: 142/83 110/52 122/68    Pulse: 92 (!) 121 (!) 102 (!) 121   Resp: (!) 27 20 (!) 26 (!) 24   Temp:       TempSrc:       SpO2:    99%   Weight: 67.5 kg (148 lb 13 oz)      Height: 1.6 m (5' 3\")          Labs:     Lab Results   Component Value Date/Time    PROTHROMBTM 14.0 08/25/2020 09:20 PM    INR 1.05 08/25/2020 09:20 PM      Lab Results   Component Value Date/Time    WBC 13.1 (H) 09/08/2020 05:00 AM    RBC 4.01 (L) 09/08/2020 05:00 AM    HEMOGLOBIN 12.4 09/08/2020 05:00 AM    HEMATOCRIT 37.6 09/08/2020 05:00 AM    MCV 93.8 09/08/2020 05:00 AM    MCH 30.9 09/08/2020 05:00 AM    MCHC 33.0 (L) 09/08/2020 05:00 AM    MPV 8.8 (L) 09/08/2020 05:00 AM    NEUTSPOLYS 77.60 (H) 09/08/2020 05:00 AM    LYMPHOCYTES 12.40 (L) 09/08/2020 05:00 AM    MONOCYTES 6.40 09/08/2020 05:00 AM    EOSINOPHILS 2.00 09/08/2020 05:00 AM    BASOPHILS 0.40 09/08/2020 05:00 AM    ANISOCYTOSIS 1+ 08/23/2020 10:05 PM      Lab Results   Component Value Date/Time    SODIUM 142 09/08/2020 05:00 AM    POTASSIUM 4.6 09/08/2020 05:00 AM    CHLORIDE 107 09/08/2020 05:00 AM    CO2 21 09/08/2020 05:00 AM    GLUCOSE 123 (H) 09/08/2020 05:00 AM    BUN 25 (H) 09/08/2020 05:00 AM    CREATININE 0.48 (L) 09/08/2020 05:00 AM      Lab Results   Component Value Date/Time    TRIGLYCERIDE 118 09/07/2020 03:13 AM       Lab Results   Component Value Date/Time    ALKPHOSPHAT 81 09/08/2020 05:00 AM    ASTSGOT 26 09/08/2020 05:00 AM    ALTSGPT 36 09/08/2020 05:00 AM    TBILIRUBIN 0.2 09/08/2020 05:00 AM        Imaging/Testing:   EEG from 9/7/2020 through 9/8/2020 discussed with the interpreting physician and no seizure activity was seen.    Physical Exam:     General: Intubated 19-year-old female in no acute " distress.  Cardio: Normal S1/S2. No peripheral edema.   Pulm: CTAX2. No respiratory distress.   Skin: Warm, dry, no rashes or lesions   Psychiatric: Unable to assess.  HEENT: Atraumatic head, normal sclera and conjunctiva, moist oral mucosa. No lid lag  Abdomen: Soft, non tender. No masses or hepatosplenomegaly.    Neurologic:  Mental Status: GCS 3 T (E1, M1, V1 T)  Cranial Nerves: Pupils equally round and reactive to light.  Oculocephalic reflex is absent.  Face appears symmetric.  Motor: Normal muscle tone and bulk.  No spontaneous movements observed.  Weak triple flexion response on the right lower extremity.  Reflexes: Deferred  Coordination: Unable to assess  Sensation: Triple flexion response in the right lower extremity.  Gait/Station: Unable to assess    Patient seen and examined on 9/8/2020 and compared to 9/7/2020 no significant clinical change was seen.    Assessment/Plan:    Annika He is a 19 y.o. female with history of cardiac arrest and subsequent anoxic brain injury due to polysubstance abuse who has had a protracted hospital course complicated by seizure activity.  EEG has not shown seizure activity for the last 48 hours however burst suppression was not achieved.  Nevertheless, at this time will start weaning propofol to see if seizure activity recurs.  If propofol is weaned successfully then will start weaning ketamine.  Etiology of seizures is likely secondary to anoxic brain injury.    Plan:  1.  Continue Keppra, Vimpat and Depakote at current doses  2.  Propofol discontinued  3.  Start weaning ketamine today.  Aim to discontinue today.  4.  Continue video monitored EEG  5.  Further recommendations pending weaning of sedation.  6.  Plan discussed with consulting physician and patient's nurse.     Andrew Petersen M.D., Diplomat of the American Board of Psychiatry and Neurology  Diplomat of [employer] Epilepsy Subspecialty   Assistant Clinical Professor, [facility]  Neurology Consultant

## 2020-09-08 NOTE — CARE PLAN
Ventilator Daily Summary    Vent Day # 16   APVCMV 26/320/8/30%    Ventilator settings changed this shift: not at this time    Weaning trials: no Cont. EEG    Respiratory Procedures: not at this time    Plan: Continue current ventilator settings and wean mechanical ventilation as tolerated per physician orders.

## 2020-09-08 NOTE — CARE PLAN
Adult Ventilation Update    Total Vent Days: 16  Vent:  x 26, 30% +8    Patient Lines/Drains/Airways Status      Active Airway       Name: Placement date: Placement time: Site: Days:    Airway ETT 7.5  08/23/20 2210   --  16                  Mobility  Activity Performed: Unable to mobilize       Events/Summary/Plan: Patient stable on vent. No changes. No SBT. Continuing EEG.

## 2020-09-08 NOTE — DIETARY
Nutrition support weekly update:  Day 15 of admit.  Annika He is a 19 y.o. female with admitting DX of Cardiac arrest (HCC), Drug overdose.    Tube feeding initiated on 8/25.  Current TF via duodenal Cortrak: Impact Peptide 1.5 @ goal rate 60 ml/hr, providing 2160 kcal, 135 grams protein, and 1109 ml free water per day.    Assessment:  Weight 67.5 kg with BMI 26.37    Estimated nutritional needs:  REE per MSJ x1.2 = 1692 kcal/day  RMR per PSU (vent L/min 10.8, T max/24 hours 37.3) = 1705 kcal/day  25-30 kcal/kg = 3731-3399 kcal/day  Metabolic cart study completed 9/1: REE = 2890 kcal/day  1.2-1.5 grams protein/kg =  grams protein/day    Evaluation:  1. Pt remains intubated, vent day 16.  2. TF @ goal.  3. Weight down 3.7 kg since admit. -6.5 L fluid per I/O.  4. Labs reviewed.  5. Meds include electrolyte replacement and bowel regimen.   6. Last BM 9/6.  7. Can change to more standard TF regimen.     Malnutrition risk: None identified @ this time.    Recommendations/Plan:  1. Change TF formula to Fibersource HN @ 25 ml/hr and advance per protocol to goal rate 70 ml/hr to provide 2016 kcal, 91 grams protein, and 1361 ml free water per day.  2. Fluids per MD. MCCORMICK following.

## 2020-09-08 NOTE — CARE PLAN
Problem: Skin Integrity  Goal: Risk for impaired skin integrity will decrease  Outcome: PROGRESSING AS EXPECTED  Intervention: Assess risk factors for impaired skin integrity and/or pressure ulcers  Note: Pt turned q 2 hours and PRN. Pt on low air loss mattress, pillows in place for positioning and to float extremities. Mepilex on sacrum and heels.      Problem: Bowel/Gastric:  Goal: Will not experience complications related to bowel motility  Intervention: Assess baseline bowel pattern  Note: Hypoactive bowel sounds x 4 quadrants. Pt's last BM 9/6/20. No BM this shift.

## 2020-09-09 ENCOUNTER — APPOINTMENT (OUTPATIENT)
Dept: RADIOLOGY | Facility: MEDICAL CENTER | Age: 20
DRG: 004 | End: 2020-09-09
Attending: INTERNAL MEDICINE
Payer: MEDICAID

## 2020-09-09 PROBLEM — R13.12 DYSPHAGIA, OROPHARYNGEAL: Status: ACTIVE | Noted: 2020-09-09

## 2020-09-09 LAB
ALBUMIN SERPL BCP-MCNC: 3.6 G/DL (ref 3.2–4.9)
ALBUMIN/GLOB SERPL: 1.1 G/DL
ALP SERPL-CCNC: 77 U/L (ref 30–99)
ALT SERPL-CCNC: 27 U/L (ref 2–50)
ANION GAP SERPL CALC-SCNC: 13 MMOL/L (ref 7–16)
AST SERPL-CCNC: 25 U/L (ref 12–45)
BACTERIA BRONCH AEROBE CULT: ABNORMAL
BASOPHILS # BLD AUTO: 0.5 % (ref 0–1.8)
BASOPHILS # BLD: 0.05 K/UL (ref 0–0.12)
BILIRUB SERPL-MCNC: 0.2 MG/DL (ref 0.1–1.5)
BUN SERPL-MCNC: 25 MG/DL (ref 8–22)
CALCIUM SERPL-MCNC: 9.3 MG/DL (ref 8.5–10.5)
CHLORIDE SERPL-SCNC: 104 MMOL/L (ref 96–112)
CO2 SERPL-SCNC: 21 MMOL/L (ref 20–33)
CREAT SERPL-MCNC: 0.46 MG/DL (ref 0.5–1.4)
EOSINOPHIL # BLD AUTO: 0.24 K/UL (ref 0–0.51)
EOSINOPHIL NFR BLD: 2.3 % (ref 0–6.9)
ERYTHROCYTE [DISTWIDTH] IN BLOOD BY AUTOMATED COUNT: 45.4 FL (ref 35.9–50)
GLOBULIN SER CALC-MCNC: 3.3 G/DL (ref 1.9–3.5)
GLUCOSE SERPL-MCNC: 129 MG/DL (ref 65–99)
GRAM STN SPEC: ABNORMAL
HCT VFR BLD AUTO: 35.5 % (ref 37–47)
HGB BLD-MCNC: 11.6 G/DL (ref 12–16)
IMM GRANULOCYTES # BLD AUTO: 0.15 K/UL (ref 0–0.11)
IMM GRANULOCYTES NFR BLD AUTO: 1.5 % (ref 0–0.9)
LYMPHOCYTES # BLD AUTO: 1.53 K/UL (ref 1–4.8)
LYMPHOCYTES NFR BLD: 14.9 % (ref 22–41)
MCH RBC QN AUTO: 30.7 PG (ref 27–33)
MCHC RBC AUTO-ENTMCNC: 32.7 G/DL (ref 33.6–35)
MCV RBC AUTO: 93.9 FL (ref 81.4–97.8)
MONOCYTES # BLD AUTO: 0.94 K/UL (ref 0–0.85)
MONOCYTES NFR BLD AUTO: 9.2 % (ref 0–13.4)
NEUTROPHILS # BLD AUTO: 7.36 K/UL (ref 2–7.15)
NEUTROPHILS NFR BLD: 71.6 % (ref 44–72)
NRBC # BLD AUTO: 0 K/UL
NRBC BLD-RTO: 0 /100 WBC
PLATELET # BLD AUTO: 636 K/UL (ref 164–446)
PMV BLD AUTO: 8.9 FL (ref 9–12.9)
POTASSIUM SERPL-SCNC: 4.3 MMOL/L (ref 3.6–5.5)
PROT SERPL-MCNC: 6.9 G/DL (ref 6–8.2)
RBC # BLD AUTO: 3.78 M/UL (ref 4.2–5.4)
SIGNIFICANT IND 70042: ABNORMAL
SITE SITE: ABNORMAL
SODIUM SERPL-SCNC: 138 MMOL/L (ref 135–145)
SOURCE SOURCE: ABNORMAL
WBC # BLD AUTO: 10.3 K/UL (ref 4.8–10.8)

## 2020-09-09 PROCEDURE — 700111 HCHG RX REV CODE 636 W/ 250 OVERRIDE (IP): Performed by: INTERNAL MEDICINE

## 2020-09-09 PROCEDURE — 99291 CRITICAL CARE FIRST HOUR: CPT | Mod: 25 | Performed by: INTERNAL MEDICINE

## 2020-09-09 PROCEDURE — 700102 HCHG RX REV CODE 250 W/ 637 OVERRIDE(OP): Performed by: INTERNAL MEDICINE

## 2020-09-09 PROCEDURE — 700105 HCHG RX REV CODE 258

## 2020-09-09 PROCEDURE — 31600 PLANNED TRACHEOSTOMY: CPT

## 2020-09-09 PROCEDURE — 85025 COMPLETE CBC W/AUTO DIFF WBC: CPT

## 2020-09-09 PROCEDURE — 700105 HCHG RX REV CODE 258: Performed by: PSYCHIATRY & NEUROLOGY

## 2020-09-09 PROCEDURE — A9270 NON-COVERED ITEM OR SERVICE: HCPCS | Performed by: INTERNAL MEDICINE

## 2020-09-09 PROCEDURE — 80053 COMPREHEN METABOLIC PANEL: CPT

## 2020-09-09 PROCEDURE — 31624 DX BRONCHOSCOPE/LAVAGE: CPT | Mod: 51 | Performed by: INTERNAL MEDICINE

## 2020-09-09 PROCEDURE — 95714 VEEG EA 12-26 HR UNMNTR: CPT | Performed by: PSYCHIATRY & NEUROLOGY

## 2020-09-09 PROCEDURE — 700105 HCHG RX REV CODE 258: Performed by: INTERNAL MEDICINE

## 2020-09-09 PROCEDURE — 94003 VENT MGMT INPAT SUBQ DAY: CPT

## 2020-09-09 PROCEDURE — 71045 X-RAY EXAM CHEST 1 VIEW: CPT

## 2020-09-09 PROCEDURE — 770022 HCHG ROOM/CARE - ICU (200)

## 2020-09-09 PROCEDURE — C9254 INJECTION, LACOSAMIDE: HCPCS | Performed by: INTERNAL MEDICINE

## 2020-09-09 PROCEDURE — 700101 HCHG RX REV CODE 250: Performed by: INTERNAL MEDICINE

## 2020-09-09 PROCEDURE — 31600 PLANNED TRACHEOSTOMY: CPT | Performed by: SURGERY

## 2020-09-09 PROCEDURE — 700111 HCHG RX REV CODE 636 W/ 250 OVERRIDE (IP): Performed by: PSYCHIATRY & NEUROLOGY

## 2020-09-09 PROCEDURE — 94770 HCHG CO2 EXPIRED GAS DETERMINATION: CPT

## 2020-09-09 PROCEDURE — 0B113F4 BYPASS TRACHEA TO CUTANEOUS WITH TRACHEOSTOMY DEVICE, PERCUTANEOUS APPROACH: ICD-10-PCS | Performed by: SURGERY

## 2020-09-09 PROCEDURE — 31645 BRNCHSC W/THER ASPIR 1ST: CPT | Performed by: INTERNAL MEDICINE

## 2020-09-09 PROCEDURE — 94760 N-INVAS EAR/PLS OXIMETRY 1: CPT

## 2020-09-09 PROCEDURE — 4A10X4Z MONITORING OF CENTRAL NERVOUS ELECTRICAL ACTIVITY, EXTERNAL APPROACH: ICD-10-PCS | Performed by: PSYCHIATRY & NEUROLOGY

## 2020-09-09 PROCEDURE — 302977 HCHG BRONCHOSCOPY PROC-THERAPEUTIC

## 2020-09-09 PROCEDURE — 36620 INSERTION CATHETER ARTERY: CPT

## 2020-09-09 PROCEDURE — 0B9M8ZZ DRAINAGE OF BILATERAL LUNGS, VIA NATURAL OR ARTIFICIAL OPENING ENDOSCOPIC: ICD-10-PCS | Performed by: INTERNAL MEDICINE

## 2020-09-09 PROCEDURE — 95720 EEG PHY/QHP EA INCR W/VEEG: CPT | Performed by: PSYCHIATRY & NEUROLOGY

## 2020-09-09 PROCEDURE — 99233 SBSQ HOSP IP/OBS HIGH 50: CPT | Performed by: PSYCHIATRY & NEUROLOGY

## 2020-09-09 RX ORDER — PROPOFOL 10 MG/ML
30 INJECTION, EMULSION INTRAVENOUS ONCE
Status: COMPLETED | OUTPATIENT
Start: 2020-09-09 | End: 2020-09-09

## 2020-09-09 RX ORDER — SODIUM CHLORIDE 9 MG/ML
INJECTION, SOLUTION INTRAVENOUS
Status: COMPLETED
Start: 2020-09-09 | End: 2020-09-09

## 2020-09-09 RX ORDER — ROCURONIUM BROMIDE 10 MG/ML
75 INJECTION, SOLUTION INTRAVENOUS ONCE
Status: COMPLETED | OUTPATIENT
Start: 2020-09-09 | End: 2020-09-09

## 2020-09-09 RX ORDER — PROPOFOL 10 MG/ML
10 INJECTION, EMULSION INTRAVENOUS ONCE
Status: COMPLETED | OUTPATIENT
Start: 2020-09-09 | End: 2020-09-09

## 2020-09-09 RX ORDER — PROPOFOL 10 MG/ML
15 INJECTION, EMULSION INTRAVENOUS ONCE
Status: COMPLETED | OUTPATIENT
Start: 2020-09-09 | End: 2020-09-09

## 2020-09-09 RX ADMIN — LEVETIRACETAM INJECTION 1500 MG: 15 INJECTION INTRAVENOUS at 05:14

## 2020-09-09 RX ADMIN — FAMOTIDINE 20 MG: 20 TABLET, FILM COATED ORAL at 05:14

## 2020-09-09 RX ADMIN — PROPOFOL 15 MG: 10 INJECTION, EMULSION INTRAVENOUS at 15:16

## 2020-09-09 RX ADMIN — PROPOFOL 10 MG: 10 INJECTION, EMULSION INTRAVENOUS at 15:21

## 2020-09-09 RX ADMIN — VALPROATE SODIUM 1000 MG: 100 INJECTION, SOLUTION INTRAVENOUS at 18:22

## 2020-09-09 RX ADMIN — FENTANYL CITRATE 100 MCG: 50 INJECTION INTRAMUSCULAR; INTRAVENOUS at 15:59

## 2020-09-09 RX ADMIN — SODIUM CHLORIDE 200 MG: 9 INJECTION, SOLUTION INTRAVENOUS at 12:09

## 2020-09-09 RX ADMIN — LEVETIRACETAM INJECTION 1500 MG: 15 INJECTION INTRAVENOUS at 18:08

## 2020-09-09 RX ADMIN — ENOXAPARIN SODIUM 40 MG: 40 INJECTION SUBCUTANEOUS at 05:14

## 2020-09-09 RX ADMIN — PROPOFOL 30 MG: 10 INJECTION, EMULSION INTRAVENOUS at 15:12

## 2020-09-09 RX ADMIN — SODIUM CHLORIDE 200 MG: 9 INJECTION, SOLUTION INTRAVENOUS at 22:45

## 2020-09-09 RX ADMIN — FAMOTIDINE 20 MG: 20 TABLET, FILM COATED ORAL at 18:03

## 2020-09-09 RX ADMIN — SULFAMETHOXAZOLE AND TRIMETHOPRIM 1 TABLET: 800; 160 TABLET ORAL at 05:14

## 2020-09-09 RX ADMIN — SODIUM CHLORIDE 500 ML: 9 INJECTION, SOLUTION INTRAVENOUS at 18:22

## 2020-09-09 RX ADMIN — DOCUSATE SODIUM 50 MG AND SENNOSIDES 8.6 MG 2 TABLET: 8.6; 5 TABLET, FILM COATED ORAL at 18:03

## 2020-09-09 RX ADMIN — ROCURONIUM BROMIDE 75 MG: 10 INJECTION, SOLUTION INTRAVENOUS at 15:12

## 2020-09-09 RX ADMIN — PERAMPANEL 10 MG: 2 TABLET ORAL at 23:46

## 2020-09-09 RX ADMIN — VALPROATE SODIUM 750 MG: 100 INJECTION, SOLUTION INTRAVENOUS at 05:14

## 2020-09-09 RX ADMIN — SULFAMETHOXAZOLE AND TRIMETHOPRIM 1 TABLET: 800; 160 TABLET ORAL at 18:03

## 2020-09-09 RX ADMIN — SODIUM CHLORIDE 200 MG: 9 INJECTION, SOLUTION INTRAVENOUS at 00:02

## 2020-09-09 ASSESSMENT — FIBROSIS 4 INDEX: FIB4 SCORE: 0.16

## 2020-09-09 NOTE — PROCEDURES
Procedures    Date: 9/9/20  Time: 3:40 PM     Procedure: Bronchoscopy with therapeutic aspiration of secretions    Indication: Pneumonia, secretions  Consent: Informed consent obtained from patient or designated decision maker after explaining the benefits/risks of the procedure including but not limited to bleeding, infection, airway trauma or loss therof, pneumothorax/hemothorax, arrythmia, or death. Surrogate expressed understanding and agreement and signed consent which can be found in the patient's chart.    Procedure: After obtaining consent, a time-out was performed. Respiratory therapy and nursing at bedside throughout procedure. Patient provided sedation and analgesia throughout the procedure. Placed on full ventilator support with an FiO2 of 100% throughout the procedure. Using a fiberoptic bronchoscope, trachea entered via ETT. Airways visualized directly and the following intervention was performed: therapeutic lavage with all areas of lungs suctioned to clear. Findings as below. Patient tolerated procedure well without any difficulties and left in care of bedside nurse/RT.     Medications: Propofol and rocuronium  Findings: Upper airway - Not visualized as bronchoscope passed through ETT.        Trachea to may - normal appearing mucosa without lesions or mass        R proximal and distal airways - small amount of secretions no mass/lesion/anatomic variance         L proximal and distal airways - small amount of secretions no mass/lesion/anatomic variance    Complications: None

## 2020-09-09 NOTE — CARE PLAN
Problem: Skin Integrity  Goal: Risk for impaired skin integrity will decrease  Outcome: PROGRESSING SLOWER THAN EXPECTED  Note: Pt has been in house and in bed since admit date. RN's cont to do ROM, Q2 turns, Pt on waffle mattress, Mepilex in place, Pillows used to float heels, elbows.

## 2020-09-09 NOTE — CARE PLAN
Problem: Respiratory:  Goal: Respiratory status will improve  Outcome: PROGRESSING SLOWER THAN EXPECTED  Intervention: Collaborate with respiratory therapist and Interdisciplinary Team on treatment measures to improve respiratory function  Note: Pt continuing to require vent support.     Problem: Infection  Goal: Will remain free from infection  Outcome: PROGRESSING SLOWER THAN EXPECTED  Intervention: Implement standard precautions and perform hand washing before and after patient contact  Note: Completed per protocol and for droplet precautions.  Intervention: Assess for removal of potential routes of infection, such as IV, central line, intra-arterial or urinary catheters  Note: Unable to remove any lines at this time.

## 2020-09-09 NOTE — PROCEDURES
VIDEO ELECTROENCEPHALOGRAM REPORT        Referring provider: Dr. Red.      DOS: 9/9/2020 (total recording of 23 hours and 19 minutes).      INDICATION:  Annika He 19 y.o. female presenting with s/p cardiac arrest, altered mental status, seizures.      CURRENT ANTIEPILEPTIC REGIMEN: Levetiracetam 1500 mg bid, Lacosamide 200 mg bid, and Valproic Acid increased to 1000 mg bid. Perampanel at 10 mg qhs added. No longer sedated.       TECHNIQUE: 30 channel video electroencephalogram (EEG) was performed in accordance with the international 10-20 system. The study was reviewed in bipolar and referential montages. The recording examined a comatose patient.      DESCRIPTION OF THE RECORD:  Generalized delta / theta activity, with elements of slow wave sleep elements noted, including sleep spindles. Frequent, blunted generalized sharps with frontal maximum and triphasic morphology, with frequent runs of Frontal Intermittent Rhythmic Delta Activity (FIRDA). Frequent seizures, and runs of non convulsive status epilepticus noted, with worsening of the eeg as study progressed, particularly in the last few hours of overnight recording. Clinically, some seizures were associated with subtle tonic posturing of the upper extremities and chewing or oral movements.      ACTIVATION PROCEDURES:   Not performed.      EKG: sampling of the EKG recording demonstrated sinus rhythm.      EVENTS:  None.      INTERPRETATION:  This is an abnormal 24 hrs video EEG recording in a comatose patient. A mild to moderate encephalopathy is suggested. Frequent, blunted generalized sharps with frontal maximum and triphasic morphology, with frequent runs of Frontal Intermittent Rhythmic Delta Activity (FIRDA). Frequent seizures, and runs of non convulsive status epilepticus noted, with worsening of the eeg as study progressed, particularly in the last few hours of overnight recording. Clinically, some seizures were associated with subtle  tonic posturing of the upper extremities and chewing or oral movements. The findings suggest underlying areas of persinstently increased cortical irritability and/or structural abnormality. Clinical and radiological correlation is recommended.     Updates provided to Dr. Agapito Beatty MD   Epilepsy and Neurodiagnostics.   Clinical  of Neurology [facility].   Diplomate in Neurology, Epilepsy, and Electrodiagnostic Medicine.   Office: [phone]  Fax: [phone]

## 2020-09-09 NOTE — PROCEDURES
"Perc Trach Op Note    Date of operation: 9/9/2020    Preoperative diagnosis: acute respiratory failure (518.81) and hypoxia (799.02)    Postoperative diagnosis: acute respiratory failure (518.81) and hypoxia (799.02)    Operation performed:    Surgeon: Dr. Ng    Assistant/endoscopist: Dr. Donohue    Anesthesia: local anesthesia, Intravenous analgesia, sedation and pharmacologic restraint     Indications: The patient is a 19 y.o. female with acute respiratory failure (518.81) and hypoxia (799.02) with persitent ventilator dependency after cardiac arrest from drug overdose. Percutaneous tracheostomy is carried out in the CICU under  bronchoscopic guidance.    Findings: Bronchoscopic findings included no sign of hemorrhage    Specimen: none sent.    Procedure: Following two physician consent, the patient was properly identified and optimally positioned in bed.  The patient was preoxygenated with 100% oxygen and placed on a set ventilator rate. A roll was placed between the patient's shoulders and the neck was slightly extended.  A \"time out\" was initiated. The correct patient, procedure and operative site were verified. The patient's allergy status was assessed. Procedural modifications were made as required. local anesthesia, Intravenous analgesia, sedation and pharmacologic restraint  was administered. The surgical site was prepped with chlorhexidine.     Dr. Donohue performed the bronchoscopy. Please see dictation for full details. The fiberoptic bronchoscope was advanced through the indwelling endotracheal tube. The tube was withdrawn to the level of the vocal cords under direct vision. The anterior trachea was transilluminated through the end of the endotracheal tube and the surface landmarks for the tracheostomy were established. The scope was withdrawn prior to cannulation of the trachea  to prevent damage to the bronchoscope.    The anterior trachea was cannulated percutaneously midway between the thyroid " cartilage and the suprasternal notch. The needle was withdrawn from the angiocatheter and a wire was passed without resistance under direct bronchoscopic vision. A 1 cm vertical incision was made and the starter dilator was passed. The single graduated dilator was passed over the wire under direct vision. An 8mm cuffed Portex  tracheostomy tube was passed over the wire under direct visualization. The tracheostomy tube was connected to the ventilator circuit and the cuff was inflated. Satisfactory oxygenation and ventilation was observed. The tracheostomy tube was secured to the neck with interrupted sutures. A tracheostomy strap was applied.    The patient tolerated the procedure well and there were no apparent complications.

## 2020-09-09 NOTE — PROGRESS NOTES
Hospital Neurology Progress Note:     Interval History: No acute events overnight.  No further seizure activity.  Unable to obtain review of systems due to intubation.    Objective:   Vitals:    09/09/20 0613 09/09/20 0800 09/09/20 1000 09/09/20 1006   BP:  114/62 116/61    Pulse: 98 92 94 (!) 113   Resp: (!) 26 (!) 26 (!) 27 (!) 21   Temp:  37.2 °C (99 °F) 36.7 °C (98.1 °F)    TempSrc:  Bladder Bladder    SpO2: 100% 99% 98% 100%   Weight:       Height:           Labs:     Lab Results   Component Value Date/Time    PROTHROMBTM 14.0 08/25/2020 09:20 PM    INR 1.05 08/25/2020 09:20 PM      Lab Results   Component Value Date/Time    WBC 10.3 09/09/2020 05:30 AM    RBC 3.78 (L) 09/09/2020 05:30 AM    HEMOGLOBIN 11.6 (L) 09/09/2020 05:30 AM    HEMATOCRIT 35.5 (L) 09/09/2020 05:30 AM    MCV 93.9 09/09/2020 05:30 AM    MCH 30.7 09/09/2020 05:30 AM    MCHC 32.7 (L) 09/09/2020 05:30 AM    MPV 8.9 (L) 09/09/2020 05:30 AM    NEUTSPOLYS 71.60 09/09/2020 05:30 AM    LYMPHOCYTES 14.90 (L) 09/09/2020 05:30 AM    MONOCYTES 9.20 09/09/2020 05:30 AM    EOSINOPHILS 2.30 09/09/2020 05:30 AM    BASOPHILS 0.50 09/09/2020 05:30 AM    ANISOCYTOSIS 1+ 08/23/2020 10:05 PM      Lab Results   Component Value Date/Time    SODIUM 138 09/09/2020 05:30 AM    POTASSIUM 4.3 09/09/2020 05:30 AM    CHLORIDE 104 09/09/2020 05:30 AM    CO2 21 09/09/2020 05:30 AM    GLUCOSE 129 (H) 09/09/2020 05:30 AM    BUN 25 (H) 09/09/2020 05:30 AM    CREATININE 0.46 (L) 09/09/2020 05:30 AM      Lab Results   Component Value Date/Time    TRIGLYCERIDE 118 09/07/2020 03:13 AM       Lab Results   Component Value Date/Time    ALKPHOSPHAT 77 09/09/2020 05:30 AM    ASTSGOT 25 09/09/2020 05:30 AM    ALTSGPT 27 09/09/2020 05:30 AM    TBILIRUBIN 0.2 09/09/2020 05:30 AM        Imaging/Testing:   EEG from 9/8/2020 through 9/9/2020 discussed with the interpreting physician.  Some seizures were seen some with tonic posturing of the bilateral upper extremities.  At times these were  clustered however they did not progress into status.  Also, SIRPIDs were seen.     Physical Exam:     General: Intubated 19-year-old female in no acute distress.  Cardio: Normal S1/S2. No peripheral edema.   Pulm: CTAX2. No respiratory distress.   Skin: Warm, dry, no rashes or lesions   Psychiatric: Unable to assess.  HEENT: Atraumatic head, normal sclera and conjunctiva, moist oral mucosa. No lid lag  Abdomen: Soft, non tender. No masses or hepatosplenomegaly.    Neurologic:  Mental Status: GCS 3 T (E1, M1, V1 T)  Cranial Nerves: Pupils equally round and reactive to light.  Oculocephalic reflex is absent.  Face appears symmetric.  Motor: Normal muscle tone and bulk.  No spontaneous movements observed.  Weak triple flexion response on the right lower extremity.  Reflexes: Deferred  Coordination: Unable to assess  Sensation: Triple flexion response in the right lower extremity.  Gait/Station: Unable to assess    Patient seen and examined on 9/1/2020 and compared to 9/8/2020 no significant clinical change was seen.    Assessment/Plan:    Annika He is a 19 y.o. female with history of cardiac arrest and subsequent anoxic brain injury due to polysubstance abuse who has had a protracted hospital course complicated by seizure activity.  EEG has not shown seizure activity for the last 48 hours however burst suppression was not achieved.  Nevertheless, at this time will start weaning propofol to see if seizure activity recurs.  If propofol is weaned successfully then will start weaning ketamine.  Etiology of seizures is likely secondary to anoxic brain injury.    Plan:  1.  Increase Keppra to 1500 mg twice daily.  Continue Vimpat at 200 mg twice daily.  Increase Depakote to 1000 mg twice daily.  2.  Propofol and ketamine discontinued.  3.  Initiate Fycompa 10 mg daily starting today.  4.  Continue video monitored EEG  5.  Plan discussed with consulting physician and patient's nurse.     Andrew Petersen,  M.D., Diplomat of the American Board of Psychiatry and Neurology  Diplomat of [facility]   Assistant Clinical Professor, [facility] Neurology Consultant

## 2020-09-09 NOTE — CONSULTS
"Surgery Consultation    Chief Complaint: Anoxic brain injury with seizures after cardiac arrest    Consult requested by Dr. Donohue on the MICU service.     History of Present Illness: The patient is a 19 year-old White young woman who was admitted to the medical service after cardiac arrest related to multiple drug overdose.  Patient developed intractable seizures and has been ventilator dependent since admission.  Neurology is following and recommended waiting period which will require transition to long-term acute care.  Consult requested for tracheostomy and gastrostomy placement    Past Medical History:  has no past medical history on file.    Past Surgical History:  has no past surgical history on file.    Allergies:   Allergies   Allergen Reactions   • Penicillins Hives     Childhood reaction - data obtained from alternative chart  Tolerated ceftriaxone 8/2020       Current Medications:    Home Medications     Reviewed by Juliette Su R.N. (Registered Nurse) on 09/03/20 at 0930  Med List Status: Complete   Medication Last Dose Status        Patient Silviano Taking any Medications                       Family History: family history is not on file.    Social History:  reports that she has never smoked. She has never used smokeless tobacco.    Review of Systems: Comprehensive review of systems is not able to be elicited from the patient secondary to the acuity of the clinical situation.    Physical Examination:     Constitutional:     Vital Signs: /73   Pulse (!) 109   Temp 37 °C (98.6 °F) (Bladder)   Resp (!) 26   Ht 1.6 m (5' 3\")   Wt 66.8 kg (147 lb 4.3 oz)   SpO2 98%    General Appearance: The patient is intubated on the ventilator  HEENT:    EEG monitors in place The pupils are equal, round, and reactive to light bilaterally.  The ear canals and tympanic membranes are clear bilaterally.  Core TRAC and endotracheal tube in place  Neck:    Trachea midline, no scars or " lines  Respiratory:   Inspection: Unlabored respirations, no intercostal retractions, paradoxical motion, or accessory muscle use.   Palpation:  The chest is nontender. The clavicles are non deformed bilaterally.   Auscultation: clear to auscultation.  Cardiovascular:   Inspection: The skin is warm and well purfused.  Auscultation: tachycardia   Peripheral Pulses: Normal.   Abdomen:   Inspection: Abdominal inspection reveals no abrasions, contusions, lacerations or penetrating wounds.   Palpation: Palpation is remarkable for no significant tenderness, guarding, or peritoneal findings.  Genitourinary:   Pearce catheter  Musculoskeletal:    No significant angulation, deformity, or soft tissue injury involving the upper and lower extremities.  Neurologic:    Cal Coma Scale (GCS) Eye Opening (spontaneous 4), Verbal Response (no verbal response 1), Best Motor Response (flexion to pain 3).  .    Laboratory Values:   Recent Labs     09/07/20 0630 09/08/20  0500 09/09/20  0530   WBC 7.6 13.1* 10.3   RBC 3.68* 4.01* 3.78*   HEMOGLOBIN 11.3* 12.4 11.6*   HEMATOCRIT 34.9* 37.6 35.5*   MCV 94.8 93.8 93.9   MCH 30.7 30.9 30.7   MCHC 32.4* 33.0* 32.7*   RDW 45.0 45.2 45.4   PLATELETCT 586* 684* 636*   MPV 8.8* 8.8* 8.9*     Recent Labs     09/07/20 0313 09/08/20  0500 09/09/20  0530   SODIUM 141 142 138   POTASSIUM 4.5 4.6 4.3   CHLORIDE 106 107 104   CO2 20 21 21   GLUCOSE 102* 123* 129*   BUN 18 25* 25*   CREATININE 0.54 0.48* 0.46*   CALCIUM 9.0 9.3 9.3     Recent Labs     09/07/20 0313 09/08/20  0500 09/09/20  0530   ASTSGOT 36 26 25   ALTSGPT 49 36 27   TBILIRUBIN <0.2 0.2 0.2   ALKPHOSPHAT 67 81 77   GLOBULIN 3.2 3.4 3.3            Imaging:   DX-CHEST-PORTABLE (1 VIEW)   Final Result         1.  Mild pulmonary edema and/or interstitial infiltrates      DX-CHEST-PORTABLE (1 VIEW)   Final Result         No significant interval change.      DX-CHEST-PORTABLE (1 VIEW)   Final Result         New hazy opacity in the left  lung base could be atelectasis or consolidation.      DX-CHEST-PORTABLE (1 VIEW)   Final Result      No significant change      DX-CHEST-PORTABLE (1 VIEW)   Final Result         1.  No acute cardiopulmonary disease.                                 DX-CHEST-PORTABLE (1 VIEW)   Final Result         1.  No acute cardiopulmonary disease.                              DX-CHEST-PORTABLE (1 VIEW)   Final Result         1.  No focal infiltrates, previously visualized infiltrates are not readily apparent.                           MR-BRAIN-WITH & W/O   Final Result         1.  Severe diffuse anoxic brain injury pattern which is more extensive and conspicuous than on previous exam.      2.  No evidence of intracranial mass effect, extra-axial fluid collection, or interval development of hydrocephalus.      3.  Diffuse mucosal inflammatory changes filling the ethmoid and sphenoid sinuses.      DX-CHEST-PORTABLE (1 VIEW)   Final Result         1.  Pulmonary edema and/or infiltrates are identified, which are somewhat decreased since the prior exam.                        [id number] VIEW   Final Result      Feeding tube is noted with tip at the level of the proximal duodenum.                  DX-CHEST-PORTABLE (1 VIEW)   Final Result         1.  Pulmonary edema and/or infiltrates are identified, which are stable since the prior exam.                     DX-CHEST-PORTABLE (1 VIEW)   Final Result      Stable chest x-ray findings with right lower lobe consolidation.      DX-CHEST-PORTABLE (1 VIEW)   Final Result         1.  Pulmonary edema and/or infiltrates are identified, which are stable since the prior exam.                  DX-CHEST-PORTABLE (1 VIEW)   Final Result         1.  Pulmonary edema and/or infiltrates are identified, which are stable since the prior exam.               MR-BRAIN-W/O   Final Result      The diffusion-weighted sequences demonstrates areas of restricted diffusion in the bilateral basal ganglia and some  of the frontal gray matter. The predominant portion of the brain parenchyma does not demonstrate any restricted diffusion. Therefore this    findings likely represent mild diffuse anoxic brain injury.      DX-CHEST-PORTABLE (1 VIEW)   Final Result         1.  Pulmonary edema and/or infiltrates are identified, which are somewhat decreased since the prior exam.            DX-CHEST-PORTABLE (1 VIEW)   Final Result         1.  Pulmonary edema and/or infiltrates are identified, which are stable since the prior exam.         DX-ABDOMEN FOR TUBE PLACEMENT   Final Result         1.  Nonspecific bowel gas pattern.   2.  Dobbhoff tube tip overlying the expected location of the pylorus or first duodenal segment.      DX-CHEST-PORTABLE (1 VIEW)   Final Result         1.  Patchy bilateral pulmonary infiltrates, somewhat increased since prior.      EC-ECHOCARDIOGRAM COMPLETE W/O CONT   Final Result      DX-CHEST-PORTABLE (1 VIEW)   Final Result         1.  Patchy bilateral pulmonary infiltrates, somewhat increased since prior.      CT-HEAD W/O   Final Result         1.  Possible subtle sulcal effacement and slight loss of differentiation of gray-white matter, consider component of cerebral edema. Recommend radiographic follow-up   2.  Bilateral sphenoid and maxillary sinus air-fluid levels      DX-CHEST-PORTABLE (1 VIEW)   Final Result         1.  No acute cardiopulmonary disease.      DX-CHEST-PORTABLE (1 VIEW)    (Results Pending)       Assessment and Plan:     Is an unfortunate 19-year-old female with severe anoxic brain injury and protracted seizures who continues to be ventilator dependent and requires durable enteral access for long-term tube feeds.  Family is consented for percutaneous tracheostomy which will be done today.  Gastrostomy has been scheduled for 9/11.    PEA (Pulseless electrical activity) (HCC)  PEA enroute to hospital  Hypoxia and aspiration contributory  MRI reveals bilateral basal ganglial anoxic  injury.  Status post therapeutic hypothermia and rewarmed    Acute respiratory failure with hypoxia (HCC)  Secondary to drug overdose  Lung protective ventilation strategies  9/9 c/s for tracheostomy and PEG    Drug overdose  Methamphetamines, oxycodone and EtOH per boyfriend    Cerebral edema (HCC)  Mild/possible per radiologist on CT  MRI on 8/27/2020 without edema or severe changes  Repeat MRI 9/2 - radiographic evidence of anoxic brain injury more apparent then prior MRI    Elevated transaminase level  Follow    Encephalopathy acute  Anoxic encephalopathy, was improving  Currently cEEG for status, neurology following    Hypoglycemia  Follow    Pneumonia of both lungs due to methicillin resistant Staphylococcus aureus (MRSA) (HCC)  BAL 8/31 - > 100K CFU MRSA  Bactrim x 14 days         ____________________________________     Maximiliano Ng D.O.    DD: 9/9/2020  3:33 PM

## 2020-09-09 NOTE — PROGRESS NOTES
"Critical Care Progress Note    Date of admission  8/23/2020    Chief Complaint  19 y.o. female admitted 8/23/2020 with drug overdose, cardiac arrest    Hospital Course    \"19 y.o. female who presented 8/23/2020 with drug overdose with methamphetamines, oxycodone and found by boyfriend.  Cardiac arrest enroute to hospital and received narcan, epinephrine and cpr and ROSC achieved.  Difficult airway at scene and LMA placed. ET tube placed in ER.  She was vomiting during airway attempt.  On propofol due to vent asynchrony.  Propofol turned off for neuro assessment and possible hypothermic protocol.  Family no longer at hospital and unable to reach per phone number given for mother.\"    8/24 - TTM protocol initiated, EEG without NCSE  8/25 - rewarmed, awake, not following  8/26 - following occasional commands  8/27- seizure like activity vs shivering--> Keppra, versed, Propofol  8/28- no seizures on EEG  8/29- remains unresponsive.  8/30 -mental status improved, now opening eyes and following commands.  Keppra discontinued  8/31 - started cefepime/vancomycin for possible pneumonia,?  UTI, fever and increasing WBC fever; full vent  9/1 - Seizure activity noted on EEG today, reviewed with neurology; keppra load and increased to 1gm q 12; full vent support  9/2 - remains unresponsive with only some posture and grimace to stimulation, neurology reconsulted, stat MRI brain, LP, cEEG  9/3 -ongoing seizures, Vimpat added, Depakote added, family conference today  9/4 -ongoing seizure noted on cEEG, started on midazolam infusion, full ventilator support  9/5 -ongoing seizures noted, propofol added and increased today, full ventilator support  9/6 -have not yet achieved burst suppression on cEEG; continue propofol, change Versed to ketamine infusion, discussed with neurology, full ventilator support  9/7 - still titrating propofol and ketamine for burst suppression on cEEG. Continue full ventilator support  9/8 - propofol off; " ketamine is being weaned; on vimpat, keppra, depakote. Requiring full ventilator support  9/9 - Requiring full ventilator support. propofol and ketamine off; on vimpat, keppra, depakote. Postures to stimuli - consult for trach/PEG        Interval Problem Update  Reviewed last 24 hour events:  Intubated, off sedation - postures to noxious stimuli  UOP good  PPx lovenox + pepcid  Bactrim x 14 days for MRSA PNA  Vimpat, valproate, keppra; off propofol/ketamine    Review of Systems  Review of Systems   Unable to perform ROS: Intubated        Vital Signs for last 24 hours   Temp:  [36.7 °C (98.1 °F)-37.6 °C (99.7 °F)] 36.7 °C (98.1 °F)  Pulse:  [] 94  Resp:  [14-96] 29  BP: (111-146)/(57-88) 125/78  SpO2:  [91 %-100 %] 98 %    Hemodynamic parameters for last 24 hours       Respiratory Information for the last 24 hours  Vent Mode: APVCMV  Rate (breaths/min): 26  Vt Target (mL): 320  PEEP/CPAP: 8  MAP: 12  Control VTE (exp VT): 318    Physical Exam   Physical Exam  Vitals signs and nursing note reviewed.   Constitutional:       General: She is not in acute distress.     Appearance: She is not ill-appearing or toxic-appearing.      Interventions: She is intubated.   HENT:      Head: Normocephalic and atraumatic.      Right Ear: External ear normal.      Left Ear: External ear normal.      Nose: No congestion or rhinorrhea.      Comments: Feeding tube in place     Mouth/Throat:      Mouth: Mucous membranes are moist.      Pharynx: No oropharyngeal exudate or posterior oropharyngeal erythema.      Comments: ET tube in place  Eyes:      General: No scleral icterus.     Conjunctiva/sclera: Conjunctivae normal.      Pupils: Pupils are equal, round, and reactive to light.      Comments: Sluggish pupillary response   Neck:      Musculoskeletal: Neck supple. No neck rigidity or muscular tenderness.   Cardiovascular:      Rate and Rhythm: Normal rate and regular rhythm.      Pulses: Normal pulses.      Heart sounds: Normal  heart sounds. No murmur.   Pulmonary:      Effort: She is intubated.      Breath sounds: No wheezing or rales.      Comments: Full ventilator support, low support settings  Abdominal:      General: Abdomen is flat. There is no distension.      Palpations: Abdomen is soft. There is no mass.      Tenderness: There is no abdominal tenderness.   Musculoskeletal:         General: No swelling or tenderness.      Right lower leg: No edema.      Left lower leg: No edema.   Skin:     General: Skin is warm and dry.      Capillary Refill: Capillary refill takes less than 2 seconds.      Findings: No erythema or rash.   Neurological:      GCS: GCS eye subscore is 3. GCS verbal subscore is 1. GCS motor subscore is 5.      Cranial Nerves: No facial asymmetry.      Motor: Abnormal muscle tone present.      Comments: Unresponsive, moves all extremities, some posturing with stimulation, positive cough and gag, on continuous EEG   Psychiatric:      Comments: Unable to assess         Medications  Current Facility-Administered Medications   Medication Dose Route Frequency Provider Last Rate Last Dose   • valproate (DEPACON) 1,000 mg in  mL IVPB  1,000 mg Intravenous Q12HRS Andrew Petersen M.D.       • perampanel (FYCOMPA) tablet 10 mg  10 mg Oral QHS Andrew Petersen M.D.       • levETIRAcetam (Keppra) 1500 mg in 100 mL NaCl IV premix  1,500 mg Intravenous Q12HRS Andrew Petersen M.D.   Stopped at 09/09/20 0529   • ketamine 1,000 mg in  mL   Intravenous CONTINUOUS (1600 Start) Nithin Donohue M.D.   Stopped at 09/08/20 1800   • fentaNYL (SUBLIMAZE) injection 100 mcg  100 mcg Intravenous Q15 MIN PRN Boogie Augustine Jr., D.O.   100 mcg at 09/08/20 2118    And   • fentaNYL (SUBLIMAZE) injection 200 mcg  200 mcg Intravenous Q15 MIN PRN Boogie Augustine Jr. D.O.   200 mcg at 09/08/20 0316    And   • fentaNYL (SUBLIMAZE) 50 mcg/mL in 50mL (Continuous Infusion)   Intravenous Continuous Boogie Augustine Jr. D.O.    Stopped at 09/07/20 2230    And   • propofol (DIPRIVAN) injection  0-80 mcg/kg/min Intravenous Continuous Boogie Augustine Jr., D.O.   Stopped at 09/07/20 2230   • sulfamethoxazole-trimethoprim (BACTRIM DS) 800-160 MG tablet 1 Tab  1 Tab Enteral Tube Q12HRS Dar Red M.D.   1 Tab at 09/09/20 0514   • lacosamide (VIMPAT) 200 mg in  mL ivpb  200 mg Intravenous Q12HRS Dar Red M.D. 120 mL/hr at 09/09/20 1209 200 mg at 09/09/20 1209   • polyethylene glycol/lytes (MIRALAX) PACKET 1 Packet  1 Packet Enteral Tube BID Dar Red M.D.   Stopped at 09/08/20 0600   • acetaminophen (TYLENOL) suppository 325 mg  325 mg Rectal Q6HRS PRN Yosef Tomlin M.D.   650 mg at 09/02/20 1632   • acetaminophen (TYLENOL) tablet 1,000 mg  1,000 mg Enteral Tube Q4HRS PRN Dar Red M.D.   1,000 mg at 09/04/20 1800   • labetalol (NORMODYNE/TRANDATE) injection 10 mg  10 mg Intravenous Q4HRS PRN Yosef Tomlin M.D.   10 mg at 08/30/20 2256   • Pharmacy Consult: Enteral tube insertion - review meds/change route/product selection  1 Each Other PHARMACY TO DOSE Boogie Augustine Jr., D.O.       • Respiratory Therapy Consult   Nebulization Continuous RT Brock Murdock M.D.       • ipratropium-albuterol (DUONEB) nebulizer solution  3 mL Nebulization Q2HRS PRN (RT) Brock Murdock M.D.       • famotidine (PEPCID) tablet 20 mg  20 mg Enteral Tube Q12HRS Dar Red M.D.   20 mg at 09/09/20 0514   • senna-docusate (PERICOLACE or SENOKOT S) 8.6-50 MG per tablet 2 Tab  2 Tab Enteral Tube BID Brock Murdock M.D.   Stopped at 09/09/20 0600    And   • polyethylene glycol/lytes (MIRALAX) PACKET 1 Packet  1 Packet Enteral Tube QDAY PRN Brock Murdock M.D.        And   • magnesium hydroxide (MILK OF MAGNESIA) suspension 30 mL  30 mL Enteral Tube QDAY PRN Brock Murdock M.D.        And   • bisacodyl (DULCOLAX) suppository 10 mg  10 mg Rectal QDAY PRN Brock Murdock M.D.       • MD Alert...ICU Electrolyte Replacement per Pharmacy    Other PHARMACY TO DOSE Brock Murdock M.D.       • lidocaine (XYLOCAINE) 1 % injection 1-2 mL  1-2 mL Tracheal Tube Q30 MIN PRN Brock Murdock M.D.       • enoxaparin (LOVENOX) inj 40 mg  40 mg Subcutaneous DAILY Boogie Augustine Jr., D.O.   40 mg at 09/09/20 0514       Fluids    Intake/Output Summary (Last 24 hours) at 9/9/2020 1222  Last data filed at 9/9/2020 1000  Gross per 24 hour   Intake 1742.02 ml   Output 1010 ml   Net 732.02 ml       Laboratory          Recent Labs     09/07/20 0313 09/08/20  0500 09/09/20  0530   SODIUM 141 142 138   POTASSIUM 4.5 4.6 4.3   CHLORIDE 106 107 104   CO2 20 21 21   BUN 18 25* 25*   CREATININE 0.54 0.48* 0.46*   CALCIUM 9.0 9.3 9.3     Recent Labs     09/07/20 0313 09/08/20  0500 09/09/20  0530   ALTSGPT 49 36 27   ASTSGOT 36 26 25   ALKPHOSPHAT 67 81 77   TBILIRUBIN <0.2 0.2 0.2   PREALBUMIN 40.4*  --   --    GLUCOSE 102* 123* 129*     Recent Labs     09/07/20 0313 09/07/20 0630 09/08/20  0500 09/09/20  0530   WBC  --  7.6 13.1* 10.3   NEUTSPOLYS  --  65.80 77.60* 71.60   LYMPHOCYTES  --  21.50* 12.40* 14.90*   MONOCYTES  --  8.30 6.40 9.20   EOSINOPHILS  --  3.30 2.00 2.30   BASOPHILS  --  0.30 0.40 0.50   ASTSGOT 36  --  26 25   ALTSGPT 49  --  36 27   ALKPHOSPHAT 67  --  81 77   TBILIRUBIN <0.2  --  0.2 0.2     Recent Labs     09/07/20 0630 09/08/20  0500 09/09/20  0530   RBC 3.68* 4.01* 3.78*   HEMOGLOBIN 11.3* 12.4 11.6*   HEMATOCRIT 34.9* 37.6 35.5*   PLATELETCT 586* 684* 636*       Imaging  X-Ray:  I have personally reviewed the images and compared with prior images.     MRI 8/27:  The diffusion-weighted sequences demonstrates areas of restricted diffusion in the bilateral basal ganglia and some of the frontal gray matter. The predominant portion of the brain parenchyma does not demonstrate any restricted diffusion. Therefore this   findings likely represent mild diffuse anoxic brain injury.    MRI 9/2:  1.  Severe diffuse anoxic brain injury pattern which is more  extensive and conspicuous than on previous exam.  2.  No evidence of intracranial mass effect, extra-axial fluid collection, or interval development of hydrocephalus.  3.  Diffuse mucosal inflammatory changes filling the ethmoid and sphenoid sinuses.    Assessment/Plan  PEA (Pulseless electrical activity) (HCC)  PEA enroute to hospital  Hypoxia and aspiration contributory  MRI reveals bilateral basal ganglial anoxic injury.  Status post therapeutic hypothermia and rewarmed    Acute respiratory failure with hypoxia (HCC)  Secondary to drug overdose  Lung protective ventilation strategies  9/9 c/s for tracheostomy and PEG    Drug overdose  Methamphetamines, oxycodone and EtOH per boyfriend    Cerebral edema (HCC)  Mild/possible per radiologist on CT  MRI on 8/27/2020 without edema or severe changes  Repeat MRI 9/2 - radiographic evidence of anoxic brain injury more apparent then prior MRI    Elevated transaminase level  Follow    Encephalopathy acute  Anoxic encephalopathy, was improving  Currently cEEG for status, neurology following    Hypoglycemia  Follow    Pneumonia of both lungs due to methicillin resistant Staphylococcus aureus (MRSA) (HCC)  BAL 8/31 - > 100K CFU MRSA  Bactrim x 14 days         Updated plan:  Continue full ventilator support  Continuous EEG s/p seizure suppression now titrating down AEDs and evaluating response      VTE:  Lovenox  Ulcer: H2 Antagonist  Lines: Central Line  Ongoing indication addressed    I have performed a physical exam and reviewed and updated ROS and Plan today (9/9/2020). In review of yesterday's note (9/8/2020), there are no changes except as documented above.      Lung protective ventilation strategies  Titrate ventilator prescription to optimize oxygenation, ventilation, and acid base balance.    Discussed patient condition and risk of morbidity and/or mortality with RN, RT, Pharmacy, Dietary, , Code status disscussed, Charge nurse / hot rounds and neurology      The patient remains critically ill.  I have assessed and reassessed the patient multiple times.  She is at high risk for further vital organ deterioration with significant ongoing critical care management as above.  Critical care time = 34 minutes in directly providing and coordinating critical care and extensive data review.  No time overlap and excludes procedures.

## 2020-09-09 NOTE — CARE PLAN
Problem: Ventilation Defect:  Goal: Ability to achieve and maintain unassisted ventilation or tolerate decreased levels of ventilator support  Outcome: PROGRESSING AS EXPECTED     Ventilator Daily Summary    Vent Day # 18    Ventilator settings changed this shift: none    Weaning trials: none, patient on continuous eeg    Respiratory Procedures: Bedside trach today.  Tolerated well.  Back up trach at bedside.     Plan: Continue current ventilator settings and wean mechanical ventilation as tolerated per physician orders.

## 2020-09-10 ENCOUNTER — APPOINTMENT (OUTPATIENT)
Dept: RADIOLOGY | Facility: MEDICAL CENTER | Age: 20
DRG: 004 | End: 2020-09-10
Attending: INTERNAL MEDICINE
Payer: MEDICAID

## 2020-09-10 LAB
ALBUMIN SERPL BCP-MCNC: 3.5 G/DL (ref 3.2–4.9)
ALBUMIN/GLOB SERPL: 1.1 G/DL
ALP SERPL-CCNC: 76 U/L (ref 30–99)
ALT SERPL-CCNC: 22 U/L (ref 2–50)
ANION GAP SERPL CALC-SCNC: 14 MMOL/L (ref 7–16)
AST SERPL-CCNC: 26 U/L (ref 12–45)
BASOPHILS # BLD AUTO: 0.4 % (ref 0–1.8)
BASOPHILS # BLD: 0.06 K/UL (ref 0–0.12)
BILIRUB SERPL-MCNC: <0.2 MG/DL (ref 0.1–1.5)
BUN SERPL-MCNC: 23 MG/DL (ref 8–22)
CALCIUM SERPL-MCNC: 8.8 MG/DL (ref 8.5–10.5)
CHLORIDE SERPL-SCNC: 102 MMOL/L (ref 96–112)
CO2 SERPL-SCNC: 22 MMOL/L (ref 20–33)
COVID ORDER STATUS COVID19: NORMAL
CREAT SERPL-MCNC: 0.51 MG/DL (ref 0.5–1.4)
EOSINOPHIL # BLD AUTO: 0.49 K/UL (ref 0–0.51)
EOSINOPHIL NFR BLD: 3.7 % (ref 0–6.9)
ERYTHROCYTE [DISTWIDTH] IN BLOOD BY AUTOMATED COUNT: 47.2 FL (ref 35.9–50)
GLOBULIN SER CALC-MCNC: 3.1 G/DL (ref 1.9–3.5)
GLUCOSE SERPL-MCNC: 117 MG/DL (ref 65–99)
HCT VFR BLD AUTO: 34.8 % (ref 37–47)
HGB BLD-MCNC: 11.1 G/DL (ref 12–16)
IMM GRANULOCYTES # BLD AUTO: 0.21 K/UL (ref 0–0.11)
IMM GRANULOCYTES NFR BLD AUTO: 1.6 % (ref 0–0.9)
LYMPHOCYTES # BLD AUTO: 1.75 K/UL (ref 1–4.8)
LYMPHOCYTES NFR BLD: 13.1 % (ref 22–41)
MCH RBC QN AUTO: 30.7 PG (ref 27–33)
MCHC RBC AUTO-ENTMCNC: 31.9 G/DL (ref 33.6–35)
MCV RBC AUTO: 96.1 FL (ref 81.4–97.8)
MONOCYTES # BLD AUTO: 1.45 K/UL (ref 0–0.85)
MONOCYTES NFR BLD AUTO: 10.8 % (ref 0–13.4)
NEUTROPHILS # BLD AUTO: 9.42 K/UL (ref 2–7.15)
NEUTROPHILS NFR BLD: 70.4 % (ref 44–72)
NRBC # BLD AUTO: 0 K/UL
NRBC BLD-RTO: 0 /100 WBC
PLATELET # BLD AUTO: 621 K/UL (ref 164–446)
PMV BLD AUTO: 9.2 FL (ref 9–12.9)
POTASSIUM SERPL-SCNC: 4.3 MMOL/L (ref 3.6–5.5)
PROCALCITONIN SERPL-MCNC: <0.05 NG/ML
PROT SERPL-MCNC: 6.6 G/DL (ref 6–8.2)
RBC # BLD AUTO: 3.62 M/UL (ref 4.2–5.4)
SARS-COV-2 RDRP RESP QL NAA+PROBE: NOTDETECTED
SODIUM SERPL-SCNC: 138 MMOL/L (ref 135–145)
SPECIMEN SOURCE: NORMAL
TRIGL SERPL-MCNC: 79 MG/DL (ref 0–149)
WBC # BLD AUTO: 13.4 K/UL (ref 4.8–10.8)

## 2020-09-10 PROCEDURE — 700102 HCHG RX REV CODE 250 W/ 637 OVERRIDE(OP): Performed by: INTERNAL MEDICINE

## 2020-09-10 PROCEDURE — 99233 SBSQ HOSP IP/OBS HIGH 50: CPT | Performed by: PSYCHIATRY & NEUROLOGY

## 2020-09-10 PROCEDURE — A9270 NON-COVERED ITEM OR SERVICE: HCPCS | Performed by: INTERNAL MEDICINE

## 2020-09-10 PROCEDURE — 71045 X-RAY EXAM CHEST 1 VIEW: CPT

## 2020-09-10 PROCEDURE — 84478 ASSAY OF TRIGLYCERIDES: CPT

## 2020-09-10 PROCEDURE — 770022 HCHG ROOM/CARE - ICU (200)

## 2020-09-10 PROCEDURE — 99291 CRITICAL CARE FIRST HOUR: CPT | Performed by: INTERNAL MEDICINE

## 2020-09-10 PROCEDURE — 95720 EEG PHY/QHP EA INCR W/VEEG: CPT | Performed by: PSYCHIATRY & NEUROLOGY

## 2020-09-10 PROCEDURE — 85025 COMPLETE CBC W/AUTO DIFF WBC: CPT

## 2020-09-10 PROCEDURE — 700105 HCHG RX REV CODE 258: Performed by: INTERNAL MEDICINE

## 2020-09-10 PROCEDURE — 700101 HCHG RX REV CODE 250: Performed by: PSYCHIATRY & NEUROLOGY

## 2020-09-10 PROCEDURE — 76937 US GUIDE VASCULAR ACCESS: CPT

## 2020-09-10 PROCEDURE — 94760 N-INVAS EAR/PLS OXIMETRY 1: CPT

## 2020-09-10 PROCEDURE — 84145 PROCALCITONIN (PCT): CPT

## 2020-09-10 PROCEDURE — 95714 VEEG EA 12-26 HR UNMNTR: CPT | Performed by: PSYCHIATRY & NEUROLOGY

## 2020-09-10 PROCEDURE — 4A10X4Z MONITORING OF CENTRAL NERVOUS ELECTRICAL ACTIVITY, EXTERNAL APPROACH: ICD-10-PCS | Performed by: PSYCHIATRY & NEUROLOGY

## 2020-09-10 PROCEDURE — 05HF33Z INSERTION OF INFUSION DEVICE INTO LEFT CEPHALIC VEIN, PERCUTANEOUS APPROACH: ICD-10-PCS | Performed by: INTERNAL MEDICINE

## 2020-09-10 PROCEDURE — U0004 COV-19 TEST NON-CDC HGH THRU: HCPCS

## 2020-09-10 PROCEDURE — 80053 COMPREHEN METABOLIC PANEL: CPT

## 2020-09-10 PROCEDURE — 700105 HCHG RX REV CODE 258: Performed by: PSYCHIATRY & NEUROLOGY

## 2020-09-10 PROCEDURE — 94770 HCHG CO2 EXPIRED GAS DETERMINATION: CPT

## 2020-09-10 PROCEDURE — C9803 HOPD COVID-19 SPEC COLLECT: HCPCS | Performed by: INTERNAL MEDICINE

## 2020-09-10 PROCEDURE — 700111 HCHG RX REV CODE 636 W/ 250 OVERRIDE (IP): Performed by: PSYCHIATRY & NEUROLOGY

## 2020-09-10 PROCEDURE — 700111 HCHG RX REV CODE 636 W/ 250 OVERRIDE (IP): Performed by: INTERNAL MEDICINE

## 2020-09-10 PROCEDURE — C9254 INJECTION, LACOSAMIDE: HCPCS | Performed by: INTERNAL MEDICINE

## 2020-09-10 PROCEDURE — 94003 VENT MGMT INPAT SUBQ DAY: CPT

## 2020-09-10 RX ADMIN — ENOXAPARIN SODIUM 40 MG: 40 INJECTION SUBCUTANEOUS at 04:37

## 2020-09-10 RX ADMIN — KETAMINE HYDROCHLORIDE 1.5 MG/KG/HR: 50 INJECTION, SOLUTION INTRAMUSCULAR; INTRAVENOUS at 08:43

## 2020-09-10 RX ADMIN — SULFAMETHOXAZOLE AND TRIMETHOPRIM 1 TABLET: 800; 160 TABLET ORAL at 04:37

## 2020-09-10 RX ADMIN — LEVETIRACETAM INJECTION 1500 MG: 15 INJECTION INTRAVENOUS at 04:37

## 2020-09-10 RX ADMIN — FENTANYL CITRATE 100 MCG: 50 INJECTION INTRAMUSCULAR; INTRAVENOUS at 14:24

## 2020-09-10 RX ADMIN — FAMOTIDINE 20 MG: 20 TABLET, FILM COATED ORAL at 17:25

## 2020-09-10 RX ADMIN — LEVETIRACETAM INJECTION 1500 MG: 15 INJECTION INTRAVENOUS at 17:27

## 2020-09-10 RX ADMIN — VALPROATE SODIUM 1000 MG: 100 INJECTION, SOLUTION INTRAVENOUS at 04:37

## 2020-09-10 RX ADMIN — SODIUM CHLORIDE 200 MG: 9 INJECTION, SOLUTION INTRAVENOUS at 11:28

## 2020-09-10 RX ADMIN — KETAMINE HYDROCHLORIDE 1.5 MG/KG/HR: 50 INJECTION, SOLUTION INTRAMUSCULAR; INTRAVENOUS at 19:32

## 2020-09-10 RX ADMIN — DOCUSATE SODIUM 50 MG AND SENNOSIDES 8.6 MG 2 TABLET: 8.6; 5 TABLET, FILM COATED ORAL at 04:37

## 2020-09-10 RX ADMIN — PERAMPANEL 10 MG: 2 TABLET ORAL at 22:15

## 2020-09-10 RX ADMIN — FAMOTIDINE 20 MG: 20 TABLET, FILM COATED ORAL at 04:37

## 2020-09-10 RX ADMIN — SODIUM CHLORIDE 200 MG: 9 INJECTION, SOLUTION INTRAVENOUS at 22:14

## 2020-09-10 RX ADMIN — ALTEPLASE 2 MG: 2.2 INJECTION, POWDER, LYOPHILIZED, FOR SOLUTION INTRAVENOUS at 02:59

## 2020-09-10 RX ADMIN — FENTANYL CITRATE 200 MCG: 50 INJECTION INTRAMUSCULAR; INTRAVENOUS at 17:48

## 2020-09-10 RX ADMIN — FENTANYL CITRATE 100 MCG: 50 INJECTION INTRAMUSCULAR; INTRAVENOUS at 20:08

## 2020-09-10 RX ADMIN — VALPROATE SODIUM 1000 MG: 100 INJECTION, SOLUTION INTRAVENOUS at 18:19

## 2020-09-10 RX ADMIN — SULFAMETHOXAZOLE AND TRIMETHOPRIM 1 TABLET: 800; 160 TABLET ORAL at 17:25

## 2020-09-10 RX ADMIN — FENTANYL CITRATE 200 MCG: 50 INJECTION INTRAMUSCULAR; INTRAVENOUS at 16:11

## 2020-09-10 RX ADMIN — FENTANYL CITRATE 100 MCG: 50 INJECTION INTRAMUSCULAR; INTRAVENOUS at 02:59

## 2020-09-10 ASSESSMENT — FIBROSIS 4 INDEX: FIB4 SCORE: 0.16

## 2020-09-10 NOTE — PROCEDURES
VIDEO ELECTROENCEPHALOGRAM REPORT        Referring provider: Dr. Red.      DOS: 9/10/2020 (total recording of 23 hours and 11 minutes).      INDICATION:  Annika He 19 y.o. female presenting with s/p cardiac arrest, altered mental status, seizures.      CURRENT ANTIEPILEPTIC REGIMEN: Levetiracetam 1500 mg bid, Lacosamide 200 mg bid, Valproic Acid 1000 mg bid, Perampanel at 10 mg qhs. Sedation resumed with Ketamine.      TECHNIQUE: 30 channel video electroencephalogram (EEG) was performed in accordance with the international 10-20 system. The study was reviewed in bipolar and referential montages. The recording examined a comatose patient.      DESCRIPTION OF THE RECORD:  Generalized delta / theta activity, with elements of slow wave sleep elements noted, including sleep spindles. Continuous, blunted generalized sharps with frontal maximum and triphasic morphology, with frequent runs of Frontal Intermittent Rhythmic Delta Activity (FIRDA). Frequent seizures noted, with mild improvement of the eeg after addition of Ketamine, with most seizures becoming brief and less often, however seizures are still noted on EEG. No longer runs of status epilepticus. Clinically, some seizures were associated with subtle tonic posturing of the upper extremities and chewing or oral movements.      ACTIVATION PROCEDURES:   Not performed.      EKG: sampling of the EKG recording demonstrated sinus rhythm.      EVENTS:  None.      INTERPRETATION:  This is an abnormal 24 hrs video EEG recording in a comatose patient. A mild to moderate encephalopathy is suggested. Continuous, blunted generalized sharps with frontal maximum and triphasic morphology, with frequent runs of Frontal Intermittent Rhythmic Delta Activity (FIRDA) throughout the study. Frequent seizures noted, with mild improvement of the eeg after addition of Ketamine, with most seizures becoming brief and less often, however seizures are still noted on overnight  EEG. No longer status epilepticus. Clinically, some seizures were associated with subtle tonic posturing of the upper extremities and chewing or oral movements. The patient is not on burst suppression. The findings suggest underlying areas of persinstently increased cortical irritability and/or structural abnormality. Clinical and radiological correlation is recommended.     Updates provided to Dr. Agapito Beatty MD   Epilepsy and Neurodiagnostics.   Clinical  of Neurology [facility].   Diplomate in Neurology, Epilepsy, and Electrodiagnostic Medicine.   Office: [phone]  Fax: [phone]

## 2020-09-10 NOTE — CARE PLAN
Problem: Ventilation Defect:  Goal: Ability to achieve and maintain unassisted ventilation or tolerate decreased levels of ventilator support  Outcome: PROGRESSING SLOWER THAN EXPECTED      Ventilator Daily Summary    Vent Day # 19  Trache Day # 2    Weaning trials: N/A    Plan: Continue current ventilator settings and wean mechanical ventilation as tolerated per physician orders.

## 2020-09-10 NOTE — PROGRESS NOTES
"Critical Care Progress Note    Date of admission  8/23/2020    Chief Complaint  19 y.o. female admitted 8/23/2020 with drug overdose, cardiac arrest    Hospital Course    \"19 y.o. female who presented 8/23/2020 with drug overdose with methamphetamines, oxycodone and found by boyfriend.  Cardiac arrest enroute to hospital and received narcan, epinephrine and cpr and ROSC achieved.  Difficult airway at scene and LMA placed. ET tube placed in ER.  She was vomiting during airway attempt.  On propofol due to vent asynchrony.  Propofol turned off for neuro assessment and possible hypothermic protocol.  Family no longer at hospital and unable to reach per phone number given for mother.\"    8/24 - TTM protocol initiated, EEG without NCSE  8/25 - rewarmed, awake, not following  8/26 - following occasional commands  8/27- seizure like activity vs shivering--> Keppra, versed, Propofol  8/28- no seizures on EEG  8/29- remains unresponsive.  8/30 -mental status improved, now opening eyes and following commands.  Keppra discontinued  8/31 - started cefepime/vancomycin for possible pneumonia,?  UTI, fever and increasing WBC fever; full vent  9/1 - Seizure activity noted on EEG today, reviewed with neurology; keppra load and increased to 1gm q 12; full vent support  9/2 - remains unresponsive with only some posture and grimace to stimulation, neurology reconsulted, stat MRI brain, LP, cEEG  9/3 -ongoing seizures, Vimpat added, Depakote added, family conference today  9/4 -ongoing seizure noted on cEEG, started on midazolam infusion, full ventilator support  9/5 -ongoing seizures noted, propofol added and increased today, full ventilator support  9/6 -have not yet achieved burst suppression on cEEG; continue propofol, change Versed to ketamine infusion, discussed with neurology, full ventilator support  9/7 - still titrating propofol and ketamine for burst suppression on cEEG. Continue full ventilator support  9/8 - propofol off; " ketamine is being weaned; on vimpat, keppra, depakote. Requiring full ventilator support  9/9 - Requiring full ventilator support. propofol and ketamine off; on vimpat, keppra, depakote. Postures to stimuli - consult for trach/PEG  9/10 -status post tracheostomy, off propofol and on ketamine- beginning to wean antiepileptic medications, continues to require full ventilator support. PEG tomorrow. Reportedly is still having seizures.         Interval Problem Update  Reviewed last 24 hour events:  Intubated, on ketamine - postures to noxious stimuli, opens eyes spontaneously  UOP good  PPx lovenox + pepcid  Bactrim x 14 days for MRSA PNA  Vimpat, valproate, keppra; off propofol    Review of Systems  Review of Systems   Unable to perform ROS: Intubated        Vital Signs for last 24 hours   Pulse:  [] 125  Resp:  [15-38] 20  BP: ()/(50-78) 123/74  SpO2:  [93 %-100 %] 100 %    Hemodynamic parameters for last 24 hours       Respiratory Information for the last 24 hours  Vent Mode: APVCMV  Rate (breaths/min): 26  Vt Target (mL): 320  PEEP/CPAP: 8  P Support: 5  MAP: 11  Control VTE (exp VT): 220    Physical Exam   Physical Exam  Vitals signs and nursing note reviewed.   Constitutional:       General: She is not in acute distress.     Appearance: She is not ill-appearing or toxic-appearing.      Interventions: She is intubated.   HENT:      Head: Normocephalic and atraumatic.      Right Ear: External ear normal.      Left Ear: External ear normal.      Nose: No congestion or rhinorrhea.      Comments: Feeding tube in place     Mouth/Throat:      Mouth: Mucous membranes are moist.      Pharynx: No oropharyngeal exudate or posterior oropharyngeal erythema.      Comments: ET tube in place  Eyes:      General: No scleral icterus.     Conjunctiva/sclera: Conjunctivae normal.      Pupils: Pupils are equal, round, and reactive to light.      Comments: Sluggish pupillary response   Neck:      Musculoskeletal: Neck  supple. No neck rigidity or muscular tenderness.   Cardiovascular:      Rate and Rhythm: Normal rate and regular rhythm.      Pulses: Normal pulses.      Heart sounds: Normal heart sounds. No murmur.   Pulmonary:      Effort: She is intubated.      Breath sounds: No wheezing or rales.      Comments: Full ventilator support, low support settings  Abdominal:      General: Abdomen is flat. There is no distension.      Palpations: Abdomen is soft. There is no mass.      Tenderness: There is no abdominal tenderness.   Musculoskeletal:         General: No swelling or tenderness.      Right lower leg: No edema.      Left lower leg: No edema.   Skin:     General: Skin is warm and dry.      Capillary Refill: Capillary refill takes less than 2 seconds.      Findings: No erythema or rash.   Neurological:      GCS: GCS eye subscore is 3. GCS verbal subscore is 1. GCS motor subscore is 5.      Cranial Nerves: No facial asymmetry.      Motor: Abnormal muscle tone present.      Comments: Unresponsive, moves all extremities, some posturing with stimulation, positive cough and gag, on continuous EEG   Psychiatric:      Comments: Unable to assess         Medications  Current Facility-Administered Medications   Medication Dose Route Frequency Provider Last Rate Last Dose   • alteplase (CATHFLO) syringe *Central Line Only* 2 mg  2 mg Intracatheter Once Boogie Augustine Jr., D.O.       • ketamine 1,000 mg in  mL infusion (critical care only)  1.5 mg/kg/hr Intravenous Continuous Andrew Petersen M.D. 50.1 mL/hr at 09/10/20 0843 1.5 mg/kg/hr at 09/10/20 0843   • valproate (DEPACON) 1,000 mg in  mL IVPB  1,000 mg Intravenous Q12HRS Andrew Petersen M.D.   Stopped at 09/10/20 0537   • perampanel (FYCOMPA) tablet 10 mg  10 mg Enteral Tube QHS Nithin Donohue M.D.   10 mg at 09/09/20 2346   • levETIRAcetam (Keppra) 1500 mg in 100 mL NaCl IV premix  1,500 mg Intravenous Q12HRS Andrew Petersen M.D.   Stopped at  09/10/20 0452   • fentaNYL (SUBLIMAZE) injection 100 mcg  100 mcg Intravenous Q15 MIN PRN JESSY Piper Jr..OYue   100 mcg at 09/10/20 1424    And   • fentaNYL (SUBLIMAZE) injection 200 mcg  200 mcg Intravenous Q15 MIN PRN JESSY Piper Jr..OYue   200 mcg at 09/08/20 0316    And   • fentaNYL (SUBLIMAZE) 50 mcg/mL in 50mL (Continuous Infusion)   Intravenous Continuous JESSY Piper Jr..O.   Stopped at 09/07/20 2230    And   • propofol (DIPRIVAN) injection  0-80 mcg/kg/min Intravenous Continuous JESSY Piper Jr..OYue   Stopped at 09/07/20 2230   • sulfamethoxazole-trimethoprim (BACTRIM DS) 800-160 MG tablet 1 Tab  1 Tab Enteral Tube Q12HRS Dar Red M.D.   1 Tab at 09/10/20 0437   • lacosamide (VIMPAT) 200 mg in  mL ivpb  200 mg Intravenous Q12HRS Dar Red M.D.   Stopped at 09/10/20 1228   • polyethylene glycol/lytes (MIRALAX) PACKET 1 Packet  1 Packet Enteral Tube BID Dar Red M.D.   Stopped at 09/08/20 0600   • acetaminophen (TYLENOL) suppository 325 mg  325 mg Rectal Q6HRS PRN Yosef Tomlin M.D.   650 mg at 09/02/20 1632   • acetaminophen (TYLENOL) tablet 1,000 mg  1,000 mg Enteral Tube Q4HRS PRN Dar Red M.D.   1,000 mg at 09/04/20 1800   • labetalol (NORMODYNE/TRANDATE) injection 10 mg  10 mg Intravenous Q4HRS PRN Yosef Tomlin M.D.   10 mg at 08/30/20 2256   • Pharmacy Consult: Enteral tube insertion - review meds/change route/product selection  1 Each Other PHARMACY TO DOSE JESSY Piper Jr..OYue       • Respiratory Therapy Consult   Nebulization Continuous RT Brock Murdock M.D.       • ipratropium-albuterol (DUONEB) nebulizer solution  3 mL Nebulization Q2HRS PRN (RT) Brock Murdock M.D.       • famotidine (PEPCID) tablet 20 mg  20 mg Enteral Tube Q12HRS Dar Red M.D.   20 mg at 09/10/20 0437   • senna-docusate (PERICOLACE or SENOKOT S) 8.6-50 MG per tablet 2 Tab  2 Tab Enteral Tube BID Brock Murdock M.D.   2 Tab at 09/10/20 0437    And   •  polyethylene glycol/lytes (MIRALAX) PACKET 1 Packet  1 Packet Enteral Tube QDAY PRN Brock Murdock M.D.        And   • magnesium hydroxide (MILK OF MAGNESIA) suspension 30 mL  30 mL Enteral Tube QDAY PRN Brock Murdock M.D.        And   • bisacodyl (DULCOLAX) suppository 10 mg  10 mg Rectal QDAY PRN Brock Murdock M.D.       • MD Alert...ICU Electrolyte Replacement per Pharmacy   Other PHARMACY TO DOSE Brock Murdock M.D.       • lidocaine (XYLOCAINE) 1 % injection 1-2 mL  1-2 mL Tracheal Tube Q30 MIN PRN Brock Murdock M.D.       • enoxaparin (LOVENOX) inj 40 mg  40 mg Subcutaneous DAILY Boogie Augustine Jr., D.O.   40 mg at 09/10/20 0437       Fluids    Intake/Output Summary (Last 24 hours) at 9/10/2020 1503  Last data filed at 9/10/2020 1400  Gross per 24 hour   Intake 2438.26 ml   Output 1460 ml   Net 978.26 ml       Laboratory          Recent Labs     09/08/20  0500 09/09/20  0530 09/10/20  0450   SODIUM 142 138 138   POTASSIUM 4.6 4.3 4.3   CHLORIDE 107 104 102   CO2 21 21 22   BUN 25* 25* 23*   CREATININE 0.48* 0.46* 0.51   CALCIUM 9.3 9.3 8.8     Recent Labs     09/08/20  0500 09/09/20  0530 09/10/20  0450   ALTSGPT 36 27 22   ASTSGOT 26 25 26   ALKPHOSPHAT 81 77 76   TBILIRUBIN 0.2 0.2 <0.2   GLUCOSE 123* 129* 117*     Recent Labs     09/08/20  0500 09/09/20  0530 09/10/20  0450   WBC 13.1* 10.3 13.4*   NEUTSPOLYS 77.60* 71.60 70.40   LYMPHOCYTES 12.40* 14.90* 13.10*   MONOCYTES 6.40 9.20 10.80   EOSINOPHILS 2.00 2.30 3.70   BASOPHILS 0.40 0.50 0.40   ASTSGOT 26 25 26   ALTSGPT 36 27 22   ALKPHOSPHAT 81 77 76   TBILIRUBIN 0.2 0.2 <0.2     Recent Labs     09/08/20  0500 09/09/20  0530 09/10/20  0450   RBC 4.01* 3.78* 3.62*   HEMOGLOBIN 12.4 11.6* 11.1*   HEMATOCRIT 37.6 35.5* 34.8*   PLATELETCT 684* 636* 621*       Imaging  X-Ray:  I have personally reviewed the images and compared with prior images.     MRI 8/27:  The diffusion-weighted sequences demonstrates areas of restricted diffusion in the bilateral  basal ganglia and some of the frontal gray matter. The predominant portion of the brain parenchyma does not demonstrate any restricted diffusion. Therefore this   findings likely represent mild diffuse anoxic brain injury.    MRI 9/2:  1.  Severe diffuse anoxic brain injury pattern which is more extensive and conspicuous than on previous exam.  2.  No evidence of intracranial mass effect, extra-axial fluid collection, or interval development of hydrocephalus.  3.  Diffuse mucosal inflammatory changes filling the ethmoid and sphenoid sinuses.    Assessment/Plan  PEA (Pulseless electrical activity) (HCC)  PEA enroute to hospital  Hypoxia and aspiration contributory  MRI reveals bilateral basal ganglial anoxic injury.  Status post therapeutic hypothermia and rewarmed    Acute respiratory failure with hypoxia (HCC)  Secondary to drug overdose  Lung protective ventilation strategies  9/9 c/s for tracheostomy and PEG    Drug overdose  Methamphetamines, oxycodone and EtOH per boyfriend    Cerebral edema (HCC)  Mild/possible per radiologist on CT  MRI on 8/27/2020 without edema or severe changes  Repeat MRI 9/2 - radiographic evidence of anoxic brain injury more apparent then prior MRI    Elevated transaminase level  Follow    Encephalopathy acute  Anoxic encephalopathy, was improving  Currently cEEG for status, neurology following    Hypoglycemia  Follow    Pneumonia of both lungs due to methicillin resistant Staphylococcus aureus (MRSA) (HCC)  BAL 8/31 - > 100K CFU MRSA  Bactrim x 14 days    Dysphagia, oropharyngeal  Secondary to anoxic brain injury and seizures  Lap gastrostomy planned for 9/11         Updated plan:  Continue full ventilator support  Continuous EEG s/p seizure suppression now titrating down AEDs and evaluating response      VTE:  Lovenox  Ulcer: H2 Antagonist  Lines: Central Line  Ongoing indication addressed    I have performed a physical exam and reviewed and updated ROS and Plan today (9/10/2020). In  review of yesterday's note (9/9/2020), there are no changes except as documented above.      Lung protective ventilation strategies  Titrate ventilator prescription to optimize oxygenation, ventilation, and acid base balance.    Discussed patient condition and risk of morbidity and/or mortality with RN, RT, Pharmacy, Dietary, , Code status disscussed, Charge nurse / hot rounds and neurology     The patient remains critically ill.  I have assessed and reassessed the patient multiple times.  She is at high risk for further vital organ deterioration with significant ongoing critical care management as above.  Critical care time = 36 minutes in directly providing and coordinating critical care and extensive data review.  No time overlap and excludes procedures.

## 2020-09-10 NOTE — PROCEDURES
Vascular Access Team    Date of Insertion: 9/10/2020  Arm Circumference: n/a  Line Length: 8  Line Size: 18  Vein Occupancy %: 33  Reason for Midline: access  Labs: WBC 13.4, , BUN 23, Cr 0.51, GFR >60, INR na    Orders confirmed, vessel patency confirmed with ultrasound. Risks and benefits of procedure explained to patient and education regarding line associated bloodstream infections provided. Questions answered.     PowerGlide Midline placed in LUE per licensed provider order with ultrasound guidance. 18g, 8 cm line placed in cephalic vein after 1 attempt(s).  Catheter inserted with brisk blood return. Secured with 0cm external from insertion site.  Line flushed without resistance with 10 mL 0.9% normal saline.  Midline secured with Biopatch and Tegaderm.     Midline placement is confirmed by nurse using ultrasound and ability to flush and draw blood. Midline is appropriate for use at this time.  No X-ray is needed for placement confirmation. Pt tolerated procedure well.  Patient condition relayed to unit RN or ordering physician via this post procedure note in the EMR.    Ultrasound images uploaded to PACS and viewable in the EMR - yes  Ultrasound imaged printed and placed in paper chart - no      BARD PowerGlide Midline ref # [id number], Lot # [id number], Expiration Date 06/30/2021

## 2020-09-10 NOTE — PROGRESS NOTES
Surgery    Trach yesterday  No sig bleeding.    No other acute changes    Awake on vent   Not FC  Trach site clean    A/P  Anoxic brain injury  Lap G-tube sched for 0930 tomorrow  Needs repeat COVID  Hold TF at MN  Consent ordered

## 2020-09-10 NOTE — CARE PLAN
Problem: Respiratory:  Goal: Respiratory status will improve  Outcome: PROGRESSING AS EXPECTED     Problem: Fluid Volume:  Goal: Will maintain balanced intake and output  Outcome: PROGRESSING AS EXPECTED     Problem: Safety  Goal: Will remain free from injury  Outcome: PROGRESSING AS EXPECTED     Problem: Infection  Goal: Will remain free from infection  Outcome: PROGRESSING AS EXPECTED     Problem: Venous Thromboembolism (VTW)/Deep Vein Thrombosis (DVT) Prevention:  Goal: Patient will participate in Venous Thrombosis (VTE)/Deep Vein Thrombosis (DVT)Prevention Measures  Outcome: PROGRESSING AS EXPECTED     Problem: Bowel/Gastric:  Goal: Normal bowel function is maintained or improved  Outcome: PROGRESSING AS EXPECTED     Problem: Knowledge Deficit  Goal: Knowledge of disease process/condition, treatment plan, diagnostic tests, and medications will improve  Outcome: PROGRESSING AS EXPECTED     Problem: Skin Integrity  Goal: Risk for impaired skin integrity will decrease  Outcome: PROGRESSING AS EXPECTED     Problem: Pain Management  Goal: Pain level will decrease to patient's comfort goal  Outcome: PROGRESSING AS EXPECTED     Problem: Psychosocial Needs:  Goal: Level of anxiety will decrease  Outcome: PROGRESSING AS EXPECTED     Problem: Communication  Goal: The ability to communicate needs accurately and effectively will improve  Outcome: PROGRESSING SLOWER THAN EXPECTED

## 2020-09-10 NOTE — PROGRESS NOTES
Hospital Neurology Progress Note:     Interval History: No acute events overnight.  No further seizure activity.  Unable to obtain review of systems due to tracheostomy.     Objective:   Vitals:    09/10/20 0800 09/10/20 1000 09/10/20 1136 09/10/20 1200   BP: (!) 99/50 116/69  (!) 99/54   Pulse: 100 (!) 104 (!) 119 (!) 108   Resp: (!) 26 (!) 38 (!) 29 (!) 30   Temp:       TempSrc: Bladder Bladder  Bladder   SpO2: 100% 100% 100% 100%   Weight:       Height:           Labs:     Lab Results   Component Value Date/Time    PROTHROMBTM 14.0 08/25/2020 09:20 PM    INR 1.05 08/25/2020 09:20 PM      Lab Results   Component Value Date/Time    WBC 13.4 (H) 09/10/2020 04:50 AM    RBC 3.62 (L) 09/10/2020 04:50 AM    HEMOGLOBIN 11.1 (L) 09/10/2020 04:50 AM    HEMATOCRIT 34.8 (L) 09/10/2020 04:50 AM    MCV 96.1 09/10/2020 04:50 AM    MCH 30.7 09/10/2020 04:50 AM    MCHC 31.9 (L) 09/10/2020 04:50 AM    MPV 9.2 09/10/2020 04:50 AM    NEUTSPOLYS 70.40 09/10/2020 04:50 AM    LYMPHOCYTES 13.10 (L) 09/10/2020 04:50 AM    MONOCYTES 10.80 09/10/2020 04:50 AM    EOSINOPHILS 3.70 09/10/2020 04:50 AM    BASOPHILS 0.40 09/10/2020 04:50 AM    ANISOCYTOSIS 1+ 08/23/2020 10:05 PM      Lab Results   Component Value Date/Time    SODIUM 138 09/10/2020 04:50 AM    POTASSIUM 4.3 09/10/2020 04:50 AM    CHLORIDE 102 09/10/2020 04:50 AM    CO2 22 09/10/2020 04:50 AM    GLUCOSE 117 (H) 09/10/2020 04:50 AM    BUN 23 (H) 09/10/2020 04:50 AM    CREATININE 0.51 09/10/2020 04:50 AM      Lab Results   Component Value Date/Time    TRIGLYCERIDE 79 09/10/2020 04:50 AM       Lab Results   Component Value Date/Time    ALKPHOSPHAT 76 09/10/2020 04:50 AM    ASTSGOT 26 09/10/2020 04:50 AM    ALTSGPT 22 09/10/2020 04:50 AM    TBILIRUBIN <0.2 09/10/2020 04:50 AM        Imaging/Testing:   EEG from 9/9/20 through 9/10/20 reviewed w/ interpreting physician. Some more seizures seen but no status.     Physical Exam:     General: Intubated 19-year-old female in no acute  distress.  Cardio: Normal S1/S2. No peripheral edema.   Pulm: CTAX2. No respiratory distress.   Skin: Warm, dry, no rashes or lesions   Psychiatric: Unable to assess.  HEENT: Atraumatic head, normal sclera and conjunctiva, moist oral mucosa. No lid lag  Abdomen: Soft, non tender. No masses or hepatosplenomegaly.    Neurologic:  Mental Status: GCS 3 T (E1, M1, V1 T)  Cranial Nerves: Pupils equally round and reactive to light.  Oculocephalic reflex is absent.  Face appears symmetric.  Motor: Normal muscle tone and bulk.  No spontaneous movements observed.  Weak triple flexion response on the right lower extremity.  Reflexes: Deferred  Coordination: Unable to assess  Sensation: Triple flexion response in the right lower extremity.  Gait/Station: Unable to assess    Patient seen and examined on 9/9/2020 and compared to 9/10/2020 no significant clinical change was seen.    Assessment/Plan:    Annika He is a 19 y.o. female with history of cardiac arrest and subsequent anoxic brain injury due to polysubstance abuse who has had a protracted hospital course complicated by seizure activity.  EEG has not shown seizure activity for the last 48 hours however burst suppression was not achieved.  Nevertheless, at this time will start weaning propofol to see if seizure activity recurs.  If propofol is weaned successfully then will start weaning ketamine.  Etiology of seizures is likely secondary to anoxic brain injury.    Plan:  1.  Increase Keppra to 1500 mg twice daily.  Continue Vimpat at 200 mg twice daily.  Increase Depakote to 1000 mg twice daily.  2.  Initiate Ketamine at 1.5mg/kg/hr  3.  Continue Fycompa 10mg daily  4.  Continue video monitored EEG  5.  Plan discussed with consulting physician and patient's nurse.     Andrew Petersen M.D., Diplomat of the American Board of Psychiatry and Neurology  Diplomat of [employer] Epilepsy [facility]specialty   Assistant Clinical Professor, [facility] Neurology  Consultant

## 2020-09-11 ENCOUNTER — APPOINTMENT (OUTPATIENT)
Dept: RADIOLOGY | Facility: MEDICAL CENTER | Age: 20
DRG: 004 | End: 2020-09-11
Attending: INTERNAL MEDICINE
Payer: MEDICAID

## 2020-09-11 ENCOUNTER — ANESTHESIA (OUTPATIENT)
Dept: SURGERY | Facility: MEDICAL CENTER | Age: 20
DRG: 004 | End: 2020-09-11
Payer: MEDICAID

## 2020-09-11 ENCOUNTER — ANESTHESIA EVENT (OUTPATIENT)
Dept: SURGERY | Facility: MEDICAL CENTER | Age: 20
DRG: 004 | End: 2020-09-11
Payer: MEDICAID

## 2020-09-11 PROBLEM — E87.20 METABOLIC ACIDOSIS: Status: RESOLVED | Noted: 2020-08-24 | Resolved: 2020-09-11

## 2020-09-11 PROBLEM — R13.12 DYSPHAGIA, OROPHARYNGEAL: Status: RESOLVED | Noted: 2020-09-09 | Resolved: 2020-09-11

## 2020-09-11 LAB
ALBUMIN SERPL BCP-MCNC: 3.8 G/DL (ref 3.2–4.9)
ALBUMIN/GLOB SERPL: 1.2 G/DL
ALP SERPL-CCNC: 83 U/L (ref 30–99)
ALT SERPL-CCNC: 25 U/L (ref 2–50)
ANION GAP SERPL CALC-SCNC: 13 MMOL/L (ref 7–16)
AST SERPL-CCNC: 25 U/L (ref 12–45)
BASOPHILS # BLD AUTO: 0.3 % (ref 0–1.8)
BASOPHILS # BLD: 0.04 K/UL (ref 0–0.12)
BILIRUB SERPL-MCNC: 0.2 MG/DL (ref 0.1–1.5)
BUN SERPL-MCNC: 17 MG/DL (ref 8–22)
CALCIUM SERPL-MCNC: 9.1 MG/DL (ref 8.5–10.5)
CHLORIDE SERPL-SCNC: 100 MMOL/L (ref 96–112)
CO2 SERPL-SCNC: 23 MMOL/L (ref 20–33)
CREAT SERPL-MCNC: 0.41 MG/DL (ref 0.5–1.4)
EOSINOPHIL # BLD AUTO: 0.52 K/UL (ref 0–0.51)
EOSINOPHIL NFR BLD: 4 % (ref 0–6.9)
ERYTHROCYTE [DISTWIDTH] IN BLOOD BY AUTOMATED COUNT: 46 FL (ref 35.9–50)
GLOBULIN SER CALC-MCNC: 3.1 G/DL (ref 1.9–3.5)
GLUCOSE SERPL-MCNC: 101 MG/DL (ref 65–99)
HCT VFR BLD AUTO: 36.7 % (ref 37–47)
HGB BLD-MCNC: 11.8 G/DL (ref 12–16)
IMM GRANULOCYTES # BLD AUTO: 0.2 K/UL (ref 0–0.11)
IMM GRANULOCYTES NFR BLD AUTO: 1.5 % (ref 0–0.9)
LYMPHOCYTES # BLD AUTO: 1.68 K/UL (ref 1–4.8)
LYMPHOCYTES NFR BLD: 12.9 % (ref 22–41)
MCH RBC QN AUTO: 31 PG (ref 27–33)
MCHC RBC AUTO-ENTMCNC: 32.2 G/DL (ref 33.6–35)
MCV RBC AUTO: 96.3 FL (ref 81.4–97.8)
MONOCYTES # BLD AUTO: 1.48 K/UL (ref 0–0.85)
MONOCYTES NFR BLD AUTO: 11.4 % (ref 0–13.4)
NEUTROPHILS # BLD AUTO: 9.11 K/UL (ref 2–7.15)
NEUTROPHILS NFR BLD: 69.9 % (ref 44–72)
NRBC # BLD AUTO: 0 K/UL
NRBC BLD-RTO: 0 /100 WBC
PLATELET # BLD AUTO: 652 K/UL (ref 164–446)
PMV BLD AUTO: 8.8 FL (ref 9–12.9)
POTASSIUM SERPL-SCNC: 4.3 MMOL/L (ref 3.6–5.5)
PROT SERPL-MCNC: 6.9 G/DL (ref 6–8.2)
RBC # BLD AUTO: 3.81 M/UL (ref 4.2–5.4)
SODIUM SERPL-SCNC: 136 MMOL/L (ref 135–145)
VALPROATE SERPL-MCNC: 33.8 UG/ML (ref 50–100)
WBC # BLD AUTO: 13 K/UL (ref 4.8–10.8)

## 2020-09-11 PROCEDURE — A9270 NON-COVERED ITEM OR SERVICE: HCPCS | Performed by: PSYCHIATRY & NEUROLOGY

## 2020-09-11 PROCEDURE — 770022 HCHG ROOM/CARE - ICU (200)

## 2020-09-11 PROCEDURE — 700111 HCHG RX REV CODE 636 W/ 250 OVERRIDE (IP): Performed by: INTERNAL MEDICINE

## 2020-09-11 PROCEDURE — 160009 HCHG ANES TIME/MIN: Performed by: SURGERY

## 2020-09-11 PROCEDURE — 4A10X4Z MONITORING OF CENTRAL NERVOUS ELECTRICAL ACTIVITY, EXTERNAL APPROACH: ICD-10-PCS | Performed by: PSYCHIATRY & NEUROLOGY

## 2020-09-11 PROCEDURE — 502240 HCHG MISC OR SUPPLY RC 0272: Performed by: SURGERY

## 2020-09-11 PROCEDURE — 700102 HCHG RX REV CODE 250 W/ 637 OVERRIDE(OP): Performed by: INTERNAL MEDICINE

## 2020-09-11 PROCEDURE — 160048 HCHG OR STATISTICAL LEVEL 1-5: Performed by: SURGERY

## 2020-09-11 PROCEDURE — A9270 NON-COVERED ITEM OR SERVICE: HCPCS | Performed by: INTERNAL MEDICINE

## 2020-09-11 PROCEDURE — 95720 EEG PHY/QHP EA INCR W/VEEG: CPT | Performed by: PSYCHIATRY & NEUROLOGY

## 2020-09-11 PROCEDURE — 85025 COMPLETE CBC W/AUTO DIFF WBC: CPT

## 2020-09-11 PROCEDURE — 160203 HCHG ENDO MINUTES - 1ST 30 MINS LEVEL 4: Performed by: SURGERY

## 2020-09-11 PROCEDURE — 99291 CRITICAL CARE FIRST HOUR: CPT | Performed by: INTERNAL MEDICINE

## 2020-09-11 PROCEDURE — 501581 HCHG TROCAR: Performed by: SURGERY

## 2020-09-11 PROCEDURE — 501583 HCHG TROCAR, THRD CAN&SEAL 5X100: Performed by: SURGERY

## 2020-09-11 PROCEDURE — 700101 HCHG RX REV CODE 250: Performed by: PSYCHIATRY & NEUROLOGY

## 2020-09-11 PROCEDURE — A6402 STERILE GAUZE <= 16 SQ IN: HCPCS | Performed by: SURGERY

## 2020-09-11 PROCEDURE — 700102 HCHG RX REV CODE 250 W/ 637 OVERRIDE(OP): Performed by: PSYCHIATRY & NEUROLOGY

## 2020-09-11 PROCEDURE — 71045 X-RAY EXAM CHEST 1 VIEW: CPT

## 2020-09-11 PROCEDURE — 502571 HCHG PACK, LAP CHOLE: Performed by: SURGERY

## 2020-09-11 PROCEDURE — 700105 HCHG RX REV CODE 258: Performed by: PSYCHIATRY & NEUROLOGY

## 2020-09-11 PROCEDURE — 0DH64UZ INSERTION OF FEEDING DEVICE INTO STOMACH, PERCUTANEOUS ENDOSCOPIC APPROACH: ICD-10-PCS | Performed by: SURGERY

## 2020-09-11 PROCEDURE — 80053 COMPREHEN METABOLIC PANEL: CPT

## 2020-09-11 PROCEDURE — 94003 VENT MGMT INPAT SUBQ DAY: CPT

## 2020-09-11 PROCEDURE — 700111 HCHG RX REV CODE 636 W/ 250 OVERRIDE (IP): Performed by: ANESTHESIOLOGY

## 2020-09-11 PROCEDURE — 700111 HCHG RX REV CODE 636 W/ 250 OVERRIDE (IP): Performed by: PSYCHIATRY & NEUROLOGY

## 2020-09-11 PROCEDURE — 700101 HCHG RX REV CODE 250: Performed by: ANESTHESIOLOGY

## 2020-09-11 PROCEDURE — 160208 HCHG ENDO MINUTES - EA ADDL 1 MIN LEVEL 4: Performed by: SURGERY

## 2020-09-11 PROCEDURE — 43653 LAPAROSCOPY GASTROSTOMY: CPT | Performed by: SURGERY

## 2020-09-11 PROCEDURE — 501838 HCHG SUTURE GENERAL: Performed by: SURGERY

## 2020-09-11 PROCEDURE — 700105 HCHG RX REV CODE 258: Performed by: ANESTHESIOLOGY

## 2020-09-11 PROCEDURE — 94770 HCHG CO2 EXPIRED GAS DETERMINATION: CPT

## 2020-09-11 PROCEDURE — 99233 SBSQ HOSP IP/OBS HIGH 50: CPT | Performed by: PSYCHIATRY & NEUROLOGY

## 2020-09-11 PROCEDURE — 700101 HCHG RX REV CODE 250: Performed by: SURGERY

## 2020-09-11 PROCEDURE — 95714 VEEG EA 12-26 HR UNMNTR: CPT | Performed by: PSYCHIATRY & NEUROLOGY

## 2020-09-11 PROCEDURE — 80164 ASSAY DIPROPYLACETIC ACD TOT: CPT

## 2020-09-11 PROCEDURE — 501570 HCHG TROCAR, SEPARATOR: Performed by: SURGERY

## 2020-09-11 RX ORDER — LEVETIRACETAM 500 MG/1
1500 TABLET ORAL 2 TIMES DAILY
Status: DISCONTINUED | OUTPATIENT
Start: 2020-09-11 | End: 2020-09-13

## 2020-09-11 RX ORDER — ROCURONIUM BROMIDE 10 MG/ML
INJECTION, SOLUTION INTRAVENOUS PRN
Status: DISCONTINUED | OUTPATIENT
Start: 2020-09-11 | End: 2020-09-11 | Stop reason: SURG

## 2020-09-11 RX ORDER — SODIUM CHLORIDE, SODIUM LACTATE, POTASSIUM CHLORIDE, CALCIUM CHLORIDE 600; 310; 30; 20 MG/100ML; MG/100ML; MG/100ML; MG/100ML
INJECTION, SOLUTION INTRAVENOUS
Status: DISCONTINUED | OUTPATIENT
Start: 2020-09-11 | End: 2020-09-11 | Stop reason: SURG

## 2020-09-11 RX ORDER — LACOSAMIDE 100 MG/1
200 TABLET ORAL 2 TIMES DAILY
Status: DISCONTINUED | OUTPATIENT
Start: 2020-09-11 | End: 2020-09-11

## 2020-09-11 RX ORDER — BUPIVACAINE HYDROCHLORIDE AND EPINEPHRINE 5; 5 MG/ML; UG/ML
INJECTION, SOLUTION EPIDURAL; INTRACAUDAL; PERINEURAL
Status: DISCONTINUED | OUTPATIENT
Start: 2020-09-11 | End: 2020-09-11 | Stop reason: HOSPADM

## 2020-09-11 RX ORDER — LACOSAMIDE 100 MG/1
150 TABLET ORAL 3 TIMES DAILY
Status: DISCONTINUED | OUTPATIENT
Start: 2020-09-11 | End: 2020-09-12

## 2020-09-11 RX ORDER — CEFAZOLIN SODIUM 1 G/3ML
INJECTION, POWDER, FOR SOLUTION INTRAMUSCULAR; INTRAVENOUS PRN
Status: DISCONTINUED | OUTPATIENT
Start: 2020-09-11 | End: 2020-09-11 | Stop reason: SURG

## 2020-09-11 RX ADMIN — DOCUSATE SODIUM 50 MG AND SENNOSIDES 8.6 MG 2 TABLET: 8.6; 5 TABLET, FILM COATED ORAL at 04:54

## 2020-09-11 RX ADMIN — ENOXAPARIN SODIUM 40 MG: 40 INJECTION SUBCUTANEOUS at 04:53

## 2020-09-11 RX ADMIN — FAMOTIDINE 20 MG: 20 TABLET, FILM COATED ORAL at 04:54

## 2020-09-11 RX ADMIN — KETAMINE HYDROCHLORIDE 3 MG/KG/HR: 50 INJECTION, SOLUTION INTRAMUSCULAR; INTRAVENOUS at 20:03

## 2020-09-11 RX ADMIN — FAMOTIDINE 20 MG: 20 TABLET, FILM COATED ORAL at 17:21

## 2020-09-11 RX ADMIN — KETAMINE HYDROCHLORIDE 1.5 MG/KG/HR: 50 INJECTION, SOLUTION INTRAMUSCULAR; INTRAVENOUS at 09:59

## 2020-09-11 RX ADMIN — CEFAZOLIN 2 G: 330 INJECTION, POWDER, FOR SOLUTION INTRAMUSCULAR; INTRAVENOUS at 10:05

## 2020-09-11 RX ADMIN — SODIUM CHLORIDE, POTASSIUM CHLORIDE, SODIUM LACTATE AND CALCIUM CHLORIDE: 600; 310; 30; 20 INJECTION, SOLUTION INTRAVENOUS at 09:59

## 2020-09-11 RX ADMIN — ROCURONIUM BROMIDE 50 MG: 10 INJECTION, SOLUTION INTRAVENOUS at 10:37

## 2020-09-11 RX ADMIN — FENTANYL CITRATE 100 MCG: 50 INJECTION INTRAMUSCULAR; INTRAVENOUS at 00:44

## 2020-09-11 RX ADMIN — FENTANYL CITRATE 100 MCG: 50 INJECTION INTRAMUSCULAR; INTRAVENOUS at 19:28

## 2020-09-11 RX ADMIN — KETAMINE HYDROCHLORIDE 1.5 MG/KG/HR: 50 INJECTION, SOLUTION INTRAMUSCULAR; INTRAVENOUS at 05:47

## 2020-09-11 RX ADMIN — FENTANYL CITRATE 100 MCG: 50 INJECTION INTRAMUSCULAR; INTRAVENOUS at 08:33

## 2020-09-11 RX ADMIN — KETAMINE HYDROCHLORIDE 3 MG/KG/HR: 50 INJECTION, SOLUTION INTRAMUSCULAR; INTRAVENOUS at 14:43

## 2020-09-11 RX ADMIN — SULFAMETHOXAZOLE AND TRIMETHOPRIM 1 TABLET: 800; 160 TABLET ORAL at 04:55

## 2020-09-11 RX ADMIN — VALPROATE SODIUM 1000 MG: 100 INJECTION, SOLUTION INTRAVENOUS at 04:53

## 2020-09-11 RX ADMIN — SULFAMETHOXAZOLE AND TRIMETHOPRIM 1 TABLET: 800; 160 TABLET ORAL at 18:00

## 2020-09-11 RX ADMIN — VALPROATE SODIUM 1000 MG: 100 INJECTION, SOLUTION INTRAVENOUS at 21:10

## 2020-09-11 RX ADMIN — LEVETIRACETAM 1500 MG: 500 TABLET ORAL at 17:21

## 2020-09-11 RX ADMIN — ROCURONIUM BROMIDE 25 MG: 10 INJECTION, SOLUTION INTRAVENOUS at 10:13

## 2020-09-11 RX ADMIN — LEVETIRACETAM INJECTION 1500 MG: 15 INJECTION INTRAVENOUS at 04:56

## 2020-09-11 RX ADMIN — PERAMPANEL 10 MG: 2 TABLET ORAL at 21:10

## 2020-09-11 RX ADMIN — LACOSAMIDE 150 MG: 100 TABLET, FILM COATED ORAL at 17:22

## 2020-09-11 RX ADMIN — ROCURONIUM BROMIDE 25 MG: 10 INJECTION, SOLUTION INTRAVENOUS at 10:01

## 2020-09-11 RX ADMIN — LACOSAMIDE 150 MG: 100 TABLET, FILM COATED ORAL at 13:53

## 2020-09-11 RX ADMIN — VALPROATE SODIUM 1000 MG: 100 INJECTION, SOLUTION INTRAVENOUS at 13:53

## 2020-09-11 NOTE — OR NURSING
Pt to pre-op with ICU RN. Dr. Ng and Dr. Blair at bedside. ID band verified; pt taken straight back to OR with OR team.

## 2020-09-11 NOTE — PROGRESS NOTES
Pt back from OR, 2 RN's at bedside and RT to receive pt.    2 RN Skin Assessment Completed by Reina RN and Quincy RN.  Text in Pink are new findings  Head: redness  Ears: redness and blanching  Nose: WDL  Mouth: redness  Neck: Redness and blanching  Breasts/Chest: redness and blanching  Shoulder Blades: WDL  Spine: WDL  (R) Arm/Elbow/hand: redness and blanching  (L) Arm/Elbow/hand: redness and blanching  Abdomen:redness and blanching Belly button Incision from OR procedure open to air, Peg Tube LUQ with dressing over  Groin: redness and blanching  Sacrum/Coccyx/Buttocks: WDL  (R) Leg: redness and blanching Square marking from a pad or dressing  (L) Leg: WDL  (R) Heel/Foot/Toe: WDL  (L) Heel/Foot/Toe: WDL          Devices in place: BP Cuff, Pulse Ox, Pearce Temperature, SCD's and EEG    Interventions in place: InterDry, Heel Mepilex, Waffle overlay, Pillows, Q2 turns and Low air loss mattress     Possible skin injury found: No    Pictures uploaded into Epic: No, needs to be completed  Wound Consult Placed: N/A

## 2020-09-11 NOTE — ANESTHESIA QCDR
2019 [facility] Clinical Data Registry (for Quality Improvement)     Postoperative nausea/vomiting risk protocol (Adult = 18 yrs and Pediatric 3-17 yrs)- (430 and 463)  General inhalation anesthetic (NOT TIVA) with PONV risk factors: Yes  Provision of anti-emetic therapy with at least 2 different classes of agents: No   Patient DID NOT receive anti-emetic therapy and reason is documented in Medical Record: Yes       Multimodal Pain Management- (477)  Non-emergent surgery AND patient age >= 18: No  Use of Multimodal Pain Management, two or more drugs and/or interventions, NOT including systemic opioids:   Exception: Documented allergy to multiple classes of analgesics:     Smoking Abstinence (404)  Patient is current smoker (cigarette, pipe, e-cig, marijuanna): No  Elective Surgery:   Abstinence instructions provided prior to day of surgery:   Patient abstained from smoking on day of surgery:     Pre-Op Beta-Blocker in Isolated CABG (44)  Isolated CABG AND patient age >= 18: No  Beta-blocker admin within 24 hours of surgical incision:   Exception:of medical reason(s) for not administering beta blocker within 24 hours prior to surgical incision (e.g., not  indicated,other medical reason):     PACU assessment of acute postoperative pain prior to Anesthesia Care End- Applies to Patients Age = 18- (ABG7)  Initial PACU pain score is which of the following:   Patient unable to report pain score: N/A    Post-anesthetic transfer of care checklist/protocol to PACU/ICU- (426 and 427)  Upon conclusion of case, patient transferred to which of the following locations: ICU  Use of transfer checklist/protocol: Yes  Exclusion: Service Performed in Patient Hospital Room (and thus did not require transfer): N/A  Unplanned admission to ICU related to anesthesia service up through end of PACU care- (MD51)  Unplanned admission to ICU (not initially anticipated at anesthesia start time): No

## 2020-09-11 NOTE — ANESTHESIA TIME REPORT
Anesthesia Start and Stop Event Times     Date Time Event    9/11/2020 0955 Ready for Procedure     0959 Anesthesia Start     1054 Anesthesia Stop        Responsible Staff  09/11/20    Name Role Begin End    Renetta Blair M.D. Anesth 0959 1054        Preop Diagnosis (Free Text):  Pre-op Diagnosis     anoxic brain injury         Preop Diagnosis (Codes):    Post op Diagnosis  Anoxic brain injury (HCC)      Premium Reason  Non-Premium    Comments:

## 2020-09-11 NOTE — PROGRESS NOTES
"Critical Care Progress Note    Date of admission  8/23/2020    Chief Complaint  19 y.o. female admitted 8/23/2020 with drug overdose, cardiac arrest    Hospital Course    \"19 y.o. female who presented 8/23/2020 with drug overdose with methamphetamines, oxycodone and found by boyfriend.  Cardiac arrest enroute to hospital and received narcan, epinephrine and cpr and ROSC achieved.  Difficult airway at scene and LMA placed. ET tube placed in ER.  She was vomiting during airway attempt.  On propofol due to vent asynchrony.  Propofol turned off for neuro assessment and possible hypothermic protocol.  Family no longer at hospital and unable to reach per phone number given for mother.\"    8/24 - TTM protocol initiated, EEG without NCSE  8/25 - rewarmed, awake, not following  8/26 - following occasional commands  8/27- seizure like activity vs shivering--> Keppra, versed, Propofol  8/28- no seizures on EEG  8/29- remains unresponsive.  8/30 -mental status improved, now opening eyes and following commands.  Keppra discontinued  8/31 - started cefepime/vancomycin for possible pneumonia,?  UTI, fever and increasing WBC fever; full vent  9/1 - Seizure activity noted on EEG today, reviewed with neurology; keppra load and increased to 1gm q 12; full vent support  9/2 - remains unresponsive with only some posture and grimace to stimulation, neurology reconsulted, stat MRI brain, LP, cEEG  9/3 -ongoing seizures, Vimpat added, Depakote added, family conference today  9/4 -ongoing seizure noted on cEEG, started on midazolam infusion, full ventilator support  9/5 -ongoing seizures noted, propofol added and increased today, full ventilator support  9/6 -have not yet achieved burst suppression on cEEG; continue propofol, change Versed to ketamine infusion, discussed with neurology, full ventilator support  9/7 - still titrating propofol and ketamine for burst suppression on cEEG. Continue full ventilator support  9/8 - propofol off; " ketamine is being weaned; on vimpat, keppra, depakote. Requiring full ventilator support  9/9 - Requiring full ventilator support. propofol and ketamine off; on vimpat, keppra, depakote. Postures to stimuli - consult for trach/PEG  9/10 -status post tracheostomy, off propofol and on ketamine- beginning to wean antiepileptic medications, continues to require full ventilator support. PEG tomorrow. Reportedly is still having seizures.     9/11 PEG tube today, continues to require full mechanical ventilator support          Interval Problem Update  Reviewed last 24 hour events:  Intubated, on ketamine - postures to noxious stimuli, opens eyes spontaneously  UOP good  PPx lovenox + pepcid  Bactrim x 14 days for MRSA PNA  Vimpat, valproate, perampanel, keppra; off propofol    Review of Systems  Review of Systems   Unable to perform ROS: Intubated        Vital Signs for last 24 hours   Pulse:  [] 115  Resp:  [19-27] 26  BP: (104-134)/(45-99) 132/99  SpO2:  [99 %-100 %] 100 %    Hemodynamic parameters for last 24 hours       Respiratory Information for the last 24 hours  Vent Mode: APVCMV  Rate (breaths/min): 26  Vt Target (mL): 320  PEEP/CPAP: 8  MAP: 13  Control VTE (exp VT): 319    Physical Exam   Physical Exam  Vitals signs and nursing note reviewed.   Constitutional:       General: She is not in acute distress.     Appearance: She is not ill-appearing or toxic-appearing.      Interventions: She is intubated.   HENT:      Head: Normocephalic and atraumatic.      Right Ear: External ear normal.      Left Ear: External ear normal.      Nose: No congestion or rhinorrhea.      Comments: Feeding tube in place     Mouth/Throat:      Mouth: Mucous membranes are moist.      Pharynx: No oropharyngeal exudate or posterior oropharyngeal erythema.      Comments: ET tube in place  Eyes:      General: No scleral icterus.     Conjunctiva/sclera: Conjunctivae normal.      Pupils: Pupils are equal, round, and reactive to light.       Comments: Sluggish pupillary response   Neck:      Musculoskeletal: Neck supple. No neck rigidity or muscular tenderness.   Cardiovascular:      Rate and Rhythm: Normal rate and regular rhythm.      Pulses: Normal pulses.      Heart sounds: Normal heart sounds. No murmur.   Pulmonary:      Effort: She is intubated.      Breath sounds: No wheezing or rales.      Comments: Full ventilator support, low support settings  Abdominal:      General: Abdomen is flat. There is no distension.      Palpations: Abdomen is soft. There is no mass.      Tenderness: There is no abdominal tenderness.   Musculoskeletal:         General: No swelling or tenderness.      Right lower leg: No edema.      Left lower leg: No edema.   Skin:     General: Skin is warm and dry.      Capillary Refill: Capillary refill takes less than 2 seconds.      Findings: No erythema or rash.   Neurological:      GCS: GCS eye subscore is 3. GCS verbal subscore is 1. GCS motor subscore is 5.      Cranial Nerves: No facial asymmetry.      Motor: Abnormal muscle tone present.      Comments: Unresponsive, moves all extremities, some posturing with stimulation, positive cough and gag, on continuous EEG   Psychiatric:      Comments: Unable to assess         Medications  Current Facility-Administered Medications   Medication Dose Route Frequency Provider Last Rate Last Dose   • valproate (DEPACON) 1,000 mg in  mL IVPB  1,000 mg Intravenous Q8HRS Andrew Petersen M.D.   Stopped at 09/11/20 1453   • ketamine 1,000 mg in  mL infusion (critical care only)  3 mg/kg/hr Intravenous Continuous Andrew Petersen M.D. 100.2 mL/hr at 09/11/20 1443 3 mg/kg/hr at 09/11/20 1443   • levETIRAcetam (KEPPRA) tablet 1,500 mg  1,500 mg Enteral Tube BID Andrew Petersen M.D.       • lacosamide (VIMPAT) tablet 150 mg  150 mg Enteral Tube TID Andrew Petersen M.D.   150 mg at 09/11/20 1353   • perampanel (FYCOMPA) tablet 10 mg  10 mg Enteral Tube QHS  Nithin Donohue M.D.   10 mg at 09/10/20 2215   • fentaNYL (SUBLIMAZE) injection 100 mcg  100 mcg Intravenous Q15 MIN PRN Boogie Augustine Jr., D.O.   100 mcg at 09/11/20 0833    And   • fentaNYL (SUBLIMAZE) injection 200 mcg  200 mcg Intravenous Q15 MIN PRN Boogie Augustine Jr., D.O.   200 mcg at 09/10/20 1748    And   • fentaNYL (SUBLIMAZE) 50 mcg/mL in 50mL (Continuous Infusion)   Intravenous Continuous JESSY Piper Jr..LILIANE   Stopped at 09/07/20 2230    And   • propofol (DIPRIVAN) injection  0-80 mcg/kg/min Intravenous Continuous Boogie Augustine Jr., D.O.   Stopped at 09/07/20 2230   • sulfamethoxazole-trimethoprim (BACTRIM DS) 800-160 MG tablet 1 Tab  1 Tab Enteral Tube Q12HRS Dar Red M.D.   1 Tab at 09/11/20 0455   • polyethylene glycol/lytes (MIRALAX) PACKET 1 Packet  1 Packet Enteral Tube BID Dar Red M.D.   Stopped at 09/08/20 0600   • acetaminophen (TYLENOL) suppository 325 mg  325 mg Rectal Q6HRS PRN Yosef Tomlin M.D.   650 mg at 09/02/20 1632   • acetaminophen (TYLENOL) tablet 1,000 mg  1,000 mg Enteral Tube Q4HRS PRN Dar Red M.D.   1,000 mg at 09/04/20 1800   • labetalol (NORMODYNE/TRANDATE) injection 10 mg  10 mg Intravenous Q4HRS PRN Yosef oTmlin M.D.   10 mg at 08/30/20 2256   • Pharmacy Consult: Enteral tube insertion - review meds/change route/product selection  1 Each Other PHARMACY TO DOSE Boogie Augustine Jr., D.O.       • Respiratory Therapy Consult   Nebulization Continuous RT Brock Murdock M.D.       • ipratropium-albuterol (DUONEB) nebulizer solution  3 mL Nebulization Q2HRS PRN (RT) Brock Murdock M.D.       • famotidine (PEPCID) tablet 20 mg  20 mg Enteral Tube Q12HRS Dar Red M.D.   20 mg at 09/11/20 0454   • senna-docusate (PERICOLACE or SENOKOT S) 8.6-50 MG per tablet 2 Tab  2 Tab Enteral Tube BID Brock Murdock M.D.   2 Tab at 09/11/20 0454    And   • polyethylene glycol/lytes (MIRALAX) PACKET 1 Packet  1 Packet Enteral Tube QDAY PRN Brock WARNER  BAILEE Murdock        And   • magnesium hydroxide (MILK OF MAGNESIA) suspension 30 mL  30 mL Enteral Tube QDAY PRN Brock Murdock M.D.        And   • bisacodyl (DULCOLAX) suppository 10 mg  10 mg Rectal QDAY PRN Brock Murdock M.D.       • MD Alert...ICU Electrolyte Replacement per Pharmacy   Other PHARMACY TO DOSE Brock Murdock M.D.       • lidocaine (XYLOCAINE) 1 % injection 1-2 mL  1-2 mL Tracheal Tube Q30 MIN PRN Brock Murdock M.D.       • enoxaparin (LOVENOX) inj 40 mg  40 mg Subcutaneous DAILY Boogie Augustine Jr., D.O.   40 mg at 09/11/20 0453       Fluids    Intake/Output Summary (Last 24 hours) at 9/11/2020 1716  Last data filed at 9/11/2020 1600  Gross per 24 hour   Intake 2633.93 ml   Output 1575 ml   Net 1058.93 ml       Laboratory          Recent Labs     09/09/20  0530 09/10/20  0450 09/11/20  0318   SODIUM 138 138 136   POTASSIUM 4.3 4.3 4.3   CHLORIDE 104 102 100   CO2 21 22 23   BUN 25* 23* 17   CREATININE 0.46* 0.51 0.41*   CALCIUM 9.3 8.8 9.1     Recent Labs     09/09/20  0530 09/10/20  0450 09/11/20  0318   ALTSGPT 27 22 25   ASTSGOT 25 26 25   ALKPHOSPHAT 77 76 83   TBILIRUBIN 0.2 <0.2 0.2   GLUCOSE 129* 117* 101*     Recent Labs     09/09/20  0530 09/10/20  0450 09/11/20  0318   WBC 10.3 13.4* 13.0*   NEUTSPOLYS 71.60 70.40 69.90   LYMPHOCYTES 14.90* 13.10* 12.90*   MONOCYTES 9.20 10.80 11.40   EOSINOPHILS 2.30 3.70 4.00   BASOPHILS 0.50 0.40 0.30   ASTSGOT 25 26 25   ALTSGPT 27 22 25   ALKPHOSPHAT 77 76 83   TBILIRUBIN 0.2 <0.2 0.2     Recent Labs     09/09/20  0530 09/10/20  0450 09/11/20  0318   RBC 3.78* 3.62* 3.81*   HEMOGLOBIN 11.6* 11.1* 11.8*   HEMATOCRIT 35.5* 34.8* 36.7*   PLATELETCT 636* 621* 652*       Imaging  X-Ray:  I have personally reviewed the images and compared with prior images.     MRI 8/27:  The diffusion-weighted sequences demonstrates areas of restricted diffusion in the bilateral basal ganglia and some of the frontal gray matter. The predominant portion of the brain  parenchyma does not demonstrate any restricted diffusion. Therefore this   findings likely represent mild diffuse anoxic brain injury.    MRI 9/2:  1.  Severe diffuse anoxic brain injury pattern which is more extensive and conspicuous than on previous exam.  2.  No evidence of intracranial mass effect, extra-axial fluid collection, or interval development of hydrocephalus.  3.  Diffuse mucosal inflammatory changes filling the ethmoid and sphenoid sinuses.    Assessment/Plan  PEA (Pulseless electrical activity) (HCC)  PEA enroute to hospital  Hypoxia and aspiration contributory  MRI reveals bilateral basal ganglial anoxic injury.  Status post therapeutic hypothermia and rewarmed    Acute respiratory failure with hypoxia (HCC)  Secondary to drug overdose  Lung protective ventilation strategies  Optimize oxygenation, ventilation, and acid base balance.  9/9 c/s for tracheostomy and PEG    Drug overdose  Methamphetamines, oxycodone and EtOH per boyfriend    Cerebral edema (HCC)  Mild/possible per radiologist on CT  MRI on 8/27/2020 without edema or severe changes  Repeat MRI 9/2 - radiographic evidence of anoxic brain injury more apparent then prior MRI    Elevated transaminase level  Follow    Encephalopathy acute  Anoxic encephalopathy, was improving  Currently cEEG for status, neurology following    Hypoglycemia  Follow    Pneumonia of both lungs due to methicillin resistant Staphylococcus aureus (MRSA) (HCC)  BAL 8/31 - > 100K CFU MRSA  Acinetobacter, stenotrophomonas, MRSA noted on prior BAL-as she is hemodynamically stable, afebrile, with a stable white count we will continue treating for the MRSA only.  Bactrim x 14 days        Dysphagia, oropharyngeal  Secondary to anoxic brain injury and seizures  Lap gastrostomy planned for 9/11      Secondary to anoxic brain injury and seizures  Lap gastrostomy 9/11      Updated plan:  Continue full ventilator support  Continuous EEG s/p seizure suppression now titrating down  AEDs and evaluating response      VTE:  Lovenox  Ulcer: H2 Antagonist  Lines: Central Line  Ongoing indication addressed    I have performed a physical exam and reviewed and updated ROS and Plan today  In review of yesterday's note (9/10/2020), there are no changes except as documented above.        Discussed patient condition and risk of morbidity and/or mortality with RN, RT, Pharmacy, Dietary, , Code status disscussed, Charge nurse / hot rounds and neurology     The patient remains critically ill.  I have assessed and reassessed the patient multiple times.  She is at high risk for further vital organ deterioration with significant ongoing critical care management as above.  Critical care time = 33 minutes in directly providing and coordinating critical care and extensive data review.  No time overlap and excludes procedures.

## 2020-09-11 NOTE — PROCEDURES
VIDEO ELECTROENCEPHALOGRAM REPORT        Referring provider: Dr. Red.      DOS: 9/11/2020 (total recording of 23 hours and 20 minutes).      INDICATION:  Annika He 19 y.o. female presenting with s/p cardiac arrest, altered mental status, seizures.      CURRENT ANTIEPILEPTIC REGIMEN: Levetiracetam 1500 mg bid, Lacosamide increased to 150 mg tid, Valproic Acid increased to 1000 mg tid, Perampanel at 10 mg qhs. Sedation with Ketamine.      TECHNIQUE: 30 channel video electroencephalogram (EEG) was performed in accordance with the international 10-20 system. The study was reviewed in bipolar and referential montages. The recording examined a sedated and/or comatose patient.      DESCRIPTION OF THE RECORD:  Generalized delta / theta activity, with elements of slow wave sleep elements noted, including sleep spindles. Continuous, blunted generalized sharps with frontal maximum and triphasic morphology, with frequent runs of Frontal Intermittent Rhythmic Delta Activity (FIRDA). Several seizures noted, with some additional improvement of the eeg after further adjustments of sedative and AEDs, with most seizures appearing as brief and less often, however seizures are still noted on EEG, with 5 seizures noted overnight. The patient is not in status epilepticus. Clinically, some seizures were associated with subtle tonic posturing of the upper extremities and chewing or oral movements.      ACTIVATION PROCEDURES:   Not performed.      EKG: sampling of the EKG recording demonstrated sinus rhythm.      EVENTS:  None.      INTERPRETATION:  This is an abnormal 24 hrs video EEG recording in a comatose patient. A mild to moderate encephalopathy is suggested. Continuous, blunted generalized sharps with frontal maximum and triphasic morphology, with frequent runs of Frontal Intermittent Rhythmic Delta Activity (FIRDA). Several seizures still noted during this eeg, with some additional improvement of the eeg after  further adjustments of sedative and AEDs, with most seizures appearing as brief and less often, however seizures are still noted on EEG, with 5 seizures noted overnight alone. The patient is not in status epilepticus. Clinically, some seizures were associated with subtle tonic posturing of the upper extremities and chewing or oral movements. The patient is not on burst suppression. The findings suggest underlying areas of persinstently increased cortical irritability and/or structural abnormality. Clinical and radiological correlation is recommended.     Updates provided to Dr. Agapito Beatty MD   Epilepsy and Neurodiagnostics.   Clinical  of Neurology [facility].   Diplomate in Neurology, Epilepsy, and Electrodiagnostic Medicine.   Office: [phone]  Fax: [phone]

## 2020-09-11 NOTE — ANESTHESIA POSTPROCEDURE EVALUATION
Patient: Annika Whitley Ying    Procedure Summary     Date: 09/11/20 Room / Location: [place] [address] / SURGERY [facility]    Anesthesia Start: 0959 Anesthesia Stop: 1054    Procedure: CREATION, GASTROSTOMY, LAPAROSCOPIC (Left Abdomen) Diagnosis: (anoxic brain injury )    Surgeon: Maximilaino Ng D.O. Responsible Provider: Renetta Blair M.D.    Anesthesia Type: general ASA Status: 4          Final Anesthesia Type: general  Last vitals  BP   Blood Pressure: 115/71    Temp   37 °C (98.6 °F)    Pulse   Pulse: (!) 112   Resp   20    SpO2   100 %      Anesthesia Post Evaluation    Patient location during evaluation: ICU  Patient participation: complete - patient cannot participate  Level of consciousness: obtunded/minimal responses  Pain scale: N/A.    Airway patency: patent  Anesthetic complications: no  Cardiovascular status: hemodynamically stable  Respiratory status: acceptable  Hydration status: euvolemic    PONV: none (N/A)  patient was unable to participate

## 2020-09-11 NOTE — PROGRESS NOTES
Pt is on ketamine for Burst Suppression. Confirmed with Dr. Augustine that we are not sedation vacationing and not doing SBT's.

## 2020-09-11 NOTE — ANESTHESIA PREPROCEDURE EVALUATION
19yoF with drug overdose, resp arrest, PEA arrest en route to hospital; developed anoxic brain injury, now with seizures and persistent resp failure, trach in place. Here for lap G-tube.    On ketamine for sedation; low resp settings    Relevant Problems   PULMONARY   (+) Pneumonia of both lungs due to methicillin resistant Staphylococcus aureus (MRSA) (HCC)      CARDIAC   (+) Cardiac arrest (HCC)   (+) PEA (Pulseless electrical activity) (HCC)      Other   (+) Acute respiratory failure with hypoxia (HCC)   (+) Cerebral edema (HCC)   (+) Drug overdose       Physical Exam    Airway - unable to assess  Patient is intubated/trached     Cardiovascular - normal exam  Rhythm: regular  Rate: normal  (-) murmur     Dental     Unable to assess dental       Pulmonary - normal exam  Breath sounds clear to auscultation     Abdominal    Neurological - sedated/unconcious                 Anesthesia Plan    ASA 4   ASA physical status 4 criteria: respiratory failure and MI - recent (< 3 months)    Plan - general       Airway plan will be ETT        Induction: intravenous    Postoperative Plan: Postoperative administration of opioids is intended.    Pertinent diagnostic labs and testing reviewed    Informed Consent:    Anesthetic plan and risks discussed with healthcare power of .    Use of blood products discussed with: healthcare power of  whom consented to blood products.

## 2020-09-11 NOTE — OR SURGEON
Operative report    PreOp Diagnosis: Oral pharyngeal dysphagia    PostOp Diagnosis: Same    Procedure(s):  CREATION, GASTROSTOMY, LAPAROSCOPIC - Wound Class: Clean Contaminated    Surgeon(s):  Maximiliano Ng D.O.    Anesthesiologist/Type of Anesthesia:  Anesthesiologist: Renetta Blair M.D./General    Surgical Staff:  Circulator: Felix Patino R.N.  Relief Circulator: Nina Prakash R.N.  Scrub Person: Carol Tracy R.N.    Specimens removed if any:  * No specimens in log *    Estimated Blood Loss: Minimal    Findings: 16 [place] gastrostomy tube in appropriate position without signs of air leak or gastric perforation    Complications: None noted    Indication: 19-year-old female with anoxic brain injury and resultant inability to take oral nutrition requiring durable enteral access for transition to long-term acute care.    Operation the patient was taken to the operating room and placed in the supine position on the operating table. He was placed under general anesthesia. The left side of the abdomen was shaved prepped and draped in the standard fashion. Quarter percent Marcaine was used for local anesthetic just below the umbilicus. A small incision was made and a 5 mm Visiport was used to into the abdomen. The abdomen was insufflated. The stomach was insufflated by the anesthesiologist became visible. An area in the left upper quadrant was chosen for gastrostomy tube site. The posterior T. fasteners were then placed through the collar of the tube and into the gastric wall. These were used to retract it up peritoneum. We then placed the insertion needle into the stomach and placed a guidewire. A 1 cm transverse incision was made at the guidewire site and the dilators were placed. The inner cannula was removed and the jejunostomy tube was then placed through the introducer sheath. The sheath was peeled away. The anterior fasteners were then placed through the collar of the tube and into the  gastric wall. The balloon was inflated and pulled up to the gastric wall as well. The T-fasteners were then secured in appropriate tension to hold the gastric wall up to the abdominal wall.  The tube was flushed and gastric contents returned. We then deflated the abdomen removed the port. The port site was closed using a 4-0 Monocryl stitch in a subcuticular fashion. The abdomen was cleaned and dried dry sterile dressings were applied. The patient was returned to [facility] in stable condition. The sponge, needle and a short count were correct at the end of the case An abdominal binder was ordered.            9/11/2020 10:43 AM Maximiliano Ng D.O.

## 2020-09-11 NOTE — CARE PLAN
Problem: Ventilation Defect:  Goal: Ability to achieve and maintain unassisted ventilation or tolerate decreased levels of ventilator support  Outcome: PROGRESSING SLOWER THAN EXPECTED      Ventilator Daily Summary    Vent Day # 20  Trache Day #3    Weaning trials: N/A pt on cont. EEG      Plan: Continue current ventilator settings and wean mechanical ventilation as tolerated per physician orders.

## 2020-09-12 ENCOUNTER — APPOINTMENT (OUTPATIENT)
Dept: RADIOLOGY | Facility: MEDICAL CENTER | Age: 20
DRG: 004 | End: 2020-09-12
Attending: INTERNAL MEDICINE
Payer: MEDICAID

## 2020-09-12 PROBLEM — R56.9 SEIZURES (HCC): Status: ACTIVE | Noted: 2020-09-12

## 2020-09-12 LAB
ALBUMIN SERPL BCP-MCNC: 3.3 G/DL (ref 3.2–4.9)
ALBUMIN/GLOB SERPL: 1.1 G/DL
ALP SERPL-CCNC: 72 U/L (ref 30–99)
ALT SERPL-CCNC: 19 U/L (ref 2–50)
ANION GAP SERPL CALC-SCNC: 11 MMOL/L (ref 7–16)
AST SERPL-CCNC: 18 U/L (ref 12–45)
BASOPHILS # BLD AUTO: 0.4 % (ref 0–1.8)
BASOPHILS # BLD: 0.04 K/UL (ref 0–0.12)
BILIRUB SERPL-MCNC: 0.2 MG/DL (ref 0.1–1.5)
BUN SERPL-MCNC: 16 MG/DL (ref 8–22)
CALCIUM SERPL-MCNC: 8.6 MG/DL (ref 8.5–10.5)
CHLORIDE SERPL-SCNC: 104 MMOL/L (ref 96–112)
CO2 SERPL-SCNC: 23 MMOL/L (ref 20–33)
CREAT SERPL-MCNC: 0.42 MG/DL (ref 0.5–1.4)
EOSINOPHIL # BLD AUTO: 0.47 K/UL (ref 0–0.51)
EOSINOPHIL NFR BLD: 5.1 % (ref 0–6.9)
ERYTHROCYTE [DISTWIDTH] IN BLOOD BY AUTOMATED COUNT: 47.4 FL (ref 35.9–50)
GLOBULIN SER CALC-MCNC: 2.9 G/DL (ref 1.9–3.5)
GLUCOSE SERPL-MCNC: 116 MG/DL (ref 65–99)
HCT VFR BLD AUTO: 32.8 % (ref 37–47)
HGB BLD-MCNC: 10.3 G/DL (ref 12–16)
IMM GRANULOCYTES # BLD AUTO: 0.2 K/UL (ref 0–0.11)
IMM GRANULOCYTES NFR BLD AUTO: 2.2 % (ref 0–0.9)
LYMPHOCYTES # BLD AUTO: 1.68 K/UL (ref 1–4.8)
LYMPHOCYTES NFR BLD: 18.3 % (ref 22–41)
MCH RBC QN AUTO: 30.4 PG (ref 27–33)
MCHC RBC AUTO-ENTMCNC: 31.4 G/DL (ref 33.6–35)
MCV RBC AUTO: 96.8 FL (ref 81.4–97.8)
MONOCYTES # BLD AUTO: 1.15 K/UL (ref 0–0.85)
MONOCYTES NFR BLD AUTO: 12.6 % (ref 0–13.4)
NEUTROPHILS # BLD AUTO: 5.62 K/UL (ref 2–7.15)
NEUTROPHILS NFR BLD: 61.4 % (ref 44–72)
NRBC # BLD AUTO: 0 K/UL
NRBC BLD-RTO: 0 /100 WBC
PLATELET # BLD AUTO: 586 K/UL (ref 164–446)
PMV BLD AUTO: 9.3 FL (ref 9–12.9)
POTASSIUM SERPL-SCNC: 4.4 MMOL/L (ref 3.6–5.5)
PROT SERPL-MCNC: 6.2 G/DL (ref 6–8.2)
RBC # BLD AUTO: 3.39 M/UL (ref 4.2–5.4)
SODIUM SERPL-SCNC: 138 MMOL/L (ref 135–145)
VALPROATE SERPL-MCNC: 46.4 UG/ML (ref 50–100)
WBC # BLD AUTO: 9.2 K/UL (ref 4.8–10.8)

## 2020-09-12 PROCEDURE — 770022 HCHG ROOM/CARE - ICU (200)

## 2020-09-12 PROCEDURE — 700111 HCHG RX REV CODE 636 W/ 250 OVERRIDE (IP): Performed by: PSYCHIATRY & NEUROLOGY

## 2020-09-12 PROCEDURE — 700102 HCHG RX REV CODE 250 W/ 637 OVERRIDE(OP): Performed by: INTERNAL MEDICINE

## 2020-09-12 PROCEDURE — A9270 NON-COVERED ITEM OR SERVICE: HCPCS | Performed by: INTERNAL MEDICINE

## 2020-09-12 PROCEDURE — 94003 VENT MGMT INPAT SUBQ DAY: CPT

## 2020-09-12 PROCEDURE — 700102 HCHG RX REV CODE 250 W/ 637 OVERRIDE(OP): Performed by: PSYCHIATRY & NEUROLOGY

## 2020-09-12 PROCEDURE — 700101 HCHG RX REV CODE 250: Performed by: INTERNAL MEDICINE

## 2020-09-12 PROCEDURE — A9270 NON-COVERED ITEM OR SERVICE: HCPCS | Performed by: PSYCHIATRY & NEUROLOGY

## 2020-09-12 PROCEDURE — 80164 ASSAY DIPROPYLACETIC ACD TOT: CPT

## 2020-09-12 PROCEDURE — 95720 EEG PHY/QHP EA INCR W/VEEG: CPT | Performed by: PSYCHIATRY & NEUROLOGY

## 2020-09-12 PROCEDURE — 71045 X-RAY EXAM CHEST 1 VIEW: CPT

## 2020-09-12 PROCEDURE — 700105 HCHG RX REV CODE 258: Performed by: PSYCHIATRY & NEUROLOGY

## 2020-09-12 PROCEDURE — 700111 HCHG RX REV CODE 636 W/ 250 OVERRIDE (IP): Performed by: INTERNAL MEDICINE

## 2020-09-12 PROCEDURE — 99233 SBSQ HOSP IP/OBS HIGH 50: CPT | Performed by: PSYCHIATRY & NEUROLOGY

## 2020-09-12 PROCEDURE — 85025 COMPLETE CBC W/AUTO DIFF WBC: CPT

## 2020-09-12 PROCEDURE — 94770 HCHG CO2 EXPIRED GAS DETERMINATION: CPT

## 2020-09-12 PROCEDURE — 99291 CRITICAL CARE FIRST HOUR: CPT | Performed by: INTERNAL MEDICINE

## 2020-09-12 PROCEDURE — 95714 VEEG EA 12-26 HR UNMNTR: CPT | Performed by: PSYCHIATRY & NEUROLOGY

## 2020-09-12 PROCEDURE — 700101 HCHG RX REV CODE 250: Performed by: PSYCHIATRY & NEUROLOGY

## 2020-09-12 PROCEDURE — 4A10X4Z MONITORING OF CENTRAL NERVOUS ELECTRICAL ACTIVITY, EXTERNAL APPROACH: ICD-10-PCS | Performed by: PSYCHIATRY & NEUROLOGY

## 2020-09-12 PROCEDURE — 80053 COMPREHEN METABOLIC PANEL: CPT

## 2020-09-12 RX ORDER — LACOSAMIDE 100 MG/1
200 TABLET ORAL EVERY 8 HOURS
Status: DISCONTINUED | OUTPATIENT
Start: 2020-09-12 | End: 2020-09-26

## 2020-09-12 RX ORDER — DIVALPROEX SODIUM 125 MG/1
250 CAPSULE, COATED PELLETS ORAL ONCE
Status: COMPLETED | OUTPATIENT
Start: 2020-09-12 | End: 2020-09-12

## 2020-09-12 RX ADMIN — VALPROATE SODIUM 1250 MG: 100 INJECTION, SOLUTION INTRAVENOUS at 21:32

## 2020-09-12 RX ADMIN — POLYETHYLENE GLYCOL 3350 1 PACKET: 17 POWDER, FOR SOLUTION ORAL at 18:27

## 2020-09-12 RX ADMIN — DIVALPROEX SODIUM 250 MG: 125 CAPSULE ORAL at 16:25

## 2020-09-12 RX ADMIN — LACOSAMIDE 150 MG: 100 TABLET, FILM COATED ORAL at 12:11

## 2020-09-12 RX ADMIN — FAMOTIDINE 20 MG: 20 TABLET, FILM COATED ORAL at 18:27

## 2020-09-12 RX ADMIN — PERAMPANEL 10 MG: 2 TABLET ORAL at 23:03

## 2020-09-12 RX ADMIN — FAMOTIDINE 20 MG: 20 TABLET, FILM COATED ORAL at 05:49

## 2020-09-12 RX ADMIN — SULFAMETHOXAZOLE AND TRIMETHOPRIM 1 TABLET: 800; 160 TABLET ORAL at 18:27

## 2020-09-12 RX ADMIN — SULFAMETHOXAZOLE AND TRIMETHOPRIM 1 TABLET: 800; 160 TABLET ORAL at 05:49

## 2020-09-12 RX ADMIN — PROPOFOL 40 MCG/KG/MIN: 10 INJECTION, EMULSION INTRAVENOUS at 10:27

## 2020-09-12 RX ADMIN — LACOSAMIDE 150 MG: 100 TABLET, FILM COATED ORAL at 05:50

## 2020-09-12 RX ADMIN — ENOXAPARIN SODIUM 40 MG: 40 INJECTION SUBCUTANEOUS at 05:47

## 2020-09-12 RX ADMIN — FENTANYL CITRATE 100 MCG: 50 INJECTION INTRAMUSCULAR; INTRAVENOUS at 02:30

## 2020-09-12 RX ADMIN — VALPROATE SODIUM 1000 MG: 100 INJECTION, SOLUTION INTRAVENOUS at 15:00

## 2020-09-12 RX ADMIN — KETAMINE HYDROCHLORIDE 3 MG/KG/HR: 50 INJECTION, SOLUTION INTRAMUSCULAR; INTRAVENOUS at 06:30

## 2020-09-12 RX ADMIN — DOCUSATE SODIUM 50 MG AND SENNOSIDES 8.6 MG 2 TABLET: 8.6; 5 TABLET, FILM COATED ORAL at 18:27

## 2020-09-12 RX ADMIN — KETAMINE HYDROCHLORIDE 3 MG/KG/HR: 50 INJECTION, SOLUTION INTRAMUSCULAR; INTRAVENOUS at 23:40

## 2020-09-12 RX ADMIN — PROPOFOL 60 MCG/KG/MIN: 10 INJECTION, EMULSION INTRAVENOUS at 19:25

## 2020-09-12 RX ADMIN — PROPOFOL 60 MCG/KG/MIN: 10 INJECTION, EMULSION INTRAVENOUS at 23:22

## 2020-09-12 RX ADMIN — VALPROATE SODIUM 1000 MG: 100 INJECTION, SOLUTION INTRAVENOUS at 05:47

## 2020-09-12 RX ADMIN — LEVETIRACETAM 1500 MG: 500 TABLET ORAL at 18:27

## 2020-09-12 RX ADMIN — DOCUSATE SODIUM 50 MG AND SENNOSIDES 8.6 MG 2 TABLET: 8.6; 5 TABLET, FILM COATED ORAL at 05:47

## 2020-09-12 RX ADMIN — KETAMINE HYDROCHLORIDE 3 MG/KG/HR: 50 INJECTION, SOLUTION INTRAMUSCULAR; INTRAVENOUS at 12:11

## 2020-09-12 RX ADMIN — KETAMINE HYDROCHLORIDE 3 MG/KG/HR: 50 INJECTION, SOLUTION INTRAMUSCULAR; INTRAVENOUS at 01:21

## 2020-09-12 RX ADMIN — LEVETIRACETAM 1500 MG: 500 TABLET ORAL at 05:48

## 2020-09-12 RX ADMIN — PROPOFOL 60 MCG/KG/MIN: 10 INJECTION, EMULSION INTRAVENOUS at 14:51

## 2020-09-12 RX ADMIN — LACOSAMIDE 200 MG: 100 TABLET, FILM COATED ORAL at 18:27

## 2020-09-12 ASSESSMENT — FIBROSIS 4 INDEX: FIB4 SCORE: 0.16

## 2020-09-12 NOTE — PROCEDURES
VIDEO ELECTROENCEPHALOGRAM REPORT        Referring provider: Dr. Red.      DOS: 9/12/2020 (total recording of 23 hours and 50 minutes).      INDICATION:  Annika He 19 y.o. female presenting with s/p cardiac arrest, altered mental status, seizures.      CURRENT ANTIEPILEPTIC REGIMEN: Levetiracetam 1500 mg bid, Lacosamide increased to 200 mg tid, Valproic Acid increased to 1250 mg tid, Perampanel at 10 mg qhs. Sedation with Ketamine, with Propofol infusion added during the study.      TECHNIQUE: 30 channel video electroencephalogram (EEG) was performed in accordance with the international 10-20 system. The study was reviewed in bipolar and referential montages. The recording examined a sedated and/or comatose patient.      DESCRIPTION OF THE RECORD:  Generalized delta / theta activity, with elements of slow wave sleep elements noted, including sleep spindles. With increase in sedation, waxing and waning of the cerebral electrical activity is noted but no burst suppression pattern. Frequent, blunted generalized sharps with frontal maximum and triphasic morphology, with frequent runs of Frontal Intermittent Rhythmic Delta Activity (FIRDA). No seizures captured since addition of Propofol.      ACTIVATION PROCEDURES:   Not performed.      EKG: sampling of the EKG recording demonstrated sinus rhythm.      EVENTS:  None.      INTERPRETATION:  This is an abnormal 24 hrs video EEG recording in a comatose patient. A mild to moderate encephalopathy is suggested. With increase in sedation, waxing and waning of the cerebral electrical activity is noted but no burst suppression pattern. Frequent, blunted generalized sharps with frontal maximum and triphasic morphology, with frequent runs of Frontal Intermittent Rhythmic Delta Activity (FIRDA). No seizures captured since addition of Propofol. The findings suggest underlying areas of persinstently increased cortical irritability and/or structural  abnormality. Clinical and radiological correlation is recommended.     Updates provided to Dr. Agapito Beatty MD   Epilepsy and Neurodiagnostics.   Clinical  of Neurology [facility].   Diplomate in Neurology, Epilepsy, and Electrodiagnostic Medicine.   Office: [phone]  Fax: [phone]

## 2020-09-12 NOTE — CARE PLAN
Problem: Venous Thromboembolism (VTW)/Deep Vein Thrombosis (DVT) Prevention:  Goal: Patient will participate in Venous Thrombosis (VTE)/Deep Vein Thrombosis (DVT)Prevention Measures  Outcome: PROGRESSING AS EXPECTED  Intervention: Encourage ambulation/mobilization at level directed by Physical Therapy in collaboration with Interdisciplinary Team  Note: Unable to mobilize, SCD's on and Lovenox SubQ.       Problem: Knowledge Deficit  Goal: Knowledge of disease process/condition, treatment plan, diagnostic tests, and medications will improve  Outcome: PROGRESSING SLOWER THAN EXPECTED  Intervention: Explain information regarding disease process/condition, treatment plan, diagnostic tests, and medications and document in education  Note: Spoke with Father in regards to Pt's current status and MD's current notes in chart.

## 2020-09-12 NOTE — ASSESSMENT & PLAN NOTE
Continuous video EEG  Continue phenobarbital, 64.8 mg every 8 hours  Continue Vimpat, 200 mg every 8 hours  Continue valproic acid, 500 mg every 8 hours  Continue Keppra, 1000 mg every 8 hours  Continue Fycompa, 10 mg nightly      On increased dose of propofol today at 60 and Versed will be increased from 5 to 15  Still having seizures per EEG on this. If we continue to pursue aggressive medical care (to be discussed today with family) the tentative plan is to add ketamine

## 2020-09-12 NOTE — PROGRESS NOTES
Hospital Neurology Progress Note:     Interval History: No acute events overnight.  Unable to obtain review of systems due to tracheostomy.  Seizures seen overnight but appear somewhat improved to prior day.    Objective:   Vitals:    09/12/20 0400 09/12/20 0500 09/12/20 0600 09/12/20 0705   BP: 118/69 107/54 110/71    Pulse: 95 (!) 107 (!) 115 99   Resp: (!) 26 (!) 26 (!) 26 (!) 26   Temp:       TempSrc: Bladder  Bladder    SpO2: 96% 97% 100% 99%   Weight:   64.6 kg (142 lb 6.7 oz)    Height:           Labs:     Lab Results   Component Value Date/Time    PROTHROMBTM 14.0 08/25/2020 09:20 PM    INR 1.05 08/25/2020 09:20 PM      Lab Results   Component Value Date/Time    WBC 9.2 09/12/2020 06:30 AM    RBC 3.39 (L) 09/12/2020 06:30 AM    HEMOGLOBIN 10.3 (L) 09/12/2020 06:30 AM    HEMATOCRIT 32.8 (L) 09/12/2020 06:30 AM    MCV 96.8 09/12/2020 06:30 AM    MCH 30.4 09/12/2020 06:30 AM    MCHC 31.4 (L) 09/12/2020 06:30 AM    MPV 9.3 09/12/2020 06:30 AM    NEUTSPOLYS 61.40 09/12/2020 06:30 AM    LYMPHOCYTES 18.30 (L) 09/12/2020 06:30 AM    MONOCYTES 12.60 09/12/2020 06:30 AM    EOSINOPHILS 5.10 09/12/2020 06:30 AM    BASOPHILS 0.40 09/12/2020 06:30 AM    ANISOCYTOSIS 1+ 08/23/2020 10:05 PM      Lab Results   Component Value Date/Time    SODIUM 138 09/12/2020 06:30 AM    POTASSIUM 4.4 09/12/2020 06:30 AM    CHLORIDE 104 09/12/2020 06:30 AM    CO2 23 09/12/2020 06:30 AM    GLUCOSE 116 (H) 09/12/2020 06:30 AM    BUN 16 09/12/2020 06:30 AM    CREATININE 0.42 (L) 09/12/2020 06:30 AM      Lab Results   Component Value Date/Time    TRIGLYCERIDE 79 09/10/2020 04:50 AM       Lab Results   Component Value Date/Time    ALKPHOSPHAT 72 09/12/2020 06:30 AM    ASTSGOT 18 09/12/2020 06:30 AM    ALTSGPT 19 09/12/2020 06:30 AM    TBILIRUBIN 0.2 09/12/2020 06:30 AM        Imaging/Testing:   EEG from 9/11/2020 through 9/12/2020 was discussed with the interpreting neurologist with some improvement in seizure activity however patient continues  to have seizures.    Physical Exam:      General: 19-year-old female lying in bed with tracheostomy in place in no acute distress.  Cardio: Normal S1/S2. No peripheral edema.   Pulm: CTAX2. No respiratory distress.   Skin: Warm, dry, no rashes or lesions   Psychiatric: Unable to assess.  HEENT: Atraumatic head, normal sclera and conjunctiva, moist oral mucosa. No lid lag.  Tracheostomy in place.  Abdomen: Soft, non tender. No masses or hepatosplenomegaly.     Neurologic:  Mental Status: GCS 3 T (E1, M1, V1 T)  Cranial Nerves: Pupils equally round and reactive to light.  Oculocephalic reflex is absent.  Face appears symmetric.  Motor: Normal muscle tone and bulk.  No spontaneous movements observed.  Weak triple flexion response on the right lower extremity.  Reflexes: Deferred  Coordination: Unable to assess  Sensation: Triple flexion response in the right lower extremity.  Gait/Station: Unable to assess     Patient was seen and examined on 9/12/2020 and compared to 9/11/2020 no significant clinical changes were seen.    Assessment/Plan:    Annika He is a 19 y.o. female with history of cardiac arrest and subsequent anoxic brain injury due to polysubstance abuse who has had a protracted hospital course complicated by seizure activity.  EEG has not shown seizure activity for the last 48 hours however burst suppression was not achieved.  Nevertheless, at this time will start weaning propofol to see if seizure activity recurs.  If propofol is weaned successfully then will start weaning ketamine.  Etiology of seizures is likely secondary to anoxic brain injury.    Plan:  1.  Continue Keppra 1500 mg twice daily  2.  Continue Vimpat to 150 mg 3 times daily  3.  Continue Depakote to 1000 mg 3 times daily.  Obtain Depakote level today.  4.  Continue ketamine to 3 mg/kg/h  5.  Continue Fycompa 10 mg daily  6.  Continue video monitored EEG  7.  Initiate propofol at 40.  Plan to try to suppress seizure activity for  48 hours prior to attempting to wean once more.  8.  May consider addition of zonisamide if this does not provide improvement.    Andrew Petersen M.D., Diplomat of the American Board of Psychiatry and Neurology  Diplomat of [employer]BP[employer] Epilepsy Subspecialty   Assistant Clinical Professor, [facility] Neurology Consultant

## 2020-09-12 NOTE — PROGRESS NOTES
Hospital Neurology Progress Note:     Interval History: No acute events overnight.  Unable to obtain review of systems due to tracheostomy.  Seizures were seen overnight.    Objective:   Vitals:    09/12/20 0400 09/12/20 0500 09/12/20 0600 09/12/20 0705   BP: 118/69 107/54 110/71    Pulse: 95 (!) 107 (!) 115 99   Resp: (!) 26 (!) 26 (!) 26 (!) 26   Temp:       TempSrc: Bladder  Bladder    SpO2: 96% 97% 100% 99%   Weight:   64.6 kg (142 lb 6.7 oz)    Height:           Labs:     Lab Results   Component Value Date/Time    PROTHROMBTM 14.0 08/25/2020 09:20 PM    INR 1.05 08/25/2020 09:20 PM      Lab Results   Component Value Date/Time    WBC 9.2 09/12/2020 06:30 AM    RBC 3.39 (L) 09/12/2020 06:30 AM    HEMOGLOBIN 10.3 (L) 09/12/2020 06:30 AM    HEMATOCRIT 32.8 (L) 09/12/2020 06:30 AM    MCV 96.8 09/12/2020 06:30 AM    MCH 30.4 09/12/2020 06:30 AM    MCHC 31.4 (L) 09/12/2020 06:30 AM    MPV 9.3 09/12/2020 06:30 AM    NEUTSPOLYS 61.40 09/12/2020 06:30 AM    LYMPHOCYTES 18.30 (L) 09/12/2020 06:30 AM    MONOCYTES 12.60 09/12/2020 06:30 AM    EOSINOPHILS 5.10 09/12/2020 06:30 AM    BASOPHILS 0.40 09/12/2020 06:30 AM    ANISOCYTOSIS 1+ 08/23/2020 10:05 PM      Lab Results   Component Value Date/Time    SODIUM 138 09/12/2020 06:30 AM    POTASSIUM 4.4 09/12/2020 06:30 AM    CHLORIDE 104 09/12/2020 06:30 AM    CO2 23 09/12/2020 06:30 AM    GLUCOSE 116 (H) 09/12/2020 06:30 AM    BUN 16 09/12/2020 06:30 AM    CREATININE 0.42 (L) 09/12/2020 06:30 AM      Lab Results   Component Value Date/Time    TRIGLYCERIDE 79 09/10/2020 04:50 AM       Lab Results   Component Value Date/Time    ALKPHOSPHAT 72 09/12/2020 06:30 AM    ASTSGOT 18 09/12/2020 06:30 AM    ALTSGPT 19 09/12/2020 06:30 AM    TBILIRUBIN 0.2 09/12/2020 06:30 AM        Imaging/Testing:   EEG from 9/10/2020 through 9/11/2020 was discussed with the interpreting neurologist with emergence of seizures however no status epilepticus was seen.    Physical Exam:      General:  19-year-old female lying in bed with tracheostomy in place in no acute distress.  Cardio: Normal S1/S2. No peripheral edema.   Pulm: CTAX2. No respiratory distress.   Skin: Warm, dry, no rashes or lesions   Psychiatric: Unable to assess.  HEENT: Atraumatic head, normal sclera and conjunctiva, moist oral mucosa. No lid lag.  Tracheostomy in place.  Abdomen: Soft, non tender. No masses or hepatosplenomegaly.     Neurologic:  Mental Status: GCS 3 T (E1, M1, V1 T)  Cranial Nerves: Pupils equally round and reactive to light.  Oculocephalic reflex is absent.  Face appears symmetric.  Motor: Normal muscle tone and bulk.  No spontaneous movements observed.  Weak triple flexion response on the right lower extremity.  Reflexes: Deferred  Coordination: Unable to assess  Sensation: Triple flexion response in the right lower extremity.  Gait/Station: Unable to assess     Patient seen and examined on 9/11/2020 and compared to exam on 9/10/2020 no significant clinical changes were seen.    Assessment/Plan:    Annika He is a 19 y.o. female with history of cardiac arrest and subsequent anoxic brain injury due to polysubstance abuse who has had a protracted hospital course complicated by seizure activity.  EEG has not shown seizure activity for the last 48 hours however burst suppression was not achieved.  Nevertheless, at this time will start weaning propofol to see if seizure activity recurs.  If propofol is weaned successfully then will start weaning ketamine.  Etiology of seizures is likely secondary to anoxic brain injury.    Plan:  1.  Continue Keppra 1500 mg twice daily  2.  Increase Vimpat to 150 mg 3 times daily  3.  Increase Depakote to 1000 mg 3 times daily  4.  Increase ketamine to 3 mg/kg/h  5.  Continue Fycompa 10 mg daily  6.  Continue video monitored EEG  7.  Plan discussed with consulting physician and patient's nurse.     Andrew Petersen M.D., Diplomat of the American Board of Psychiatry and  Neurology  Diplomat of [employer]BP[employer] Epilepsy Subspecialty   Assistant Clinical Professor, [facility] Neurology Consultant

## 2020-09-12 NOTE — PROGRESS NOTES
"Critical Care Progress Note    Date of admission  8/23/2020    Chief Complaint  19 y.o. female admitted 8/23/2020 with drug overdose, cardiac arrest    Hospital Course    \"19 y.o. female who presented 8/23/2020 with drug overdose with methamphetamines, oxycodone and found by boyfriend.  Cardiac arrest enroute to hospital and received narcan, epinephrine and cpr and ROSC achieved.  Difficult airway at scene and LMA placed. ET tube placed in ER.  She was vomiting during airway attempt.  On propofol due to vent asynchrony.  Propofol turned off for neuro assessment and possible hypothermic protocol.  Family no longer at hospital and unable to reach per phone number given for mother.\"    8/24 - TTM protocol initiated, EEG without NCSE  8/25 - rewarmed, awake, not following  8/26 - following occasional commands  8/27- seizure like activity vs shivering--> Keppra, versed, Propofol  8/28- no seizures on EEG  8/29- remains unresponsive.  8/30 -mental status improved, now opening eyes and following commands.  Keppra discontinued  8/31 - started cefepime/vancomycin for possible pneumonia,?  UTI, fever and increasing WBC fever; full vent  9/1 - Seizure activity noted on EEG today, reviewed with neurology; keppra load and increased to 1gm q 12; full vent support  9/2 - remains unresponsive with only some posture and grimace to stimulation, neurology reconsulted, stat MRI brain, LP, cEEG  9/3 -ongoing seizures, Vimpat added, Depakote added, family conference today  9/4 -ongoing seizure noted on cEEG, started on midazolam infusion, full ventilator support  9/5 -ongoing seizures noted, propofol added and increased today, full ventilator support  9/6 -have not yet achieved burst suppression on cEEG; continue propofol, change Versed to ketamine infusion, discussed with neurology, full ventilator support  9/7 - still titrating propofol and ketamine for burst suppression on cEEG. Continue full ventilator support  9/8 - propofol off; " ketamine is being weaned; on vimpat, keppra, depakote. Requiring full ventilator support  9/9 - Requiring full ventilator support. propofol and ketamine off; on vimpat, keppra, depakote. Postures to stimuli - consult for trach/PEG  9/10 -status post tracheostomy, off propofol and on ketamine- beginning to wean antiepileptic medications, continues to require full ventilator support. PEG tomorrow. Reportedly is still having seizures.   9/11 PEG tube today, continues to require full mechanical ventilator support  9/12 returned to deep sedation secondary to persistent seizures; continues to require full vent support        Interval Problem Update  Reviewed last 24 hour events:  Intubated, sedated on propofol and ketamine   UOP good  PPx lovenox + pepcid  Bactrim x 10 days for MRSA PNA  Vimpat, valproate, perampanel, keppra; plus propofol and ketamine    Review of Systems  Review of Systems   Unable to perform ROS: Intubated        Vital Signs for last 24 hours   Pulse:  [] 101  Resp:  [4-31] 26  BP: (105-143)/(54-99) 117/69  SpO2:  [95 %-100 %] 98 %    Hemodynamic parameters for last 24 hours       Respiratory Information for the last 24 hours  Vent Mode: APVCMV  Rate (breaths/min): 26  Vt Target (mL): 320  PEEP/CPAP: 8  MAP: 10  Control VTE (exp VT): 319    Physical Exam   Physical Exam  Vitals signs and nursing note reviewed.   Constitutional:       General: She is not in acute distress.     Appearance: She is not ill-appearing or toxic-appearing.      Interventions: She is intubated.   HENT:      Head: Normocephalic and atraumatic.      Right Ear: External ear normal.      Left Ear: External ear normal.      Nose: No congestion or rhinorrhea.      Mouth/Throat:      Mouth: Mucous membranes are moist.      Pharynx: No oropharyngeal exudate or posterior oropharyngeal erythema.   Eyes:      General: No scleral icterus.     Conjunctiva/sclera: Conjunctivae normal.      Pupils: Pupils are equal, round, and reactive  to light.      Comments: Sluggish pupillary response   Neck:      Musculoskeletal: Neck supple. No neck rigidity or muscular tenderness.   Cardiovascular:      Rate and Rhythm: Normal rate and regular rhythm.      Pulses: Normal pulses.      Heart sounds: Normal heart sounds. No murmur.   Pulmonary:      Effort: She is intubated.      Breath sounds: No wheezing or rales.      Comments: Full ventilator support, low support settings  Abdominal:      General: Abdomen is flat. There is no distension.      Palpations: Abdomen is soft. There is no mass.      Tenderness: There is no abdominal tenderness.      Comments: PEG tube dressing c/d/i   Musculoskeletal:         General: No swelling or tenderness.      Right lower leg: No edema.      Left lower leg: No edema.   Skin:     General: Skin is warm and dry.      Capillary Refill: Capillary refill takes less than 2 seconds.      Findings: No erythema or rash.   Neurological:      GCS: GCS eye subscore is 3. GCS verbal subscore is 1. GCS motor subscore is 5.      Cranial Nerves: No facial asymmetry.      Motor: Abnormal muscle tone present.      Comments: Trach, deep sedation   Psychiatric:      Comments: Unable to assess         Medications  Current Facility-Administered Medications   Medication Dose Route Frequency Provider Last Rate Last Dose   • valproate (DEPACON) 1,000 mg in  mL IVPB  1,000 mg Intravenous Q8HRS Andrew Petersen M.D.   Stopped at 09/12/20 0647   • ketamine 1,000 mg in  mL infusion (critical care only)  3 mg/kg/hr Intravenous Continuous Andrew Petersen M.D. 100.2 mL/hr at 09/12/20 0715 3 mg/kg/hr at 09/12/20 0715   • levETIRAcetam (KEPPRA) tablet 1,500 mg  1,500 mg Enteral Tube BID Andrew Petersen M.D.   1,500 mg at 09/12/20 0548   • lacosamide (VIMPAT) tablet 150 mg  150 mg Enteral Tube TID Andrew Petersen M.D.   150 mg at 09/12/20 0550   • perampanel (FYCOMPA) tablet 10 mg  10 mg Enteral Tube QHS Nithin Donohue  M.D.   10 mg at 09/11/20 2110   • fentaNYL (SUBLIMAZE) injection 100 mcg  100 mcg Intravenous Q15 MIN PRN JESSY Piper Jr..OYue   100 mcg at 09/12/20 0230    And   • fentaNYL (SUBLIMAZE) injection 200 mcg  200 mcg Intravenous Q15 MIN PRN JESSY Piper Jr..OYue   200 mcg at 09/10/20 1748    And   • fentaNYL (SUBLIMAZE) 50 mcg/mL in 50mL (Continuous Infusion)   Intravenous Continuous JESSY Piper Jr..OYue   Stopped at 09/07/20 2230    And   • propofol (DIPRIVAN) injection  0-80 mcg/kg/min Intravenous Continuous JESSY Piper Jr..O. 23.2 mL/hr at 09/12/20 1115 60 mcg/kg/min at 09/12/20 1115   • sulfamethoxazole-trimethoprim (BACTRIM DS) 800-160 MG tablet 1 Tab  1 Tab Enteral Tube Q12HRS Dar Red M.D.   1 Tab at 09/12/20 0549   • polyethylene glycol/lytes (MIRALAX) PACKET 1 Packet  1 Packet Enteral Tube BID Dar Red M.D.   Stopped at 09/08/20 0600   • acetaminophen (TYLENOL) suppository 325 mg  325 mg Rectal Q6HRS PRN Yosef Tomlin M.D.   650 mg at 09/02/20 1632   • acetaminophen (TYLENOL) tablet 1,000 mg  1,000 mg Enteral Tube Q4HRS PRN Dar Red M.D.   1,000 mg at 09/04/20 1800   • labetalol (NORMODYNE/TRANDATE) injection 10 mg  10 mg Intravenous Q4HRS PRN Yosef Tomlin M.D.   10 mg at 08/30/20 2256   • Pharmacy Consult: Enteral tube insertion - review meds/change route/product selection  1 Each Other PHARMACY TO DOSE JESSY Piper Jr..LILIANE       • Respiratory Therapy Consult   Nebulization Continuous RT Brock Murdock M.D.       • ipratropium-albuterol (DUONEB) nebulizer solution  3 mL Nebulization Q2HRS PRN (RT) Brock Murdock M.D.       • famotidine (PEPCID) tablet 20 mg  20 mg Enteral Tube Q12HRS Dar Red M.D.   20 mg at 09/12/20 0549   • senna-docusate (PERICOLACE or SENOKOT S) 8.6-50 MG per tablet 2 Tab  2 Tab Enteral Tube BID Brock Murdock M.D.   2 Tab at 09/12/20 0547    And   • polyethylene glycol/lytes (MIRALAX) PACKET 1 Packet  1 Packet Enteral Tube QDAY  PRN Brock Murdock M.D.        And   • magnesium hydroxide (MILK OF MAGNESIA) suspension 30 mL  30 mL Enteral Tube QDAY PRN Brock Murdock M.D.        And   • bisacodyl (DULCOLAX) suppository 10 mg  10 mg Rectal QDAY PRN Brock Murdock M.D.       • MD Alert...ICU Electrolyte Replacement per Pharmacy   Other PHARMACY TO DOSE Brock Murdock M.D.       • lidocaine (XYLOCAINE) 1 % injection 1-2 mL  1-2 mL Tracheal Tube Q30 MIN PRN Brock Murdock M.D.       • enoxaparin (LOVENOX) inj 40 mg  40 mg Subcutaneous DAILY Boogie Augustine Jr., D.O.   40 mg at 09/12/20 0547       Fluids    Intake/Output Summary (Last 24 hours) at 9/12/2020 1150  Last data filed at 9/12/2020 1000  Gross per 24 hour   Intake 4150.61 ml   Output 3325 ml   Net 825.61 ml       Laboratory          Recent Labs     09/10/20  0450 09/11/20  0318 09/12/20  0630   SODIUM 138 136 138   POTASSIUM 4.3 4.3 4.4   CHLORIDE 102 100 104   CO2 22 23 23   BUN 23* 17 16   CREATININE 0.51 0.41* 0.42*   CALCIUM 8.8 9.1 8.6     Recent Labs     09/10/20  0450 09/11/20  0318 09/12/20  0630   ALTSGPT 22 25 19   ASTSGOT 26 25 18   ALKPHOSPHAT 76 83 72   TBILIRUBIN <0.2 0.2 0.2   GLUCOSE 117* 101* 116*     Recent Labs     09/10/20  0450 09/11/20  0318 09/12/20  0630   WBC 13.4* 13.0* 9.2   NEUTSPOLYS 70.40 69.90 61.40   LYMPHOCYTES 13.10* 12.90* 18.30*   MONOCYTES 10.80 11.40 12.60   EOSINOPHILS 3.70 4.00 5.10   BASOPHILS 0.40 0.30 0.40   ASTSGOT 26 25 18   ALTSGPT 22 25 19   ALKPHOSPHAT 76 83 72   TBILIRUBIN <0.2 0.2 0.2     Recent Labs     09/10/20  0450 09/11/20  0318 09/12/20  0630   RBC 3.62* 3.81* 3.39*   HEMOGLOBIN 11.1* 11.8* 10.3*   HEMATOCRIT 34.8* 36.7* 32.8*   PLATELETCT 621* 652* 586*       Imaging  X-Ray:  I have personally reviewed the images and compared with prior images.     MRI 8/27:  The diffusion-weighted sequences demonstrates areas of restricted diffusion in the bilateral basal ganglia and some of the frontal gray matter. The predominant portion of the  brain parenchyma does not demonstrate any restricted diffusion. Therefore this   findings likely represent mild diffuse anoxic brain injury.    MRI 9/2:  1.  Severe diffuse anoxic brain injury pattern which is more extensive and conspicuous than on previous exam.  2.  No evidence of intracranial mass effect, extra-axial fluid collection, or interval development of hydrocephalus.  3.  Diffuse mucosal inflammatory changes filling the ethmoid and sphenoid sinuses.    Assessment/Plan  PEA (Pulseless electrical activity) (HCC)  PEA enroute to hospital  Hypoxia and aspiration contributory  MRI reveals bilateral basal ganglial anoxic injury.  Status post therapeutic hypothermia and rewarmed    Acute respiratory failure with hypoxia (HCC)  Secondary to drug overdose  Lung protective ventilation strategies  Optimize oxygenation, ventilation, and acid base balance.  Tracheostomy and PEG    Drug overdose  Methamphetamines, oxycodone and EtOH per boyfriend    Cerebral edema (HCC)  Mild/possible per radiologist on CT  MRI on 8/27/2020 without edema or severe changes  Repeat MRI 9/2 - radiographic evidence of anoxic brain injury more apparent then prior MRI    Elevated transaminase level  Follow    Encephalopathy acute  Anoxic encephalopathy, was improving  Currently cEEG for status, neurology following    Hypoglycemia  Follow    Pneumonia of both lungs due to methicillin resistant Staphylococcus aureus (MRSA) (HCC)  BAL 8/31 - > 100K CFU MRSA  Acinetobacter, stenotrophomonas, MRSA noted on prior BAL-as she is hemodynamically stable, afebrile, with a stable white count we will continue treating for the MRSA only.  Bactrim x 10 days        Dysphagia, oropharyngeal  Secondary to anoxic brain injury and seizures  Lap gastrostomy planned for 9/11      Secondary to anoxic brain injury and seizures  Lap gastrostomy 9/11    Seizures (HCC)  Recurrent following PEA arrest and anoxic brain injury  Returning to deep sedation using ketamine  and propofol      Updated plan:  Continue full ventilator support  Continuous EEG, still trying to achieve seizure cessation      VTE:  Lovenox  Ulcer: H2 Antagonist  Lines: Central Line  Ongoing indication addressed    I have performed a physical exam and reviewed and updated ROS and Plan today  In review of yesterday's note (9/11/2020), there are no changes except as documented above.        Discussed patient condition and risk of morbidity and/or mortality with RN, RT, Pharmacy, Dietary, , Code status disscussed, Charge nurse / hot rounds and neurology     The patient remains critically ill.  I have assessed and reassessed the patient multiple times.  She is at high risk for further vital organ deterioration with significant ongoing critical care management as above.  Critical care time = 38 minutes in directly providing and coordinating critical care and extensive data review.  No time overlap and excludes procedures.

## 2020-09-12 NOTE — PROGRESS NOTES
12 hour Chart Check    Monitor Summary: SRST 's  .16/.08/.30    2 RN Skin Check Completed after OR visit

## 2020-09-12 NOTE — CARE PLAN
Problem: Ventilation Defect:  Goal: Ability to achieve and maintain unassisted ventilation or tolerate decreased levels of ventilator support  Outcome: PROGRESSING SLOWER THAN EXPECTED      Ventilator Daily Summary    Vent Day # 21  Trache Day # 4    Weaning trials: N/A    Plan: Continue current ventilator settings and wean mechanical ventilation as tolerated per physician orders.

## 2020-09-13 ENCOUNTER — APPOINTMENT (OUTPATIENT)
Dept: RADIOLOGY | Facility: MEDICAL CENTER | Age: 20
DRG: 004 | End: 2020-09-13
Attending: INTERNAL MEDICINE
Payer: MEDICAID

## 2020-09-13 PROBLEM — T42.6X1A VALPROIC ACID TOXICITY: Status: ACTIVE | Noted: 2020-09-13

## 2020-09-13 LAB
ALBUMIN SERPL BCP-MCNC: 3.4 G/DL (ref 3.2–4.9)
ALBUMIN/GLOB SERPL: 1.1 G/DL
ALP SERPL-CCNC: 174 U/L (ref 30–99)
ALT SERPL-CCNC: 82 U/L (ref 2–50)
AMMONIA PLAS-SCNC: 62 UMOL/L (ref 11–45)
ANION GAP SERPL CALC-SCNC: 13 MMOL/L (ref 7–16)
AST SERPL-CCNC: 128 U/L (ref 12–45)
BASOPHILS # BLD AUTO: 0.5 % (ref 0–1.8)
BASOPHILS # BLD: 0.05 K/UL (ref 0–0.12)
BILIRUB SERPL-MCNC: 0.2 MG/DL (ref 0.1–1.5)
BUN SERPL-MCNC: 16 MG/DL (ref 8–22)
CALCIUM SERPL-MCNC: 8.9 MG/DL (ref 8.5–10.5)
CHLORIDE SERPL-SCNC: 107 MMOL/L (ref 96–112)
CO2 SERPL-SCNC: 22 MMOL/L (ref 20–33)
CREAT SERPL-MCNC: 0.47 MG/DL (ref 0.5–1.4)
EOSINOPHIL # BLD AUTO: 0.52 K/UL (ref 0–0.51)
EOSINOPHIL NFR BLD: 5.2 % (ref 0–6.9)
ERYTHROCYTE [DISTWIDTH] IN BLOOD BY AUTOMATED COUNT: 48 FL (ref 35.9–50)
GLOBULIN SER CALC-MCNC: 3 G/DL (ref 1.9–3.5)
GLUCOSE SERPL-MCNC: 116 MG/DL (ref 65–99)
HCT VFR BLD AUTO: 35.6 % (ref 37–47)
HGB BLD-MCNC: 11.4 G/DL (ref 12–16)
IMM GRANULOCYTES # BLD AUTO: 0.23 K/UL (ref 0–0.11)
IMM GRANULOCYTES NFR BLD AUTO: 2.3 % (ref 0–0.9)
LIPASE SERPL-CCNC: 26 U/L (ref 11–82)
LYMPHOCYTES # BLD AUTO: 1.64 K/UL (ref 1–4.8)
LYMPHOCYTES NFR BLD: 16.3 % (ref 22–41)
MCH RBC QN AUTO: 31 PG (ref 27–33)
MCHC RBC AUTO-ENTMCNC: 32 G/DL (ref 33.6–35)
MCV RBC AUTO: 96.7 FL (ref 81.4–97.8)
MONOCYTES # BLD AUTO: 0.93 K/UL (ref 0–0.85)
MONOCYTES NFR BLD AUTO: 9.2 % (ref 0–13.4)
NEUTROPHILS # BLD AUTO: 6.72 K/UL (ref 2–7.15)
NEUTROPHILS NFR BLD: 66.5 % (ref 44–72)
NRBC # BLD AUTO: 0 K/UL
NRBC BLD-RTO: 0 /100 WBC
PLATELET # BLD AUTO: 523 K/UL (ref 164–446)
PMV BLD AUTO: 9.1 FL (ref 9–12.9)
POTASSIUM SERPL-SCNC: 4.5 MMOL/L (ref 3.6–5.5)
PROT SERPL-MCNC: 6.4 G/DL (ref 6–8.2)
RBC # BLD AUTO: 3.68 M/UL (ref 4.2–5.4)
SODIUM SERPL-SCNC: 142 MMOL/L (ref 135–145)
TRIGL SERPL-MCNC: 89 MG/DL (ref 0–149)
WBC # BLD AUTO: 10.1 K/UL (ref 4.8–10.8)

## 2020-09-13 PROCEDURE — 99233 SBSQ HOSP IP/OBS HIGH 50: CPT | Performed by: PSYCHIATRY & NEUROLOGY

## 2020-09-13 PROCEDURE — 85025 COMPLETE CBC W/AUTO DIFF WBC: CPT

## 2020-09-13 PROCEDURE — 4A10X4Z MONITORING OF CENTRAL NERVOUS ELECTRICAL ACTIVITY, EXTERNAL APPROACH: ICD-10-PCS | Performed by: PSYCHIATRY & NEUROLOGY

## 2020-09-13 PROCEDURE — A9270 NON-COVERED ITEM OR SERVICE: HCPCS | Performed by: INTERNAL MEDICINE

## 2020-09-13 PROCEDURE — 700105 HCHG RX REV CODE 258

## 2020-09-13 PROCEDURE — 80053 COMPREHEN METABOLIC PANEL: CPT

## 2020-09-13 PROCEDURE — 95714 VEEG EA 12-26 HR UNMNTR: CPT | Performed by: PSYCHIATRY & NEUROLOGY

## 2020-09-13 PROCEDURE — 700102 HCHG RX REV CODE 250 W/ 637 OVERRIDE(OP): Performed by: INTERNAL MEDICINE

## 2020-09-13 PROCEDURE — 94770 HCHG CO2 EXPIRED GAS DETERMINATION: CPT

## 2020-09-13 PROCEDURE — 95720 EEG PHY/QHP EA INCR W/VEEG: CPT | Performed by: PSYCHIATRY & NEUROLOGY

## 2020-09-13 PROCEDURE — 94003 VENT MGMT INPAT SUBQ DAY: CPT

## 2020-09-13 PROCEDURE — 700101 HCHG RX REV CODE 250: Performed by: PSYCHIATRY & NEUROLOGY

## 2020-09-13 PROCEDURE — 80177 DRUG SCRN QUAN LEVETIRACETAM: CPT

## 2020-09-13 PROCEDURE — 700101 HCHG RX REV CODE 250: Performed by: INTERNAL MEDICINE

## 2020-09-13 PROCEDURE — 99291 CRITICAL CARE FIRST HOUR: CPT | Performed by: INTERNAL MEDICINE

## 2020-09-13 PROCEDURE — 700105 HCHG RX REV CODE 258: Performed by: PSYCHIATRY & NEUROLOGY

## 2020-09-13 PROCEDURE — 700111 HCHG RX REV CODE 636 W/ 250 OVERRIDE (IP): Performed by: INTERNAL MEDICINE

## 2020-09-13 PROCEDURE — 83690 ASSAY OF LIPASE: CPT

## 2020-09-13 PROCEDURE — 770022 HCHG ROOM/CARE - ICU (200)

## 2020-09-13 PROCEDURE — 700102 HCHG RX REV CODE 250 W/ 637 OVERRIDE(OP): Performed by: PSYCHIATRY & NEUROLOGY

## 2020-09-13 PROCEDURE — 71045 X-RAY EXAM CHEST 1 VIEW: CPT

## 2020-09-13 PROCEDURE — 700111 HCHG RX REV CODE 636 W/ 250 OVERRIDE (IP): Performed by: PSYCHIATRY & NEUROLOGY

## 2020-09-13 PROCEDURE — 84478 ASSAY OF TRIGLYCERIDES: CPT

## 2020-09-13 PROCEDURE — A9270 NON-COVERED ITEM OR SERVICE: HCPCS | Performed by: PSYCHIATRY & NEUROLOGY

## 2020-09-13 PROCEDURE — 82140 ASSAY OF AMMONIA: CPT

## 2020-09-13 RX ORDER — LEVOCARNITINE 1 G/10ML
1000 SOLUTION ORAL 2 TIMES DAILY WITH MEALS
Status: COMPLETED | OUTPATIENT
Start: 2020-09-13 | End: 2020-09-17

## 2020-09-13 RX ORDER — LEVETIRACETAM 500 MG/1
1000 TABLET ORAL EVERY 8 HOURS
Status: DISCONTINUED | OUTPATIENT
Start: 2020-09-13 | End: 2020-09-26

## 2020-09-13 RX ORDER — LEVOCARNITINE 1 G/10ML
1000 SOLUTION ORAL ONCE
Status: COMPLETED | OUTPATIENT
Start: 2020-09-13 | End: 2020-09-13

## 2020-09-13 RX ORDER — KETAMINE HYDROCHLORIDE 50 MG/ML
INJECTION, SOLUTION INTRAMUSCULAR; INTRAVENOUS
Status: DISCONTINUED
Start: 2020-09-13 | End: 2020-09-13

## 2020-09-13 RX ORDER — GLYCOPYRROLATE 1 MG/1
0.5 TABLET ORAL EVERY 8 HOURS
Status: DISCONTINUED | OUTPATIENT
Start: 2020-09-13 | End: 2020-09-26

## 2020-09-13 RX ORDER — KETAMINE HYDROCHLORIDE 50 MG/ML
INJECTION, SOLUTION INTRAMUSCULAR; INTRAVENOUS
Status: COMPLETED
Start: 2020-09-13 | End: 2020-09-13

## 2020-09-13 RX ORDER — SODIUM CHLORIDE 9 MG/ML
INJECTION, SOLUTION INTRAVENOUS
Status: COMPLETED
Start: 2020-09-13 | End: 2020-09-13

## 2020-09-13 RX ADMIN — LEVOCARNITINE 1000 MG: 1 SOLUTION ORAL at 15:32

## 2020-09-13 RX ADMIN — VALPROIC ACID 1000 MG: 250 SOLUTION ORAL at 14:48

## 2020-09-13 RX ADMIN — VALPROIC ACID 1000 MG: 250 SOLUTION ORAL at 22:00

## 2020-09-13 RX ADMIN — LACOSAMIDE 200 MG: 100 TABLET, FILM COATED ORAL at 17:10

## 2020-09-13 RX ADMIN — LEVETIRACETAM 1000 MG: 500 TABLET, FILM COATED ORAL at 22:00

## 2020-09-13 RX ADMIN — PROPOFOL 60 MCG/KG/MIN: 10 INJECTION, EMULSION INTRAVENOUS at 19:40

## 2020-09-13 RX ADMIN — GLYCOPYRROLATE 0.5 MG: 1 TABLET ORAL at 14:00

## 2020-09-13 RX ADMIN — POLYETHYLENE GLYCOL 3350 1 PACKET: 17 POWDER, FOR SOLUTION ORAL at 05:23

## 2020-09-13 RX ADMIN — LEVOCARNITINE 1000 MG: 1 SOLUTION ORAL at 22:33

## 2020-09-13 RX ADMIN — DOCUSATE SODIUM 50 MG AND SENNOSIDES 8.6 MG 2 TABLET: 8.6; 5 TABLET, FILM COATED ORAL at 17:08

## 2020-09-13 RX ADMIN — DOCUSATE SODIUM 50 MG AND SENNOSIDES 8.6 MG 2 TABLET: 8.6; 5 TABLET, FILM COATED ORAL at 05:22

## 2020-09-13 RX ADMIN — ENOXAPARIN SODIUM 40 MG: 40 INJECTION SUBCUTANEOUS at 05:22

## 2020-09-13 RX ADMIN — KETAMINE HYDROCHLORIDE 3 MG/KG/HR: 50 INJECTION, SOLUTION INTRAMUSCULAR; INTRAVENOUS at 17:08

## 2020-09-13 RX ADMIN — FAMOTIDINE 20 MG: 20 TABLET, FILM COATED ORAL at 05:23

## 2020-09-13 RX ADMIN — KETAMINE HYDROCHLORIDE 3 MG/KG/HR: 50 INJECTION, SOLUTION INTRAMUSCULAR; INTRAVENOUS at 22:54

## 2020-09-13 RX ADMIN — LACOSAMIDE 200 MG: 100 TABLET, FILM COATED ORAL at 02:40

## 2020-09-13 RX ADMIN — PROPOFOL 60 MCG/KG/MIN: 10 INJECTION, EMULSION INTRAVENOUS at 03:10

## 2020-09-13 RX ADMIN — LEVETIRACETAM 1500 MG: 500 TABLET ORAL at 05:21

## 2020-09-13 RX ADMIN — FAMOTIDINE 20 MG: 20 TABLET, FILM COATED ORAL at 17:08

## 2020-09-13 RX ADMIN — SODIUM CHLORIDE 500 ML: 9 INJECTION, SOLUTION INTRAVENOUS at 05:27

## 2020-09-13 RX ADMIN — LACOSAMIDE 200 MG: 100 TABLET, FILM COATED ORAL at 09:34

## 2020-09-13 RX ADMIN — PROPOFOL 60 MCG/KG/MIN: 10 INJECTION, EMULSION INTRAVENOUS at 07:51

## 2020-09-13 RX ADMIN — PROPOFOL 60 MCG/KG/MIN: 10 INJECTION, EMULSION INTRAVENOUS at 10:58

## 2020-09-13 RX ADMIN — LEVETIRACETAM 1000 MG: 500 TABLET, FILM COATED ORAL at 14:48

## 2020-09-13 RX ADMIN — KETAMINE HYDROCHLORIDE 3 MG/KG/HR: 50 INJECTION, SOLUTION INTRAMUSCULAR; INTRAVENOUS at 11:32

## 2020-09-13 RX ADMIN — SULFAMETHOXAZOLE AND TRIMETHOPRIM 1 TABLET: 800; 160 TABLET ORAL at 17:09

## 2020-09-13 RX ADMIN — KETAMINE HYDROCHLORIDE 3 MG/KG/HR: 50 INJECTION, SOLUTION INTRAMUSCULAR; INTRAVENOUS at 05:19

## 2020-09-13 RX ADMIN — VALPROATE SODIUM 1250 MG: 100 INJECTION, SOLUTION INTRAVENOUS at 05:23

## 2020-09-13 RX ADMIN — PERAMPANEL 10 MG: 2 TABLET ORAL at 22:32

## 2020-09-13 RX ADMIN — SULFAMETHOXAZOLE AND TRIMETHOPRIM 1 TABLET: 800; 160 TABLET ORAL at 05:22

## 2020-09-13 RX ADMIN — PROPOFOL 60 MCG/KG/MIN: 10 INJECTION, EMULSION INTRAVENOUS at 15:22

## 2020-09-13 RX ADMIN — GLYCOPYRROLATE 0.5 MG: 1 TABLET ORAL at 22:32

## 2020-09-13 NOTE — PROCEDURES
VIDEO ELECTROENCEPHALOGRAM REPORT        Referring provider: Dr. Red.      DOS: 9/13/2020 (total recording of 23 hours and 50 minutes).      INDICATION:  Annika He 19 y.o. female presenting with s/p cardiac arrest, altered mental status, seizures.      CURRENT ANTIEPILEPTIC REGIMEN: Levetiracetam 1000 mg tid, Lacosamide 200 mg tid, Valproic Acid 1250 mg tid, Perampanel at 10 mg qhs. Sedation with Ketamine, and Propofol infusions.      TECHNIQUE: 30 channel video electroencephalogram (EEG) was performed in accordance with the international 10-20 system. The study was reviewed in bipolar and referential montages. The recording examined a sedated and/or comatose patient.      DESCRIPTION OF THE RECORD:  Generalized delta / theta activity, with elements of slow wave sleep elements noted, including sleep spindles. No burst suppression pattern noted. Frequent, blunted generalized sharps with frontal maximum and triphasic morphology, with frequent runs of Frontal Intermittent Rhythmic Delta Activity (FIRDA). No seizures captured.      ACTIVATION PROCEDURES:   Not performed.      EKG: sampling of the EKG recording demonstrated sinus rhythm.      EVENTS:  None.      INTERPRETATION:  This is an abnormal 24 hrs video EEG recording in a comatose patient. A mild to moderate encephalopathy is suggested. The patient appears only mildly sedated, not in burst suppression pattern. Frequent, blunted generalized sharps with frontal maximum and triphasic morphology, with frequent runs of Frontal Intermittent Rhythmic Delta Activity (FIRDA). No seizures captured, however the study remains significantly abnormal. The findings suggest underlying areas of persinstently increased cortical irritability and/or structural abnormality. Clinical and radiological correlation is recommended.     Updates provided to Dr. Bear.         Garland Beatty MD   Epilepsy and Neurodiagnostics.   Clinical  of  Neurology [facility].   Diplomate in Neurology, Epilepsy, and Electrodiagnostic Medicine.   Office: [phone]  Fax: [phone]

## 2020-09-13 NOTE — PROGRESS NOTES
Hospital Neurology Progress Note:     Interval History: No acute events overnight.  Unable to obtain review of systems due to tracheostomy.  Seizures seen overnight but appear somewhat improved to prior day.    Objective:   Vitals:    09/13/20 0651 09/13/20 0700 09/13/20 0800 09/13/20 0928   BP:  110/84 120/84    Pulse: (!) 107 (!) 107 (!) 120 (!) 102   Resp: (!) 26 (!) 26 (!) 22 (!) 26   Temp:       TempSrc:       SpO2: 100% 98%  97%   Weight:       Height:           Labs:     Lab Results   Component Value Date/Time    PROTHROMBTM 14.0 08/25/2020 09:20 PM    INR 1.05 08/25/2020 09:20 PM      Lab Results   Component Value Date/Time    WBC 10.1 09/13/2020 04:13 AM    RBC 3.68 (L) 09/13/2020 04:13 AM    HEMOGLOBIN 11.4 (L) 09/13/2020 04:13 AM    HEMATOCRIT 35.6 (L) 09/13/2020 04:13 AM    MCV 96.7 09/13/2020 04:13 AM    MCH 31.0 09/13/2020 04:13 AM    MCHC 32.0 (L) 09/13/2020 04:13 AM    MPV 9.1 09/13/2020 04:13 AM    NEUTSPOLYS 66.50 09/13/2020 04:13 AM    LYMPHOCYTES 16.30 (L) 09/13/2020 04:13 AM    MONOCYTES 9.20 09/13/2020 04:13 AM    EOSINOPHILS 5.20 09/13/2020 04:13 AM    BASOPHILS 0.50 09/13/2020 04:13 AM    ANISOCYTOSIS 1+ 08/23/2020 10:05 PM      Lab Results   Component Value Date/Time    SODIUM 142 09/13/2020 04:13 AM    POTASSIUM 4.5 09/13/2020 04:13 AM    CHLORIDE 107 09/13/2020 04:13 AM    CO2 22 09/13/2020 04:13 AM    GLUCOSE 116 (H) 09/13/2020 04:13 AM    BUN 16 09/13/2020 04:13 AM    CREATININE 0.47 (L) 09/13/2020 04:13 AM      Lab Results   Component Value Date/Time    TRIGLYCERIDE 89 09/13/2020 04:13 AM       Lab Results   Component Value Date/Time    ALKPHOSPHAT 174 (H) 09/13/2020 04:13 AM    ASTSGOT 128 (H) 09/13/2020 04:13 AM    ALTSGPT 82 (H) 09/13/2020 04:13 AM    TBILIRUBIN 0.2 09/13/2020 04:13 AM        Imaging/Testing:   EEG from 9/12/2020 through 9/13/2020 was discussed with the interpreting neurologist.  There is improvement in seizure activity and the patient is now sedated however no burst  suppression.    Physical Exam:      General: 19-year-old female lying in bed with tracheostomy in place in no acute distress.  Cardio: Normal S1/S2. No peripheral edema.   Pulm: CTAX2. No respiratory distress.   Skin: Warm, dry, no rashes or lesions   Psychiatric: Unable to assess.  HEENT: Atraumatic head, normal sclera and conjunctiva, moist oral mucosa. No lid lag.  Tracheostomy in place.  Abdomen: Soft, non tender. No masses or hepatosplenomegaly.     Neurologic:  Mental Status: GCS 3 T (E1, M1, V1 T)  Cranial Nerves: Pupils equally round and reactive to light.  Oculocephalic reflex is absent.  Face appears symmetric.  Motor: Normal muscle tone and bulk.  No spontaneous movements observed.  Weak triple flexion response on the right lower extremity.  Reflexes: Deferred  Coordination: Unable to assess  Sensation: Triple flexion response in the right lower extremity.  Gait/Station: Unable to assess     Patient was seen and examined on 9/13/2020 compared to 9/12/2020 no significant clinical change was seen.    Assessment/Plan:    Annika He is a 19 y.o. female with history of cardiac arrest and subsequent anoxic brain injury due to polysubstance abuse who has had a protracted hospital course complicated by seizure activity.  EEG has not shown seizure activity for the last 48 hours however burst suppression was not achieved.  Nevertheless, at this time will start weaning propofol to see if seizure activity recurs.  If propofol is weaned successfully then will start weaning ketamine.  Etiology of seizures is likely secondary to anoxic brain injury.    Plan:  1.  Change Keppra to 1000 mg 3 times a day  2.  Continue Vimpat to 150 mg 3 times daily  3.  Patient's LFTs increased.  Will decrease Depakote back to 1000 mg 3 times a day.  4.  Continue ketamine to 3 mg/kg/h  5.  Continue Fycompa 10 mg daily  6.  Continue video monitored EEG  7.  Continue propofol at 60 mcg/kg/min  8.  If the patient's LFTs continue  to trend up, plan to decrease/discontinue Depakote and perhaps initiate oral phenobarbital.    Andrew Petersen M.D., Diplomat of the American Board of Psychiatry and Neurology  Diplomat of [facility]   Assistant Clinical Professor, [facility] Neurology Consultant

## 2020-09-13 NOTE — PROGRESS NOTES
"Critical Care Progress Note    Date of admission  8/23/2020    Chief Complaint  19 y.o. female admitted 8/23/2020 with drug overdose, cardiac arrest    Hospital Course    \"19 y.o. female who presented 8/23/2020 with overdose with methamphetamines, oxycodone and found by boyfriend. Cardiac arrest enroute to hospital and received narcan, epinephrine and cpr and ROSC achieved.  Difficult airway at scene and LMA placed.     8/24 - TTM protocol initiated, EEG without NCSE  8/25 - rewarmed, awake, not following  8/26 - following occasional commands  8/27- seizure like activity vs shivering--> Keppra, versed, Propofol  8/28- no seizures on EEG  8/29- remains unresponsive.  8/30 -mental status improved, now opening eyes and following commands.  Keppra discontinued  8/31 - started cefepime/vancomycin for possible pneumonia,?  UTI, fever and increasing WBC fever; full vent  9/1 - Seizure activity noted on EEG today, reviewed with neurology; keppra load and increased to 1gm q 12; full vent support  9/2 - remains unresponsive with only some posture and grimace to stimulation, neurology reconsulted, stat MRI brain, LP, cEEG  9/3 -ongoing seizures, Vimpat added, Depakote added, family conference today  9/4 -ongoing seizure noted on cEEG, started on midazolam infusion, full ventilator support  9/5 -ongoing seizures noted, propofol added and increased today, full ventilator support  9/6 -have not yet achieved burst suppression on cEEG; continue propofol, change Versed to ketamine infusion, discussed with neurology, full ventilator support  9/7 - still titrating propofol and ketamine for burst suppression on cEEG. Continue full ventilator support  9/8 - propofol off; ketamine is being weaned; on vimpat, keppra, depakote. Requiring full ventilator support  9/9 - Requiring full ventilator support. propofol and ketamine off; on vimpat, keppra, depakote. Postures to stimuli - consult for trach/PEG  9/10 -status post tracheostomy, off " propofol and on ketamine- beginning to wean antiepileptic medications, continues to require full ventilator support. PEG tomorrow. Reportedly is still having seizures.   9/11 PEG tube today, continues to require full mechanical ventilator support  9/12 returned to deep sedation secondary to persistent seizures; continues to require full vent support  9/13 continues to require full vent support; propofol and ketamine plus AEDs for persistent, intermittent seizures          Interval Problem Update  Reviewed last 24 hour events:  Ongoing, intermittent seizures  Intubated, sedated on propofol and ketamine   Elevated liver enzymes on VPA  UOP good  PPx lovenox + pepcid  Bactrim x 10/10 days for MRSA PNA  Vimpat, valproate, perampanel, keppra; plus propofol and ketamine    Review of Systems  Review of Systems   Unable to perform ROS: Intubated        Vital Signs for last 24 hours   Pulse:  [] 109  Resp:  [22-31] 26  BP: (104-134)/(57-90) 120/84  SpO2:  [95 %-100 %] 99 %    Hemodynamic parameters for last 24 hours       Respiratory Information for the last 24 hours  Vent Mode: APVCMV  Rate (breaths/min): 26  Vt Target (mL): 320  PEEP/CPAP: 8  MAP: 12  Control VTE (exp VT): 323    Physical Exam   Physical Exam  Vitals signs and nursing note reviewed.   Constitutional:       General: She is not in acute distress.     Appearance: She is not ill-appearing or toxic-appearing.      Interventions: She is intubated.   HENT:      Head: Normocephalic and atraumatic.      Right Ear: External ear normal.      Left Ear: External ear normal.      Nose: No congestion or rhinorrhea.      Mouth/Throat:      Mouth: Mucous membranes are moist.      Pharynx: No oropharyngeal exudate or posterior oropharyngeal erythema.   Eyes:      General: No scleral icterus.     Conjunctiva/sclera: Conjunctivae normal.      Pupils: Pupils are equal, round, and reactive to light.      Comments: Sluggish pupillary response   Neck:      Musculoskeletal:  Neck supple. No neck rigidity or muscular tenderness.   Cardiovascular:      Rate and Rhythm: Normal rate and regular rhythm.      Pulses: Normal pulses.      Heart sounds: Normal heart sounds. No murmur.   Pulmonary:      Effort: She is intubated.      Breath sounds: No wheezing or rales.      Comments: Full ventilator support, low support settings  Abdominal:      General: Abdomen is flat. There is no distension.      Palpations: Abdomen is soft. There is no mass.      Tenderness: There is no abdominal tenderness.      Comments: PEG tube dressing c/d/i   Musculoskeletal:         General: No swelling or tenderness.      Right lower leg: No edema.      Left lower leg: No edema.   Skin:     General: Skin is warm and dry.      Capillary Refill: Capillary refill takes less than 2 seconds.      Findings: No erythema or rash.   Neurological:      GCS: GCS eye subscore is 3. GCS verbal subscore is 1. GCS motor subscore is 5.      Cranial Nerves: No facial asymmetry.      Motor: Abnormal muscle tone present.      Comments: Trach, deep sedation   Psychiatric:      Comments: Unable to assess         Medications  Current Facility-Administered Medications   Medication Dose Route Frequency Provider Last Rate Last Dose   • levETIRAcetam (KEPPRA) tablet 1,000 mg  1,000 mg Enteral Tube Q8HR Andrew Petersen M.D.   1,000 mg at 09/13/20 1448   • Valproate Sodium (DEPAKENE) oral solution 1,000 mg  1,000 mg Enteral Tube Q8HRS Andrew Petersen M.D.   1,000 mg at 09/13/20 1448   • glycopyrrolate (ROBINUL) tablet 0.5 mg  0.5 mg Enteral Tube Q8HRS Andrew Petersen M.D.   0.5 mg at 09/13/20 1400   • levOCARNitine (CARNITOR) 1 GM/10ML solution 1,000 mg  1,000 mg Enteral Tube BID WITH MEALS Nithin Donohue M.D.       • lacosamide (VIMPAT) tablet 200 mg  200 mg Enteral Tube Q8HR Andrew Petersen M.D.   200 mg at 09/13/20 1710   • ketamine 1,000 mg in  mL infusion (critical care only)  3 mg/kg/hr Intravenous  Continuous Andrew Petersen M.D. 100.2 mL/hr at 09/13/20 1708 3 mg/kg/hr at 09/13/20 1708   • perampanel (FYCOMPA) tablet 10 mg  10 mg Enteral Tube QHS Nithin Donohue M.D.   10 mg at 09/12/20 2303   • fentaNYL (SUBLIMAZE) injection 100 mcg  100 mcg Intravenous Q15 MIN PRN JESSY Piper Jr..O.   100 mcg at 09/12/20 0230    And   • fentaNYL (SUBLIMAZE) injection 200 mcg  200 mcg Intravenous Q15 MIN PRN JESSY Piper Jr..O.   200 mcg at 09/10/20 1748    And   • fentaNYL (SUBLIMAZE) 50 mcg/mL in 50mL (Continuous Infusion)   Intravenous Continuous JESSY Piper Jr..O.   Stopped at 09/07/20 2230    And   • propofol (DIPRIVAN) injection  0-80 mcg/kg/min Intravenous Continuous JESSY Piper Jr..O. 23.2 mL/hr at 09/13/20 1522 60 mcg/kg/min at 09/13/20 1522   • polyethylene glycol/lytes (MIRALAX) PACKET 1 Packet  1 Packet Enteral Tube BID Dar Red M.D.   Stopped at 09/13/20 1800   • acetaminophen (TYLENOL) suppository 325 mg  325 mg Rectal Q6HRS PRN Yosef Tomlin M.D.   650 mg at 09/02/20 1632   • acetaminophen (TYLENOL) tablet 1,000 mg  1,000 mg Enteral Tube Q4HRS PRN Dar Red M.D.   1,000 mg at 09/04/20 1800   • labetalol (NORMODYNE/TRANDATE) injection 10 mg  10 mg Intravenous Q4HRS PRN Yosef Tomlin M.D.   10 mg at 08/30/20 2256   • Pharmacy Consult: Enteral tube insertion - review meds/change route/product selection  1 Each Other PHARMACY TO DOSE Boogie Augustine Jr., D.OYue       • Respiratory Therapy Consult   Nebulization Continuous RT Brock Murdock M.D.       • ipratropium-albuterol (DUONEB) nebulizer solution  3 mL Nebulization Q2HRS PRN (RT) Brock Murdock M.D.       • famotidine (PEPCID) tablet 20 mg  20 mg Enteral Tube Q12HRS Dar Red M.D.   20 mg at 09/13/20 1708   • senna-docusate (PERICOLACE or SENOKOT S) 8.6-50 MG per tablet 2 Tab  2 Tab Enteral Tube BID Brock Murdock M.D.   2 Tab at 09/13/20 1708    And   • polyethylene glycol/lytes (MIRALAX) PACKET 1  Packet  1 Packet Enteral Tube QDAY PRN Brock Murdock M.D.        And   • magnesium hydroxide (MILK OF MAGNESIA) suspension 30 mL  30 mL Enteral Tube QDAY PRN Brock Murdock M.D.        And   • bisacodyl (DULCOLAX) suppository 10 mg  10 mg Rectal QDAY PRN Brock Murdock M.D.       • MD Alert...ICU Electrolyte Replacement per Pharmacy   Other PHARMACY TO DOSE Brock Murdock M.D.       • lidocaine (XYLOCAINE) 1 % injection 1-2 mL  1-2 mL Tracheal Tube Q30 MIN PRN Brock Murdock M.D.       • enoxaparin (LOVENOX) inj 40 mg  40 mg Subcutaneous DAILY Boogie Augustine Jr., D.O.   40 mg at 09/13/20 0522       Fluids    Intake/Output Summary (Last 24 hours) at 9/13/2020 1712  Last data filed at 9/13/2020 1600  Gross per 24 hour   Intake 4791.6 ml   Output 3368 ml   Net 1423.6 ml       Laboratory          Recent Labs     09/11/20 0318 09/12/20  0630 09/13/20  0413   SODIUM 136 138 142   POTASSIUM 4.3 4.4 4.5   CHLORIDE 100 104 107   CO2 23 23 22   BUN 17 16 16   CREATININE 0.41* 0.42* 0.47*   CALCIUM 9.1 8.6 8.9     Recent Labs     09/11/20 0318 09/12/20  0630 09/13/20  0413 09/13/20  0951   ALTSGPT 25 19 82*  --    ASTSGOT 25 18 128*  --    ALKPHOSPHAT 83 72 174*  --    TBILIRUBIN 0.2 0.2 0.2  --    LIPASE  --   --   --  26   GLUCOSE 101* 116* 116*  --      Recent Labs     09/11/20 0318 09/12/20  0630 09/13/20  0413   WBC 13.0* 9.2 10.1   NEUTSPOLYS 69.90 61.40 66.50   LYMPHOCYTES 12.90* 18.30* 16.30*   MONOCYTES 11.40 12.60 9.20   EOSINOPHILS 4.00 5.10 5.20   BASOPHILS 0.30 0.40 0.50   ASTSGOT 25 18 128*   ALTSGPT 25 19 82*   ALKPHOSPHAT 83 72 174*   TBILIRUBIN 0.2 0.2 0.2     Recent Labs     09/11/20  0318 09/12/20  0630 09/13/20  0413   RBC 3.81* 3.39* 3.68*   HEMOGLOBIN 11.8* 10.3* 11.4*   HEMATOCRIT 36.7* 32.8* 35.6*   PLATELETCT 652* 586* 523*       Imaging  X-Ray:  I have personally reviewed the images and compared with prior images.     MRI 8/27:  The diffusion-weighted sequences demonstrates areas of restricted  diffusion in the bilateral basal ganglia and some of the frontal gray matter. The predominant portion of the brain parenchyma does not demonstrate any restricted diffusion. Therefore this   findings likely represent mild diffuse anoxic brain injury.    MRI 9/2:  1.  Severe diffuse anoxic brain injury pattern which is more extensive and conspicuous than on previous exam.  2.  No evidence of intracranial mass effect, extra-axial fluid collection, or interval development of hydrocephalus.  3.  Diffuse mucosal inflammatory changes filling the ethmoid and sphenoid sinuses.    Assessment/Plan  PEA (Pulseless electrical activity) (HCC)  PEA enroute to hospital  Hypoxia and aspiration contributory  MRI reveals bilateral basal ganglial anoxic injury.  Status post therapeutic hypothermia and rewarmed    Acute respiratory failure with hypoxia (HCC)  Secondary to drug overdose  Lung protective ventilation strategies  Optimize oxygenation, ventilation, and acid base balance.  Tracheostomy and PEG    Drug overdose  Methamphetamines, oxycodone and EtOH per boyfriend    Cerebral edema (HCC)  Mild/possible per radiologist on CT  MRI on 8/27/2020 without edema or severe changes  Repeat MRI 9/2 - radiographic evidence of anoxic brain injury more apparent then prior MRI    Elevated transaminase level  Follow    Encephalopathy acute  Anoxic brain injury, documented to be following commands s/p cardiac arrest prior to PNA and seizures  Currently cEEG for status, neurology following    Hypoglycemia  Follow    Pneumonia of both lungs due to methicillin resistant Staphylococcus aureus (MRSA) ([facility])  BAL 8/31 - > 100K CFU MRSA  Acinetobacter, stenotrophomonas, MRSA noted on prior BAL-as she is hemodynamically stable, afebrile, with a stable white count we will continue treating for the MRSA only.  Bactrim x 10 days        Dysphagia, oropharyngeal  Secondary to anoxic brain injury and seizures  Lap gastrostomy planned for 9/11      Secondary  to anoxic brain injury and seizures  Lap gastrostomy 9/11    Seizures (HCC)  Recurrent following PEA arrest and anoxic brain injury  Deep sedation using ketamine and propofol + vimpat, keppra, valproate, perampanel    Valproic acid toxicity  9/13 elevated LFTs and elevated ammonia  L-carnitine as antidote  Reduce VPA dosing       Updated plan:  Continue full ventilator support  Continuous EEG, still trying to achieve seizure cessation      VTE:  Lovenox  Ulcer: H2 Antagonist  Lines: Central Line  Ongoing indication addressed    I have performed a physical exam and reviewed and updated ROS and Plan today  In review of yesterday's note (9/12/2020), there are no changes except as documented above.        Discussed patient condition and risk of morbidity and/or mortality with RN, RT, Pharmacy, Dietary, , Code status disscussed, Charge nurse / hot rounds and neurology     The patient remains critically ill.  I have assessed and reassessed the patient multiple times.  She is at high risk for further vital organ deterioration with significant ongoing critical care management as above.  Critical care time = 32 minutes in directly providing and coordinating critical care and extensive data review.  No time overlap and excludes procedures.

## 2020-09-13 NOTE — CARE PLAN
Problem: Ventilation Defect:  Goal: Ability to achieve and maintain unassisted ventilation or tolerate decreased levels of ventilator support  Outcome: NOT MET   Vent day 22  Trache day 5  26/320/+8, 40%  SBT held, Cont. EEG present

## 2020-09-13 NOTE — CARE PLAN
Adult Ventilation Update    Total Vent Days: 21  Total Trach Days: 4  Vent:  x 26, 40% +8    Patient Lines/Drains/Airways Status      Active Airway       Name: Placement date: Placement time: Site: Days:    Airway Trach Tracheostomy 8.0  09/09/20   1547   Tracheostomy  4                  Mobility  Activity Performed: Unable to mobilize     Events/Summary/Plan: Patient stable on vent. No weaning or SBT. Continuous EEG.

## 2020-09-13 NOTE — CARE PLAN
Problem: Fluid Volume:  Goal: Will maintain balanced intake and output  Outcome: PROGRESSING AS EXPECTED     Problem: Infection  Goal: Will remain free from infection  Outcome: PROGRESSING AS EXPECTED     Problem: Communication  Goal: The ability to communicate needs accurately and effectively will improve  Outcome: PROGRESSING SLOWER THAN EXPECTED     Problem: Knowledge Deficit  Goal: Knowledge of disease process/condition, treatment plan, diagnostic tests, and medications will improve  Outcome: PROGRESSING SLOWER THAN EXPECTED  Goal: Knowledge of the prescribed therapeutic regimen will improve  Outcome: PROGRESSING SLOWER THAN EXPECTED

## 2020-09-14 ENCOUNTER — APPOINTMENT (OUTPATIENT)
Dept: RADIOLOGY | Facility: MEDICAL CENTER | Age: 20
DRG: 004 | End: 2020-09-14
Attending: INTERNAL MEDICINE
Payer: MEDICAID

## 2020-09-14 PROBLEM — G93.1 ANOXIC ENCEPHALOPATHY (HCC): Status: ACTIVE | Noted: 2020-08-24

## 2020-09-14 LAB
ALBUMIN SERPL BCP-MCNC: 3.1 G/DL (ref 3.2–4.9)
ALBUMIN/GLOB SERPL: 1.1 G/DL
ALP SERPL-CCNC: 232 U/L (ref 30–99)
ALT SERPL-CCNC: 253 U/L (ref 2–50)
ANION GAP SERPL CALC-SCNC: 12 MMOL/L (ref 7–16)
AST SERPL-CCNC: 115 U/L (ref 12–45)
BASOPHILS # BLD AUTO: 0.3 % (ref 0–1.8)
BASOPHILS # BLD: 0.03 K/UL (ref 0–0.12)
BILIRUB SERPL-MCNC: <0.2 MG/DL (ref 0.1–1.5)
BUN SERPL-MCNC: 13 MG/DL (ref 8–22)
CALCIUM SERPL-MCNC: 8.7 MG/DL (ref 8.5–10.5)
CHLORIDE SERPL-SCNC: 106 MMOL/L (ref 96–112)
CO2 SERPL-SCNC: 21 MMOL/L (ref 20–33)
CREAT SERPL-MCNC: 0.45 MG/DL (ref 0.5–1.4)
CRP SERPL HS-MCNC: 2.86 MG/DL (ref 0–0.75)
EOSINOPHIL # BLD AUTO: 0.46 K/UL (ref 0–0.51)
EOSINOPHIL NFR BLD: 5 % (ref 0–6.9)
ERYTHROCYTE [DISTWIDTH] IN BLOOD BY AUTOMATED COUNT: 47.8 FL (ref 35.9–50)
GLOBULIN SER CALC-MCNC: 2.8 G/DL (ref 1.9–3.5)
GLUCOSE SERPL-MCNC: 127 MG/DL (ref 65–99)
HCT VFR BLD AUTO: 34.6 % (ref 37–47)
HGB BLD-MCNC: 10.9 G/DL (ref 12–16)
IMM GRANULOCYTES # BLD AUTO: 0.12 K/UL (ref 0–0.11)
IMM GRANULOCYTES NFR BLD AUTO: 1.3 % (ref 0–0.9)
LYMPHOCYTES # BLD AUTO: 1.36 K/UL (ref 1–4.8)
LYMPHOCYTES NFR BLD: 14.9 % (ref 22–41)
MCH RBC QN AUTO: 31 PG (ref 27–33)
MCHC RBC AUTO-ENTMCNC: 31.5 G/DL (ref 33.6–35)
MCV RBC AUTO: 98.3 FL (ref 81.4–97.8)
MONOCYTES # BLD AUTO: 0.92 K/UL (ref 0–0.85)
MONOCYTES NFR BLD AUTO: 10.1 % (ref 0–13.4)
NEUTROPHILS # BLD AUTO: 6.25 K/UL (ref 2–7.15)
NEUTROPHILS NFR BLD: 68.4 % (ref 44–72)
NRBC # BLD AUTO: 0 K/UL
NRBC BLD-RTO: 0 /100 WBC
PLATELET # BLD AUTO: 487 K/UL (ref 164–446)
PMV BLD AUTO: 9.2 FL (ref 9–12.9)
POTASSIUM SERPL-SCNC: 4 MMOL/L (ref 3.6–5.5)
PREALB SERPL-MCNC: 43.4 MG/DL (ref 18–38)
PROT SERPL-MCNC: 5.9 G/DL (ref 6–8.2)
RBC # BLD AUTO: 3.52 M/UL (ref 4.2–5.4)
SODIUM SERPL-SCNC: 139 MMOL/L (ref 135–145)
WBC # BLD AUTO: 9.1 K/UL (ref 4.8–10.8)

## 2020-09-14 PROCEDURE — A9270 NON-COVERED ITEM OR SERVICE: HCPCS | Performed by: INTERNAL MEDICINE

## 2020-09-14 PROCEDURE — A9270 NON-COVERED ITEM OR SERVICE: HCPCS | Performed by: PSYCHIATRY & NEUROLOGY

## 2020-09-14 PROCEDURE — 770022 HCHG ROOM/CARE - ICU (200)

## 2020-09-14 PROCEDURE — 700102 HCHG RX REV CODE 250 W/ 637 OVERRIDE(OP): Performed by: INTERNAL MEDICINE

## 2020-09-14 PROCEDURE — 94770 HCHG CO2 EXPIRED GAS DETERMINATION: CPT

## 2020-09-14 PROCEDURE — 84134 ASSAY OF PREALBUMIN: CPT

## 2020-09-14 PROCEDURE — 99291 CRITICAL CARE FIRST HOUR: CPT | Mod: 25 | Performed by: PSYCHIATRY & NEUROLOGY

## 2020-09-14 PROCEDURE — 80053 COMPREHEN METABOLIC PANEL: CPT

## 2020-09-14 PROCEDURE — 86140 C-REACTIVE PROTEIN: CPT

## 2020-09-14 PROCEDURE — 94003 VENT MGMT INPAT SUBQ DAY: CPT

## 2020-09-14 PROCEDURE — 700111 HCHG RX REV CODE 636 W/ 250 OVERRIDE (IP)

## 2020-09-14 PROCEDURE — 700105 HCHG RX REV CODE 258: Performed by: PSYCHIATRY & NEUROLOGY

## 2020-09-14 PROCEDURE — 700102 HCHG RX REV CODE 250 W/ 637 OVERRIDE(OP): Performed by: PSYCHIATRY & NEUROLOGY

## 2020-09-14 PROCEDURE — 99291 CRITICAL CARE FIRST HOUR: CPT | Performed by: INTERNAL MEDICINE

## 2020-09-14 PROCEDURE — 95720 EEG PHY/QHP EA INCR W/VEEG: CPT | Performed by: PSYCHIATRY & NEUROLOGY

## 2020-09-14 PROCEDURE — 700111 HCHG RX REV CODE 636 W/ 250 OVERRIDE (IP): Performed by: INTERNAL MEDICINE

## 2020-09-14 PROCEDURE — 85025 COMPLETE CBC W/AUTO DIFF WBC: CPT

## 2020-09-14 PROCEDURE — 700101 HCHG RX REV CODE 250: Performed by: PSYCHIATRY & NEUROLOGY

## 2020-09-14 PROCEDURE — 4A10X4Z MONITORING OF CENTRAL NERVOUS ELECTRICAL ACTIVITY, EXTERNAL APPROACH: ICD-10-PCS | Performed by: PSYCHIATRY & NEUROLOGY

## 2020-09-14 PROCEDURE — 95714 VEEG EA 12-26 HR UNMNTR: CPT | Performed by: PSYCHIATRY & NEUROLOGY

## 2020-09-14 PROCEDURE — 71045 X-RAY EXAM CHEST 1 VIEW: CPT

## 2020-09-14 RX ADMIN — LEVETIRACETAM 1000 MG: 500 TABLET, FILM COATED ORAL at 14:50

## 2020-09-14 RX ADMIN — LEVETIRACETAM 1000 MG: 500 TABLET, FILM COATED ORAL at 06:36

## 2020-09-14 RX ADMIN — PROPOFOL 60 MCG/KG/MIN: 10 INJECTION, EMULSION INTRAVENOUS at 16:29

## 2020-09-14 RX ADMIN — ACETAMINOPHEN 1000 MG: 325 TABLET, FILM COATED ORAL at 18:10

## 2020-09-14 RX ADMIN — DOCUSATE SODIUM 50 MG AND SENNOSIDES 8.6 MG 2 TABLET: 8.6; 5 TABLET, FILM COATED ORAL at 18:11

## 2020-09-14 RX ADMIN — VALPROIC ACID 500 MG: 250 SOLUTION ORAL at 22:58

## 2020-09-14 RX ADMIN — FAMOTIDINE 20 MG: 20 TABLET, FILM COATED ORAL at 06:35

## 2020-09-14 RX ADMIN — GLYCOPYRROLATE 0.5 MG: 1 TABLET ORAL at 21:40

## 2020-09-14 RX ADMIN — LEVETIRACETAM 1000 MG: 500 TABLET, FILM COATED ORAL at 22:00

## 2020-09-14 RX ADMIN — PROPOFOL 60 MCG/KG/MIN: 10 INJECTION, EMULSION INTRAVENOUS at 08:25

## 2020-09-14 RX ADMIN — LACOSAMIDE 200 MG: 100 TABLET, FILM COATED ORAL at 18:12

## 2020-09-14 RX ADMIN — LACOSAMIDE 200 MG: 100 TABLET, FILM COATED ORAL at 03:26

## 2020-09-14 RX ADMIN — GLYCOPYRROLATE 0.5 MG: 1 TABLET ORAL at 14:50

## 2020-09-14 RX ADMIN — ENOXAPARIN SODIUM 40 MG: 40 INJECTION SUBCUTANEOUS at 06:35

## 2020-09-14 RX ADMIN — KETAMINE HYDROCHLORIDE 3 MG/KG/HR: 50 INJECTION, SOLUTION INTRAMUSCULAR; INTRAVENOUS at 04:24

## 2020-09-14 RX ADMIN — LEVOCARNITINE 1000 MG: 1 SOLUTION ORAL at 08:08

## 2020-09-14 RX ADMIN — MIDAZOLAM 6 MG/HR: 5 INJECTION INTRAMUSCULAR; INTRAVENOUS at 11:39

## 2020-09-14 RX ADMIN — PROPOFOL 60 MCG/KG/MIN: 10 INJECTION, EMULSION INTRAVENOUS at 00:02

## 2020-09-14 RX ADMIN — PROPOFOL 60 MCG/KG/MIN: 10 INJECTION, EMULSION INTRAVENOUS at 21:45

## 2020-09-14 RX ADMIN — VALPROIC ACID 1000 MG: 250 SOLUTION ORAL at 06:35

## 2020-09-14 RX ADMIN — GLYCOPYRROLATE 0.5 MG: 1 TABLET ORAL at 06:36

## 2020-09-14 RX ADMIN — KETAMINE HYDROCHLORIDE 3 MG/KG/HR: 50 INJECTION, SOLUTION INTRAMUSCULAR; INTRAVENOUS at 21:46

## 2020-09-14 RX ADMIN — KETAMINE HYDROCHLORIDE 3 MG/KG/HR: 50 INJECTION, SOLUTION INTRAMUSCULAR; INTRAVENOUS at 10:22

## 2020-09-14 RX ADMIN — LACOSAMIDE 200 MG: 100 TABLET, FILM COATED ORAL at 10:27

## 2020-09-14 RX ADMIN — PROPOFOL 60 MCG/KG/MIN: 10 INJECTION, EMULSION INTRAVENOUS at 12:44

## 2020-09-14 RX ADMIN — PROPOFOL 60 MCG/KG/MIN: 10 INJECTION, EMULSION INTRAVENOUS at 04:04

## 2020-09-14 RX ADMIN — VALPROIC ACID 500 MG: 250 SOLUTION ORAL at 15:55

## 2020-09-14 RX ADMIN — MIDAZOLAM HYDROCHLORIDE 6 MG/HR: 5 INJECTION, SOLUTION INTRAMUSCULAR; INTRAVENOUS at 11:39

## 2020-09-14 RX ADMIN — FAMOTIDINE 20 MG: 20 TABLET, FILM COATED ORAL at 18:10

## 2020-09-14 RX ADMIN — LEVOCARNITINE 1000 MG: 1 SOLUTION ORAL at 18:11

## 2020-09-14 RX ADMIN — KETAMINE HYDROCHLORIDE 3 MG/KG/HR: 50 INJECTION, SOLUTION INTRAMUSCULAR; INTRAVENOUS at 15:54

## 2020-09-14 RX ADMIN — PERAMPANEL 10 MG: 2 TABLET ORAL at 21:42

## 2020-09-14 ASSESSMENT — COGNITIVE AND FUNCTIONAL STATUS - GENERAL
WALKING IN HOSPITAL ROOM: TOTAL
CLIMB 3 TO 5 STEPS WITH RAILING: TOTAL
MOVING FROM LYING ON BACK TO SITTING ON SIDE OF FLAT BED: UNABLE
TOILETING: TOTAL
DRESSING REGULAR LOWER BODY CLOTHING: TOTAL
MOBILITY SCORE: 6
STANDING UP FROM CHAIR USING ARMS: TOTAL
PERSONAL GROOMING: TOTAL
TURNING FROM BACK TO SIDE WHILE IN FLAT BAD: UNABLE
EATING MEALS: TOTAL
HELP NEEDED FOR BATHING: TOTAL
DAILY ACTIVITIY SCORE: 6
DRESSING REGULAR UPPER BODY CLOTHING: TOTAL
MOVING TO AND FROM BED TO CHAIR: UNABLE
SUGGESTED CMS G CODE MODIFIER MOBILITY: CN
SUGGESTED CMS G CODE MODIFIER DAILY ACTIVITY: CN

## 2020-09-14 ASSESSMENT — FIBROSIS 4 INDEX: FIB4 SCORE: 0.31

## 2020-09-14 NOTE — PROCEDURES
VIDEO ELECTROENCEPHALOGRAM REPORT        Referring provider: Dr. Red.      DOS: 9/14/2020 (total recording of 23 hours and 20 minutes).      INDICATION:  Annika He 19 y.o. female presenting with s/p cardiac arrest, altered mental status, seizures.      CURRENT ANTIEPILEPTIC REGIMEN: Levetiracetam 1000 mg tid, Lacosamide 200 mg tid, Valproic Acid 1250 mg tid, Perampanel at 10 mg qhs. Sedation with Ketamine, Propofol, as well as Midazolam infusion, which was added during the test.      TECHNIQUE: 30 channel video electroencephalogram (EEG) was performed in accordance with the international 10-20 system. The study was reviewed in bipolar and referential montages. The recording examined a sedated and/or comatose patient.      DESCRIPTION OF THE RECORD:  Generalized delta / theta activity, with elements of slow wave sleep elements noted, including sleep spindles. A subtle burst suppression pattern noted intermittently. Frequent, blunted generalized sharps with frontal maximum and triphasic morphology, still with frequent runs of Frontal Intermittent Rhythmic Delta Activity (FIRDA). No seizures captured.      ACTIVATION PROCEDURES:   Not performed.      EKG: sampling of the EKG recording demonstrated sinus rhythm.      EVENTS:  None.      INTERPRETATION:  This is an abnormal 24 hrs video EEG recording in a sedated and/or comatose patient. A mild to moderate encephalopathy is suggested. A subtle burst suppression pattern is noted at times. Frequent, blunted generalized sharps with frontal maximum and triphasic morphology, still with frequent runs of Frontal Intermittent Rhythmic Delta Activity (FIRDA). No seizures captured, and some additional improvement with addition of Midazolam, however the study remains abnormal. The findings suggest underlying areas of persinstently increased cortical irritability and/or structural abnormality. Clinical and radiological correlation is recommended.     Updates  provided to Dr. Bear.         Garland Beatty MD   Epilepsy and Neurodiagnostics.   Clinical  of Neurology [facility].   Diplomate in Neurology, Epilepsy, and Electrodiagnostic Medicine.   Office: [phone]  Fax: [phone]

## 2020-09-14 NOTE — DISCHARGE PLANNING
Care Transition Team Assessment    Information Source  Orientation : Unable to Assess  Who is responsible for making decisions for patient? : Patient    Readmission Evaluation  Is this a readmission?: No    Elopement Risk  Legal Hold: No  Ambulatory or Self Mobile in Wheelchair: No-Not an Elopement Risk  Disoriented: (arturo)  Elopement Risk: Not at Risk for Elopement    Interdisciplinary Discharge Planning  Primary Care Physician: (none)  Lives with - Patient's Self Care Capacity: (boyfriend)  Patient or legal guardian wants to designate a caregiver: No  Support Systems: (mom and dad who live separately but locally)  Housing / Facility: Mobile Home  Mobility Issues: No  Prior Services: None  Durable Medical Equipment: Not Applicable    Discharge Preparedness  What is your plan after discharge?: Uncertain - pending medical team collaboration  What are your discharge supports?: Parent  Prior Functional Level: Ambulatory, Independent with Activities of Daily Living    Functional Assesment  Prior Functional Level: Ambulatory, Independent with Activities of Daily Living    Finances  Prescription Coverage: No  Prescription Coverage Comments: no medical insurance              Advance Directive  Advance Directive?: (no)    Domestic Abuse  Have you ever been the victim of abuse or violence?: Not Sure  Physical Abuse or Sexual Abuse: Unable to Assess due to Patient Condition  Verbal Abuse or Emotional Abuse: Unable to Assess due to Patient Condition  Possible Abuse/Neglect Reported to:: Not Applicable    Psychological Assessment  History of Substance Abuse: Amphetamines  Date Last Used - Amphetamines: prior to admission  Substance Abuse Comments: (anoxic brain injury from admission dx)  History of Psychiatric Problems: (MARK ANTHONY)         Anticipated Discharge Information  Discharge Address: [address] [place] [address]

## 2020-09-14 NOTE — PROGRESS NOTES
Critical Care Progress Note    Date of admission  8/23/2020    Chief Complaint  19 y.o. female admitted 8/23/2020 with a cardiac arrest following an apparent drug overdose.    Hospital Course    8/24 - TTM protocol initiated, EEG without NCSE  8/25 - rewarmed, awake, not following  8/26 - following occasional commands  8/27- seizure like activity vs shivering--> Keppra, versed, Propofol  8/28- no seizures on EEG  8/29- remains unresponsive.  8/30 -mental status improved, now opening eyes and following commands.  Keppra discontinued  8/31 - started cefepime/vancomycin for possible pneumonia,?  UTI, fever and increasing WBC fever; full vent  9/1 - Seizure activity noted on EEG today, reviewed with neurology; keppra load and increased to 1gm q 12; full vent support  9/2 - remains unresponsive with only some posture and grimace to stimulation, neurology reconsulted, stat MRI brain, LP, cEEG  9/3 -ongoing seizures, Vimpat added, Depakote added, family conference today  9/4 -ongoing seizure noted on cEEG, started on midazolam infusion, full ventilator support  9/5 -ongoing seizures noted, propofol added and increased today, full ventilator support  9/6 -have not yet achieved burst suppression on cEEG; continue propofol, change Versed to ketamine infusion, discussed with neurology, full ventilator support  9/7 - still titrating propofol and ketamine for burst suppression on cEEG. Continue full ventilator support  9/8 - propofol off; ketamine is being weaned; on vimpat, keppra, depakote. Requiring full ventilator support  9/9 - Requiring full ventilator support. propofol and ketamine off; on vimpat, keppra, depakote. Postures to stimuli - consult for trach/PEG  9/10 -status post tracheostomy, off propofol and on ketamine- beginning to wean antiepileptic medications, continues to require full ventilator support. PEG tomorrow. Reportedly is still having seizures.   9/11 PEG tube today, continues to require full mechanical  ventilator support  9/12 returned to deep sedation secondary to persistent seizures; continues to require full vent support  9/13 continues to require full vent support; propofol and ketamine plus AEDs for persistent, intermittent seizures  9/14 -    continue full vent support.  Continue propofol, ketamine.  Start Versed.  Continuous EEG.      Interval Problem Update  Reviewed last 24 hour events:      GCS 3  + cough, gag, corneal  ST  Vent 23  Trach 6  PEEP 8   30%  PEG  UOP OK  Ketamine 100  Prop 60  Versed 6  TF at goal      Review of Systems  Review of Systems   Unable to perform ROS: Acuity of condition        Vital Signs for last 24 hours   Pulse:  [] 108  Resp:  [19-28] 26  BP: (107-129)/(65-93) 111/75  SpO2:  [95 %-100 %] 100 %    Hemodynamic parameters for last 24 hours       Respiratory Information for the last 24 hours  Vent Mode: APVCMV  Rate (breaths/min): 26  Vt Target (mL): 320  PEEP/CPAP: 8  MAP: 12  Control VTE (exp VT): 318    Physical Exam   Physical Exam  Constitutional:       Appearance: She is not diaphoretic.      Comments: On ventilator   HENT:      Head: Normocephalic and atraumatic.      Right Ear: External ear normal.      Left Ear: External ear normal.      Nose: Nose normal.      Mouth/Throat:      Mouth: Mucous membranes are moist.      Pharynx: Oropharynx is clear.   Eyes:      Conjunctiva/sclera: Conjunctivae normal.      Pupils: Pupils are equal, round, and reactive to light.   Neck:      Musculoskeletal: Normal range of motion and neck supple.   Cardiovascular:      Pulses: Normal pulses.      Heart sounds: No murmur. No gallop.       Comments: Sinus rhythm  Pulmonary:      Breath sounds: Rales (Few scattered crackles) present. No wheezing.   Abdominal:      General: Bowel sounds are normal. There is no distension.      Palpations: Abdomen is soft.      Tenderness: There is no abdominal tenderness. There is no guarding.      Comments: Tolerating enteral tube feedings    Musculoskeletal: Normal range of motion.      Right lower leg: No edema.      Left lower leg: No edema.      Comments: No clubbing or cyanosis   Skin:     General: Skin is warm and dry.      Capillary Refill: Capillary refill takes less than 2 seconds.   Neurological:      Comments: Pupils 4 mm, equal and briskly reactive.         Medications  Current Facility-Administered Medications   Medication Dose Route Frequency Provider Last Rate Last Dose   • midazolam (VERSED) premix 125 mg/125 mL NS  6 mg/hr Intravenous Continuous Toni Bear M.D. 6 mL/hr at 09/14/20 1139 6 mg/hr at 09/14/20 1139   • Valproate Sodium (DEPAKENE) oral solution 500 mg  500 mg Enteral Tube Q8HRS Toni Bear M.D.   500 mg at 09/14/20 1555   • fentaNYL (SUBLIMAZE) injection  mcg   mcg Intravenous Q HOUR PRN Angel Luis Lanier M.D.       • levETIRAcetam (KEPPRA) tablet 1,000 mg  1,000 mg Enteral Tube Q8HR Andrew Petersen M.D.   1,000 mg at 09/14/20 1450   • glycopyrrolate (ROBINUL) tablet 0.5 mg  0.5 mg Enteral Tube Q8HRS Andrew Petersen M.D.   0.5 mg at 09/14/20 1450   • levOCARNitine (CARNITOR) 1 GM/10ML solution 1,000 mg  1,000 mg Enteral Tube BID WITH MEALS Nithin Donohue M.D.   1,000 mg at 09/14/20 1811   • lacosamide (VIMPAT) tablet 200 mg  200 mg Enteral Tube Q8HR Andrew Petersen M.D.   200 mg at 09/14/20 1812   • ketamine 1,000 mg in  mL infusion (critical care only)  3 mg/kg/hr Intravenous Continuous Andrew Petersen M.D. 100.2 mL/hr at 09/14/20 1554 3 mg/kg/hr at 09/14/20 1554   • perampanel (FYCOMPA) tablet 10 mg  10 mg Enteral Tube QHS Nithin Donohue M.D.   10 mg at 09/13/20 2232   • propofol (DIPRIVAN) injection  0-80 mcg/kg/min Intravenous Continuous Boogie Augustine Jr., D.O. 23.2 mL/hr at 09/14/20 1629 60 mcg/kg/min at 09/14/20 1629   • polyethylene glycol/lytes (MIRALAX) PACKET 1 Packet  1 Packet Enteral Tube BID Dar Red M.D.   Stopped at 09/13/20 1800   •  acetaminophen (TYLENOL) suppository 325 mg  325 mg Rectal Q6HRS PRN Yosef Tomlin M.D.   650 mg at 09/02/20 1632   • acetaminophen (TYLENOL) tablet 1,000 mg  1,000 mg Enteral Tube Q4HRS PRN Dar Red M.D.   1,000 mg at 09/14/20 1810   • labetalol (NORMODYNE/TRANDATE) injection 10 mg  10 mg Intravenous Q4HRS PRN Yosef Tomlin M.D.   10 mg at 08/30/20 2256   • Pharmacy Consult: Enteral tube insertion - review meds/change route/product selection  1 Each Other PHARMACY TO DOSE Boogie Augustine Jr., D.O.       • Respiratory Therapy Consult   Nebulization Continuous RT Brock Murdock M.D.       • ipratropium-albuterol (DUONEB) nebulizer solution  3 mL Nebulization Q2HRS PRN (RT) Brock Murdock M.D.       • famotidine (PEPCID) tablet 20 mg  20 mg Enteral Tube Q12HRS Dar Red M.D.   20 mg at 09/14/20 1810   • senna-docusate (PERICOLACE or SENOKOT S) 8.6-50 MG per tablet 2 Tab  2 Tab Enteral Tube BID Brock Murdock M.D.   2 Tab at 09/14/20 1811    And   • polyethylene glycol/lytes (MIRALAX) PACKET 1 Packet  1 Packet Enteral Tube QDAY PRN Brock Murdock M.D.        And   • magnesium hydroxide (MILK OF MAGNESIA) suspension 30 mL  30 mL Enteral Tube QDAY PRN Brock Murdcok M.D.        And   • bisacodyl (DULCOLAX) suppository 10 mg  10 mg Rectal QDAY PRN Brock Murdock M.D.       • MD Alert...ICU Electrolyte Replacement per Pharmacy   Other PHARMACY TO DOSE Brock Murdock M.D.       • lidocaine (XYLOCAINE) 1 % injection 1-2 mL  1-2 mL Tracheal Tube Q30 MIN PRN Brock Murdock M.D.       • enoxaparin (LOVENOX) inj 40 mg  40 mg Subcutaneous DAILY Boogie Augustine Jr., D.O.   40 mg at 09/14/20 0635       Fluids    Intake/Output Summary (Last 24 hours) at 9/14/2020 1930  Last data filed at 9/14/2020 1800  Gross per 24 hour   Intake 5046.49 ml   Output 4250 ml   Net 796.49 ml       Laboratory          Recent Labs     09/12/20  0630 09/13/20  0413 09/14/20  0400   SODIUM 138 142 139   POTASSIUM 4.4 4.5 4.0   CHLORIDE  104 107 106   CO2 23 22 21   BUN 16 16 13   CREATININE 0.42* 0.47* 0.45*   CALCIUM 8.6 8.9 8.7     Recent Labs     09/12/20  0630 09/13/20  0413 09/13/20  0951 09/14/20  0400 09/14/20  1513   ALTSGPT 19 82*  --  253*  --    ASTSGOT 18 128*  --  115*  --    ALKPHOSPHAT 72 174*  --  232*  --    TBILIRUBIN 0.2 0.2  --  <0.2  --    LIPASE  --   --  26  --   --    PREALBUMIN  --   --   --   --  43.4*   GLUCOSE 116* 116*  --  127*  --      Recent Labs     09/12/20  0630 09/13/20 0413 09/14/20  0400   WBC 9.2 10.1 9.1   NEUTSPOLYS 61.40 66.50 68.40   LYMPHOCYTES 18.30* 16.30* 14.90*   MONOCYTES 12.60 9.20 10.10   EOSINOPHILS 5.10 5.20 5.00   BASOPHILS 0.40 0.50 0.30   ASTSGOT 18 128* 115*   ALTSGPT 19 82* 253*   ALKPHOSPHAT 72 174* 232*   TBILIRUBIN 0.2 0.2 <0.2     Recent Labs     09/12/20 0630 09/13/20 0413 09/14/20  0400   RBC 3.39* 3.68* 3.52*   HEMOGLOBIN 10.3* 11.4* 10.9*   HEMATOCRIT 32.8* 35.6* 34.6*   PLATELETCT 586* 523* 487*       Imaging  X-Ray:  I have personally reviewed the images and compared with prior images. and My impression is: Minimal bibasilar opacification    Assessment/Plan  * Acute respiratory failure with hypoxia (HCC)- (present on admission)  Assessment & Plan  Intubated 8/24  Trach 9/9  All of the appropriate ventilator bundles are in place  Continue vent support    Anoxic encephalopathy (HCC)- (present on admission)  Assessment & Plan  MRI on 9/2 consistent with severe anoxic brain injury  EEG with frequent, blunted generalized sharps with frontal maximum and triphasic morphology with frequent runs of frontal intermittent rhythmic delta activity (FIRDA)  Continue AEDs    Drug overdose- (present on admission)  Assessment & Plan  BA 0.16 on presentation  Positive urine drug screen for amphetamines on presentation  Boyfriend reported methamphetamine, oxycodone and alcohol use    Seizures (HCC)  Assessment & Plan  Continuous video EEG  Continue Vimpat, 200 mg every 8 hours  Decrease valproic  acid, 500 mg every 8 hours  Continue Keppra, 1000 mg every 8 hours  Start Versed drip at 6 mg an hour  Continue Fycompa, 10 mg nightly    Pneumonia of both lungs due to methicillin resistant Staphylococcus aureus (MRSA) (HCC)  Assessment & Plan  Sputum culture with MRSA, Acinetobacter and Stenotrophomonas species  Completed 10 days of Bactrim on 9/13  Observe off antibiotics    Elevated transaminase level- (present on admission)  Assessment & Plan  Suspect due to valproic acid  Decrease dose of valproic acid and trend liver enzymes    S/P PEA cardiac arrest (HCC)- (present on admission)  Assessment & Plan  S/P therapeutic hypothermia protocol    Dysphagia, oropharyngeal- (present on admission)  Assessment & Plan  S/P laparoscopic gastrostomy on 9/11  Continue enteral tube feedings       VTE:  Lovenox  Ulcer: H2 Antagonist  Lines: Pearce Catheter  Ongoing indication addressed    I have performed a physical exam and reviewed and updated ROS and Plan today (9/14/2020). In review of yesterday's note (9/13/2020), there are no changes except as documented above.     I have assessed and reassessed her respiratory status with ventilator adjustments, ventilator waveforms, airway mechanics, blood pressure, hemodynamics, cardiovascular status and neurologic status.  She is at increased risk for worsening CNS, respiratory and cardiovascular system dysfunction.    Discussed patient condition and risk of morbidity and/or mortality with RN, RT, Pharmacy, Charge nurse / hot rounds and QA team     The patient remains critically ill.  Critical care time = 35 minutes in directly providing and coordinating critical care and extensive data review.  No time overlap and excludes procedures.    Angel Luis Lanier MD  Pulmonary and Critical Care Medicine

## 2020-09-14 NOTE — PROGRESS NOTES
Issue in [facility] with dispensing Vimpat 100mg tablets. Dosage for patient is 2 x 100mg tablets, however only 1 tablet dispensed. Typed that wrong amount was dispensed in [facility], and machine then locked out. Bertha, Pharmacy tech to unit to assist. Per Bertha count verified as correct. Now dispensing correct amount. Meds administered.

## 2020-09-14 NOTE — CARE PLAN
Problem: Ventilation Defect:  Goal: Ability to achieve and maintain unassisted ventilation or tolerate decreased levels of ventilator support  Outcome: NOT MET   Vent day 23  Trache day 6  16/320/+8, 30%  Cont. EEG present, SBT held

## 2020-09-14 NOTE — DIETARY
Nutrition support weekly update:  Day 21 of admit.  Annika He is a 19 y.o. female with admitting DX of cardiac arrest, Drug overdose.     Tube feeding on 8/25.   Current TF via PEG is Fibersource HN @70 kcal/hr providing 2016 kcals, 91 grams protein and 1361 mL free water per day.     Assessment:  Weight 65.1kg via bed scale.     Estimated nutritional needs:  Re-estimate of nutritional needs based on:    REE per MSJ X 1.2 = 1663 kcal/day  RMR PSU (ventL/min 8.4, Tmax 37.7) = 1673 kcal/day  Metabolic cart study completed 9/1: REE = 2890 kcal/day  25-30 kcal/kg = 3955-8748 kcal/ day  1.2-1.5 =78-98 gram protein/day  7995-6161 ml free water/day (~ 30-35 ml/kg)     Evaluation:   1. Pt remains intubated vent day 23. Pt has trach   2. TF @ goal  3. Meds: Robinul, Lovenox, Pepcid, Vimpat, Keppra, Levocarnitine, Electorlyte Replacement per Pharmacy, senna, perampanel,  Propofol 23.2mL/hr (612 kcals/day), ketamine, Midazolam, bowel protocol.   4. Labs: glu 127, Crea 0.45, Ammonia 62 (9/13)   5. Last BM: 9/14  6. Will use current wt for calculations as expected dry wt. Actual wt difficult to assess due to equipment on bed and wt fluctuations. Limited wts available in chart review/no picture ID available to confirm UBW. Wt on 9/5 has bedscale wt of 43.5 kg, does not appear correct per observation. Will continue to monitor trends.  7. Change to formula suitable for use while on propofol, RN states pt likely to stay at current propofol rate at this time. Will continue to meet upper end of needs based on recent Met cart study.   8. Per rounds, pt with generalized edema.       Malnutrition risk: No new risk identified at this time.     Recommendations/Plan:  1. On propofol: Change TF formula to Impact Peptide 1.5 @ 25mL/hr and advance per protocol to goal rate 40 ml/hr to provide 1440 kcals/day (TF +propofol= 2052 kcals/day), 90 g pro/day, and 739 ml free water/day.   2. Once propofol is d/c'd: Change to TF back to  Fibersource HN @70 kcal/hr providing 2016 kcals, 91 grams protein and 1361 mL free water per day.  3. Fluids per MD.   4. Monitor weights       RD following.

## 2020-09-14 NOTE — CARE PLAN
Problem: Bowel/Gastric:  Goal: Normal bowel function is maintained or improved  Outcome: PROGRESSING AS EXPECTED     Problem: Bowel/Gastric:  Goal: Will not experience complications related to bowel motility  Outcome: PROGRESSING AS EXPECTED     Patient had large soft brown BM this evening. Tolerating PEG tube feeding at goal rate. Will continue to monitor.

## 2020-09-14 NOTE — PROGRESS NOTES
Notified MD Bear that EEG cables are not MRI compatible, pt is still coughing a lot despite versed with lots of head movement and that the physical MRI pumps can only run 2 drips at a time and pt is on propofol, versed and ketamine. MD stated to hold MRI now and will plan for another time

## 2020-09-14 NOTE — PROGRESS NOTES
NEUROLOGY PROGRESS NOTE      BACKGROUND:    19 y.o. female was admitted on 8/23/2020  9:57 PM for Cardiac arrest (HCC)  Drug overdose.  She is being followed by Neurology for anoxic brain injury.    SUBJECTIVE:   No significant changes, remains unresponsive with tracheostomy on vent.      VITALS:  Vitals:    09/14/20 0900 09/14/20 1000 09/14/20 1100 09/14/20 1131   BP: 113/77 107/65 121/80    Pulse: (!) 101 (!) 105 (!) 111 (!) 109   Resp: (!) 26 (!) 26 (!) 26 (!) 28   Temp:       TempSrc:       SpO2: 99% 98% 99% 100%   Weight:       Height:           NEUROLOGICAL EXAM:   She is unresponsive on vent.  Her eyes are closed.  I do not appreciate facial asymmetry.  Facial sensation cannot be tested.  Pupils are 4 mm equal and briskly reactive to light.  She has cough and gag reflex.  She has no withdrawal to physical stimulation in lower extremity.  She has slight posturing of left upper extremity in noxious stimuli.  Motor, sensory, coordination cannot be tested.  Rest of her neurological examination is limited.    OBJECTIVE:    NEUROIMAGING:    DX-CHEST-PORTABLE (1 VIEW)   Final Result      Bibasilar underinflation atelectasis which could obscure an additional process. This is similar to the prior study.      DX-CHEST-PORTABLE (1 VIEW)   Final Result      Hazy airspace opacities are improved compared to prior.      DX-CHEST-PORTABLE (1 VIEW)   Final Result         1.  Pulmonary edema and/or infiltrates are identified, which are stable since the prior exam.            DX-CHEST-PORTABLE (1 VIEW)   Final Result         1.  Mild pulmonary edema and/or interstitial infiltrates, somewhat decreased since prior study         IR-MIDLINE CATHETER INSERTION WO GUIDANCE > AGE 3   Final Result                  Ultrasound-guided midline placement performed by qualified nursing staff    as above.          DX-CHEST-PORTABLE (1 VIEW)   Final Result         1.  Mild pulmonary edema and/or interstitial infiltrates, stable since prior  study      DX-CHEST-PORTABLE (1 VIEW)   Final Result         1.  Mild pulmonary edema and/or interstitial infiltrates      DX-CHEST-PORTABLE (1 VIEW)   Final Result         No significant interval change.      DX-CHEST-PORTABLE (1 VIEW)   Final Result         New hazy opacity in the left lung base could be atelectasis or consolidation.      DX-CHEST-PORTABLE (1 VIEW)   Final Result      No significant change      DX-CHEST-PORTABLE (1 VIEW)   Final Result         1.  No acute cardiopulmonary disease.                                 DX-CHEST-PORTABLE (1 VIEW)   Final Result         1.  No acute cardiopulmonary disease.                              DX-CHEST-PORTABLE (1 VIEW)   Final Result         1.  No focal infiltrates, previously visualized infiltrates are not readily apparent.                           MR-BRAIN-WITH & W/O   Final Result         1.  Severe diffuse anoxic brain injury pattern which is more extensive and conspicuous than on previous exam.      2.  No evidence of intracranial mass effect, extra-axial fluid collection, or interval development of hydrocephalus.      3.  Diffuse mucosal inflammatory changes filling the ethmoid and sphenoid sinuses.      DX-CHEST-PORTABLE (1 VIEW)   Final Result         1.  Pulmonary edema and/or infiltrates are identified, which are somewhat decreased since the prior exam.                        OA-[id number]-3 VIEW   Final Result      Feeding tube is noted with tip at the level of the proximal duodenum.                  DX-CHEST-PORTABLE (1 VIEW)   Final Result         1.  Pulmonary edema and/or infiltrates are identified, which are stable since the prior exam.                     DX-CHEST-PORTABLE (1 VIEW)   Final Result      Stable chest x-ray findings with right lower lobe consolidation.      DX-CHEST-PORTABLE (1 VIEW)   Final Result         1.  Pulmonary edema and/or infiltrates are identified, which are stable since the prior exam.                  DX-CHEST-PORTABLE  (1 VIEW)   Final Result         1.  Pulmonary edema and/or infiltrates are identified, which are stable since the prior exam.               MR-BRAIN-W/O   Final Result      The diffusion-weighted sequences demonstrates areas of restricted diffusion in the bilateral basal ganglia and some of the frontal gray matter. The predominant portion of the brain parenchyma does not demonstrate any restricted diffusion. Therefore this    findings likely represent mild diffuse anoxic brain injury.      DX-CHEST-PORTABLE (1 VIEW)   Final Result         1.  Pulmonary edema and/or infiltrates are identified, which are somewhat decreased since the prior exam.            DX-CHEST-PORTABLE (1 VIEW)   Final Result         1.  Pulmonary edema and/or infiltrates are identified, which are stable since the prior exam.         DX-ABDOMEN FOR TUBE PLACEMENT   Final Result         1.  Nonspecific bowel gas pattern.   2.  Dobbhoff tube tip overlying the expected location of the pylorus or first duodenal segment.      DX-CHEST-PORTABLE (1 VIEW)   Final Result         1.  Patchy bilateral pulmonary infiltrates, somewhat increased since prior.      EC-ECHOCARDIOGRAM COMPLETE W/O CONT   Final Result      DX-CHEST-PORTABLE (1 VIEW)   Final Result         1.  Patchy bilateral pulmonary infiltrates, somewhat increased since prior.      CT-HEAD W/O   Final Result         1.  Possible subtle sulcal effacement and slight loss of differentiation of gray-white matter, consider component of cerebral edema. Recommend radiographic follow-up   2.  Bilateral sphenoid and maxillary sinus air-fluid levels      DX-CHEST-PORTABLE (1 VIEW)   Final Result         1.  No acute cardiopulmonary disease.      DX-CHEST-PORTABLE (1 VIEW)    (Results Pending)       MEDICATIONS:  Current Facility-Administered Medications   Medication Dose Route Frequency Provider Last Rate Last Dose   • midazolam (VERSED) premix 125 mg/125 mL NS  6 mg/hr Intravenous Continuous Madjid  BAILEE Bear 6 mL/hr at 09/14/20 1139 6 mg/hr at 09/14/20 1139   • levETIRAcetam (KEPPRA) tablet 1,000 mg  1,000 mg Enteral Tube Q8HR Andrew Petersen M.D.   1,000 mg at 09/14/20 0636   • Valproate Sodium (DEPAKENE) oral solution 1,000 mg  1,000 mg Enteral Tube Q8HRS Andrew Petersen M.D.   1,000 mg at 09/14/20 0635   • glycopyrrolate (ROBINUL) tablet 0.5 mg  0.5 mg Enteral Tube Q8HRS Andrew Petersen M.D.   0.5 mg at 09/14/20 0636   • levOCARNitine (CARNITOR) 1 GM/10ML solution 1,000 mg  1,000 mg Enteral Tube BID WITH MEALS Nithin Donohue M.D.   1,000 mg at 09/14/20 0808   • lacosamide (VIMPAT) tablet 200 mg  200 mg Enteral Tube Q8HR Andrew Petersen M.D.   200 mg at 09/14/20 1027   • ketamine 1,000 mg in  mL infusion (critical care only)  3 mg/kg/hr Intravenous Continuous Andrew Petersen M.D. 100.2 mL/hr at 09/14/20 1022 3 mg/kg/hr at 09/14/20 1022   • perampanel (FYCOMPA) tablet 10 mg  10 mg Enteral Tube QHS Nithin Donohue M.D.   10 mg at 09/13/20 2232   • fentaNYL (SUBLIMAZE) injection 100 mcg  100 mcg Intravenous Q15 MIN PRN Boogie Augustine Jr. D.O.   100 mcg at 09/12/20 0230    And   • fentaNYL (SUBLIMAZE) injection 200 mcg  200 mcg Intravenous Q15 MIN PRN Boogie Augustine Jr. D.O.   200 mcg at 09/10/20 1748    And   • fentaNYL (SUBLIMAZE) 50 mcg/mL in 50mL (Continuous Infusion)   Intravenous Continuous Boogie Augustine Jr. D.O.   Stopped at 09/07/20 2230    And   • propofol (DIPRIVAN) injection  0-80 mcg/kg/min Intravenous Continuous Boogie Augustine Jr., D.O. 23.2 mL/hr at 09/14/20 0825 60 mcg/kg/min at 09/14/20 0825   • polyethylene glycol/lytes (MIRALAX) PACKET 1 Packet  1 Packet Enteral Tube BID Dar Red M.D.   Stopped at 09/13/20 1800   • acetaminophen (TYLENOL) suppository 325 mg  325 mg Rectal Q6HRS PRN Yosef Tomlin M.D.   650 mg at 09/02/20 1632   • acetaminophen (TYLENOL) tablet 1,000 mg  1,000 mg Enteral Tube Q4HRS PRN Dar Red M.D.   1,000 mg  at 09/04/20 1800   • labetalol (NORMODYNE/TRANDATE) injection 10 mg  10 mg Intravenous Q4HRS PRN Yosef Tomlin M.D.   10 mg at 08/30/20 2256   • Pharmacy Consult: Enteral tube insertion - review meds/change route/product selection  1 Each Other PHARMACY TO DOSE Boogie Augustine Jr., D.O.       • Respiratory Therapy Consult   Nebulization Continuous RT Brock Murdock M.D.       • ipratropium-albuterol (DUONEB) nebulizer solution  3 mL Nebulization Q2HRS PRN (RT) Brock Murdock M.D.       • famotidine (PEPCID) tablet 20 mg  20 mg Enteral Tube Q12HRS Dar Red M.D.   20 mg at 09/14/20 0635   • senna-docusate (PERICOLACE or SENOKOT S) 8.6-50 MG per tablet 2 Tab  2 Tab Enteral Tube BID Brock Murdock M.D.   Stopped at 09/14/20 0600    And   • polyethylene glycol/lytes (MIRALAX) PACKET 1 Packet  1 Packet Enteral Tube QDAY PRN Brock Murdock M.D.        And   • magnesium hydroxide (MILK OF MAGNESIA) suspension 30 mL  30 mL Enteral Tube QDAY PRN Brock Murdock M.D.        And   • bisacodyl (DULCOLAX) suppository 10 mg  10 mg Rectal QDAY PRN Brock Murdock M.D.       • MD Alert...ICU Electrolyte Replacement per Pharmacy   Other PHARMACY TO DOSE Brock Murdock M.D.       • lidocaine (XYLOCAINE) 1 % injection 1-2 mL  1-2 mL Tracheal Tube Q30 MIN PRN Brock Murdock M.D.       • enoxaparin (LOVENOX) inj 40 mg  40 mg Subcutaneous DAILY Boogie Augustine Jr., D.O.   40 mg at 09/14/20 0635       LABS:      Recent Labs     09/12/20  0630 09/13/20  0413 09/14/20  0400   WBC 9.2 10.1 9.1   RBC 3.39* 3.68* 3.52*   HEMOGLOBIN 10.3* 11.4* 10.9*   HEMATOCRIT 32.8* 35.6* 34.6*   MCV 96.8 96.7 98.3*   MCH 30.4 31.0 31.0   MCHC 31.4* 32.0* 31.5*   RDW 47.4 48.0 47.8   PLATELETCT 586* 523* 487*   MPV 9.3 9.1 9.2     Recent Labs     09/12/20  0630 09/13/20  0413 09/14/20  0400   SODIUM 138 142 139   POTASSIUM 4.4 4.5 4.0   CHLORIDE 104 107 106   CO2 23 22 21   GLUCOSE 116* 116* 127*   BUN 16 16 13     INR   Date Value Ref Range Status    2020 1.05 0.87 - 1.13 Final     Comment:     INR - Non-therapeutic Reference Range: 0.87-1.13  INR - Therapeutic Reference Range: 2.0-4.0       No results found for: POCINR  Lab Results   Component Value Date/Time    CREATININE 0.45 (L) 20200     Lab Results   Component Value Date/Time    IFAFRICA >60 2020 0400    IFNOTAFR >60 2020 0400     EE2020:  This is an abnormal 24 hrs video EEG recording in a comatose patient. A mild to moderate encephalopathy is suggested. The patient appears only mildly sedated, not in burst suppression pattern. Frequent, blunted generalized sharps with frontal maximum and triphasic morphology, with frequent runs of Frontal Intermittent Rhythmic Delta Activity (FIRDA). No seizures captured, however the study remains significantly abnormal. The findings suggest underlying areas of persinstently increased cortical irritability and/or structural abnormality. Clinical and radiological correlation is recommended.    ASSESSMENT AND PLAN:  19-year-old female with polysubstance abuse and subsequent cardiac arrest on 2020 who remains unresponsive with no neurological improvement on vent.  Her EEG as outlined above revealed frequent generalized sharp with frontal maximum and triphasic morphology and frequent runs of frontal intermittent rhythmic delta activity but no clear seizure.  She is on multiple antiepileptic medication including Keppra 1 g every 8 hours, and Vimpat 200 mg every 8 hours, valproic acid 1000 mg every 8 hours, Fycompa 10 mg nightly in addition to propofol and ketamine.  After discussing the EEG report with Dr. Beatty, we will start Versed 6 mg/hour and continue with EEG monitoring and adjust her medication accordingly.  Unfortunately with presence of anoxic brain injuries and MRI obtained on  her prognosis for meaningful and quality neurological recovery is guarded to poor.  I will repeat her brain MRI for prognostication.  Her  liver enzymes are worsening for which I will decrease her valproic acid to 500 mg 3 times a day.  Total critical care time spent was 35 minutes.

## 2020-09-14 NOTE — CARE PLAN
Problem: Ventilation Defect:  Goal: Ability to achieve and maintain unassisted ventilation or tolerate decreased levels of ventilator support  Outcome: PROGRESSING AS EXPECTED       Ventilator Daily Summary    Vent Day # 23,Trach day 6     Ventilator settings changed this shift: none     Weaning trials: not able to wean due to continuous eeg     Respiratory Procedures: none     Plan: Continue current ventilator settings and wean mechanical ventilation as tolerated per physician orders.

## 2020-09-15 ENCOUNTER — APPOINTMENT (OUTPATIENT)
Dept: RADIOLOGY | Facility: MEDICAL CENTER | Age: 20
DRG: 004 | End: 2020-09-15
Attending: INTERNAL MEDICINE
Payer: MEDICAID

## 2020-09-15 ENCOUNTER — APPOINTMENT (OUTPATIENT)
Dept: RADIOLOGY | Facility: MEDICAL CENTER | Age: 20
DRG: 004 | End: 2020-09-15
Attending: PSYCHIATRY & NEUROLOGY
Payer: MEDICAID

## 2020-09-15 LAB
ALBUMIN SERPL BCP-MCNC: 3.2 G/DL (ref 3.2–4.9)
ALBUMIN/GLOB SERPL: 1.1 G/DL
ALP SERPL-CCNC: 191 U/L (ref 30–99)
ALT SERPL-CCNC: 148 U/L (ref 2–50)
ANION GAP SERPL CALC-SCNC: 13 MMOL/L (ref 7–16)
AST SERPL-CCNC: 42 U/L (ref 12–45)
BASOPHILS # BLD AUTO: 0.3 % (ref 0–1.8)
BASOPHILS # BLD: 0.04 K/UL (ref 0–0.12)
BILIRUB SERPL-MCNC: 0.2 MG/DL (ref 0.1–1.5)
BUN SERPL-MCNC: 14 MG/DL (ref 8–22)
CALCIUM SERPL-MCNC: 8.5 MG/DL (ref 8.5–10.5)
CHLORIDE SERPL-SCNC: 106 MMOL/L (ref 96–112)
CO2 SERPL-SCNC: 22 MMOL/L (ref 20–33)
CREAT SERPL-MCNC: 0.41 MG/DL (ref 0.5–1.4)
EOSINOPHIL # BLD AUTO: 0.82 K/UL (ref 0–0.51)
EOSINOPHIL NFR BLD: 6.6 % (ref 0–6.9)
ERYTHROCYTE [DISTWIDTH] IN BLOOD BY AUTOMATED COUNT: 46.7 FL (ref 35.9–50)
GLOBULIN SER CALC-MCNC: 2.9 G/DL (ref 1.9–3.5)
GLUCOSE SERPL-MCNC: 99 MG/DL (ref 65–99)
HCT VFR BLD AUTO: 37.2 % (ref 37–47)
HGB BLD-MCNC: 11.9 G/DL (ref 12–16)
IMM GRANULOCYTES # BLD AUTO: 0.15 K/UL (ref 0–0.11)
IMM GRANULOCYTES NFR BLD AUTO: 1.2 % (ref 0–0.9)
LYMPHOCYTES # BLD AUTO: 1.18 K/UL (ref 1–4.8)
LYMPHOCYTES NFR BLD: 9.4 % (ref 22–41)
MAGNESIUM SERPL-MCNC: 2 MG/DL (ref 1.5–2.5)
MCH RBC QN AUTO: 30.7 PG (ref 27–33)
MCHC RBC AUTO-ENTMCNC: 32 G/DL (ref 33.6–35)
MCV RBC AUTO: 95.9 FL (ref 81.4–97.8)
MONOCYTES # BLD AUTO: 1.08 K/UL (ref 0–0.85)
MONOCYTES NFR BLD AUTO: 8.6 % (ref 0–13.4)
NEUTROPHILS # BLD AUTO: 9.24 K/UL (ref 2–7.15)
NEUTROPHILS NFR BLD: 73.9 % (ref 44–72)
NRBC # BLD AUTO: 0 K/UL
NRBC BLD-RTO: 0 /100 WBC
PLATELET # BLD AUTO: 495 K/UL (ref 164–446)
PMV BLD AUTO: 9.7 FL (ref 9–12.9)
POTASSIUM SERPL-SCNC: 4.1 MMOL/L (ref 3.6–5.5)
PROT SERPL-MCNC: 6.1 G/DL (ref 6–8.2)
RBC # BLD AUTO: 3.88 M/UL (ref 4.2–5.4)
SODIUM SERPL-SCNC: 141 MMOL/L (ref 135–145)
WBC # BLD AUTO: 12.5 K/UL (ref 4.8–10.8)

## 2020-09-15 PROCEDURE — 700102 HCHG RX REV CODE 250 W/ 637 OVERRIDE(OP): Performed by: INTERNAL MEDICINE

## 2020-09-15 PROCEDURE — 94770 HCHG CO2 EXPIRED GAS DETERMINATION: CPT

## 2020-09-15 PROCEDURE — 770022 HCHG ROOM/CARE - ICU (200)

## 2020-09-15 PROCEDURE — 700102 HCHG RX REV CODE 250 W/ 637 OVERRIDE(OP): Performed by: PSYCHIATRY & NEUROLOGY

## 2020-09-15 PROCEDURE — 700101 HCHG RX REV CODE 250: Performed by: PSYCHIATRY & NEUROLOGY

## 2020-09-15 PROCEDURE — 83735 ASSAY OF MAGNESIUM: CPT

## 2020-09-15 PROCEDURE — 700111 HCHG RX REV CODE 636 W/ 250 OVERRIDE (IP): Performed by: INTERNAL MEDICINE

## 2020-09-15 PROCEDURE — A9270 NON-COVERED ITEM OR SERVICE: HCPCS | Performed by: INTERNAL MEDICINE

## 2020-09-15 PROCEDURE — 70551 MRI BRAIN STEM W/O DYE: CPT

## 2020-09-15 PROCEDURE — 85025 COMPLETE CBC W/AUTO DIFF WBC: CPT

## 2020-09-15 PROCEDURE — 95718 EEG PHYS/QHP 2-12 HR W/VEEG: CPT | Performed by: PSYCHIATRY & NEUROLOGY

## 2020-09-15 PROCEDURE — 4A10X4Z MONITORING OF CENTRAL NERVOUS ELECTRICAL ACTIVITY, EXTERNAL APPROACH: ICD-10-PCS | Performed by: PSYCHIATRY & NEUROLOGY

## 2020-09-15 PROCEDURE — 95711 VEEG 2-12 HR UNMONITORED: CPT | Performed by: PSYCHIATRY & NEUROLOGY

## 2020-09-15 PROCEDURE — 71045 X-RAY EXAM CHEST 1 VIEW: CPT

## 2020-09-15 PROCEDURE — 94003 VENT MGMT INPAT SUBQ DAY: CPT

## 2020-09-15 PROCEDURE — 99291 CRITICAL CARE FIRST HOUR: CPT | Performed by: INTERNAL MEDICINE

## 2020-09-15 PROCEDURE — 700111 HCHG RX REV CODE 636 W/ 250 OVERRIDE (IP): Performed by: PSYCHIATRY & NEUROLOGY

## 2020-09-15 PROCEDURE — 95714 VEEG EA 12-26 HR UNMNTR: CPT | Performed by: PSYCHIATRY & NEUROLOGY

## 2020-09-15 PROCEDURE — 700105 HCHG RX REV CODE 258: Performed by: PSYCHIATRY & NEUROLOGY

## 2020-09-15 PROCEDURE — 99291 CRITICAL CARE FIRST HOUR: CPT | Mod: 25 | Performed by: PSYCHIATRY & NEUROLOGY

## 2020-09-15 PROCEDURE — A9270 NON-COVERED ITEM OR SERVICE: HCPCS | Performed by: PSYCHIATRY & NEUROLOGY

## 2020-09-15 PROCEDURE — 80053 COMPREHEN METABOLIC PANEL: CPT

## 2020-09-15 RX ORDER — MORPHINE SULFATE 4 MG/ML
4 INJECTION, SOLUTION INTRAMUSCULAR; INTRAVENOUS ONCE
Status: DISCONTINUED | OUTPATIENT
Start: 2020-09-15 | End: 2020-09-15 | Stop reason: CLARIF

## 2020-09-15 RX ORDER — MIDAZOLAM HYDROCHLORIDE 1 MG/ML
2 INJECTION INTRAMUSCULAR; INTRAVENOUS
Status: DISCONTINUED | OUTPATIENT
Start: 2020-09-15 | End: 2020-09-15 | Stop reason: CLARIF

## 2020-09-15 RX ORDER — SODIUM CHLORIDE 9 MG/ML
INJECTION, SOLUTION INTRAVENOUS
Status: ACTIVE
Start: 2020-09-15 | End: 2020-09-15

## 2020-09-15 RX ADMIN — PROPOFOL 60 MCG/KG/MIN: 10 INJECTION, EMULSION INTRAVENOUS at 05:20

## 2020-09-15 RX ADMIN — PROPOFOL 60 MCG/KG/MIN: 10 INJECTION, EMULSION INTRAVENOUS at 22:22

## 2020-09-15 RX ADMIN — GLYCOPYRROLATE 0.5 MG: 1 TABLET ORAL at 14:20

## 2020-09-15 RX ADMIN — KETAMINE HYDROCHLORIDE 3 MG/KG/HR: 50 INJECTION, SOLUTION INTRAMUSCULAR; INTRAVENOUS at 20:22

## 2020-09-15 RX ADMIN — LEVOCARNITINE 1000 MG: 1 SOLUTION ORAL at 17:33

## 2020-09-15 RX ADMIN — LEVETIRACETAM 1000 MG: 500 TABLET, FILM COATED ORAL at 05:51

## 2020-09-15 RX ADMIN — VALPROIC ACID 500 MG: 250 SOLUTION ORAL at 23:00

## 2020-09-15 RX ADMIN — KETAMINE HYDROCHLORIDE 3 MG/KG/HR: 50 INJECTION, SOLUTION INTRAMUSCULAR; INTRAVENOUS at 14:58

## 2020-09-15 RX ADMIN — DOCUSATE SODIUM 50 MG AND SENNOSIDES 8.6 MG 2 TABLET: 8.6; 5 TABLET, FILM COATED ORAL at 17:32

## 2020-09-15 RX ADMIN — FAMOTIDINE 20 MG: 20 TABLET, FILM COATED ORAL at 05:51

## 2020-09-15 RX ADMIN — GLYCOPYRROLATE 0.5 MG: 1 TABLET ORAL at 05:51

## 2020-09-15 RX ADMIN — DOCUSATE SODIUM 50 MG AND SENNOSIDES 8.6 MG 2 TABLET: 8.6; 5 TABLET, FILM COATED ORAL at 05:52

## 2020-09-15 RX ADMIN — KETAMINE HYDROCHLORIDE 3 MG/KG/HR: 50 INJECTION, SOLUTION INTRAMUSCULAR; INTRAVENOUS at 03:25

## 2020-09-15 RX ADMIN — KETAMINE HYDROCHLORIDE 3 MG/KG/HR: 50 INJECTION, SOLUTION INTRAMUSCULAR; INTRAVENOUS at 09:14

## 2020-09-15 RX ADMIN — ENOXAPARIN SODIUM 40 MG: 40 INJECTION SUBCUTANEOUS at 05:51

## 2020-09-15 RX ADMIN — PERAMPANEL 10 MG: 2 TABLET ORAL at 23:01

## 2020-09-15 RX ADMIN — LEVETIRACETAM 1000 MG: 500 TABLET, FILM COATED ORAL at 23:01

## 2020-09-15 RX ADMIN — LEVOCARNITINE 1000 MG: 1 SOLUTION ORAL at 07:30

## 2020-09-15 RX ADMIN — MIDAZOLAM 6 MG/HR: 5 INJECTION INTRAMUSCULAR; INTRAVENOUS at 06:35

## 2020-09-15 RX ADMIN — PROPOFOL 60 MCG/KG/MIN: 10 INJECTION, EMULSION INTRAVENOUS at 12:11

## 2020-09-15 RX ADMIN — VALPROIC ACID 500 MG: 250 SOLUTION ORAL at 05:51

## 2020-09-15 RX ADMIN — GLYCOPYRROLATE 0.5 MG: 1 TABLET ORAL at 23:01

## 2020-09-15 RX ADMIN — VALPROIC ACID 500 MG: 250 SOLUTION ORAL at 14:21

## 2020-09-15 RX ADMIN — LACOSAMIDE 200 MG: 100 TABLET, FILM COATED ORAL at 02:54

## 2020-09-15 RX ADMIN — LACOSAMIDE 200 MG: 100 TABLET, FILM COATED ORAL at 09:29

## 2020-09-15 RX ADMIN — PROPOFOL 60 MCG/KG/MIN: 10 INJECTION, EMULSION INTRAVENOUS at 02:24

## 2020-09-15 RX ADMIN — FAMOTIDINE 20 MG: 20 TABLET, FILM COATED ORAL at 17:33

## 2020-09-15 RX ADMIN — LEVETIRACETAM 1000 MG: 500 TABLET, FILM COATED ORAL at 14:21

## 2020-09-15 RX ADMIN — PROPOFOL 60 MCG/KG/MIN: 10 INJECTION, EMULSION INTRAVENOUS at 09:28

## 2020-09-15 RX ADMIN — PROPOFOL 60 MCG/KG/MIN: 10 INJECTION, EMULSION INTRAVENOUS at 17:33

## 2020-09-15 RX ADMIN — LACOSAMIDE 200 MG: 100 TABLET, FILM COATED ORAL at 17:37

## 2020-09-15 ASSESSMENT — FIBROSIS 4 INDEX: FIB4 SCORE: 0.31

## 2020-09-15 NOTE — CARE PLAN
Problem: Fluid Volume:  Goal: Will maintain balanced intake and output  Outcome: PROGRESSING AS EXPECTED     Problem: Safety  Goal: Will remain free from injury  Outcome: PROGRESSING AS EXPECTED  Goal: Will remain free from falls  Outcome: PROGRESSING AS EXPECTED     Problem: Bowel/Gastric:  Goal: Normal bowel function is maintained or improved  Outcome: PROGRESSING AS EXPECTED     Problem: Skin Integrity  Goal: Risk for impaired skin integrity will decrease  Outcome: PROGRESSING AS EXPECTED

## 2020-09-15 NOTE — PROCEDURES
VIDEO ELECTROENCEPHALOGRAM REPORT        Referring provider: Dr. Red.      DOS: 9/15/2020 (total recording of 2 hours and 42 minutes).      INDICATION:  Annika He 19 y.o. female presenting with s/p cardiac arrest, altered mental status, seizures.      CURRENT ANTIEPILEPTIC REGIMEN: Levetiracetam 1000 mg tid, Lacosamide 200 mg tid, Valproic Acid 1250 mg tid, Perampanel at 10 mg qhs. Sedation with Ketamine, Propofol, Midazolam infusion.      TECHNIQUE: 30 channel video electroencephalogram (EEG) was performed in accordance with the international 10-20 system. The study was reviewed in bipolar and referential montages. The recording examined a sedated and/or comatose patient.      DESCRIPTION OF THE RECORD:  Generalized delta / theta activity, with elements of slow wave sleep elements noted, including sleep spindles. A subtle burst suppression pattern noted intermittently. Frequent, blunted generalized sharps with frontal maximum and triphasic morphology, still with frequent runs of Frontal Intermittent Rhythmic Delta Activity (FIRDA). No seizures captured.      ACTIVATION PROCEDURES:   Not performed.      EKG: sampling of the EKG recording demonstrated sinus rhythm.      EVENTS:  None.      INTERPRETATION:  This is an abnormal extended video EEG recording in a sedated and/or comatose patient. A mild to moderate encephalopathy is suggested. A subtle burst suppression pattern is noted at times. Frequent, blunted generalized sharps with frontal maximum and triphasic morphology, still with frequent runs of Frontal Intermittent Rhythmic Delta Activity (FIRDA). No seizures captured. The findings suggest underlying areas of persinstently increased cortical irritability and/or structural abnormality. Clinical and radiological correlation is recommended.     Updates provided to Dr. Bear.         Garland Beatty MD   Epilepsy and Neurodiagnostics.   Clinical  of Neurology  [facility].   Diplomate in Neurology, Epilepsy, and Electrodiagnostic Medicine.   Office: [phone]  Fax: [phone]

## 2020-09-15 NOTE — PROGRESS NOTES
Spiritual Care Note    Patient Information     Patient's Name: Annika He   MRN: [id number]    YOB: 2000   Age and Gender: 19 y.o. female   Service Area: CARDIAC ICU [facility]   Room (and Bed): T6/   Ethnicity or Nationality:     Primary Language: English   Evangelical/Spiritual preference: Gnosticist of [employer]/Restorationist   Place of Residence: [place]   Family/Friends/Others Present: Mother, Sister   Clinical Team Present: no   Medical Diagnosis(-es)/Procedure(s): Cardiac Arrest  Drug Overdose   Code Status: Full Code    Date of Admission: 8/23/2020   Length of Stay: 21 days        Spiritual Care Provider Information:  Name of Spiritual Care Provider: Marika Thornton  Title of Spiritual Care Provider: Associate   Phone Number: [phone]  E-mail: [email or web address]  Total time : 10 minutes    Spiritual Screen Results:    Gen Nursing  Spiritual Screen  Is your spiritual health or inner well-being important to you as you cope with your medical condition?: Unable to determine  Would you like to receive a visit from our Spiritual Care team or your own Buddhist or spiritual leader?: Unable to determine  Was spiritual care education provided to the patient?: Declined     Palliative Care  PC Evangelical/Spiritual Screening  Is your spiritual health or inner well-being important to you as you cope with your medical condition?: Unable to determine  Would you like to receive a visit from our Spiritual Care team or your own Buddhist or spiritual leader?: Unable to determine  Was spiritual care education provided to the patient?: Declined      Encounter/Request Information  Encounter/Request Type   Visited With: Patient, Health care provider  Nature of the Visit: Follow-up, On shift  Continue Visiting: Yes  Next Follow-up Date: 09/16/20  Crisis Visit: Critical care  Referral From/ Origin of Request: Epic nursing    Religous Needs/Values  Evangelical  Needs Visit    Sabianist Needs: Prayer    Spiritual Assessment   Spiritual Care Encounters    Observations/Symptoms: (Pt intubated, sedated, unrespon, brain scan ongoing)    Interacton/Conversation:  checked in with unit Nurse, then donned appropriate PPE, and entered pt's room.  prayed with pt, per father ok to f/u with pt visit. Upon leaving room, pt's mother and sister were preparing to enter room.  met mother and sister for first time - held brief conversation with them.     Assessment: Need    Need: Seeking Spiritual Assistance and Support    Interventions: Companionship, Prayer    Outcomes: Other (Comment)(Unable to assess)    Plan: (Continue to follow pt)

## 2020-09-15 NOTE — CARE PLAN
Problem: Ventilation Defect:  Goal: Ability to achieve and maintain unassisted ventilation or tolerate decreased levels of ventilator support  Outcome: PROGRESSING AS EXPECTED       Ventilator Daily Summary    Vent Day # 24, trach day 7    Ventilator settings changed this shift: none     Weaning trials: none per md     Respiratory Procedures: none     Plan: Continue current ventilator settings and wean mechanical ventilation as tolerated per physician orders.

## 2020-09-15 NOTE — PROGRESS NOTES
NEUROLOGY PROGRESS NOTE      BACKGROUND:    19 y.o. female was admitted on 8/23/2020  9:57 PM for Cardiac arrest (HCC)  Drug overdose.  She is being followed by Neurology for anoxic brain injury.    SUBJECTIVE:   No significant changes, remains unresponsive with tracheostomy on vent.      VITALS:  Vitals:    09/15/20 0741 09/15/20 0800 09/15/20 0900 09/15/20 1000   BP:  110/75 114/75 117/80   Pulse: 98 100 99 (!) 105   Resp: (!) 30 (!) 26 (!) 26 (!) 26   Temp:       TempSrc:       SpO2: 100% 100% 98% 100%   Weight:       Height:           NEUROLOGICAL EXAM:   She is unresponsive on vent.  Her eyes are closed.  I do not appreciate facial asymmetry.  Facial sensation cannot be tested.  Pupils are 4 mm equal and briskly reactive to light.  She has cough and gag reflex.  She has no withdrawal to physical stimulation in lower extremity.  She has slight posturing of left upper extremity in noxious stimuli.  Motor, sensory, coordination cannot be tested.  Rest of her neurological examination is limited.    OBJECTIVE:    NEUROIMAGING:    DX-CHEST-PORTABLE (1 VIEW)   Final Result      No significant interval change.      DX-CHEST-PORTABLE (1 VIEW)   Final Result      Bibasilar underinflation atelectasis which could obscure an additional process. This is similar to the prior study.      DX-CHEST-PORTABLE (1 VIEW)   Final Result      Hazy airspace opacities are improved compared to prior.      DX-CHEST-PORTABLE (1 VIEW)   Final Result         1.  Pulmonary edema and/or infiltrates are identified, which are stable since the prior exam.            DX-CHEST-PORTABLE (1 VIEW)   Final Result         1.  Mild pulmonary edema and/or interstitial infiltrates, somewhat decreased since prior study         IR-MIDLINE CATHETER INSERTION WO GUIDANCE > AGE 3   Final Result                  Ultrasound-guided midline placement performed by qualified nursing staff    as above.          DX-CHEST-PORTABLE (1 VIEW)   Final Result         1.  Mild  pulmonary edema and/or interstitial infiltrates, stable since prior study      DX-CHEST-PORTABLE (1 VIEW)   Final Result         1.  Mild pulmonary edema and/or interstitial infiltrates      DX-CHEST-PORTABLE (1 VIEW)   Final Result         No significant interval change.      DX-CHEST-PORTABLE (1 VIEW)   Final Result         New hazy opacity in the left lung base could be atelectasis or consolidation.      DX-CHEST-PORTABLE (1 VIEW)   Final Result      No significant change      DX-CHEST-PORTABLE (1 VIEW)   Final Result         1.  No acute cardiopulmonary disease.                                 DX-CHEST-PORTABLE (1 VIEW)   Final Result         1.  No acute cardiopulmonary disease.                              DX-CHEST-PORTABLE (1 VIEW)   Final Result         1.  No focal infiltrates, previously visualized infiltrates are not readily apparent.                           MR-BRAIN-WITH & W/O   Final Result         1.  Severe diffuse anoxic brain injury pattern which is more extensive and conspicuous than on previous exam.      2.  No evidence of intracranial mass effect, extra-axial fluid collection, or interval development of hydrocephalus.      3.  Diffuse mucosal inflammatory changes filling the ethmoid and sphenoid sinuses.      DX-CHEST-PORTABLE (1 VIEW)   Final Result         1.  Pulmonary edema and/or infiltrates are identified, which are somewhat decreased since the prior exam.                        [id number] VIEW   Final Result      Feeding tube is noted with tip at the level of the proximal duodenum.                  DX-CHEST-PORTABLE (1 VIEW)   Final Result         1.  Pulmonary edema and/or infiltrates are identified, which are stable since the prior exam.                     DX-CHEST-PORTABLE (1 VIEW)   Final Result      Stable chest x-ray findings with right lower lobe consolidation.      DX-CHEST-PORTABLE (1 VIEW)   Final Result         1.  Pulmonary edema and/or infiltrates are identified, which  are stable since the prior exam.                  DX-CHEST-PORTABLE (1 VIEW)   Final Result         1.  Pulmonary edema and/or infiltrates are identified, which are stable since the prior exam.               MR-BRAIN-W/O   Final Result      The diffusion-weighted sequences demonstrates areas of restricted diffusion in the bilateral basal ganglia and some of the frontal gray matter. The predominant portion of the brain parenchyma does not demonstrate any restricted diffusion. Therefore this    findings likely represent mild diffuse anoxic brain injury.      DX-CHEST-PORTABLE (1 VIEW)   Final Result         1.  Pulmonary edema and/or infiltrates are identified, which are somewhat decreased since the prior exam.            DX-CHEST-PORTABLE (1 VIEW)   Final Result         1.  Pulmonary edema and/or infiltrates are identified, which are stable since the prior exam.         DX-ABDOMEN FOR TUBE PLACEMENT   Final Result         1.  Nonspecific bowel gas pattern.   2.  Dobbhoff tube tip overlying the expected location of the pylorus or first duodenal segment.      DX-CHEST-PORTABLE (1 VIEW)   Final Result         1.  Patchy bilateral pulmonary infiltrates, somewhat increased since prior.      EC-ECHOCARDIOGRAM COMPLETE W/O CONT   Final Result      DX-CHEST-PORTABLE (1 VIEW)   Final Result         1.  Patchy bilateral pulmonary infiltrates, somewhat increased since prior.      CT-HEAD W/O   Final Result         1.  Possible subtle sulcal effacement and slight loss of differentiation of gray-white matter, consider component of cerebral edema. Recommend radiographic follow-up   2.  Bilateral sphenoid and maxillary sinus air-fluid levels      DX-CHEST-PORTABLE (1 VIEW)   Final Result         1.  No acute cardiopulmonary disease.      DX-CHEST-PORTABLE (1 VIEW)    (Results Pending)   MR-BRAIN-W/O    (Results Pending)       MEDICATIONS:  Current Facility-Administered Medications   Medication Dose Route Frequency Provider Last  Rate Last Dose   • SODIUM CHLORIDE 0.9 % IV SOLN            • midazolam (VERSED) premix 125 mg/125 mL NS  8 mg/hr Intravenous Continuous Toni Bear M.D. 8 mL/hr at 09/15/20 0909 8 mg/hr at 09/15/20 0909   • Valproate Sodium (DEPAKENE) oral solution 500 mg  500 mg Enteral Tube Q8HRS Toni Bear M.D.   500 mg at 09/15/20 0551   • fentaNYL (SUBLIMAZE) injection  mcg   mcg Intravenous Q HOUR PRN Angel Luis Lanier M.D.       • levETIRAcetam (KEPPRA) tablet 1,000 mg  1,000 mg Enteral Tube Q8HR Andrew Petersen M.D.   1,000 mg at 09/15/20 0551   • glycopyrrolate (ROBINUL) tablet 0.5 mg  0.5 mg Enteral Tube Q8HRS Andrew Petersen M.D.   0.5 mg at 09/15/20 0551   • levOCARNitine (CARNITOR) 1 GM/10ML solution 1,000 mg  1,000 mg Enteral Tube BID WITH MEALS Nithin Donohue M.D.   1,000 mg at 09/15/20 0730   • lacosamide (VIMPAT) tablet 200 mg  200 mg Enteral Tube Q8HR Andrew Petersen M.D.   200 mg at 09/15/20 0929   • ketamine 1,000 mg in  mL infusion (critical care only)  3 mg/kg/hr Intravenous Continuous Andrew Petersen M.D. 100.2 mL/hr at 09/15/20 0914 3 mg/kg/hr at 09/15/20 0914   • perampanel (FYCOMPA) tablet 10 mg  10 mg Enteral Tube QHS Nithin Donohue M.D.   10 mg at 09/14/20 2142   • propofol (DIPRIVAN) injection  0-80 mcg/kg/min Intravenous Continuous Boogie Augustine Jr., D.O. 23.2 mL/hr at 09/15/20 0928 60 mcg/kg/min at 09/15/20 0928   • polyethylene glycol/lytes (MIRALAX) PACKET 1 Packet  1 Packet Enteral Tube BID Dar Red M.D.   Stopped at 09/13/20 1800   • acetaminophen (TYLENOL) suppository 325 mg  325 mg Rectal Q6HRS PRN Yosef Tomlin M.D.   650 mg at 09/02/20 1632   • acetaminophen (TYLENOL) tablet 1,000 mg  1,000 mg Enteral Tube Q4HRS PRN Dar Red M.D.   1,000 mg at 09/14/20 1810   • labetalol (NORMODYNE/TRANDATE) injection 10 mg  10 mg Intravenous Q4HRS PRN Yosef Tomlin M.D.   10 mg at 08/30/20 2256   • Pharmacy Consult: Enteral  tube insertion - review meds/change route/product selection  1 Each Other PHARMACY TO DOSE Boogie Augustine Jr., D.O.       • Respiratory Therapy Consult   Nebulization Continuous RT Brock Murdock M.D.       • ipratropium-albuterol (DUONEB) nebulizer solution  3 mL Nebulization Q2HRS PRN (RT) Brock Murdock M.D.       • famotidine (PEPCID) tablet 20 mg  20 mg Enteral Tube Q12HRS Dar Red M.D.   20 mg at 09/15/20 0551   • senna-docusate (PERICOLACE or SENOKOT S) 8.6-50 MG per tablet 2 Tab  2 Tab Enteral Tube BID Brock Murdock M.D.   2 Tab at 09/15/20 0552    And   • polyethylene glycol/lytes (MIRALAX) PACKET 1 Packet  1 Packet Enteral Tube QDAY PRN Brock Murdock M.D.        And   • magnesium hydroxide (MILK OF MAGNESIA) suspension 30 mL  30 mL Enteral Tube QDAY PRN Brock Murdock M.D.        And   • bisacodyl (DULCOLAX) suppository 10 mg  10 mg Rectal QDAY PRN Brock Murdock M.D.       • MD Alert...ICU Electrolyte Replacement per Pharmacy   Other PHARMACY TO DOSE Brock Murdock M.D.       • lidocaine (XYLOCAINE) 1 % injection 1-2 mL  1-2 mL Tracheal Tube Q30 MIN PRN Brock Murdock M.D.       • enoxaparin (LOVENOX) inj 40 mg  40 mg Subcutaneous DAILY Boogie Augustine Jr., D.O.   40 mg at 09/15/20 0551       LABS:      Recent Labs     09/13/20  0413 09/14/20  0400 09/15/20  0613   WBC 10.1 9.1 12.5*   RBC 3.68* 3.52* 3.88*   HEMOGLOBIN 11.4* 10.9* 11.9*   HEMATOCRIT 35.6* 34.6* 37.2   MCV 96.7 98.3* 95.9   MCH 31.0 31.0 30.7   MCHC 32.0* 31.5* 32.0*   RDW 48.0 47.8 46.7   PLATELETCT 523* 487* 495*   MPV 9.1 9.2 9.7     Recent Labs     09/13/20  0413 09/14/20  0400 09/15/20  0613   SODIUM 142 139 141   POTASSIUM 4.5 4.0 4.1   CHLORIDE 107 106 106   CO2 22 21 22   GLUCOSE 116* 127* 99   BUN 16 13 14     INR   Date Value Ref Range Status   08/25/2020 1.05 0.87 - 1.13 Final     Comment:     INR - Non-therapeutic Reference Range: 0.87-1.13  INR - Therapeutic Reference Range: 2.0-4.0       No results found for:  POCINR  Lab Results   Component Value Date/Time    CREATININE 0.45 (L) 2020     Lab Results   Component Value Date/Time    IFAFRICA >60 20200    IFNOTAFR >60 20200     EE2020:  This is an abnormal 24 hrs video EEG recording in a comatose patient. A mild to moderate encephalopathy is suggested. The patient appears only mildly sedated, not in burst suppression pattern. Frequent, blunted generalized sharps with frontal maximum and triphasic morphology, with frequent runs of Frontal Intermittent Rhythmic Delta Activity (FIRDA). No seizures captured, however the study remains significantly abnormal. The findings suggest underlying areas of persinstently increased cortical irritability and/or structural abnormality. Clinical and radiological correlation is recommended.    ASSESSMENT AND PLAN:  19-year-old female with polysubstance abuse and subsequent cardiac arrest on 2020 who remains unresponsive with no neurological improvement on vent.  Her EEG as outlined above revealed frequent generalized sharp with frontal maximum and triphasic morphology and frequent runs of frontal intermittent rhythmic delta activity but no clear seizure.  She is on multiple antiepileptic medication including Keppra 1 g every 8 hours, and Vimpat 200 mg every 8 hours, valproic acid 1000 mg every 8 hours, Fycompa 10 mg nightly in addition to propofol and ketamine.  Unfortunately with presence of anoxic brain injuries and MRI obtained on  her prognosis for meaningful and quality neurological recovery is guarded to poor.  I will repeat her brain MRI for prognostication.  Her liver enzymes were worsening for which I have decrease her valproic acid to 500 mg 3 times a day.  I will increase her Versed to 8 mg/h to see if we can achieve burst suppression on EEG.  Discussed with Dr. Lanier  Total critical care time spent was 35 minutes.

## 2020-09-15 NOTE — PROGRESS NOTES
Critical Care Progress Note    Date of admission  8/23/2020    Chief Complaint  19 y.o. female admitted 8/23/2020 with a cardiac arrest following an apparent drug overdose.    Hospital Course    8/24 - TTM protocol initiated, EEG without NCSE  8/25 - rewarmed, awake, not following  8/26 - following occasional commands  8/27- seizure like activity vs shivering--> Keppra, versed, Propofol  8/28- no seizures on EEG  8/29- remains unresponsive.  8/30 -mental status improved, now opening eyes and following commands.  Keppra discontinued  8/31 - started cefepime/vancomycin for possible pneumonia,?  UTI, fever and increasing WBC fever; full vent  9/1 - Seizure activity noted on EEG today, reviewed with neurology; keppra load and increased to 1gm q 12; full vent support  9/2 - remains unresponsive with only some posture and grimace to stimulation, neurology reconsulted, stat MRI brain, LP, cEEG  9/3 -ongoing seizures, Vimpat added, Depakote added, family conference today  9/4 -ongoing seizure noted on cEEG, started on midazolam infusion, full ventilator support  9/5 -ongoing seizures noted, propofol added and increased today, full ventilator support  9/6 -have not yet achieved burst suppression on cEEG; continue propofol, change Versed to ketamine infusion, discussed with neurology, full ventilator support  9/7 - still titrating propofol and ketamine for burst suppression on cEEG. Continue full ventilator support  9/8 - propofol off; ketamine is being weaned; on vimpat, keppra, depakote. Requiring full ventilator support  9/9 - Requiring full ventilator support. propofol and ketamine off; on vimpat, keppra, depakote. Postures to stimuli - consult for trach/PEG  9/10 -status post tracheostomy, off propofol and on ketamine- beginning to wean antiepileptic medications, continues to require full ventilator support. PEG tomorrow. Reportedly is still having seizures.   9/11 PEG tube today, continues to require full mechanical  ventilator support  9/12 returned to deep sedation secondary to persistent seizures; continues to require full vent support  9/13 continues to require full vent support; propofol and ketamine plus AEDs for persistent, intermittent seizures  9/14 -    continue full vent support.  Continue propofol, ketamine.  Start Versed.  Continuous EEG.  9/15 -    continue vent support.  Increase Versed drip.  For repeat MRI today.      Interval Problem Update  Reviewed last 24 hour events:      Increase Versed to 8  100.6  PERRL  GSC 4  SR-ST  Prop 60  Ketamine 3  Versed gtt  Excellent UOP  TF at goal - 40  Vent 24  Trach 7  PEEP 8   30%      Review of Systems  Review of Systems   Unable to perform ROS: Acuity of condition        Vital Signs for last 24 hours   Temp:  [37.1 °C (98.8 °F)-38.1 °C (100.6 °F)] 37.1 °C (98.8 °F)  Pulse:  [] 105  Resp:  [17-30] 17  BP: (108-136)/(61-99) 117/81  SpO2:  [95 %-100 %] 100 %    Hemodynamic parameters for last 24 hours       Respiratory Information for the last 24 hours  Vent Mode: APVCMV  Rate (breaths/min): 26  Vt Target (mL): 320  PEEP/CPAP: 8  MAP: 12  Control VTE (exp VT): 318    Physical Exam   Physical Exam  Constitutional:       Appearance: She is not diaphoretic.      Comments: On ventilator   HENT:      Head: Normocephalic and atraumatic.      Right Ear: External ear normal.      Left Ear: External ear normal.      Nose: Nose normal.      Mouth/Throat:      Mouth: Mucous membranes are moist.      Pharynx: No oropharyngeal exudate.   Eyes:      General: No scleral icterus.     Pupils: Pupils are equal, round, and reactive to light.   Neck:      Musculoskeletal: Normal range of motion. No neck rigidity.   Cardiovascular:      Pulses: Normal pulses.      Heart sounds: No murmur. No friction rub.      Comments: Sinus rhythm  Pulmonary:      Breath sounds: Rales (Scattered coarse crackles) present. No wheezing.   Abdominal:      General: Bowel sounds are normal. There is no  distension.      Palpations: Abdomen is soft.      Tenderness: There is no abdominal tenderness. There is no rebound.      Comments: Tolerating enteral tube feedings   Musculoskeletal: Normal range of motion.      Right lower leg: No edema.      Left lower leg: No edema.      Comments: No clubbing or cyanosis   Skin:     General: Skin is warm and dry.      Capillary Refill: Capillary refill takes less than 2 seconds.   Neurological:      Comments: Pupils 4 mm, equal and briskly reactive.         Medications  Current Facility-Administered Medications   Medication Dose Route Frequency Provider Last Rate Last Dose   • SODIUM CHLORIDE 0.9 % IV SOLN            • midazolam (VERSED) premix 125 mg/125 mL NS  8 mg/hr Intravenous Continuous Toni Bear M.D. 8 mL/hr at 09/15/20 0909 8 mg/hr at 09/15/20 0909   • Valproate Sodium (DEPAKENE) oral solution 500 mg  500 mg Enteral Tube Q8HRS Toni Bear M.D.   500 mg at 09/15/20 0551   • fentaNYL (SUBLIMAZE) injection  mcg   mcg Intravenous Q HOUR PRN Angel Luis Lanier M.D.       • levETIRAcetam (KEPPRA) tablet 1,000 mg  1,000 mg Enteral Tube Q8HR Andrew Petersen M.D.   1,000 mg at 09/15/20 0551   • glycopyrrolate (ROBINUL) tablet 0.5 mg  0.5 mg Enteral Tube Q8HRS Andrew Petersen M.D.   0.5 mg at 09/15/20 0551   • levOCARNitine (CARNITOR) 1 GM/10ML solution 1,000 mg  1,000 mg Enteral Tube BID WITH MEALS Nithin Donohue M.D.   1,000 mg at 09/15/20 0730   • lacosamide (VIMPAT) tablet 200 mg  200 mg Enteral Tube Q8HR Andrew Petersen M.D.   200 mg at 09/15/20 0929   • ketamine 1,000 mg in  mL infusion (critical care only)  3 mg/kg/hr Intravenous Continuous Andrew Petersen M.D. 100.2 mL/hr at 09/15/20 0914 3 mg/kg/hr at 09/15/20 0914   • perampanel (FYCOMPA) tablet 10 mg  10 mg Enteral Tube QHS Nithin Donohue M.D.   10 mg at 09/14/20 5299   • propofol (DIPRIVAN) injection  0-80 mcg/kg/min Intravenous Continuous Boogie Augustine  MÓNICA Orozco 23.2 mL/hr at 09/15/20 0928 60 mcg/kg/min at 09/15/20 0928   • polyethylene glycol/lytes (MIRALAX) PACKET 1 Packet  1 Packet Enteral Tube BID Dar Red M.D.   Stopped at 09/13/20 1800   • acetaminophen (TYLENOL) suppository 325 mg  325 mg Rectal Q6HRS PRN Yosef Tomlin M.D.   650 mg at 09/02/20 1632   • acetaminophen (TYLENOL) tablet 1,000 mg  1,000 mg Enteral Tube Q4HRS PRN Dar Red M.D.   1,000 mg at 09/14/20 1810   • labetalol (NORMODYNE/TRANDATE) injection 10 mg  10 mg Intravenous Q4HRS PRN Yosef Tomlin M.D.   10 mg at 08/30/20 2256   • Pharmacy Consult: Enteral tube insertion - review meds/change route/product selection  1 Each Other PHARMACY TO DOSE Boogie Augustine Jr., [email or web address].       • Respiratory Therapy Consult   Nebulization Continuous RT Brock Murdock M.D.       • ipratropium-albuterol (DUONEB) nebulizer solution  3 mL Nebulization Q2HRS PRN (RT) Brock Murdock M.D.       • famotidine (PEPCID) tablet 20 mg  20 mg Enteral Tube Q12HRS Dar Red M.D.   20 mg at 09/15/20 0551   • senna-docusate (PERICOLACE or SENOKOT S) 8.6-50 MG per tablet 2 Tab  2 Tab Enteral Tube BID Brock Murdock M.D.   2 Tab at 09/15/20 0552    And   • polyethylene glycol/lytes (MIRALAX) PACKET 1 Packet  1 Packet Enteral Tube QDAY PRN Brock Murdock M.D.        And   • magnesium hydroxide (MILK OF MAGNESIA) suspension 30 mL  30 mL Enteral Tube QDAY PRN Brock Murdock M.D.        And   • bisacodyl (DULCOLAX) suppository 10 mg  10 mg Rectal QDAY PRN Brock Murdock M.D.       • MD Alert...ICU Electrolyte Replacement per Pharmacy   Other PHARMACY TO DOSE Brock Murdock M.D.       • lidocaine (XYLOCAINE) 1 % injection 1-2 mL  1-2 mL Tracheal Tube Q30 MIN PRN Brock Murdock M.D.       • enoxaparin (LOVENOX) inj 40 mg  40 mg Subcutaneous DAILY Boogie Augustine Jr., D.O.   40 mg at 09/15/20 0551       Fluids    Intake/Output Summary (Last 24 hours) at 9/15/2020 1318  Last data filed at 9/15/2020 1000  Gross per  24 hour   Intake 3998.5 ml   Output 3740 ml   Net 258.5 ml       Laboratory          Recent Labs     09/13/20 0413 09/14/20 0400 09/15/20  0613   SODIUM 142 139 141   POTASSIUM 4.5 4.0 4.1   CHLORIDE 107 106 106   CO2 22 21 22   BUN 16 13 14   CREATININE 0.47* 0.45* 0.41*   MAGNESIUM  --   --  2.0   CALCIUM 8.9 8.7 8.5     Recent Labs     09/13/20 0413 09/13/20  0951 09/14/20  0400 09/14/20  1513 09/15/20  0613   ALTSGPT 82*  --  253*  --  148*   ASTSGOT 128*  --  115*  --  42   ALKPHOSPHAT 174*  --  232*  --  191*   TBILIRUBIN 0.2  --  <0.2  --  0.2   LIPASE  --  26  --   --   --    PREALBUMIN  --   --   --  43.4*  --    GLUCOSE 116*  --  127*  --  99     Recent Labs     09/13/20  0413 09/14/20  0400 09/15/20  0613   WBC 10.1 9.1 12.5*   NEUTSPOLYS 66.50 68.40 73.90*   LYMPHOCYTES 16.30* 14.90* 9.40*   MONOCYTES 9.20 10.10 8.60   EOSINOPHILS 5.20 5.00 6.60   BASOPHILS 0.50 0.30 0.30   ASTSGOT 128* 115* 42   ALTSGPT 82* 253* 148*   ALKPHOSPHAT 174* 232* 191*   TBILIRUBIN 0.2 <0.2 0.2     Recent Labs     09/13/20  0413 09/14/20  0400 09/15/20  0613   RBC 3.68* 3.52* 3.88*   HEMOGLOBIN 11.4* 10.9* 11.9*   HEMATOCRIT 35.6* 34.6* 37.2   PLATELETCT 523* 487* 495*       Imaging  X-Ray:  I have personally reviewed the images and compared with prior images. and My impression is: Unchanged mild right infrahilar opacification    Assessment/Plan  * Acute respiratory failure with hypoxia (HCC)- (present on admission)  Assessment & Plan  Intubated 8/24  Trach 9/9  All of the appropriate ventilator bundles are in place  Continue vent support    Anoxic encephalopathy (HCC)- (present on admission)  Assessment & Plan  MRI on 9/2 consistent with severe anoxic brain injury  EEG with frequent, blunted generalized sharps with frontal maximum and triphasic morphology with frequent runs of frontal intermittent rhythmic delta activity (FIRDA)  Continue AEDs  Repeat MRI    Drug overdose- (present on admission)  Assessment & Plan  BA 0.16  on presentation  Positive urine drug screen for amphetamines on presentation  Boyfriend reported methamphetamine, oxycodone and alcohol use    Seizures (HCC)  Assessment & Plan  Continuous video EEG  Continue Vimpat, 200 mg every 8 hours  Decrease valproic acid, 500 mg every 8 hours  Continue Keppra, 1000 mg every 8 hours  Increase Versed drip to 8 mg an hour  Continue Fycompa, 10 mg nightly    Pneumonia of both lungs due to methicillin resistant Staphylococcus aureus (MRSA) (HCC)  Assessment & Plan  Sputum culture with MRSA, Acinetobacter and Stenotrophomonas species  Completed 10 days of Bactrim on 9/13  Observe off antibiotics    Elevated transaminase level- (present on admission)  Assessment & Plan  Suspect due to valproic acid  Improved on decreased dose of valproic acid  Continue l-carnitine    S/P PEA cardiac arrest (HCC)- (present on admission)  Assessment & Plan  S/P therapeutic hypothermia protocol    Dysphagia, oropharyngeal- (present on admission)  Assessment & Plan  S/P laparoscopic gastrostomy on 9/11  Continue enteral tube feedings       VTE:  Lovenox  Ulcer: H2 Antagonist  Lines: Pearce Catheter  Ongoing indication addressed    I have performed a physical exam and reviewed and updated ROS and Plan today (9/15/2020). In review of yesterday's note (9/14/2020), there are no changes except as documented above.     I have assessed and reassessed her respiratory status with ventilator adjustments, airway mechanics, ventilator waveforms, hemodynamics, blood pressure, cardiovascular status and her neurologic status.  She is at increased risk for worsening CNS, cardiovascular and respiratory system dysfunction.    Discussed patient condition and risk of morbidity and/or mortality with RN, RT, Pharmacy, Charge nurse / hot rounds, QA team and neurology     The patient remains critically ill.  Critical care time = 33 minutes in directly providing and coordinating critical care and extensive data review.  No time  overlap and excludes procedures.    Angel Luis Lanier MD  Pulmonary and Critical Care Medicine

## 2020-09-16 ENCOUNTER — APPOINTMENT (OUTPATIENT)
Dept: RADIOLOGY | Facility: MEDICAL CENTER | Age: 20
DRG: 004 | End: 2020-09-16
Attending: INTERNAL MEDICINE
Payer: MEDICAID

## 2020-09-16 LAB
ALBUMIN SERPL BCP-MCNC: 2.9 G/DL (ref 3.2–4.9)
ALBUMIN/GLOB SERPL: 0.9 G/DL
ALP SERPL-CCNC: 169 U/L (ref 30–99)
ALT SERPL-CCNC: 102 U/L (ref 2–50)
ANION GAP SERPL CALC-SCNC: 15 MMOL/L (ref 7–16)
AST SERPL-CCNC: 32 U/L (ref 12–45)
BASOPHILS # BLD AUTO: 0.4 % (ref 0–1.8)
BASOPHILS # BLD: 0.04 K/UL (ref 0–0.12)
BILIRUB SERPL-MCNC: <0.2 MG/DL (ref 0.1–1.5)
BUN SERPL-MCNC: 17 MG/DL (ref 8–22)
CALCIUM SERPL-MCNC: 8.5 MG/DL (ref 8.5–10.5)
CHLORIDE SERPL-SCNC: 105 MMOL/L (ref 96–112)
CO2 SERPL-SCNC: 19 MMOL/L (ref 20–33)
CREAT SERPL-MCNC: 0.3 MG/DL (ref 0.5–1.4)
EOSINOPHIL # BLD AUTO: 0.84 K/UL (ref 0–0.51)
EOSINOPHIL NFR BLD: 8.6 % (ref 0–6.9)
ERYTHROCYTE [DISTWIDTH] IN BLOOD BY AUTOMATED COUNT: 48.1 FL (ref 35.9–50)
GLOBULIN SER CALC-MCNC: 3.1 G/DL (ref 1.9–3.5)
GLUCOSE SERPL-MCNC: 91 MG/DL (ref 65–99)
HCT VFR BLD AUTO: 35.8 % (ref 37–47)
HGB BLD-MCNC: 11.6 G/DL (ref 12–16)
IMM GRANULOCYTES # BLD AUTO: 0.13 K/UL (ref 0–0.11)
IMM GRANULOCYTES NFR BLD AUTO: 1.3 % (ref 0–0.9)
LEVETIRACETAM SERPL-MCNC: 11 UG/ML (ref 12–46)
LYMPHOCYTES # BLD AUTO: 1.43 K/UL (ref 1–4.8)
LYMPHOCYTES NFR BLD: 14.6 % (ref 22–41)
MAGNESIUM SERPL-MCNC: 1.9 MG/DL (ref 1.5–2.5)
MCH RBC QN AUTO: 31.2 PG (ref 27–33)
MCHC RBC AUTO-ENTMCNC: 32.4 G/DL (ref 33.6–35)
MCV RBC AUTO: 96.2 FL (ref 81.4–97.8)
MONOCYTES # BLD AUTO: 0.82 K/UL (ref 0–0.85)
MONOCYTES NFR BLD AUTO: 8.4 % (ref 0–13.4)
NEUTROPHILS # BLD AUTO: 6.54 K/UL (ref 2–7.15)
NEUTROPHILS NFR BLD: 66.7 % (ref 44–72)
NRBC # BLD AUTO: 0 K/UL
NRBC BLD-RTO: 0 /100 WBC
PLATELET # BLD AUTO: 428 K/UL (ref 164–446)
PMV BLD AUTO: 9.5 FL (ref 9–12.9)
POTASSIUM SERPL-SCNC: 4.2 MMOL/L (ref 3.6–5.5)
PROT SERPL-MCNC: 6 G/DL (ref 6–8.2)
RBC # BLD AUTO: 3.72 M/UL (ref 4.2–5.4)
SODIUM SERPL-SCNC: 139 MMOL/L (ref 135–145)
TRIGL SERPL-MCNC: 254 MG/DL (ref 0–149)
WBC # BLD AUTO: 9.8 K/UL (ref 4.8–10.8)

## 2020-09-16 PROCEDURE — 95720 EEG PHY/QHP EA INCR W/VEEG: CPT | Performed by: PSYCHIATRY & NEUROLOGY

## 2020-09-16 PROCEDURE — 94770 HCHG CO2 EXPIRED GAS DETERMINATION: CPT

## 2020-09-16 PROCEDURE — A9270 NON-COVERED ITEM OR SERVICE: HCPCS | Performed by: INTERNAL MEDICINE

## 2020-09-16 PROCEDURE — 700101 HCHG RX REV CODE 250: Performed by: PSYCHIATRY & NEUROLOGY

## 2020-09-16 PROCEDURE — 71045 X-RAY EXAM CHEST 1 VIEW: CPT

## 2020-09-16 PROCEDURE — A9270 NON-COVERED ITEM OR SERVICE: HCPCS | Performed by: PSYCHIATRY & NEUROLOGY

## 2020-09-16 PROCEDURE — 94003 VENT MGMT INPAT SUBQ DAY: CPT

## 2020-09-16 PROCEDURE — 700102 HCHG RX REV CODE 250 W/ 637 OVERRIDE(OP): Performed by: INTERNAL MEDICINE

## 2020-09-16 PROCEDURE — 83735 ASSAY OF MAGNESIUM: CPT

## 2020-09-16 PROCEDURE — 700111 HCHG RX REV CODE 636 W/ 250 OVERRIDE (IP): Performed by: INTERNAL MEDICINE

## 2020-09-16 PROCEDURE — 86316 IMMUNOASSAY TUMOR OTHER: CPT

## 2020-09-16 PROCEDURE — 84478 ASSAY OF TRIGLYCERIDES: CPT

## 2020-09-16 PROCEDURE — 99291 CRITICAL CARE FIRST HOUR: CPT | Performed by: INTERNAL MEDICINE

## 2020-09-16 PROCEDURE — 95714 VEEG EA 12-26 HR UNMNTR: CPT | Performed by: PSYCHIATRY & NEUROLOGY

## 2020-09-16 PROCEDURE — 99233 SBSQ HOSP IP/OBS HIGH 50: CPT | Mod: 25 | Performed by: PSYCHIATRY & NEUROLOGY

## 2020-09-16 PROCEDURE — 85025 COMPLETE CBC W/AUTO DIFF WBC: CPT

## 2020-09-16 PROCEDURE — 700102 HCHG RX REV CODE 250 W/ 637 OVERRIDE(OP): Performed by: PSYCHIATRY & NEUROLOGY

## 2020-09-16 PROCEDURE — 700105 HCHG RX REV CODE 258: Performed by: PSYCHIATRY & NEUROLOGY

## 2020-09-16 PROCEDURE — 4A10X4Z MONITORING OF CENTRAL NERVOUS ELECTRICAL ACTIVITY, EXTERNAL APPROACH: ICD-10-PCS | Performed by: PSYCHIATRY & NEUROLOGY

## 2020-09-16 PROCEDURE — 95700 EEG CONT REC W/VID EEG TECH: CPT | Performed by: PSYCHIATRY & NEUROLOGY

## 2020-09-16 PROCEDURE — 770022 HCHG ROOM/CARE - ICU (200)

## 2020-09-16 PROCEDURE — 80053 COMPREHEN METABOLIC PANEL: CPT

## 2020-09-16 PROCEDURE — 700111 HCHG RX REV CODE 636 W/ 250 OVERRIDE (IP): Performed by: PSYCHIATRY & NEUROLOGY

## 2020-09-16 RX ADMIN — KETAMINE HYDROCHLORIDE 3 MG/KG/HR: 50 INJECTION, SOLUTION INTRAMUSCULAR; INTRAVENOUS at 19:13

## 2020-09-16 RX ADMIN — DOCUSATE SODIUM 50 MG AND SENNOSIDES 8.6 MG 2 TABLET: 8.6; 5 TABLET, FILM COATED ORAL at 18:12

## 2020-09-16 RX ADMIN — KETAMINE HYDROCHLORIDE 3 MG/KG/HR: 50 INJECTION, SOLUTION INTRAMUSCULAR; INTRAVENOUS at 13:27

## 2020-09-16 RX ADMIN — PERAMPANEL 10 MG: 2 TABLET ORAL at 22:07

## 2020-09-16 RX ADMIN — LACOSAMIDE 200 MG: 100 TABLET, FILM COATED ORAL at 18:12

## 2020-09-16 RX ADMIN — PROPOFOL 60 MCG/KG/MIN: 10 INJECTION, EMULSION INTRAVENOUS at 23:13

## 2020-09-16 RX ADMIN — PROPOFOL 60 MCG/KG/MIN: 10 INJECTION, EMULSION INTRAVENOUS at 05:54

## 2020-09-16 RX ADMIN — GLYCOPYRROLATE 0.5 MG: 1 TABLET ORAL at 13:26

## 2020-09-16 RX ADMIN — DOCUSATE SODIUM 50 MG AND SENNOSIDES 8.6 MG 2 TABLET: 8.6; 5 TABLET, FILM COATED ORAL at 05:12

## 2020-09-16 RX ADMIN — FAMOTIDINE 20 MG: 20 TABLET, FILM COATED ORAL at 18:12

## 2020-09-16 RX ADMIN — GLYCOPYRROLATE 0.5 MG: 1 TABLET ORAL at 05:12

## 2020-09-16 RX ADMIN — VALPROIC ACID 500 MG: 250 SOLUTION ORAL at 05:12

## 2020-09-16 RX ADMIN — LEVETIRACETAM 1000 MG: 500 TABLET, FILM COATED ORAL at 05:12

## 2020-09-16 RX ADMIN — MIDAZOLAM 8 MG/HR: 5 INJECTION INTRAMUSCULAR; INTRAVENOUS at 00:10

## 2020-09-16 RX ADMIN — LEVOCARNITINE 1000 MG: 1 SOLUTION ORAL at 18:11

## 2020-09-16 RX ADMIN — LEVOCARNITINE 1000 MG: 1 SOLUTION ORAL at 07:36

## 2020-09-16 RX ADMIN — FAMOTIDINE 20 MG: 20 TABLET, FILM COATED ORAL at 05:13

## 2020-09-16 RX ADMIN — LEVETIRACETAM 1000 MG: 500 TABLET, FILM COATED ORAL at 21:32

## 2020-09-16 RX ADMIN — PROPOFOL 60 MCG/KG/MIN: 10 INJECTION, EMULSION INTRAVENOUS at 02:10

## 2020-09-16 RX ADMIN — LEVETIRACETAM 1000 MG: 500 TABLET, FILM COATED ORAL at 13:27

## 2020-09-16 RX ADMIN — LACOSAMIDE 200 MG: 100 TABLET, FILM COATED ORAL at 10:38

## 2020-09-16 RX ADMIN — VALPROIC ACID 500 MG: 250 SOLUTION ORAL at 13:27

## 2020-09-16 RX ADMIN — GLYCOPYRROLATE 0.5 MG: 1 TABLET ORAL at 21:32

## 2020-09-16 RX ADMIN — KETAMINE HYDROCHLORIDE 3 MG/KG/HR: 50 INJECTION, SOLUTION INTRAMUSCULAR; INTRAVENOUS at 05:13

## 2020-09-16 RX ADMIN — PROPOFOL 60 MCG/KG/MIN: 10 INJECTION, EMULSION INTRAVENOUS at 15:11

## 2020-09-16 RX ADMIN — VALPROIC ACID 500 MG: 250 SOLUTION ORAL at 21:32

## 2020-09-16 RX ADMIN — ENOXAPARIN SODIUM 40 MG: 40 INJECTION SUBCUTANEOUS at 05:12

## 2020-09-16 RX ADMIN — LACOSAMIDE 200 MG: 100 TABLET, FILM COATED ORAL at 02:50

## 2020-09-16 RX ADMIN — PROPOFOL 60 MCG/KG/MIN: 10 INJECTION, EMULSION INTRAVENOUS at 10:38

## 2020-09-16 RX ADMIN — PROPOFOL 60 MCG/KG/MIN: 10 INJECTION, EMULSION INTRAVENOUS at 19:13

## 2020-09-16 ASSESSMENT — FIBROSIS 4 INDEX: FIB4 SCORE: 0.16

## 2020-09-16 NOTE — CARE PLAN
Problem: Venous Thromboembolism (VTW)/Deep Vein Thrombosis (DVT) Prevention:  Goal: Patient will participate in Venous Thrombosis (VTE)/Deep Vein Thrombosis (DVT)Prevention Measures  Outcome: PROGRESSING AS EXPECTED  Flowsheets (Taken 9/16/2020 1542)  SCDs, Sequential Compression Device: On  Pharmacologic Prophylaxis Used: LMWH: Enoxaparin(Lovenox)  Intervention: Ensure patient wears graduated elastic stockings (ANDREW hose) and/or SCDs, if ordered, when in bed or chair (Remove at least once per shift for skin check)  Flowsheets (Taken 9/16/2020 1542)  SCDs, Sequential Compression Device: On     Problem: Skin Integrity  Goal: Risk for impaired skin integrity will decrease  Outcome: PROGRESSING AS EXPECTED  Note: Q2 hour turns, repositioned all medical devices, waffle cushion

## 2020-09-16 NOTE — PROCEDURES
VIDEO ELECTROENCEPHALOGRAM REPORT        Referring provider: Dr. Red.      DOS: 9/16/2020 (total recording of 17 hours and 14 minutes).      INDICATION:  Annika He 19 y.o. female presenting with s/p cardiac arrest, altered mental status, seizures.      CURRENT ANTIEPILEPTIC REGIMEN: Levetiracetam 1000 mg tid, Lacosamide 200 mg tid, Valproic Acid 1250 mg tid, Perampanel at 10 mg qhs. Sedation with Ketamine, and Propofol. Midazolam infusion was discontinued during the study.      TECHNIQUE: 30 channel video electroencephalogram (EEG) was performed in accordance with the international 10-20 system. The study was reviewed in bipolar and referential montages. The recording examined a sedated and/or comatose patient.      DESCRIPTION OF THE RECORD:  Generalized delta / theta activity, with elements of slow wave sleep elements noted, including sleep spindles. No burst suppression pattern noted. Frequent, blunted generalized sharps with frontal maximum and triphasic morphology. Frequent runs of Frontal Intermittent Rhythmic Delta Activity (FIRDA). No clear seizures captured.      ACTIVATION PROCEDURES:   Not performed.      EKG: sampling of the EKG recording demonstrated sinus rhythm.      EVENTS:  None.      INTERPRETATION:  This is an abnormal continuous video EEG recording in a sedated and/or comatose patient. A moderate encephalopathy is suggested. Not on burst suppression. Frequent, blunted generalized sharps with frontal maximum and triphasic morphology. Frequent runs of Frontal Intermittent Rhythmic Delta Activity (FIRDA). No clear seizures captured. The findings suggest underlying areas of persinstently increased cortical irritability and/or structural abnormality. Clinical and radiological correlation is recommended.     Updates provided to Dr. Bear.         Garland Beatty MD   Epilepsy and Neurodiagnostics.   Clinical  of [facility]rology [facility]  [employer] [facility] [employer].   Diplomate in Neurology, Epilepsy, and Electrodiagnostic Medicine.   Office: [phone]  Fax: [phone]

## 2020-09-16 NOTE — CARE PLAN
Problem: Ventilation Defect:  Goal: Ability to achieve and maintain unassisted ventilation or tolerate decreased levels of ventilator support  Intervention: Support and monitor invasive and noninvasive mechanical ventilation  Note:   Adult Ventilation Update    Total Vent Days: 25   Trach Day 7  8.0 Portex  CMV  26 320 +8 30%    Patient Lines/Drains/Airways Status      Active Airway       Name: Placement date: Placement time: Site: Days:    Airway Trach Tracheostomy 8.0  09/09/20   1547   Tracheostomy  6                  Sputum/Suction  Cough: Productive;Strong (09/16/20 0312)  Sputum Amount: Small (09/16/20 0312)  Sputum Color: White;Clear (09/16/20 0312)  Sputum Consistency: Thick (09/16/20 0312)

## 2020-09-16 NOTE — PROGRESS NOTES
Critical Care Progress Note    Date of admission  8/23/2020    Chief Complaint  19 y.o. female admitted 8/23/2020 with a cardiac arrest following an apparent drug overdose.    Hospital Course    8/24 - TTM protocol initiated, EEG without NCSE  8/25 - rewarmed, awake, not following  8/26 - following occasional commands  8/27- seizure like activity vs shivering--> Keppra, versed, Propofol  8/28- no seizures on EEG  8/29- remains unresponsive.  8/30 -mental status improved, now opening eyes and following commands.  Keppra discontinued  8/31 - started cefepime/vancomycin for possible pneumonia,?  UTI, fever and increasing WBC fever; full vent  9/1 - Seizure activity noted on EEG today, reviewed with neurology; keppra load and increased to 1gm q 12; full vent support  9/2 - remains unresponsive with only some posture and grimace to stimulation, neurology reconsulted, stat MRI brain, LP, cEEG  9/3 -ongoing seizures, Vimpat added, Depakote added, family conference today  9/4 -ongoing seizure noted on cEEG, started on midazolam infusion, full ventilator support  9/5 -ongoing seizures noted, propofol added and increased today, full ventilator support  9/6 -have not yet achieved burst suppression on cEEG; continue propofol, change Versed to ketamine infusion, discussed with neurology, full ventilator support  9/7 - still titrating propofol and ketamine for burst suppression on cEEG. Continue full ventilator support  9/8 - propofol off; ketamine is being weaned; on vimpat, keppra, depakote. Requiring full ventilator support  9/9 - Requiring full ventilator support. propofol and ketamine off; on vimpat, keppra, depakote. Postures to stimuli - consult for trach/PEG  9/10 -status post tracheostomy, off propofol and on ketamine- beginning to wean antiepileptic medications, continues to require full ventilator support. PEG tomorrow. Reportedly is still having seizures.   9/11 PEG tube today, continues to require full mechanical  ventilator support  9/12 returned to deep sedation secondary to persistent seizures; continues to require full vent support  9/13 continues to require full vent support; propofol and ketamine plus AEDs for persistent, intermittent seizures  9/14 -    continue full vent support.  Continue propofol, ketamine.  Start Versed.  Continuous EEG.  9/15 -    continue vent support.  Increase Versed drip.  For repeat MRI today.  9/16 -    stop Versed drip.  Continue other AEDs.  Continue vent support.      Interval Problem Update  Reviewed last 24 hour events:      Ketamine 3  Versed 8  Prop 60  ST  TF at 40 (goal)  Vent 25  Trach 8  PEEP 8   30%      Review of Systems  Review of Systems   Unable to perform ROS: Acuity of condition        Vital Signs for last 24 hours   Temp:  [36.5 °C (97.7 °F)-37.1 °C (98.8 °F)] 37.1 °C (98.8 °F)  Pulse:  [] 103  Resp:  [24-26] 26  BP: (107-126)/(70-92) 126/80  SpO2:  [96 %-100 %] 100 %    Hemodynamic parameters for last 24 hours       Respiratory Information for the last 24 hours  Vent Mode: APVCMV  Rate (breaths/min): 26  Vt Target (mL): 320  PEEP/CPAP: 8  MAP: 12  Control VTE (exp VT): 318    Physical Exam   Physical Exam  Constitutional:       Appearance: She is not diaphoretic.      Comments: On ventilator   HENT:      Head: Normocephalic and atraumatic.      Right Ear: External ear normal.      Left Ear: External ear normal.      Nose: Nose normal.      Mouth/Throat:      Mouth: Mucous membranes are moist.      Pharynx: Oropharynx is clear.   Eyes:      Conjunctiva/sclera: Conjunctivae normal.      Pupils: Pupils are equal, round, and reactive to light.   Neck:      Musculoskeletal: Normal range of motion and neck supple.   Cardiovascular:      Pulses: Normal pulses.      Heart sounds: No murmur. No gallop.       Comments: Sinus rhythm  Pulmonary:      Breath sounds: Rales (Coarse crackles bilaterally) present. No wheezing.   Abdominal:      General: Bowel sounds are normal.       Palpations: Abdomen is soft.      Tenderness: There is no abdominal tenderness. There is no guarding.      Comments: Tolerating enteral tube feedings   Musculoskeletal: Normal range of motion.      Right lower leg: No edema.      Left lower leg: No edema.      Comments: No clubbing or cyanosis   Skin:     General: Skin is warm and dry.      Capillary Refill: Capillary refill takes less than 2 seconds.   Neurological:      Comments: Pupils 4 mm, equal and briskly reactive.         Medications  Current Facility-Administered Medications   Medication Dose Route Frequency Provider Last Rate Last Dose   • Valproate Sodium (DEPAKENE) oral solution 500 mg  500 mg Enteral Tube Q8HRS Toni Bear M.D.   500 mg at 09/16/20 1327   • fentaNYL (SUBLIMAZE) injection  mcg   mcg Intravenous Q HOUR PRN Angel Luis Lanier M.D.       • levETIRAcetam (KEPPRA) tablet 1,000 mg  1,000 mg Enteral Tube Q8HR Andrew Petersen M.D.   1,000 mg at 09/16/20 1327   • glycopyrrolate (ROBINUL) tablet 0.5 mg  0.5 mg Enteral Tube Q8HRS Andrew Petersen M.D.   0.5 mg at 09/16/20 1326   • levOCARNitine (CARNITOR) 1 GM/10ML solution 1,000 mg  1,000 mg Enteral Tube BID WITH MEALS Angel Luis Lanier M.D.   1,000 mg at 09/16/20 0736   • lacosamide (VIMPAT) tablet 200 mg  200 mg Enteral Tube Q8HR Andrew Petersen M.D.   200 mg at 09/16/20 1038   • ketamine 1,000 mg in  mL infusion (critical care only)  3 mg/kg/hr Intravenous Continuous Andrew Petersen M.D. 100.2 mL/hr at 09/16/20 1327 3 mg/kg/hr at 09/16/20 1327   • perampanel (FYCOMPA) tablet 10 mg  10 mg Enteral Tube QHS Nithin Donohue M.D.   10 mg at 09/15/20 2301   • propofol (DIPRIVAN) injection  0-80 mcg/kg/min Intravenous Continuous Boogie M Fawn Jr., D.O. 23.2 mL/hr at 09/16/20 1511 60 mcg/kg/min at 09/16/20 1511   • polyethylene glycol/lytes (MIRALAX) PACKET 1 Packet  1 Packet Enteral Tube BID Dar Red M.D.   Stopped at 09/13/20 1800   •  acetaminophen (TYLENOL) suppository 325 mg  325 mg Rectal Q6HRS PRN Yosef Tomlin M.D.   650 mg at 09/02/20 1632   • acetaminophen (TYLENOL) tablet 1,000 mg  1,000 mg Enteral Tube Q4HRS PRN Dar Red M.D.   1,000 mg at 09/14/20 1810   • labetalol (NORMODYNE/TRANDATE) injection 10 mg  10 mg Intravenous Q4HRS PRN Yosef Tomlin M.D.   10 mg at 08/30/20 2256   • Pharmacy Consult: Enteral tube insertion - review meds/change route/product selection  1 Each Other PHARMACY TO DOSE Boogie Augustine Jr., D.O.       • Respiratory Therapy Consult   Nebulization Continuous RT Brock Murdock M.D.       • ipratropium-albuterol (DUONEB) nebulizer solution  3 mL Nebulization Q2HRS PRN (RT) Brock Murdock M.D.       • famotidine (PEPCID) tablet 20 mg  20 mg Enteral Tube Q12HRS Dar Red M.D.   20 mg at 09/16/20 0513   • senna-docusate (PERICOLACE or SENOKOT S) 8.6-50 MG per tablet 2 Tab  2 Tab Enteral Tube BID Brock Murdock M.D.   2 Tab at 09/16/20 0512    And   • polyethylene glycol/lytes (MIRALAX) PACKET 1 Packet  1 Packet Enteral Tube QDAY PRN Brock Murdock M.D.        And   • magnesium hydroxide (MILK OF MAGNESIA) suspension 30 mL  30 mL Enteral Tube QDAY PRN Brock Murdock M.D.        And   • bisacodyl (DULCOLAX) suppository 10 mg  10 mg Rectal QDAY PRN Brock Murdock M.D.       • MD Alert...ICU Electrolyte Replacement per Pharmacy   Other PHARMACY TO DOSE Brock Murdock M.D.       • lidocaine (XYLOCAINE) 1 % injection 1-2 mL  1-2 mL Tracheal Tube Q30 MIN PRN Brock Murdock M.D.       • enoxaparin (LOVENOX) inj 40 mg  40 mg Subcutaneous DAILY Boogie Augustine Jr., D.O.   40 mg at 09/16/20 0512       Fluids    Intake/Output Summary (Last 24 hours) at 9/16/2020 1748  Last data filed at 9/16/2020 1600  Gross per 24 hour   Intake 4167.2 ml   Output 3575 ml   Net 592.2 ml       Laboratory          Recent Labs     09/14/20  0400 09/15/20  0613 09/16/20  0305   SODIUM 139 141 139   POTASSIUM 4.0 4.1 4.2   CHLORIDE 106  106 105   CO2 21 22 19*   BUN 13 14 17   CREATININE 0.45* 0.41* 0.30*   MAGNESIUM  --  2.0 1.9   CALCIUM 8.7 8.5 8.5     Recent Labs     09/14/20  0400 09/14/20  1513 09/15/20  0613 09/16/20  0305   ALTSGPT 253*  --  148* 102*   ASTSGOT 115*  --  42 32   ALKPHOSPHAT 232*  --  191* 169*   TBILIRUBIN <0.2  --  0.2 <0.2   PREALBUMIN  --  43.4*  --   --    GLUCOSE 127*  --  99 91     Recent Labs     09/14/20  0400 09/15/20  0613 09/16/20  0305   WBC 9.1 12.5* 9.8   NEUTSPOLYS 68.40 73.90* 66.70   LYMPHOCYTES 14.90* 9.40* 14.60*   MONOCYTES 10.10 8.60 8.40   EOSINOPHILS 5.00 6.60 8.60*   BASOPHILS 0.30 0.30 0.40   ASTSGOT 115* 42 32   ALTSGPT 253* 148* 102*   ALKPHOSPHAT 232* 191* 169*   TBILIRUBIN <0.2 0.2 <0.2     Recent Labs     09/14/20  0400 09/15/20  0613 09/16/20  0305   RBC 3.52* 3.88* 3.72*   HEMOGLOBIN 10.9* 11.9* 11.6*   HEMATOCRIT 34.6* 37.2 35.8*   PLATELETCT 487* 495* 428       Imaging  X-Ray:  I have personally reviewed the images and compared with prior images. and My impression is: Bibasilar opacities are about the same    Assessment/Plan  * Acute respiratory failure with hypoxia (HCC)- (present on admission)  Assessment & Plan  Intubated 8/24  Trach 9/9  Continue vent support  All of the appropriate ventilator bundles are in place    Anoxic encephalopathy (HCC)- (present on admission)  Assessment & Plan  MRI on 9/2 and 9/15 consistent with severe anoxic brain injury  EEG with frequent, blunted generalized sharps with frontal maximum and triphasic morphology with frequent runs of frontal intermittent rhythmic delta activity (FIRDA)  Continue AEDs    Drug overdose- (present on admission)  Assessment & Plan  BA 0.16 on presentation  Positive urine drug screen for amphetamines on presentation  Boyfriend reported methamphetamine, oxycodone and alcohol use    Seizures (HCC)  Assessment & Plan  Continuous video EEG  Continue Vimpat, 200 mg every 8 hours  Continue valproic acid, 500 mg every 8 hours  Continue  Keppra, 1000 mg every 8 hours  Stop Versed drip  Continue Fycompa, 10 mg nightly  Continue ketamine    Pneumonia of both lungs due to methicillin resistant Staphylococcus aureus (MRSA) ([facility])  Assessment & Plan  Sputum culture with MRSA, Acinetobacter and Stenotrophomonas species  Completed 10 days of Bactrim on 9/13  Observe off antibiotics    Elevated transaminase level- (present on admission)  Assessment & Plan  Suspect due to valproic acid  Improving  Continue l-carnitine    S/P PEA cardiac arrest (HCC)- (present on admission)  Assessment & Plan  S/P therapeutic hypothermia protocol    Dysphagia, oropharyngeal- (present on admission)  Assessment & Plan  S/P laparoscopic gastrostomy on 9/11  Continue enteral tube feedings       VTE:  Lovenox  Ulcer: H2 Antagonist  Lines: Pearce Catheter  Ongoing indication addressed    I have performed a physical exam and reviewed and updated ROS and Plan today (9/16/2020). In review of yesterday's note (9/15/2020), there are no changes except as documented above.     I have assessed and reassessed her respiratory status with ventilator adjustments, ventilator waveforms, airway mechanics, blood pressure, hemodynamics, cardiovascular status and her neurologic status.  She is at increased risk for worsening respiratory, cardiovascular and CNS system dysfunction.    Discussed patient condition and risk of morbidity and/or mortality with Family, RN, RT, Pharmacy, Charge nurse / hot rounds, QA team and neurology     The patient remains critically ill.  Critical care time = 35 minutes in directly providing and coordinating critical care and extensive data review.  No time overlap and excludes procedures.    Angel Luis Lanier MD  Pulmonary and Critical Care Medicine

## 2020-09-16 NOTE — CARE PLAN
Problem: Safety  Goal: Will remain free from injury  Outcome: PROGRESSING AS EXPECTED  Goal: Will remain free from falls  Outcome: PROGRESSING AS EXPECTED     Problem: Venous Thromboembolism (VTW)/Deep Vein Thrombosis (DVT) Prevention:  Goal: Patient will participate in Venous Thrombosis (VTE)/Deep Vein Thrombosis (DVT)Prevention Measures  Outcome: PROGRESSING AS EXPECTED     Problem: Skin Integrity  Goal: Risk for impaired skin integrity will decrease  Outcome: PROGRESSING AS EXPECTED

## 2020-09-16 NOTE — EEG PROGRESS NOTE
Pt is hooked up to continuous EEG with CT Compatible electrodes. These electrodes may NOT go to MRI.

## 2020-09-16 NOTE — DISCHARGE PLANNING
note:  Follow up call to Jes at [facility] about Medicaid application. She said that mother has been contacted and suppose to come in and complete application. Email sent to [facility] to follow up on this please.

## 2020-09-16 NOTE — PROGRESS NOTES
NEUROLOGY PROGRESS NOTE      BACKGROUND:    19 y.o. female was admitted on 8/23/2020  9:57 PM for Cardiac arrest (HCC)  Drug overdose.  She is being followed by Neurology for anoxic brain injury.    SUBJECTIVE:   No significant changes, remains unresponsive with tracheostomy on vent.      VITALS:  Vitals:    09/16/20 0900 09/16/20 1000 09/16/20 1100 09/16/20 1115   BP: 112/72 114/79 117/75    Pulse: (!) 102 (!) 102 97 (!) 101   Resp: (!) 26 (!) 26 (!) 24 (!) 26   Temp:       TempSrc:       SpO2: 100% 100% 100% 100%   Weight:       Height:           NEUROLOGICAL EXAM:   She is unresponsive on vent.  Her eyes are closed.  I do not appreciate facial asymmetry.  Facial sensation cannot be tested.  Pupils are 4 mm equal and briskly reactive to light.  She has cough and gag reflex.  She has no withdrawal to physical stimulation in lower extremity.  She has slight posturing of left upper extremity in noxious stimuli.  Motor, sensory, coordination cannot be tested.  Rest of her neurological examination is limited.    OBJECTIVE:    NEUROIMAGING:    DX-CHEST-PORTABLE (1 VIEW)   Final Result      No significant interval change.         MR-BRAIN-W/O   Final Result      1.  Findings consistent with worsening anoxic brain injury involving the lentiform nuclei bilaterally.   2.  Findings consistent with anoxic brain injury in the parietal, occipital and posterior temporal regions which appear somewhat improved from prior exam.  Similar findings in the bifrontal region appear unchanged.         DX-CHEST-PORTABLE (1 VIEW)   Final Result      No significant interval change.      DX-CHEST-PORTABLE (1 VIEW)   Final Result      Bibasilar underinflation atelectasis which could obscure an additional process. This is similar to the prior study.      DX-CHEST-PORTABLE (1 VIEW)   Final Result      Hazy airspace opacities are improved compared to prior.      DX-CHEST-PORTABLE (1 VIEW)   Final Result         1.  Pulmonary edema and/or  infiltrates are identified, which are stable since the prior exam.            DX-CHEST-PORTABLE (1 VIEW)   Final Result         1.  Mild pulmonary edema and/or interstitial infiltrates, somewhat decreased since prior study         IR-MIDLINE CATHETER INSERTION WO GUIDANCE > AGE 3   Final Result                  Ultrasound-guided midline placement performed by qualified nursing staff    as above.          DX-CHEST-PORTABLE (1 VIEW)   Final Result         1.  Mild pulmonary edema and/or interstitial infiltrates, stable since prior study      DX-CHEST-PORTABLE (1 VIEW)   Final Result         1.  Mild pulmonary edema and/or interstitial infiltrates      DX-CHEST-PORTABLE (1 VIEW)   Final Result         No significant interval change.      DX-CHEST-PORTABLE (1 VIEW)   Final Result         New hazy opacity in the left lung base could be atelectasis or consolidation.      DX-CHEST-PORTABLE (1 VIEW)   Final Result      No significant change      DX-CHEST-PORTABLE (1 VIEW)   Final Result         1.  No acute cardiopulmonary disease.                                 DX-CHEST-PORTABLE (1 VIEW)   Final Result         1.  No acute cardiopulmonary disease.                              DX-CHEST-PORTABLE (1 VIEW)   Final Result         1.  No focal infiltrates, previously visualized infiltrates are not readily apparent.                           MR-BRAIN-WITH & W/O   Final Result         1.  Severe diffuse anoxic brain injury pattern which is more extensive and conspicuous than on previous exam.      2.  No evidence of intracranial mass effect, extra-axial fluid collection, or interval development of hydrocephalus.      3.  Diffuse mucosal inflammatory changes filling the ethmoid and sphenoid sinuses.      DX-CHEST-PORTABLE (1 VIEW)   Final Result         1.  Pulmonary edema and/or infiltrates are identified, which are somewhat decreased since the prior exam.                        [id number] VIEW   Final Result      Feeding tube  is noted with tip at the level of the proximal duodenum.                  DX-CHEST-PORTABLE (1 VIEW)   Final Result         1.  Pulmonary edema and/or infiltrates are identified, which are stable since the prior exam.                     DX-CHEST-PORTABLE (1 VIEW)   Final Result      Stable chest x-ray findings with right lower lobe consolidation.      DX-CHEST-PORTABLE (1 VIEW)   Final Result         1.  Pulmonary edema and/or infiltrates are identified, which are stable since the prior exam.                  DX-CHEST-PORTABLE (1 VIEW)   Final Result         1.  Pulmonary edema and/or infiltrates are identified, which are stable since the prior exam.               MR-BRAIN-W/O   Final Result      The diffusion-weighted sequences demonstrates areas of restricted diffusion in the bilateral basal ganglia and some of the frontal gray matter. The predominant portion of the brain parenchyma does not demonstrate any restricted diffusion. Therefore this    findings likely represent mild diffuse anoxic brain injury.      DX-CHEST-PORTABLE (1 VIEW)   Final Result         1.  Pulmonary edema and/or infiltrates are identified, which are somewhat decreased since the prior exam.            DX-CHEST-PORTABLE (1 VIEW)   Final Result         1.  Pulmonary edema and/or infiltrates are identified, which are stable since the prior exam.         DX-ABDOMEN FOR TUBE PLACEMENT   Final Result         1.  Nonspecific bowel gas pattern.   2.  Dobbhoff tube tip overlying the expected location of the pylorus or first duodenal segment.      DX-CHEST-PORTABLE (1 VIEW)   Final Result         1.  Patchy bilateral pulmonary infiltrates, somewhat increased since prior.      EC-ECHOCARDIOGRAM COMPLETE W/O CONT   Final Result      DX-CHEST-PORTABLE (1 VIEW)   Final Result         1.  Patchy bilateral pulmonary infiltrates, somewhat increased since prior.      CT-HEAD W/O   Final Result         1.  Possible subtle sulcal effacement and slight loss of  differentiation of gray-white matter, consider component of cerebral edema. Recommend radiographic follow-up   2.  Bilateral sphenoid and maxillary sinus air-fluid levels      DX-CHEST-PORTABLE (1 VIEW)   Final Result         1.  No acute cardiopulmonary disease.      DX-CHEST-PORTABLE (1 VIEW)    (Results Pending)       MEDICATIONS:  Current Facility-Administered Medications   Medication Dose Route Frequency Provider Last Rate Last Dose   • midazolam (VERSED) premix 125 mg/125 mL NS  8 mg/hr Intravenous Continuous Toni Bear M.D. 8 mL/hr at 09/16/20 0010 8 mg/hr at 09/16/20 0010   • Valproate Sodium (DEPAKENE) oral solution 500 mg  500 mg Enteral Tube Q8HRS Toni Bear M.D.   500 mg at 09/16/20 0512   • fentaNYL (SUBLIMAZE) injection  mcg   mcg Intravenous Q HOUR PRN Angel Luis Lanier M.D.       • levETIRAcetam (KEPPRA) tablet 1,000 mg  1,000 mg Enteral Tube Q8HR Andrew Petersen M.D.   1,000 mg at 09/16/20 0512   • glycopyrrolate (ROBINUL) tablet 0.5 mg  0.5 mg Enteral Tube Q8HRS Andrew Petersen M.D.   0.5 mg at 09/16/20 0512   • levOCARNitine (CARNITOR) 1 GM/10ML solution 1,000 mg  1,000 mg Enteral Tube BID WITH MEALS Angel Luis Lanier M.D.   1,000 mg at 09/16/20 0736   • lacosamide (VIMPAT) tablet 200 mg  200 mg Enteral Tube Q8HR Andrew Petersen M.D.   200 mg at 09/16/20 1038   • ketamine 1,000 mg in  mL infusion (critical care only)  3 mg/kg/hr Intravenous Continuous Andrew Petersen M.D. 100.2 mL/hr at 09/16/20 0513 3 mg/kg/hr at 09/16/20 0513   • perampanel (FYCOMPA) tablet 10 mg  10 mg Enteral Tube QHS Nithin Donohue M.D.   10 mg at 09/15/20 2301   • propofol (DIPRIVAN) injection  0-80 mcg/kg/min Intravenous Continuous Boogie Augustine Jr., D.O. 23.2 mL/hr at 09/16/20 1038 60 mcg/kg/min at 09/16/20 1038   • polyethylene glycol/lytes (MIRALAX) PACKET 1 Packet  1 Packet Enteral Tube BID Dar Red M.D.   Stopped at 09/13/20 1800   •  acetaminophen (TYLENOL) suppository 325 mg  325 mg Rectal Q6HRS PRN Yosef Tomlin M.D.   650 mg at 09/02/20 1632   • acetaminophen (TYLENOL) tablet 1,000 mg  1,000 mg Enteral Tube Q4HRS PRN Dar Red M.D.   1,000 mg at 09/14/20 1810   • labetalol (NORMODYNE/TRANDATE) injection 10 mg  10 mg Intravenous Q4HRS PRN Yosef Tomlin M.D.   10 mg at 08/30/20 2256   • Pharmacy Consult: Enteral tube insertion - review meds/change route/product selection  1 Each Other PHARMACY TO DOSE Boogie Augustine Jr., D.O.       • Respiratory Therapy Consult   Nebulization Continuous RT Brock Murdock M.D.       • ipratropium-albuterol (DUONEB) nebulizer solution  3 mL Nebulization Q2HRS PRN (RT) Brock Murdock M.D.       • famotidine (PEPCID) tablet 20 mg  20 mg Enteral Tube Q12HRS Dar Red M.D.   20 mg at 09/16/20 0513   • senna-docusate (PERICOLACE or SENOKOT S) 8.6-50 MG per tablet 2 Tab  2 Tab Enteral Tube BID Brock Murdock M.D.   2 Tab at 09/16/20 0512    And   • polyethylene glycol/lytes (MIRALAX) PACKET 1 Packet  1 Packet Enteral Tube QDAY PRN Brock Murdock M.D.        And   • magnesium hydroxide (MILK OF MAGNESIA) suspension 30 mL  30 mL Enteral Tube QDAY PRN Brock Murdock M.D.        And   • bisacodyl (DULCOLAX) suppository 10 mg  10 mg Rectal QDAY PRN Brock Murdock M.D.       • MD Alert...ICU Electrolyte Replacement per Pharmacy   Other PHARMACY TO DOSE Brock Murdock M.D.       • lidocaine (XYLOCAINE) 1 % injection 1-2 mL  1-2 mL Tracheal Tube Q30 MIN PRN Brock Murdokc M.D.       • enoxaparin (LOVENOX) inj 40 mg  40 mg Subcutaneous DAILY Boogie Augustine Jr., D.O.   40 mg at 09/16/20 0512       LABS:      Recent Labs     09/14/20  0400 09/15/20  0613 09/16/20  0305   WBC 9.1 12.5* 9.8   RBC 3.52* 3.88* 3.72*   HEMOGLOBIN 10.9* 11.9* 11.6*   HEMATOCRIT 34.6* 37.2 35.8*   MCV 98.3* 95.9 96.2   MCH 31.0 30.7 31.2   MCHC 31.5* 32.0* 32.4*   RDW 47.8 46.7 48.1   PLATELETCT 487* 495* 428   MPV 9.2 9.7 9.5      Recent Labs     20  0400 09/15/20  0613 20  0305   SODIUM 139 141 139   POTASSIUM 4.0 4.1 4.2   CHLORIDE 106 106 105   CO2 21 22 19*   GLUCOSE 127* 99 91   BUN 13 14 17     INR   Date Value Ref Range Status   2020 1.05 0.87 - 1.13 Final     Comment:     INR - Non-therapeutic Reference Range: 0.87-1.13  INR - Therapeutic Reference Range: 2.0-4.0       No results found for: POCINR  Lab Results   Component Value Date/Time    CREATININE 0.45 (L) 2020     Lab Results   Component Value Date/Time    IFAFRICA >60 20200    IFNOTAFR >60 2020     EE2020:  This is an abnormal 24 hrs video EEG recording in a comatose patient. A mild to moderate encephalopathy is suggested. The patient appears only mildly sedated, not in burst suppression pattern. Frequent, blunted generalized sharps with frontal maximum and triphasic morphology, with frequent runs of Frontal Intermittent Rhythmic Delta Activity (FIRDA). No seizures captured, however the study remains significantly abnormal. The findings suggest underlying areas of persinstently increased cortical irritability and/or structural abnormality. Clinical and radiological correlation is recommended.    ASSESSMENT AND PLAN:  19-year-old female with polysubstance abuse and subsequent cardiac arrest on 2020 who remains unresponsive with no neurological improvement on vent.  Her EEG as outlined above revealed frequent generalized sharp with frontal maximum and triphasic morphology and frequent runs of frontal intermittent rhythmic delta activity but no clear seizure.  She is on multiple antiepileptic medication including Keppra 1 g every 8 hours, and Vimpat 200 mg every 8 hours, valproic acid 1000 mg every 8 hours, Fycompa 10 mg nightly in addition to propofol and ketamine.  Her repeat brain MRI on 9/15/20 shows worsening anoxic brain injury with involvement of lentiform nuclei bilaterally.   I tried to call her father  Angel Luis, at [phone] and he did not answer the phone and left brief message that I called with no details.  Subsequently I called her mother, Juliette, at [phone] and updated her regarding her new brain MRI finding and plan of care to continue with EEG monitoring with a goal of reduced sedation on while on EEG monitoring to assess her neurological function if any.  I answered all of her question.   Discussed with Dr. Lanier  Total critical care time spent was 40 minutes.

## 2020-09-16 NOTE — CARE PLAN
Adult Ventilation Care Plan Update    Total Vent Days: 25  Trach# 8  8.0 Portex    Settings: APVCMV  26 320 +8 30%      Weaning:no weaning per MD until sedation has decreased/continuous EEG        Plan: Continue current vent settings and wean mechanical ventilation as tolerated per MD orders.

## 2020-09-16 NOTE — PROGRESS NOTES
Addendum:    She is back on continuous EEG monitoring and there is no active seizure.  I will discontinue Versed and eventually reduce other sedation to evaluate her neurological situation.

## 2020-09-17 ENCOUNTER — APPOINTMENT (OUTPATIENT)
Dept: RADIOLOGY | Facility: MEDICAL CENTER | Age: 20
DRG: 004 | End: 2020-09-17
Attending: INTERNAL MEDICINE
Payer: MEDICAID

## 2020-09-17 PROBLEM — R50.9 FEVER: Status: ACTIVE | Noted: 2020-09-17

## 2020-09-17 LAB
ALBUMIN SERPL BCP-MCNC: 3.4 G/DL (ref 3.2–4.9)
ALBUMIN/GLOB SERPL: 1.2 G/DL
ALP SERPL-CCNC: 163 U/L (ref 30–99)
ALT SERPL-CCNC: 71 U/L (ref 2–50)
ANION GAP SERPL CALC-SCNC: 13 MMOL/L (ref 7–16)
APPEARANCE UR: ABNORMAL
AST SERPL-CCNC: 28 U/L (ref 12–45)
BACTERIA #/AREA URNS HPF: NEGATIVE /HPF
BASOPHILS # BLD AUTO: 0.5 % (ref 0–1.8)
BASOPHILS # BLD: 0.05 K/UL (ref 0–0.12)
BILIRUB SERPL-MCNC: <0.2 MG/DL (ref 0.1–1.5)
BILIRUB UR QL STRIP.AUTO: NEGATIVE
BUN SERPL-MCNC: 18 MG/DL (ref 8–22)
CALCIUM SERPL-MCNC: 8.9 MG/DL (ref 8.5–10.5)
CHLORIDE SERPL-SCNC: 105 MMOL/L (ref 96–112)
CO2 SERPL-SCNC: 21 MMOL/L (ref 20–33)
COLOR UR: YELLOW
CREAT SERPL-MCNC: 0.33 MG/DL (ref 0.5–1.4)
EOSINOPHIL # BLD AUTO: 0.64 K/UL (ref 0–0.51)
EOSINOPHIL NFR BLD: 6.4 % (ref 0–6.9)
EPI CELLS #/AREA URNS HPF: NEGATIVE /HPF
ERYTHROCYTE [DISTWIDTH] IN BLOOD BY AUTOMATED COUNT: 48 FL (ref 35.9–50)
GLOBULIN SER CALC-MCNC: 2.9 G/DL (ref 1.9–3.5)
GLUCOSE SERPL-MCNC: 105 MG/DL (ref 65–99)
GLUCOSE UR STRIP.AUTO-MCNC: NEGATIVE MG/DL
GRAM STN SPEC: NORMAL
HCT VFR BLD AUTO: 36.8 % (ref 37–47)
HGB BLD-MCNC: 11.8 G/DL (ref 12–16)
HYALINE CASTS #/AREA URNS LPF: NORMAL /LPF
IMM GRANULOCYTES # BLD AUTO: 0.16 K/UL (ref 0–0.11)
IMM GRANULOCYTES NFR BLD AUTO: 1.6 % (ref 0–0.9)
KETONES UR STRIP.AUTO-MCNC: NEGATIVE MG/DL
LEUKOCYTE ESTERASE UR QL STRIP.AUTO: NEGATIVE
LYMPHOCYTES # BLD AUTO: 1.33 K/UL (ref 1–4.8)
LYMPHOCYTES NFR BLD: 13.2 % (ref 22–41)
MAGNESIUM SERPL-MCNC: 2 MG/DL (ref 1.5–2.5)
MCH RBC QN AUTO: 30.6 PG (ref 27–33)
MCHC RBC AUTO-ENTMCNC: 32.1 G/DL (ref 33.6–35)
MCV RBC AUTO: 95.3 FL (ref 81.4–97.8)
MICRO URNS: ABNORMAL
MONOCYTES # BLD AUTO: 0.91 K/UL (ref 0–0.85)
MONOCYTES NFR BLD AUTO: 9.1 % (ref 0–13.4)
NEUTROPHILS # BLD AUTO: 6.95 K/UL (ref 2–7.15)
NEUTROPHILS NFR BLD: 69.2 % (ref 44–72)
NITRITE UR QL STRIP.AUTO: NEGATIVE
NRBC # BLD AUTO: 0 K/UL
NRBC BLD-RTO: 0 /100 WBC
PH UR STRIP.AUTO: 7 [PH] (ref 5–8)
PLATELET # BLD AUTO: 490 K/UL (ref 164–446)
PMV BLD AUTO: 9.8 FL (ref 9–12.9)
POTASSIUM SERPL-SCNC: 4.3 MMOL/L (ref 3.6–5.5)
PROT SERPL-MCNC: 6.3 G/DL (ref 6–8.2)
PROT UR QL STRIP: NEGATIVE MG/DL
RBC # BLD AUTO: 3.86 M/UL (ref 4.2–5.4)
RBC # URNS HPF: NORMAL /HPF
RBC UR QL AUTO: NEGATIVE
SIGNIFICANT IND 70042: NORMAL
SITE SITE: NORMAL
SODIUM SERPL-SCNC: 139 MMOL/L (ref 135–145)
SOURCE SOURCE: NORMAL
SP GR UR STRIP.AUTO: 1.03
UROBILINOGEN UR STRIP.AUTO-MCNC: 1 MG/DL
WBC # BLD AUTO: 10 K/UL (ref 4.8–10.8)
WBC #/AREA URNS HPF: NORMAL /HPF

## 2020-09-17 PROCEDURE — 700102 HCHG RX REV CODE 250 W/ 637 OVERRIDE(OP): Performed by: INTERNAL MEDICINE

## 2020-09-17 PROCEDURE — 87070 CULTURE OTHR SPECIMN AEROBIC: CPT

## 2020-09-17 PROCEDURE — 770022 HCHG ROOM/CARE - ICU (200)

## 2020-09-17 PROCEDURE — 95720 EEG PHY/QHP EA INCR W/VEEG: CPT | Performed by: PSYCHIATRY & NEUROLOGY

## 2020-09-17 PROCEDURE — 87205 SMEAR GRAM STAIN: CPT

## 2020-09-17 PROCEDURE — 700105 HCHG RX REV CODE 258: Performed by: PSYCHIATRY & NEUROLOGY

## 2020-09-17 PROCEDURE — 87150 DNA/RNA AMPLIFIED PROBE: CPT | Mod: 91

## 2020-09-17 PROCEDURE — 94003 VENT MGMT INPAT SUBQ DAY: CPT

## 2020-09-17 PROCEDURE — 81001 URINALYSIS AUTO W/SCOPE: CPT

## 2020-09-17 PROCEDURE — 99291 CRITICAL CARE FIRST HOUR: CPT | Mod: 25 | Performed by: PSYCHIATRY & NEUROLOGY

## 2020-09-17 PROCEDURE — 94770 HCHG CO2 EXPIRED GAS DETERMINATION: CPT

## 2020-09-17 PROCEDURE — 700102 HCHG RX REV CODE 250 W/ 637 OVERRIDE(OP): Performed by: PSYCHIATRY & NEUROLOGY

## 2020-09-17 PROCEDURE — 95714 VEEG EA 12-26 HR UNMNTR: CPT | Performed by: PSYCHIATRY & NEUROLOGY

## 2020-09-17 PROCEDURE — 80053 COMPREHEN METABOLIC PANEL: CPT

## 2020-09-17 PROCEDURE — A9270 NON-COVERED ITEM OR SERVICE: HCPCS | Performed by: INTERNAL MEDICINE

## 2020-09-17 PROCEDURE — 87040 BLOOD CULTURE FOR BACTERIA: CPT

## 2020-09-17 PROCEDURE — 87186 SC STD MICRODIL/AGAR DIL: CPT

## 2020-09-17 PROCEDURE — 4A10X4Z MONITORING OF CENTRAL NERVOUS ELECTRICAL ACTIVITY, EXTERNAL APPROACH: ICD-10-PCS | Performed by: PSYCHIATRY & NEUROLOGY

## 2020-09-17 PROCEDURE — 71045 X-RAY EXAM CHEST 1 VIEW: CPT

## 2020-09-17 PROCEDURE — 87077 CULTURE AEROBIC IDENTIFY: CPT

## 2020-09-17 PROCEDURE — A9270 NON-COVERED ITEM OR SERVICE: HCPCS | Performed by: PSYCHIATRY & NEUROLOGY

## 2020-09-17 PROCEDURE — 700101 HCHG RX REV CODE 250: Performed by: PSYCHIATRY & NEUROLOGY

## 2020-09-17 PROCEDURE — 700111 HCHG RX REV CODE 636 W/ 250 OVERRIDE (IP): Performed by: INTERNAL MEDICINE

## 2020-09-17 PROCEDURE — 83735 ASSAY OF MAGNESIUM: CPT

## 2020-09-17 PROCEDURE — 99291 CRITICAL CARE FIRST HOUR: CPT | Performed by: INTERNAL MEDICINE

## 2020-09-17 PROCEDURE — 85025 COMPLETE CBC W/AUTO DIFF WBC: CPT

## 2020-09-17 RX ADMIN — PROPOFOL 30 MCG/KG/MIN: 10 INJECTION, EMULSION INTRAVENOUS at 13:15

## 2020-09-17 RX ADMIN — LACOSAMIDE 200 MG: 100 TABLET, FILM COATED ORAL at 01:07

## 2020-09-17 RX ADMIN — LEVETIRACETAM 1000 MG: 500 TABLET, FILM COATED ORAL at 05:15

## 2020-09-17 RX ADMIN — ENOXAPARIN SODIUM 40 MG: 40 INJECTION SUBCUTANEOUS at 05:14

## 2020-09-17 RX ADMIN — GLYCOPYRROLATE 0.5 MG: 1 TABLET ORAL at 21:04

## 2020-09-17 RX ADMIN — KETAMINE HYDROCHLORIDE 3 MG/KG/HR: 50 INJECTION, SOLUTION INTRAMUSCULAR; INTRAVENOUS at 18:23

## 2020-09-17 RX ADMIN — PROPOFOL 60 MCG/KG/MIN: 10 INJECTION, EMULSION INTRAVENOUS at 03:38

## 2020-09-17 RX ADMIN — KETAMINE HYDROCHLORIDE 3 MG/KG/HR: 50 INJECTION, SOLUTION INTRAMUSCULAR; INTRAVENOUS at 12:34

## 2020-09-17 RX ADMIN — DOCUSATE SODIUM 50 MG AND SENNOSIDES 8.6 MG 2 TABLET: 8.6; 5 TABLET, FILM COATED ORAL at 05:14

## 2020-09-17 RX ADMIN — FAMOTIDINE 20 MG: 20 TABLET, FILM COATED ORAL at 05:14

## 2020-09-17 RX ADMIN — VALPROIC ACID 500 MG: 250 SOLUTION ORAL at 05:14

## 2020-09-17 RX ADMIN — LEVETIRACETAM 1000 MG: 500 TABLET, FILM COATED ORAL at 21:05

## 2020-09-17 RX ADMIN — KETAMINE HYDROCHLORIDE 3 MG/KG/HR: 50 INJECTION, SOLUTION INTRAMUSCULAR; INTRAVENOUS at 23:57

## 2020-09-17 RX ADMIN — LEVETIRACETAM 1000 MG: 500 TABLET, FILM COATED ORAL at 13:15

## 2020-09-17 RX ADMIN — FAMOTIDINE 20 MG: 20 TABLET, FILM COATED ORAL at 17:32

## 2020-09-17 RX ADMIN — ACETAMINOPHEN 1000 MG: 325 TABLET, FILM COATED ORAL at 10:01

## 2020-09-17 RX ADMIN — VALPROIC ACID 500 MG: 250 SOLUTION ORAL at 21:05

## 2020-09-17 RX ADMIN — LACOSAMIDE 200 MG: 100 TABLET, FILM COATED ORAL at 10:01

## 2020-09-17 RX ADMIN — KETAMINE HYDROCHLORIDE 3 MG/KG/HR: 50 INJECTION, SOLUTION INTRAMUSCULAR; INTRAVENOUS at 06:36

## 2020-09-17 RX ADMIN — GLYCOPYRROLATE 0.5 MG: 1 TABLET ORAL at 05:14

## 2020-09-17 RX ADMIN — GLYCOPYRROLATE 0.5 MG: 1 TABLET ORAL at 13:15

## 2020-09-17 RX ADMIN — KETAMINE HYDROCHLORIDE 3 MG/KG/HR: 50 INJECTION, SOLUTION INTRAMUSCULAR; INTRAVENOUS at 00:54

## 2020-09-17 RX ADMIN — PROPOFOL 60 MCG/KG/MIN: 10 INJECTION, EMULSION INTRAVENOUS at 07:49

## 2020-09-17 RX ADMIN — VALPROIC ACID 500 MG: 250 SOLUTION ORAL at 13:15

## 2020-09-17 RX ADMIN — LACOSAMIDE 200 MG: 100 TABLET, FILM COATED ORAL at 17:32

## 2020-09-17 RX ADMIN — PERAMPANEL 10 MG: 2 TABLET ORAL at 21:05

## 2020-09-17 RX ADMIN — LEVOCARNITINE 1000 MG: 1 SOLUTION ORAL at 07:49

## 2020-09-17 ASSESSMENT — FIBROSIS 4 INDEX: FIB4 SCORE: 0.14

## 2020-09-17 NOTE — PROGRESS NOTES
Critical Care Progress Note    Date of admission  8/23/2020    Chief Complaint  19 y.o. female admitted 8/23/2020 with a cardiac arrest following an apparent drug overdose.    Hospital Course    8/24 - TTM protocol initiated, EEG without NCSE  8/25 - rewarmed, awake, not following  8/26 - following occasional commands  8/27- seizure like activity vs shivering--> Keppra, versed, Propofol  8/28- no seizures on EEG  8/29- remains unresponsive.  8/30 -mental status improved, now opening eyes and following commands.  Keppra discontinued  8/31 - started cefepime/vancomycin for possible pneumonia,?  UTI, fever and increasing WBC fever; full vent  9/1 - Seizure activity noted on EEG today, reviewed with neurology; keppra load and increased to 1gm q 12; full vent support  9/2 - remains unresponsive with only some posture and grimace to stimulation, neurology reconsulted, stat MRI brain, LP, cEEG  9/3 -ongoing seizures, Vimpat added, Depakote added, family conference today  9/4 -ongoing seizure noted on cEEG, started on midazolam infusion, full ventilator support  9/5 -ongoing seizures noted, propofol added and increased today, full ventilator support  9/6 -have not yet achieved burst suppression on cEEG; continue propofol, change Versed to ketamine infusion, discussed with neurology, full ventilator support  9/7 - still titrating propofol and ketamine for burst suppression on cEEG. Continue full ventilator support  9/8 - propofol off; ketamine is being weaned; on vimpat, keppra, depakote. Requiring full ventilator support  9/9 - Requiring full ventilator support. propofol and ketamine off; on vimpat, keppra, depakote. Postures to stimuli - consult for trach/PEG  9/10 -status post tracheostomy, off propofol and on ketamine- beginning to wean antiepileptic medications, continues to require full ventilator support. PEG tomorrow. Reportedly is still having seizures.   9/11 PEG tube today, continues to require full mechanical  ventilator support  9/12 returned to deep sedation secondary to persistent seizures; continues to require full vent support  9/13 continues to require full vent support; propofol and ketamine plus AEDs for persistent, intermittent seizures  9/14 -    continue full vent support.  Continue propofol, ketamine.  Start Versed.  Continuous EEG.  9/15 -    continue vent support.  Increase Versed drip.  For repeat MRI today.  9/16 -    stop Versed drip.  Continue other AEDs.  Continue vent support.  9/17 -    febrile.  Culture blood and sputum.  Check UA.  Decrease propofol.  Continue vent support.      Interval Problem Update  Reviewed last 24 hour events:      Versed off  Ketamine 3  Prop 60  SR  TF at 40 (goal)  EEG in place  Good UOP  Vent 26  Trach 9  PEEP 8   30%  Culture blood and sputum  Check UA      Review of Systems  Review of Systems   Unable to perform ROS: Acuity of condition        Vital Signs for last 24 hours   Temp:  [37.1 °C (98.8 °F)-39 °C (102.2 °F)] 39 °C (102.2 °F)  Pulse:  [] 114  Resp:  [5-27] 22  BP: (103-130)/(65-86) 116/73  SpO2:  [94 %-100 %] 99 %    Hemodynamic parameters for last 24 hours       Respiratory Information for the last 24 hours  Vent Mode: APVCMV  Rate (breaths/min): 26  Vt Target (mL): 320  PEEP/CPAP: 8  MAP: 13  Control VTE (exp VT): 321    Physical Exam   Physical Exam  Constitutional:       Appearance: She is not diaphoretic.      Comments: On ventilator   HENT:      Head: Normocephalic and atraumatic.      Right Ear: External ear normal.      Left Ear: External ear normal.      Nose: Nose normal.      Mouth/Throat:      Mouth: Mucous membranes are moist.      Pharynx: No oropharyngeal exudate.   Eyes:      General: No scleral icterus.     Pupils: Pupils are equal, round, and reactive to light.   Neck:      Musculoskeletal: Normal range of motion. No neck rigidity.   Cardiovascular:      Pulses: Normal pulses.      Heart sounds: No murmur. No friction rub.      Comments:  Sinus rhythm  Pulmonary:      Breath sounds: Rales (Scattered coarse crackles) present. No wheezing.   Abdominal:      General: Bowel sounds are normal. There is no distension.      Palpations: Abdomen is soft.      Tenderness: There is no abdominal tenderness. There is no guarding.      Comments: Tolerating enteral tube feedings   Musculoskeletal: Normal range of motion.      Right lower leg: No edema.      Left lower leg: No edema.      Comments: No clubbing or cyanosis   Skin:     General: Skin is warm and dry.      Capillary Refill: Capillary refill takes less than 2 seconds.   Neurological:      Comments: Pupils 4 mm, equal and briskly reactive.         Medications  Current Facility-Administered Medications   Medication Dose Route Frequency Provider Last Rate Last Dose   • Valproate Sodium (DEPAKENE) oral solution 500 mg  500 mg Enteral Tube Q8HRS Toni Bear M.D.   500 mg at 09/17/20 1315   • fentaNYL (SUBLIMAZE) injection  mcg   mcg Intravenous Q HOUR PRN Angel Luis Lanier M.D.       • levETIRAcetam (KEPPRA) tablet 1,000 mg  1,000 mg Enteral Tube Q8HR Andrew Petersen M.D.   1,000 mg at 09/17/20 1315   • glycopyrrolate (ROBINUL) tablet 0.5 mg  0.5 mg Enteral Tube Q8HRS Andrew Petersen M.D.   0.5 mg at 09/17/20 1315   • lacosamide (VIMPAT) tablet 200 mg  200 mg Enteral Tube Q8HR Andrew Petersen M.D.   200 mg at 09/17/20 1001   • ketamine 1,000 mg in  mL infusion (critical care only)  3 mg/kg/hr Intravenous Continuous Andrew Petersen M.D. 100.2 mL/hr at 09/17/20 1234 3 mg/kg/hr at 09/17/20 1234   • perampanel (FYCOMPA) tablet 10 mg  10 mg Enteral Tube QHS Nithin Donohue M.D.   10 mg at 09/16/20 2207   • propofol (DIPRIVAN) injection  0-80 mcg/kg/min Intravenous Continuous Boogie Augustine Jr., D.O. 11.6 mL/hr at 09/17/20 1315 30 mcg/kg/min at 09/17/20 1315   • polyethylene glycol/lytes (MIRALAX) PACKET 1 Packet  1 Packet Enteral Tube BID Dar Red M.D.    Stopped at 09/13/20 1800   • acetaminophen (TYLENOL) suppository 325 mg  325 mg Rectal Q6HRS PRN Yosef Tomlin M.D.   650 mg at 09/02/20 1632   • acetaminophen (TYLENOL) tablet 1,000 mg  1,000 mg Enteral Tube Q4HRS PRN Dar Red M.D.   1,000 mg at 09/17/20 1001   • labetalol (NORMODYNE/TRANDATE) injection 10 mg  10 mg Intravenous Q4HRS PRN Yosef Tomlin M.D.   10 mg at 08/30/20 2256   • Pharmacy Consult: Enteral tube insertion - review meds/change route/product selection  1 Each Other PHARMACY TO DOSE Boogie Augustine Jr., D.O.       • Respiratory Therapy Consult   Nebulization Continuous RT Brock Murdock M.D.       • ipratropium-albuterol (DUONEB) nebulizer solution  3 mL Nebulization Q2HRS PRN (RT) Brock Murdock M.D.       • famotidine (PEPCID) tablet 20 mg  20 mg Enteral Tube Q12HRS Dar Red M.D.   20 mg at 09/17/20 0514   • senna-docusate (PERICOLACE or SENOKOT S) 8.6-50 MG per tablet 2 Tab  2 Tab Enteral Tube BID Brock Murdock M.D.   2 Tab at 09/17/20 0514    And   • polyethylene glycol/lytes (MIRALAX) PACKET 1 Packet  1 Packet Enteral Tube QDAY PRN Brock Murdock M.D.        And   • magnesium hydroxide (MILK OF MAGNESIA) suspension 30 mL  30 mL Enteral Tube QDAY PRN Brock Murdock M.D.        And   • bisacodyl (DULCOLAX) suppository 10 mg  10 mg Rectal QDAY PRN Brock Murdock M.D.       • MD Alert...ICU Electrolyte Replacement per Pharmacy   Other PHARMACY TO DOSE Brock Murdock M.D.       • lidocaine (XYLOCAINE) 1 % injection 1-2 mL  1-2 mL Tracheal Tube Q30 MIN PRN Brock Murdock M.D.       • enoxaparin (LOVENOX) inj 40 mg  40 mg Subcutaneous DAILY Boogie Augustine Jr., D.O.   40 mg at 09/17/20 0514       Fluids    Intake/Output Summary (Last 24 hours) at 9/17/2020 1334  Last data filed at 9/17/2020 1200  Gross per 24 hour   Intake 3987.48 ml   Output 3000 ml   Net 987.48 ml       Laboratory          Recent Labs     09/15/20  0613 09/16/20  0305 09/17/20  0356   SODIUM 141 139 139    POTASSIUM 4.1 4.2 4.3   CHLORIDE 106 105 105   CO2 22 19* 21   BUN 14 17 18   CREATININE 0.41* 0.30* 0.33*   MAGNESIUM 2.0 1.9 2.0   CALCIUM 8.5 8.5 8.9     Recent Labs     09/14/20  1513 09/15/20  0613 09/16/20  0305 09/17/20  0356   ALTSGPT  --  148* 102* 71*   ASTSGOT  --  42 32 28   ALKPHOSPHAT  --  191* 169* 163*   TBILIRUBIN  --  0.2 <0.2 <0.2   PREALBUMIN 43.4*  --   --   --    GLUCOSE  --  99 91 105*     Recent Labs     09/15/20  0613 09/16/20 0305 09/17/20  0356   WBC 12.5* 9.8 10.0   NEUTSPOLYS 73.90* 66.70 69.20   LYMPHOCYTES 9.40* 14.60* 13.20*   MONOCYTES 8.60 8.40 9.10   EOSINOPHILS 6.60 8.60* 6.40   BASOPHILS 0.30 0.40 0.50   ASTSGOT 42 32 28   ALTSGPT 148* 102* 71*   ALKPHOSPHAT 191* 169* 163*   TBILIRUBIN 0.2 <0.2 <0.2     Recent Labs     09/15/20  0613 09/16/20 0305 09/17/20  0356   RBC 3.88* 3.72* 3.86*   HEMOGLOBIN 11.9* 11.6* 11.8*   HEMATOCRIT 37.2 35.8* 36.8*   PLATELETCT 495* 428 490*       Imaging  X-Ray:  I have personally reviewed the images and compared with prior images. and My impression is: Unchanged basilar opacities    Assessment/Plan  * Acute respiratory failure with hypoxia (HCC)- (present on admission)  Assessment & Plan  Intubated 8/24  Trach 9/9  All of the appropriate ventilator bundles are in place  Continue vent support    Anoxic encephalopathy (HCC)- (present on admission)  Assessment & Plan  MRI on 9/2 and 9/15 consistent with severe anoxic brain injury  EEG with frequent, blunted generalized sharps with frontal maximum and triphasic morphology with frequent runs of frontal intermittent rhythmic delta activity (FIRDA)  Continue AEDs    Drug overdose- (present on admission)  Assessment & Plan  BA 0.16 on presentation  Positive urine drug screen for amphetamines on presentation  Boyfriend reported methamphetamine, oxycodone and alcohol use    Fever  Assessment & Plan  Source not clear  No leukocytosis  Chest x-ray unchanged  Culture blood and sputum  Check UA    Seizures  ([facility])  Assessment & Plan  Continuous video EEG  Continue Vimpat, 200 mg every 8 hours  Continue valproic acid, 500 mg every 8 hours  Continue Keppra, 1000 mg every 8 hours  Continue Fycompa, 10 mg nightly  Continue ketamine  Decrease propofol    Pneumonia of both lungs due to methicillin resistant Staphylococcus aureus (MRSA) ([facility])  Assessment & Plan  Sputum culture with MRSA, Acinetobacter and Stenotrophomonas species  Completed 10 days of Bactrim on 9/13  Observe off antibiotics    Elevated transaminase level- (present on admission)  Assessment & Plan  Suspect due to valproic acid  Stop l-carnitine  Improved    S/P PEA cardiac arrest ([facility])- (present on admission)  Assessment & Plan  S/P therapeutic hypothermia protocol    Dysphagia, oropharyngeal- (present on admission)  Assessment & Plan  S/P laparoscopic gastrostomy on 9/11  Continue enteral tube feedings       VTE:  Lovenox  Ulcer: H2 Antagonist  Lines: Pearce Catheter  Ongoing indication addressed    I have performed a physical exam and reviewed and updated ROS and Plan today (9/17/2020). In review of yesterday's note (9/16/2020), there are no changes except as documented above.     I have assessed and reassessed her respiratory status with ventilator adjustments, airway mechanics, ventilator waveforms, hemodynamics, blood pressure, cardiovascular status and her neurologic status.  She is at increased risk for worsening cardiovascular, respiratory and CNS system dysfunction.    Discussed patient condition and risk of morbidity and/or mortality with RN, RT, Pharmacy, Charge nurse / hot rounds, QA team and neurology     The patient remains critically ill.  Critical care time = 38 minutes in directly providing and coordinating critical care and extensive data review.  No time overlap and excludes procedures.    Angel Luis Lanier MD  Pulmonary and Critical Care Medicine

## 2020-09-17 NOTE — PROGRESS NOTES
Wasted 35 ml of midazolam gtt (concentration 125mg/125ml) with FERNANDO Arias    When attempting to waste in the med select it only allows to waste quantity, as in bags, rather than mls or mg    Per narcotic tech, say how much of the bag is left. Therefore, 0.28 was wasted.

## 2020-09-17 NOTE — PROGRESS NOTES
NEUROLOGY PROGRESS NOTE      BACKGROUND:    19 y.o. female was admitted on 8/23/2020  9:57 PM for Cardiac arrest (HCC)  Drug overdose.  She is being followed by Neurology for anoxic brain injury.    SUBJECTIVE:   No significant changes, remains unresponsive with tracheostomy on vent.  She became febrile today.    VITALS:  Vitals:    09/17/20 1100 09/17/20 1129 09/17/20 1200 09/17/20 1300   BP: 128/83  118/76 116/73   Pulse: (!) 127 (!) 124 (!) 118 (!) 114   Resp: (!) 23 (!) 27 (!) 27 (!) 22   Temp:       TempSrc:       SpO2: 98% 97% 99% 99%   Weight:       Height:           NEUROLOGICAL EXAM:   She is unresponsive on vent.  Her eyes are closed.  I do not appreciate facial asymmetry.  Facial sensation cannot be tested.  Pupils are 4 mm equal and briskly reactive to light.  She has cough and gag reflex.  She has no withdrawal to physical stimulation in lower extremity.  She has slight posturing of left upper extremity in noxious stimuli.  Motor, sensory, coordination cannot be tested.  Rest of her neurological examination is limited.    OBJECTIVE:    NEUROIMAGING:    DX-CHEST-PORTABLE (1 VIEW)   Final Result         1. Stable tracheostomy.   2. No new consolidation or pleural effusions.         DX-CHEST-PORTABLE (1 VIEW)   Final Result      No significant interval change.         MR-BRAIN-W/O   Final Result      1.  Findings consistent with worsening anoxic brain injury involving the lentiform nuclei bilaterally.   2.  Findings consistent with anoxic brain injury in the parietal, occipital and posterior temporal regions which appear somewhat improved from prior exam.  Similar findings in the bifrontal region appear unchanged.         DX-CHEST-PORTABLE (1 VIEW)   Final Result      No significant interval change.      DX-CHEST-PORTABLE (1 VIEW)   Final Result      Bibasilar underinflation atelectasis which could obscure an additional process. This is similar to the prior study.      DX-CHEST-PORTABLE (1 VIEW)   Final  Result      Hazy airspace opacities are improved compared to prior.      DX-CHEST-PORTABLE (1 VIEW)   Final Result         1.  Pulmonary edema and/or infiltrates are identified, which are stable since the prior exam.            DX-CHEST-PORTABLE (1 VIEW)   Final Result         1.  Mild pulmonary edema and/or interstitial infiltrates, somewhat decreased since prior study         IR-MIDLINE CATHETER INSERTION WO GUIDANCE > AGE 3   Final Result                  Ultrasound-guided midline placement performed by qualified nursing staff    as above.          DX-CHEST-PORTABLE (1 VIEW)   Final Result         1.  Mild pulmonary edema and/or interstitial infiltrates, stable since prior study      DX-CHEST-PORTABLE (1 VIEW)   Final Result         1.  Mild pulmonary edema and/or interstitial infiltrates      DX-CHEST-PORTABLE (1 VIEW)   Final Result         No significant interval change.      DX-CHEST-PORTABLE (1 VIEW)   Final Result         New hazy opacity in the left lung base could be atelectasis or consolidation.      DX-CHEST-PORTABLE (1 VIEW)   Final Result      No significant change      DX-CHEST-PORTABLE (1 VIEW)   Final Result         1.  No acute cardiopulmonary disease.                                 DX-CHEST-PORTABLE (1 VIEW)   Final Result         1.  No acute cardiopulmonary disease.                              DX-CHEST-PORTABLE (1 VIEW)   Final Result         1.  No focal infiltrates, previously visualized infiltrates are not readily apparent.                           MR-BRAIN-WITH & W/O   Final Result         1.  Severe diffuse anoxic brain injury pattern which is more extensive and conspicuous than on previous exam.      2.  No evidence of intracranial mass effect, extra-axial fluid collection, or interval development of hydrocephalus.      3.  Diffuse mucosal inflammatory changes filling the ethmoid and sphenoid sinuses.      DX-CHEST-PORTABLE (1 VIEW)   Final Result         1.  Pulmonary edema and/or  infiltrates are identified, which are somewhat decreased since the prior exam.                        [id number] VIEW   Final Result      Feeding tube is noted with tip at the level of the proximal duodenum.                  DX-CHEST-PORTABLE (1 VIEW)   Final Result         1.  Pulmonary edema and/or infiltrates are identified, which are stable since the prior exam.                     DX-CHEST-PORTABLE (1 VIEW)   Final Result      Stable chest x-ray findings with right lower lobe consolidation.      DX-CHEST-PORTABLE (1 VIEW)   Final Result         1.  Pulmonary edema and/or infiltrates are identified, which are stable since the prior exam.                  DX-CHEST-PORTABLE (1 VIEW)   Final Result         1.  Pulmonary edema and/or infiltrates are identified, which are stable since the prior exam.               MR-BRAIN-W/O   Final Result      The diffusion-weighted sequences demonstrates areas of restricted diffusion in the bilateral basal ganglia and some of the frontal gray matter. The predominant portion of the brain parenchyma does not demonstrate any restricted diffusion. Therefore this    findings likely represent mild diffuse anoxic brain injury.      DX-CHEST-PORTABLE (1 VIEW)   Final Result         1.  Pulmonary edema and/or infiltrates are identified, which are somewhat decreased since the prior exam.            DX-CHEST-PORTABLE (1 VIEW)   Final Result         1.  Pulmonary edema and/or infiltrates are identified, which are stable since the prior exam.         DX-ABDOMEN FOR TUBE PLACEMENT   Final Result         1.  Nonspecific bowel gas pattern.   2.  Dobbhoff tube tip overlying the expected location of the pylorus or first duodenal segment.      DX-CHEST-PORTABLE (1 VIEW)   Final Result         1.  Patchy bilateral pulmonary infiltrates, somewhat increased since prior.      EC-ECHOCARDIOGRAM COMPLETE W/O CONT   Final Result      DX-CHEST-PORTABLE (1 VIEW)   Final Result         1.  Patchy bilateral  pulmonary infiltrates, somewhat increased since prior.      CT-HEAD W/O   Final Result         1.  Possible subtle sulcal effacement and slight loss of differentiation of gray-white matter, consider component of cerebral edema. Recommend radiographic follow-up   2.  Bilateral sphenoid and maxillary sinus air-fluid levels      DX-CHEST-PORTABLE (1 VIEW)   Final Result         1.  No acute cardiopulmonary disease.      DX-CHEST-PORTABLE (1 VIEW)    (Results Pending)       MEDICATIONS:  Current Facility-Administered Medications   Medication Dose Route Frequency Provider Last Rate Last Dose   • Valproate Sodium (DEPAKENE) oral solution 500 mg  500 mg Enteral Tube Q8HRS Toni Bear M.D.   500 mg at 09/17/20 1315   • fentaNYL (SUBLIMAZE) injection  mcg   mcg Intravenous Q HOUR PRN Angel Luis Lanier M.D.       • levETIRAcetam (KEPPRA) tablet 1,000 mg  1,000 mg Enteral Tube Q8HR Andrew Petersen M.D.   1,000 mg at 09/17/20 1315   • glycopyrrolate (ROBINUL) tablet 0.5 mg  0.5 mg Enteral Tube Q8HRS Andrew Petersen M.D.   0.5 mg at 09/17/20 1315   • lacosamide (VIMPAT) tablet 200 mg  200 mg Enteral Tube Q8HR Andrew Petersen M.D.   200 mg at 09/17/20 1001   • ketamine 1,000 mg in  mL infusion (critical care only)  3 mg/kg/hr Intravenous Continuous Andrew Petersen M.D. 100.2 mL/hr at 09/17/20 1234 3 mg/kg/hr at 09/17/20 1234   • perampanel (FYCOMPA) tablet 10 mg  10 mg Enteral Tube QHS Nithin Donohue M.D.   10 mg at 09/16/20 2207   • propofol (DIPRIVAN) injection  0-80 mcg/kg/min Intravenous Continuous Boogie Augustine Jr., D.O. 11.6 mL/hr at 09/17/20 1315 30 mcg/kg/min at 09/17/20 1315   • polyethylene glycol/lytes (MIRALAX) PACKET 1 Packet  1 Packet Enteral Tube BID Dar Red M.D.   Stopped at 09/13/20 1800   • acetaminophen (TYLENOL) suppository 325 mg  325 mg Rectal Q6HRS PRN Yosef Tomlin M.D.   650 mg at 09/02/20 1632   • acetaminophen (TYLENOL) tablet 1,000 mg   1,000 mg Enteral Tube Q4HRS PRN Dar Red M.D.   1,000 mg at 09/17/20 1001   • labetalol (NORMODYNE/TRANDATE) injection 10 mg  10 mg Intravenous Q4HRS PRN Yosef Tomlin M.D.   10 mg at 08/30/20 2256   • Pharmacy Consult: Enteral tube insertion - review meds/change route/product selection  1 Each Other PHARMACY TO DOSE Boogie Augustine Jr., D.O.       • Respiratory Therapy Consult   Nebulization Continuous RT Brock Murdock M.D.       • ipratropium-albuterol (DUONEB) nebulizer solution  3 mL Nebulization Q2HRS PRN (RT) Brock Murdock M.D.       • famotidine (PEPCID) tablet 20 mg  20 mg Enteral Tube Q12HRS Dar Red M.D.   20 mg at 09/17/20 0514   • senna-docusate (PERICOLACE or SENOKOT S) 8.6-50 MG per tablet 2 Tab  2 Tab Enteral Tube BID Brock Murdock M.D.   2 Tab at 09/17/20 0514    And   • polyethylene glycol/lytes (MIRALAX) PACKET 1 Packet  1 Packet Enteral Tube QDAY PRN Brock Murdock M.D.        And   • magnesium hydroxide (MILK OF MAGNESIA) suspension 30 mL  30 mL Enteral Tube QDAY PRN Brock Murdock M.D.        And   • bisacodyl (DULCOLAX) suppository 10 mg  10 mg Rectal QDAY PRN Brock Murdock M.D.       • MD Alert...ICU Electrolyte Replacement per Pharmacy   Other PHARMACY TO DOSE Brock Murdock M.D.       • lidocaine (XYLOCAINE) 1 % injection 1-2 mL  1-2 mL Tracheal Tube Q30 MIN PRN Brock Murdock M.D.       • enoxaparin (LOVENOX) inj 40 mg  40 mg Subcutaneous DAILY Boogie Augustine Jr., D.O.   40 mg at 09/17/20 0514       LABS:      Recent Labs     09/15/20  0613 09/16/20  0305 09/17/20  0356   WBC 12.5* 9.8 10.0   RBC 3.88* 3.72* 3.86*   HEMOGLOBIN 11.9* 11.6* 11.8*   HEMATOCRIT 37.2 35.8* 36.8*   MCV 95.9 96.2 95.3   MCH 30.7 31.2 30.6   MCHC 32.0* 32.4* 32.1*   RDW 46.7 48.1 48.0   PLATELETCT 495* 428 490*   MPV 9.7 9.5 9.8     Recent Labs     09/15/20  0613 09/16/20  0305 09/17/20  0356   SODIUM 141 139 139   POTASSIUM 4.1 4.2 4.3   CHLORIDE 106 105 105   CO2 22 19* 21   GLUCOSE 99 91  105*   BUN 14 17 18     INR   Date Value Ref Range Status   2020 1.05 0.87 - 1.13 Final     Comment:     INR - Non-therapeutic Reference Range: 0.87-1.13  INR - Therapeutic Reference Range: 2.0-4.0       No results found for: POCINR  Lab Results   Component Value Date/Time    CREATININE 0.45 (L) 2020 0400     Lab Results   Component Value Date/Time    IFAFRICA >60 2020 0400    IFNOTAFR >60 2020 0400     EE2020:  This is an abnormal 24 hrs video EEG recording in a comatose patient. A mild to moderate encephalopathy is suggested. The patient appears only mildly sedated, not in burst suppression pattern. Frequent, blunted generalized sharps with frontal maximum and triphasic morphology, with frequent runs of Frontal Intermittent Rhythmic Delta Activity (FIRDA). No seizures captured, however the study remains significantly abnormal. The findings suggest underlying areas of persinstently increased cortical irritability and/or structural abnormality. Clinical and radiological correlation is recommended.    ASSESSMENT AND PLAN:  19-year-old female with polysubstance abuse and subsequent cardiac arrest on 2020 who remains unresponsive with no neurological improvement on vent.  Her EEG as outlined above revealed frequent generalized sharp with frontal maximum and triphasic morphology and frequent runs of frontal intermittent rhythmic delta activity but no clear seizure.  She is on multiple antiepileptic medication including Keppra 1 g every 8 hours, and Vimpat 200 mg every 8 hours, valproic acid 1000 mg every 8 hours, Fycompa 10 mg nightly in addition to propofol and ketamine.  Her repeat brain MRI on 9/15/20 shows worsening anoxic brain injury with involvement of lentiform nuclei bilaterally.   Spoke with both of her parents yesterday and his father is coming this afternoon to have a face-to-face conversation and update.  Continue with EEG monitoring with a goal of reduced sedation while  on EEG monitoring to assess her neurological function if any.  Decreased propofol to 30 from 60 mcg/kg/min.  Discussed with Dr. Lanier  Total critical care time spent was 40 minutes.

## 2020-09-17 NOTE — PROGRESS NOTES
Patient's temperature reached 39C.     Removed patient's blankets, gave tylenol and placed ice packs.    Updated Dr. Bear and Dr. Lanier of increased temp.    No changes to plan of care.

## 2020-09-17 NOTE — PROCEDURES
VIDEO ELECTROENCEPHALOGRAM REPORT        Referring provider: Dr. Bear.      DOS: 9/17/2020 (total recording of 23 hours and 39 minutes).      INDICATION:  Annika He 19 y.o. female presenting with s/p cardiac arrest, altered mental status, seizures.      CURRENT ANTIEPILEPTIC REGIMEN: Levetiracetam 1000 mg tid, Lacosamide 200 mg tid, Valproic Acid 1250 mg tid, Perampanel at 10 mg qhs. Sedation with Ketamine. Propofol and Midazolam infusions were discontinued.     TECHNIQUE: 30 channel video electroencephalogram (EEG) was performed in accordance with the international 10-20 system. The study was reviewed in bipolar and referential montages. The recording examined a sedated and/or comatose patient.      DESCRIPTION OF THE RECORD:  Generalized delta activity. Frequent, blunted generalized sharps with frontal maximum and triphasic morphology. Frequent runs of Frontal Intermittent Rhythmic Delta Activity (FIRDA). No clear seizures captured.      ACTIVATION PROCEDURES:   Not performed.      EKG: sampling of the EKG recording demonstrated sinus rhythm.      EVENTS:  None.      INTERPRETATION:  This is an abnormal continuous video EEG recording in a sedated and/or comatose patient. A moderate encephalopathy is suggested. Not on burst suppression. Frequent, blunted generalized sharps with frontal maximum and triphasic morphology. Frequent runs of Frontal Intermittent Rhythmic Delta Activity (FIRDA). No clear seizures captured despite weaning of Propofol and Midazolam. The findings suggest underlying areas of persinstently increased cortical irritability and/or structural abnormality. Clinical and radiological correlation is recommended.     Updates provided to Dr. Bear.         Garland Beatty MD   Epilepsy and Neurodiagnostics.   Clinical  of Neurology [facility].   Diplomate in Neurology, Epilepsy, and Electrodiagnostic Medicine.   Office:  [phone]  Fax: [phone]

## 2020-09-17 NOTE — CARE PLAN
Problem: Ventilation Defect:  Goal: Ability to achieve and maintain unassisted ventilation or tolerate decreased levels of ventilator support  Intervention: Support and monitor invasive and noninvasive mechanical ventilation  Note:   Adult Ventilation Update    Total Vent Days: 26  Trach Day 9  8.0 Portex  CMV  26 320 +8 30%    Patient Lines/Drains/Airways Status      Active Airway       Name: Placement date: Placement time: Site: Days:    Airway Trach Tracheostomy 8.0  09/09/20   1547   Tracheostomy  7                  Sputum/Suction   Cough: Productive;Strong (09/16/20 1600)  Sputum Amount: Scant (09/17/20 0000)  Sputum Color: Clear;White (09/17/20 0000)  Sputum Consistency: Thick (09/17/20 0000)    Events/Summary/Plan: No SBT done due to continuous EEG.

## 2020-09-18 ENCOUNTER — APPOINTMENT (OUTPATIENT)
Dept: RADIOLOGY | Facility: MEDICAL CENTER | Age: 20
DRG: 004 | End: 2020-09-18
Attending: INTERNAL MEDICINE
Payer: MEDICAID

## 2020-09-18 PROBLEM — J15.1 PNEUMONIA OF BOTH LUNGS DUE TO PSEUDOMONAS SPECIES (HCC): Status: ACTIVE | Noted: 2020-09-02

## 2020-09-18 LAB
ALBUMIN SERPL BCP-MCNC: 3.4 G/DL (ref 3.2–4.9)
ALBUMIN/GLOB SERPL: 1.2 G/DL
ALP SERPL-CCNC: 166 U/L (ref 30–99)
ALT SERPL-CCNC: 70 U/L (ref 2–50)
ANION GAP SERPL CALC-SCNC: 13 MMOL/L (ref 7–16)
AST SERPL-CCNC: 30 U/L (ref 12–45)
BASOPHILS # BLD AUTO: 0.3 % (ref 0–1.8)
BASOPHILS # BLD: 0.03 K/UL (ref 0–0.12)
BILIRUB SERPL-MCNC: 0.2 MG/DL (ref 0.1–1.5)
BUN SERPL-MCNC: 18 MG/DL (ref 8–22)
CALCIUM SERPL-MCNC: 8.7 MG/DL (ref 8.5–10.5)
CHLORIDE SERPL-SCNC: 103 MMOL/L (ref 96–112)
CO2 SERPL-SCNC: 22 MMOL/L (ref 20–33)
CREAT SERPL-MCNC: 0.34 MG/DL (ref 0.5–1.4)
EOSINOPHIL # BLD AUTO: 0.25 K/UL (ref 0–0.51)
EOSINOPHIL NFR BLD: 2.2 % (ref 0–6.9)
ERYTHROCYTE [DISTWIDTH] IN BLOOD BY AUTOMATED COUNT: 48.7 FL (ref 35.9–50)
GLOBULIN SER CALC-MCNC: 2.9 G/DL (ref 1.9–3.5)
GLUCOSE SERPL-MCNC: 128 MG/DL (ref 65–99)
HCT VFR BLD AUTO: 33.5 % (ref 37–47)
HGB BLD-MCNC: 11.4 G/DL (ref 12–16)
IMM GRANULOCYTES # BLD AUTO: 0.11 K/UL (ref 0–0.11)
IMM GRANULOCYTES NFR BLD AUTO: 1 % (ref 0–0.9)
LYMPHOCYTES # BLD AUTO: 1.29 K/UL (ref 1–4.8)
LYMPHOCYTES NFR BLD: 11.4 % (ref 22–41)
MAGNESIUM SERPL-MCNC: 1.9 MG/DL (ref 1.5–2.5)
MCH RBC QN AUTO: 31.8 PG (ref 27–33)
MCHC RBC AUTO-ENTMCNC: 34 G/DL (ref 33.6–35)
MCV RBC AUTO: 93.3 FL (ref 81.4–97.8)
MONOCYTES # BLD AUTO: 0.83 K/UL (ref 0–0.85)
MONOCYTES NFR BLD AUTO: 7.3 % (ref 0–13.4)
NEUTROPHILS # BLD AUTO: 8.83 K/UL (ref 2–7.15)
NEUTROPHILS NFR BLD: 77.8 % (ref 44–72)
NRBC # BLD AUTO: 0 K/UL
NRBC BLD-RTO: 0 /100 WBC
PLATELET # BLD AUTO: 432 K/UL (ref 164–446)
PMV BLD AUTO: 9.9 FL (ref 9–12.9)
POTASSIUM SERPL-SCNC: 4 MMOL/L (ref 3.6–5.5)
PROT SERPL-MCNC: 6.3 G/DL (ref 6–8.2)
RBC # BLD AUTO: 3.59 M/UL (ref 4.2–5.4)
SODIUM SERPL-SCNC: 138 MMOL/L (ref 135–145)
WBC # BLD AUTO: 11.3 K/UL (ref 4.8–10.8)

## 2020-09-18 PROCEDURE — 700111 HCHG RX REV CODE 636 W/ 250 OVERRIDE (IP): Performed by: INTERNAL MEDICINE

## 2020-09-18 PROCEDURE — A9270 NON-COVERED ITEM OR SERVICE: HCPCS | Performed by: INTERNAL MEDICINE

## 2020-09-18 PROCEDURE — 770022 HCHG ROOM/CARE - ICU (200)

## 2020-09-18 PROCEDURE — 83735 ASSAY OF MAGNESIUM: CPT

## 2020-09-18 PROCEDURE — 99233 SBSQ HOSP IP/OBS HIGH 50: CPT | Mod: 25 | Performed by: PSYCHIATRY & NEUROLOGY

## 2020-09-18 PROCEDURE — 95714 VEEG EA 12-26 HR UNMNTR: CPT | Performed by: PSYCHIATRY & NEUROLOGY

## 2020-09-18 PROCEDURE — 94003 VENT MGMT INPAT SUBQ DAY: CPT

## 2020-09-18 PROCEDURE — 700102 HCHG RX REV CODE 250 W/ 637 OVERRIDE(OP): Performed by: INTERNAL MEDICINE

## 2020-09-18 PROCEDURE — 99291 CRITICAL CARE FIRST HOUR: CPT | Performed by: INTERNAL MEDICINE

## 2020-09-18 PROCEDURE — A9270 NON-COVERED ITEM OR SERVICE: HCPCS | Performed by: PSYCHIATRY & NEUROLOGY

## 2020-09-18 PROCEDURE — 4A10X4Z MONITORING OF CENTRAL NERVOUS ELECTRICAL ACTIVITY, EXTERNAL APPROACH: ICD-10-PCS | Performed by: PSYCHIATRY & NEUROLOGY

## 2020-09-18 PROCEDURE — 700102 HCHG RX REV CODE 250 W/ 637 OVERRIDE(OP): Performed by: PSYCHIATRY & NEUROLOGY

## 2020-09-18 PROCEDURE — 700101 HCHG RX REV CODE 250: Performed by: PSYCHIATRY & NEUROLOGY

## 2020-09-18 PROCEDURE — 80053 COMPREHEN METABOLIC PANEL: CPT

## 2020-09-18 PROCEDURE — 94770 HCHG CO2 EXPIRED GAS DETERMINATION: CPT

## 2020-09-18 PROCEDURE — 95720 EEG PHY/QHP EA INCR W/VEEG: CPT | Performed by: PSYCHIATRY & NEUROLOGY

## 2020-09-18 PROCEDURE — 85025 COMPLETE CBC W/AUTO DIFF WBC: CPT

## 2020-09-18 PROCEDURE — 700105 HCHG RX REV CODE 258: Performed by: INTERNAL MEDICINE

## 2020-09-18 PROCEDURE — 700105 HCHG RX REV CODE 258: Performed by: PSYCHIATRY & NEUROLOGY

## 2020-09-18 PROCEDURE — 71045 X-RAY EXAM CHEST 1 VIEW: CPT

## 2020-09-18 RX ORDER — MAGNESIUM SULFATE HEPTAHYDRATE 40 MG/ML
2 INJECTION, SOLUTION INTRAVENOUS ONCE
Status: COMPLETED | OUTPATIENT
Start: 2020-09-18 | End: 2020-09-18

## 2020-09-18 RX ORDER — DIVALPROEX SODIUM 125 MG/1
500 CAPSULE, COATED PELLETS ORAL EVERY 8 HOURS
Status: DISCONTINUED | OUTPATIENT
Start: 2020-09-18 | End: 2020-09-26

## 2020-09-18 RX ADMIN — ENOXAPARIN SODIUM 40 MG: 40 INJECTION SUBCUTANEOUS at 04:57

## 2020-09-18 RX ADMIN — LACOSAMIDE 200 MG: 100 TABLET, FILM COATED ORAL at 01:26

## 2020-09-18 RX ADMIN — GLYCOPYRROLATE 0.5 MG: 1 TABLET ORAL at 15:11

## 2020-09-18 RX ADMIN — LEVETIRACETAM 1000 MG: 500 TABLET, FILM COATED ORAL at 04:57

## 2020-09-18 RX ADMIN — ACETAMINOPHEN 1000 MG: 325 TABLET, FILM COATED ORAL at 15:20

## 2020-09-18 RX ADMIN — CEFTAZIDIME 1 G: 1 INJECTION, POWDER, FOR SOLUTION INTRAMUSCULAR; INTRAVENOUS at 19:58

## 2020-09-18 RX ADMIN — LEVETIRACETAM 1000 MG: 500 TABLET, FILM COATED ORAL at 21:06

## 2020-09-18 RX ADMIN — GLYCOPYRROLATE 0.5 MG: 1 TABLET ORAL at 04:57

## 2020-09-18 RX ADMIN — GLYCOPYRROLATE 0.5 MG: 1 TABLET ORAL at 21:06

## 2020-09-18 RX ADMIN — PERAMPANEL 10 MG: 2 TABLET ORAL at 22:39

## 2020-09-18 RX ADMIN — DIVALPROEX SODIUM 500 MG: 125 CAPSULE ORAL at 21:06

## 2020-09-18 RX ADMIN — MAGNESIUM SULFATE 2 G: 2 INJECTION INTRAVENOUS at 08:20

## 2020-09-18 RX ADMIN — FAMOTIDINE 20 MG: 20 TABLET, FILM COATED ORAL at 17:31

## 2020-09-18 RX ADMIN — LEVETIRACETAM 1000 MG: 500 TABLET, FILM COATED ORAL at 15:11

## 2020-09-18 RX ADMIN — VALPROIC ACID 500 MG: 250 SOLUTION ORAL at 04:57

## 2020-09-18 RX ADMIN — FAMOTIDINE 20 MG: 20 TABLET, FILM COATED ORAL at 04:57

## 2020-09-18 RX ADMIN — KETAMINE HYDROCHLORIDE 3 MG/KG/HR: 50 INJECTION, SOLUTION INTRAMUSCULAR; INTRAVENOUS at 05:49

## 2020-09-18 RX ADMIN — LACOSAMIDE 200 MG: 100 TABLET, FILM COATED ORAL at 17:31

## 2020-09-18 RX ADMIN — LACOSAMIDE 200 MG: 100 TABLET, FILM COATED ORAL at 08:45

## 2020-09-18 RX ADMIN — DIVALPROEX SODIUM 500 MG: 125 CAPSULE ORAL at 15:12

## 2020-09-18 ASSESSMENT — FIBROSIS 4 INDEX: FIB4 SCORE: 0.17

## 2020-09-18 NOTE — PROGRESS NOTES
Critical Care Progress Note    Date of admission  8/23/2020    Chief Complaint  19 y.o. female admitted 8/23/2020 with a cardiac arrest following an apparent drug overdose.    Hospital Course    8/24 - TTM protocol initiated, EEG without NCSE  8/25 - rewarmed, awake, not following  8/26 - following occasional commands  8/27- seizure like activity vs shivering--> Keppra, versed, Propofol  8/28- no seizures on EEG  8/29- remains unresponsive.  8/30 -mental status improved, now opening eyes and following commands.  Keppra discontinued  8/31 - started cefepime/vancomycin for possible pneumonia,?  UTI, fever and increasing WBC fever; full vent  9/1 - Seizure activity noted on EEG today, reviewed with neurology; keppra load and increased to 1gm q 12; full vent support  9/2 - remains unresponsive with only some posture and grimace to stimulation, neurology reconsulted, stat MRI brain, LP, cEEG  9/3 -ongoing seizures, Vimpat added, Depakote added, family conference today  9/4 -ongoing seizure noted on cEEG, started on midazolam infusion, full ventilator support  9/5 -ongoing seizures noted, propofol added and increased today, full ventilator support  9/6 -have not yet achieved burst suppression on cEEG; continue propofol, change Versed to ketamine infusion, discussed with neurology, full ventilator support  9/7 - still titrating propofol and ketamine for burst suppression on cEEG. Continue full ventilator support  9/8 - propofol off; ketamine is being weaned; on vimpat, keppra, depakote. Requiring full ventilator support  9/9 - Requiring full ventilator support. propofol and ketamine off; on vimpat, keppra, depakote. Postures to stimuli - consult for trach/PEG  9/10 -status post tracheostomy, off propofol and on ketamine- beginning to wean antiepileptic medications, continues to require full ventilator support. PEG tomorrow. Reportedly is still having seizures.   9/11 PEG tube today, continues to require full mechanical  ventilator support  9/12 returned to deep sedation secondary to persistent seizures; continues to require full vent support  9/13 continues to require full vent support; propofol and ketamine plus AEDs for persistent, intermittent seizures  9/14 -    continue full vent support.  Continue propofol, ketamine.  Start Versed.  Continuous EEG.  9/15 -    continue vent support.  Increase Versed drip.  For repeat MRI today.  9/16 -    stop Versed drip.  Continue other AEDs.  Continue vent support.  9/17 -    febrile.  Culture blood and sputum.  Check UA.  Decrease propofol.  Continue vent support.  9/18 -    Pseudomonas in sputum.  Start Fortaz.  Stop ketamine.  Propofol stopped yesterday.      Interval Problem Update  Reviewed last 24 hour events:      Stop Ketamine gtt  Off propofol  SR  Moved left leg spont  SR-ST  Vent 27  Trach 10  99.4  Replete Mg      Review of Systems  Review of Systems   Unable to perform ROS: Acuity of condition        Vital Signs for last 24 hours   Pulse:  [] 97  Resp:  [21-32] 26  BP: (105-121)/(60-78) 111/65  SpO2:  [93 %-100 %] 98 %    Hemodynamic parameters for last 24 hours       Respiratory Information for the last 24 hours  Vent Mode: APVCMV  Rate (breaths/min): 26  Vt Target (mL): 320  PEEP/CPAP: 8  MAP: 12  Control VTE (exp VT): 318    Physical Exam   Physical Exam  Constitutional:       Appearance: She is not diaphoretic.      Comments: On ventilator   HENT:      Head: Normocephalic and atraumatic.      Right Ear: External ear normal.      Left Ear: External ear normal.      Nose: Nose normal.      Mouth/Throat:      Mouth: Mucous membranes are moist.      Pharynx: Oropharynx is clear.   Eyes:      Conjunctiva/sclera: Conjunctivae normal.      Pupils: Pupils are equal, round, and reactive to light.   Neck:      Musculoskeletal: Normal range of motion and neck supple.   Cardiovascular:      Pulses: Normal pulses.      Heart sounds: No murmur. No gallop.       Comments: Sinus  rhythm  Pulmonary:      Breath sounds: Rales (Unchanged crackles) present. No wheezing.   Abdominal:      General: Bowel sounds are normal. There is no distension.      Palpations: Abdomen is soft.      Tenderness: There is no abdominal tenderness. There is no rebound.      Comments: Tolerating enteral tube feedings   Musculoskeletal: Normal range of motion.      Right lower leg: No edema.      Left lower leg: No edema.      Comments: No clubbing or cyanosis   Skin:     General: Skin is warm and dry.      Capillary Refill: Capillary refill takes less than 2 seconds.   Neurological:      Comments: Pupils 4 mm, equal and briskly reactive.         Medications  Current Facility-Administered Medications   Medication Dose Route Frequency Provider Last Rate Last Dose   • divalproex (DEPAKOTE SPRINKLE) capsule 500 mg  500 mg Enteral Tube Q8HRS Angel Luis Lanier M.D.   500 mg at 09/18/20 1512   • ceftAZIDime (FORTAZ) 1 g in  mL IVPB  1 g Intravenous Q8HRS Angel Luis Lanier M.D.       • fentaNYL (SUBLIMAZE) injection  mcg   mcg Intravenous Q HOUR PRN Angel Luis Lanier M.D.       • levETIRAcetam (KEPPRA) tablet 1,000 mg  1,000 mg Enteral Tube Q8HR Andrew Petersen M.D.   1,000 mg at 09/18/20 1511   • glycopyrrolate (ROBINUL) tablet 0.5 mg  0.5 mg Enteral Tube Q8HRS Anrdew Petersen M.D.   0.5 mg at 09/18/20 1511   • lacosamide (VIMPAT) tablet 200 mg  200 mg Enteral Tube Q8HR Andrew Petersen M.D.   200 mg at 09/18/20 1731   • perampanel (FYCOMPA) tablet 10 mg  10 mg Enteral Tube QHS Nithin Donohue M.D.   10 mg at 09/17/20 2105   • propofol (DIPRIVAN) injection  0-80 mcg/kg/min Intravenous Continuous Boogie Augustine Jr., D.O.   Stopped at 09/17/20 1652   • acetaminophen (TYLENOL) suppository 325 mg  325 mg Rectal Q6HRS PRN Yosef Tomlin M.D.   650 mg at 09/02/20 1632   • acetaminophen (TYLENOL) tablet 1,000 mg  1,000 mg Enteral Tube Q4HRS PRN Dar Red M.D.   1,000 mg  at 09/18/20 1520   • labetalol (NORMODYNE/TRANDATE) injection 10 mg  10 mg Intravenous Q4HRS PRN Yosef Tomlin M.D.   10 mg at 08/30/20 2256   • Pharmacy Consult: Enteral tube insertion - review meds/change route/product selection  1 Each Other PHARMACY TO DOSE Boogie Augustine Jr., D.O.       • Respiratory Therapy Consult   Nebulization Continuous RT Brock Murdock M.D.       • ipratropium-albuterol (DUONEB) nebulizer solution  3 mL Nebulization Q2HRS PRN (RT) Brock Murdock M.D.       • famotidine (PEPCID) tablet 20 mg  20 mg Enteral Tube Q12HRS Dar Red M.D.   20 mg at 09/18/20 1731   • senna-docusate (PERICOLACE or SENOKOT S) 8.6-50 MG per tablet 2 Tab  2 Tab Enteral Tube BID Brock Murdock M.D.   Stopped at 09/17/20 1800    And   • polyethylene glycol/lytes (MIRALAX) PACKET 1 Packet  1 Packet Enteral Tube QDAY PRN Brock Murdock M.D.        And   • magnesium hydroxide (MILK OF MAGNESIA) suspension 30 mL  30 mL Enteral Tube QDAY PRN Brock Murdock M.D.        And   • bisacodyl (DULCOLAX) suppository 10 mg  10 mg Rectal QDAY PRN Brock Murdock M.D.       • MD Alert...ICU Electrolyte Replacement per Pharmacy   Other PHARMACY TO DOSE Brock Murdock M.D.       • lidocaine (XYLOCAINE) 1 % injection 1-2 mL  1-2 mL Tracheal Tube Q30 MIN PRN Brock Murdock M.D.       • enoxaparin (LOVENOX) inj 40 mg  40 mg Subcutaneous DAILY Boogie Augustine Jr., D.O.   40 mg at 09/18/20 0457       Fluids    Intake/Output Summary (Last 24 hours) at 9/18/2020 1820  Last data filed at 9/18/2020 1800  Gross per 24 hour   Intake 3134.71 ml   Output 1750 ml   Net 1384.71 ml       Laboratory          Recent Labs     09/16/20  0305 09/17/20  0356 09/18/20  0323   SODIUM 139 139 138   POTASSIUM 4.2 4.3 4.0   CHLORIDE 105 105 103   CO2 19* 21 22   BUN 17 18 18   CREATININE 0.30* 0.33* 0.34*   MAGNESIUM 1.9 2.0 1.9   CALCIUM 8.5 8.9 8.7     Recent Labs     09/16/20  0305 09/17/20  0356 09/18/20  0323   ALTSGPT 102* 71* 70*   ASTSGOT 32 28  30   ALKPHOSPHAT 169* 163* 166*   TBILIRUBIN <0.2 <0.2 0.2   GLUCOSE 91 105* 128*     Recent Labs     09/16/20  0305 09/17/20  0356 09/18/20  0323   WBC 9.8 10.0 11.3*   NEUTSPOLYS 66.70 69.20 77.80*   LYMPHOCYTES 14.60* 13.20* 11.40*   MONOCYTES 8.40 9.10 7.30   EOSINOPHILS 8.60* 6.40 2.20   BASOPHILS 0.40 0.50 0.30   ASTSGOT 32 28 30   ALTSGPT 102* 71* 70*   ALKPHOSPHAT 169* 163* 166*   TBILIRUBIN <0.2 <0.2 0.2     Recent Labs     09/16/20  0305 09/17/20  0356 09/18/20  0323   RBC 3.72* 3.86* 3.59*   HEMOGLOBIN 11.6* 11.8* 11.4*   HEMATOCRIT 35.8* 36.8* 33.5*   PLATELETCT 428 490* 432       Imaging  X-Ray:  I have personally reviewed the images and compared with prior images. and My impression is: Faint right infrahilar opacification    Assessment/Plan  * Acute respiratory failure with hypoxia (HCC)- (present on admission)  Assessment & Plan  Intubated 8/24  Trach 9/9  All of the appropriate ventilator bundles are in place  Continue vent support    Anoxic encephalopathy (HCC)- (present on admission)  Assessment & Plan  MRI on 9/2 and 9/15 consistent with severe anoxic brain injury  EEG with frequent, blunted generalized sharps with frontal maximum and triphasic morphology with frequent runs of frontal intermittent rhythmic delta activity (FIRDA)  Continue AEDs    Drug overdose- (present on admission)  Assessment & Plan  BA 0.16 on presentation  Positive urine drug screen for amphetamines on presentation  Boyfriend reported methamphetamine, oxycodone and alcohol use    Fever  Assessment & Plan  Blood cultures negative from 9/17  UA unremarkable  Sputum with Pseudomonas species    Seizures ([facility])  Assessment & Plan  Continuous video EEG  Continue Vimpat, 200 mg every 8 hours  Continue valproic acid, 500 mg every 8 hours  Continue Keppra, 1000 mg every 8 hours  Continue Fycompa, 10 mg nightly  Stop ketamine    Pneumonia of both lungs due to Pseudomonas species ([facility])  Assessment & Plan  Sputum culture with MRSA,  Acinetobacter and Stenotrophomonas species on 9/6  Completed 10 days of Bactrim on 9/13  Sputum culture from 9/17 with Pseudomonas species  Start Fortaz    Elevated transaminase level- (present on admission)  Assessment & Plan  Suspect due to valproic acid  Improving    S/P PEA cardiac arrest (HCC)- (present on admission)  Assessment & Plan  S/P therapeutic hypothermia protocol    Dysphagia, oropharyngeal- (present on admission)  Assessment & Plan  S/P laparoscopic gastrostomy on 9/11  Continue enteral tube feedings       VTE:  Lovenox  Ulcer: H2 Antagonist  Lines: Pearce Catheter  Ongoing indication addressed    I have performed a physical exam and reviewed and updated ROS and Plan today (9/18/2020). In review of yesterday's note (9/17/2020), there are no changes except as documented above.     I have assessed and reassessed her respiratory status with ventilator adjustments, ventilator waveforms, airway mechanics, blood pressure, hemodynamics, cardiovascular status and her neurologic status.  She is at increased risk for worsening respiratory, cardiovascular and CNS system dysfunction.    Discussed patient condition and risk of morbidity and/or mortality with RN, RT, Pharmacy, Charge nurse / hot rounds, QA team and neurology     The patient remains critically ill.  Critical care time = 35 minutes in directly providing and coordinating critical care and extensive data review.  No time overlap and excludes procedures.    Angel Luis Lanier MD  Pulmonary and Critical Care Medicine

## 2020-09-18 NOTE — CARE PLAN
Vent Day 27  Trach Day 10  8.0 Portex  CMV  26 320 +8 30%    No SBT performed at this time. Continuous EEG in room.

## 2020-09-18 NOTE — PROGRESS NOTES
Head: Unable to assess, continuous EEG in place   Ears: WDL  Nose: WDL  Mouth: white coating, oral care completed  Neck: trach in place, trach care completed  Breasts/Chest: WDL  Shoulder Blades: WDL  Spine: WDL  (R) Arm/Elbow/hand: WDL  (L) Arm/Elbow/hand: WDL  Abdomen:redness and non-blanching around peg tube. Off loaded with gauze, will reassess. Scab on umbilicus   Groin: redness and excoriation, barrier cream applied  Sacrum/Coccyx/Buttocks: redness and excoriated, barrier cream applied  (R) Leg: foot drop, boot in place  (L) Leg: foot drop, boot in place  (R) Heel/Foot/Toe: Callus on bottom of foot  (L) Heel/Foot/Toe: Callus on bottom of foot          Devices in place: ECG, BP Cuff, Pulse Ox, Pearce Temperature, SCD's and EEG, trach on ventilator, foot drop boots, and PEG tube    Interventions in place: Heel Mepilex, Waffle overlay, Pillows, Q2 turns, Low air loss mattress  and Barrier Cream    Possible skin injury found: Yes    Pictures uploaded into Epic: No, needs to be completed, off loading site first  Wound Consult Placed: no

## 2020-09-18 NOTE — PROCEDURES
VIDEO ELECTROENCEPHALOGRAM REPORT        Referring provider: Dr. Bear.      DOS: 9/18/2020 (total recording of 23 hours and 29 minutes).      INDICATION:  Annika He 19 y.o. female presenting with s/p cardiac arrest, altered mental status, seizures.      CURRENT ANTIEPILEPTIC REGIMEN: Levetiracetam 1000 mg tid, Lacosamide 200 mg tid, Valproic Acid 1250 mg tid, Perampanel at 10 mg qhs. No longer sedated.      TECHNIQUE: 30 channel video electroencephalogram (EEG) was performed in accordance with the international 10-20 system. The study was reviewed in bipolar and referential montages. The recording examined an encephalopathic patient.      DESCRIPTION OF THE RECORD:  Generalized delta activity. Continuous, blunted generalized sharps with frontal maximum and triphasic morphology. Frequent runs of Frontal Intermittent Rhythmic Delta Activity (FIRDA). Brief non convulsive seizures have returned since discontinuation of sedation. The patient is not in status epilepticus.      ACTIVATION PROCEDURES:   Not performed.      EKG: sampling of the EKG recording demonstrated sinus rhythm.      EVENTS:  None.      INTERPRETATION:  This is an abnormal continuous video EEG recording in an encephalopathic patient. A moderate encephalopathy is suggested. Continuous blunted generalized sharps with frontal maximum and triphasic morphology. Frequent runs of Frontal Intermittent Rhythmic Delta Activity (FIRDA). There has been worsening of the eeg. Brief non convulsive seizures have returned since discontinuation of sedation. The patient is not in status epilepticus. The findings suggest underlying areas of persinstently increased cortical irritability and/or structural abnormality. Clinical and radiological correlation is recommended.     Updates provided to Dr. Bear.         Garland Beatty MD   Epilepsy and Neurodiagnostics.   Clinical  of Neurology [facility].   Diplomate in Neurology, Epilepsy, and Electrodiagnostic Medicine.   Office: [phone]  Fax: [phone]

## 2020-09-18 NOTE — PROGRESS NOTES
NEUROLOGY PROGRESS NOTE      BACKGROUND:    19 y.o. female was admitted on 8/23/2020  9:57 PM for Cardiac arrest (HCC)  Drug overdose.  She is being followed by Neurology for anoxic brain injury.    SUBJECTIVE:   No significant changes, remains unresponsive with tracheostomy on vent.  Versed was discontinued 2 days ago and propofol was discontinued yesterday afternoon.    VITALS:  Vitals:    09/18/20 0700 09/18/20 0800 09/18/20 0900 09/18/20 1000   BP: 111/66 113/64 116/69 113/76   Pulse: (!) 102 (!) 105 (!) 104 (!) 104   Resp: (!) 26 (!) 26 (!) 26 (!) 26   Temp:       TempSrc:       SpO2: 97% 99% 100% 100%   Weight:       Height:           NEUROLOGICAL EXAM:   She is unresponsive on vent.  Her eyes are closed.  I do not appreciate facial asymmetry.  Facial sensation cannot be tested.  She has some spontaneous lip movement.  Pupils are 4 mm equal and briskly reactive to light.  She has cough and gag reflex.  She now withdraws to physical stimulation in lower extremity.  She has slight posturing of left upper extremity in noxious stimuli.  Motor, sensory, coordination cannot be tested.  Rest of her neurological examination is limited.    OBJECTIVE:    NEUROIMAGING:    DX-CHEST-PORTABLE (1 VIEW)   Final Result      Stable tracheostomy. No new consolidation or pleural effusions.      DX-CHEST-PORTABLE (1 VIEW)   Final Result         1. Stable tracheostomy.   2. No new consolidation or pleural effusions.         DX-CHEST-PORTABLE (1 VIEW)   Final Result      No significant interval change.         MR-BRAIN-W/O   Final Result      1.  Findings consistent with worsening anoxic brain injury involving the lentiform nuclei bilaterally.   2.  Findings consistent with anoxic brain injury in the parietal, occipital and posterior temporal regions which appear somewhat improved from prior exam.  Similar findings in the bifrontal region appear unchanged.         DX-CHEST-PORTABLE (1 VIEW)   Final Result      No significant  interval change.      DX-CHEST-PORTABLE (1 VIEW)   Final Result      Bibasilar underinflation atelectasis which could obscure an additional process. This is similar to the prior study.      DX-CHEST-PORTABLE (1 VIEW)   Final Result      Hazy airspace opacities are improved compared to prior.      DX-CHEST-PORTABLE (1 VIEW)   Final Result         1.  Pulmonary edema and/or infiltrates are identified, which are stable since the prior exam.            DX-CHEST-PORTABLE (1 VIEW)   Final Result         1.  Mild pulmonary edema and/or interstitial infiltrates, somewhat decreased since prior study         IR-MIDLINE CATHETER INSERTION WO GUIDANCE > AGE 3   Final Result                  Ultrasound-guided midline placement performed by qualified nursing staff    as above.          DX-CHEST-PORTABLE (1 VIEW)   Final Result         1.  Mild pulmonary edema and/or interstitial infiltrates, stable since prior study      DX-CHEST-PORTABLE (1 VIEW)   Final Result         1.  Mild pulmonary edema and/or interstitial infiltrates      DX-CHEST-PORTABLE (1 VIEW)   Final Result         No significant interval change.      DX-CHEST-PORTABLE (1 VIEW)   Final Result         New hazy opacity in the left lung base could be atelectasis or consolidation.      DX-CHEST-PORTABLE (1 VIEW)   Final Result      No significant change      DX-CHEST-PORTABLE (1 VIEW)   Final Result         1.  No acute cardiopulmonary disease.                                 DX-CHEST-PORTABLE (1 VIEW)   Final Result         1.  No acute cardiopulmonary disease.                              DX-CHEST-PORTABLE (1 VIEW)   Final Result         1.  No focal infiltrates, previously visualized infiltrates are not readily apparent.                           MR-BRAIN-WITH & W/O   Final Result         1.  Severe diffuse anoxic brain injury pattern which is more extensive and conspicuous than on previous exam.      2.  No evidence of intracranial mass effect, extra-axial fluid  collection, or interval development of hydrocephalus.      3.  Diffuse mucosal inflammatory changes filling the ethmoid and sphenoid sinuses.      DX-CHEST-PORTABLE (1 VIEW)   Final Result         1.  Pulmonary edema and/or infiltrates are identified, which are somewhat decreased since the prior exam.                        [id number] VIEW   Final Result      Feeding tube is noted with tip at the level of the proximal duodenum.                  DX-CHEST-PORTABLE (1 VIEW)   Final Result         1.  Pulmonary edema and/or infiltrates are identified, which are stable since the prior exam.                     DX-CHEST-PORTABLE (1 VIEW)   Final Result      Stable chest x-ray findings with right lower lobe consolidation.      DX-CHEST-PORTABLE (1 VIEW)   Final Result         1.  Pulmonary edema and/or infiltrates are identified, which are stable since the prior exam.                  DX-CHEST-PORTABLE (1 VIEW)   Final Result         1.  Pulmonary edema and/or infiltrates are identified, which are stable since the prior exam.               MR-BRAIN-W/O   Final Result      The diffusion-weighted sequences demonstrates areas of restricted diffusion in the bilateral basal ganglia and some of the frontal gray matter. The predominant portion of the brain parenchyma does not demonstrate any restricted diffusion. Therefore this    findings likely represent mild diffuse anoxic brain injury.      DX-CHEST-PORTABLE (1 VIEW)   Final Result         1.  Pulmonary edema and/or infiltrates are identified, which are somewhat decreased since the prior exam.            DX-CHEST-PORTABLE (1 VIEW)   Final Result         1.  Pulmonary edema and/or infiltrates are identified, which are stable since the prior exam.         DX-ABDOMEN FOR TUBE PLACEMENT   Final Result         1.  Nonspecific bowel gas pattern.   2.  Dobbhoff tube tip overlying the expected location of the pylorus or first duodenal segment.      DX-CHEST-PORTABLE (1 VIEW)    Final Result         1.  Patchy bilateral pulmonary infiltrates, somewhat increased since prior.      EC-ECHOCARDIOGRAM COMPLETE W/O CONT   Final Result      DX-CHEST-PORTABLE (1 VIEW)   Final Result         1.  Patchy bilateral pulmonary infiltrates, somewhat increased since prior.      CT-HEAD W/O   Final Result         1.  Possible subtle sulcal effacement and slight loss of differentiation of gray-white matter, consider component of cerebral edema. Recommend radiographic follow-up   2.  Bilateral sphenoid and maxillary sinus air-fluid levels      DX-CHEST-PORTABLE (1 VIEW)   Final Result         1.  No acute cardiopulmonary disease.      DX-CHEST-PORTABLE (1 VIEW)    (Results Pending)       MEDICATIONS:  Current Facility-Administered Medications   Medication Dose Route Frequency Provider Last Rate Last Dose   • divalproex (DEPAKOTE SPRINKLE) capsule 500 mg  500 mg Enteral Tube Q8HRS Angel Luis Lanier M.D.       • fentaNYL (SUBLIMAZE) injection  mcg   mcg Intravenous Q HOUR PRN Angel Luis Lanier M.D.       • levETIRAcetam (KEPPRA) tablet 1,000 mg  1,000 mg Enteral Tube Q8HR Andrew Petersen M.D.   1,000 mg at 09/18/20 0457   • glycopyrrolate (ROBINUL) tablet 0.5 mg  0.5 mg Enteral Tube Q8HRS Andrew Pteersen M.D.   0.5 mg at 09/18/20 0457   • lacosamide (VIMPAT) tablet 200 mg  200 mg Enteral Tube Q8HR Andrew Petersen M.D.   200 mg at 09/18/20 0845   • perampanel (FYCOMPA) tablet 10 mg  10 mg Enteral Tube QHS Nithin Donohue M.D.   10 mg at 09/17/20 2105   • propofol (DIPRIVAN) injection  0-80 mcg/kg/min Intravenous Continuous Boogie Augustine Jr., D.O.   Stopped at 09/17/20 1652   • acetaminophen (TYLENOL) suppository 325 mg  325 mg Rectal Q6HRS PRN Yosef Tomlin M.D.   650 mg at 09/02/20 1632   • acetaminophen (TYLENOL) tablet 1,000 mg  1,000 mg Enteral Tube Q4HRS PRN Dar Red M.D.   1,000 mg at 09/17/20 1001   • labetalol (NORMODYNE/TRANDATE) injection 10 mg   10 mg Intravenous Q4HRS PRN Yosef Tomlin M.D.   10 mg at 08/30/20 9586   • Pharmacy Consult: Enteral tube insertion - review meds/change route/product selection  1 Each Other PHARMACY TO DOSE Boogie Augustine Jr., D.O.       • Respiratory Therapy Consult   Nebulization Continuous RT Brock Murdock M.D.       • ipratropium-albuterol (DUONEB) nebulizer solution  3 mL Nebulization Q2HRS PRN (RT) Brock Murdock M.D.       • famotidine (PEPCID) tablet 20 mg  20 mg Enteral Tube Q12HRS Dar Red M.D.   20 mg at 09/18/20 0457   • senna-docusate (PERICOLACE or SENOKOT S) 8.6-50 MG per tablet 2 Tab  2 Tab Enteral Tube BID Brock Murdock M.D.   Stopped at 09/17/20 1800    And   • polyethylene glycol/lytes (MIRALAX) PACKET 1 Packet  1 Packet Enteral Tube QDAY PRN Brock Murdock M.D.        And   • magnesium hydroxide (MILK OF MAGNESIA) suspension 30 mL  30 mL Enteral Tube QDAY PRN Brock Murdock M.D.        And   • bisacodyl (DULCOLAX) suppository 10 mg  10 mg Rectal QDAY PRN Brock Murdock M.D.       • MD Alert...ICU Electrolyte Replacement per Pharmacy   Other PHARMACY TO DOSE Brock Murdock M.D.       • lidocaine (XYLOCAINE) 1 % injection 1-2 mL  1-2 mL Tracheal Tube Q30 MIN PRN Brock Murdock M.D.       • enoxaparin (LOVENOX) inj 40 mg  40 mg Subcutaneous DAILY Boogie Augustine Jr., D.O.   40 mg at 09/18/20 0457       LABS:      Recent Labs     09/16/20  0305 09/17/20  0356 09/18/20  0323   WBC 9.8 10.0 11.3*   RBC 3.72* 3.86* 3.59*   HEMOGLOBIN 11.6* 11.8* 11.4*   HEMATOCRIT 35.8* 36.8* 33.5*   MCV 96.2 95.3 93.3   MCH 31.2 30.6 31.8   MCHC 32.4* 32.1* 34.0   RDW 48.1 48.0 48.7   PLATELETCT 428 490* 432   MPV 9.5 9.8 9.9     Recent Labs     09/16/20  0305 09/17/20  0356 09/18/20  0323   SODIUM 139 139 138   POTASSIUM 4.2 4.3 4.0   CHLORIDE 105 105 103   CO2 19* 21 22   GLUCOSE 91 105* 128*   BUN 17 18 18     INR   Date Value Ref Range Status   08/25/2020 1.05 0.87 - 1.13 Final     Comment:     INR - Non-therapeutic  Reference Range: 0.87-1.13  INR - Therapeutic Reference Range: 2.0-4.0       No results found for: POCINR  Lab Results   Component Value Date/Time    CREATININE 0.45 (L) 2020     Lab Results   Component Value Date/Time    IFAFR[place] >60 2020 0400    IFNOTAFR >60 20200     EE2020:  This is an abnormal 24 hrs video EEG recording in a comatose patient. A mild to moderate encephalopathy is suggested. The patient appears only mildly sedated, not in burst suppression pattern. Frequent, blunted generalized sharps with frontal maximum and triphasic morphology, with frequent runs of Frontal Intermittent Rhythmic Delta Activity (FIRDA). No seizures captured, however the study remains significantly abnormal. The findings suggest underlying areas of persinstently increased cortical irritability and/or structural abnormality. Clinical and radiological correlation is recommended.    EE2020:  This is an abnormal continuous video EEG recording in a sedated and/or comatose patient. A moderate encephalopathy is suggested. Not on burst suppression. Frequent, blunted generalized sharps with frontal maximum and triphasic morphology. Frequent runs of Frontal Intermittent Rhythmic Delta Activity (FIRDA). No clear seizures captured despite weaning of Propofol and Midazolam. The findings suggest underlying areas of persinstently increased cortical irritability and/or structural abnormality. Clinical and radiological correlation is recommended.       ASSESSMENT AND PLAN:  19-year-old female with polysubstance abuse and subsequent cardiac arrest on 2020 who remains unresponsive with no neurological improvement on vent.  Her EEG as outlined above revealed frequent generalized sharp with frontal maximum and triphasic morphology and frequent runs of frontal intermittent rhythmic delta activity but no clear seizure.  She is on multiple antiepileptic medication including Keppra 1 g every 8 hours, and  Vimpat 200 mg every 8 hours, valproic acid 500 mg every 8 hours, Fycompa 10 mg nightly in addition to ketamine.  Her repeat brain MRI on 9/15/20 shows worsening anoxic brain injury with involvement of lentiform nuclei bilaterally.   Spoke with her father yesterday afternoon and reviewed all 3 of her brain MRI with him and updated him regarding her neurological status and her plan for gradually taking her off of sedation to evaluate her neurological status.  Versed was discontinued 2 days ago, propofol was discontinued yesterday afternoon and her EEG remains unchanged with no clear seizures.  I have discontinued her ketamine this morning and will continue with EEG monitoring.  We will follow clinically and reevaluate off sedation to see if there is any change in her neurological status.  Discussed with Dr. Lanier  Total critical care time spent was 35 minutes.

## 2020-09-18 NOTE — CARE PLAN
Problem: Venous Thromboembolism (VTW)/Deep Vein Thrombosis (DVT) Prevention:  Goal: Patient will participate in Venous Thrombosis (VTE)/Deep Vein Thrombosis (DVT)Prevention Measures  Outcome: PROGRESSING AS EXPECTED     Problem: Skin Integrity  Goal: Risk for impaired skin integrity will decrease  Outcome: PROGRESSING AS EXPECTED

## 2020-09-18 NOTE — CARE PLAN
Problem: Venous Thromboembolism (VTW)/Deep Vein Thrombosis (DVT) Prevention:  Goal: Patient will participate in Venous Thrombosis (VTE)/Deep Vein Thrombosis (DVT)Prevention Measures  Intervention: Ensure patient wears graduated elastic stockings (ANDREW hose) and/or SCDs, if ordered, when in bed or chair (Remove at least once per shift for skin check)  Note: SCD's in place     Problem: Skin Integrity  Goal: Risk for impaired skin integrity will decrease  Outcome: PROGRESSING AS EXPECTED  Note: Patient turned and repositioned every two hours. Pillows in place for support.

## 2020-09-19 ENCOUNTER — APPOINTMENT (OUTPATIENT)
Dept: RADIOLOGY | Facility: MEDICAL CENTER | Age: 20
DRG: 004 | End: 2020-09-19
Attending: INTERNAL MEDICINE
Payer: MEDICAID

## 2020-09-19 LAB
ALBUMIN SERPL BCP-MCNC: 3.5 G/DL (ref 3.2–4.9)
ALBUMIN/GLOB SERPL: 1.3 G/DL
ALP SERPL-CCNC: 145 U/L (ref 30–99)
ALT SERPL-CCNC: 52 U/L (ref 2–50)
ANION GAP SERPL CALC-SCNC: 14 MMOL/L (ref 7–16)
AST SERPL-CCNC: 26 U/L (ref 12–45)
BACTERIA SPEC RESP CULT: ABNORMAL
BACTERIA SPEC RESP CULT: ABNORMAL
BASOPHILS # BLD AUTO: 0.4 % (ref 0–1.8)
BASOPHILS # BLD: 0.03 K/UL (ref 0–0.12)
BILIRUB SERPL-MCNC: 0.2 MG/DL (ref 0.1–1.5)
BUN SERPL-MCNC: 19 MG/DL (ref 8–22)
CALCIUM SERPL-MCNC: 8.7 MG/DL (ref 8.5–10.5)
CHLORIDE SERPL-SCNC: 100 MMOL/L (ref 96–112)
CO2 SERPL-SCNC: 22 MMOL/L (ref 20–33)
CREAT SERPL-MCNC: 0.39 MG/DL (ref 0.5–1.4)
EOSINOPHIL # BLD AUTO: 0.26 K/UL (ref 0–0.51)
EOSINOPHIL NFR BLD: 3.2 % (ref 0–6.9)
ERYTHROCYTE [DISTWIDTH] IN BLOOD BY AUTOMATED COUNT: 49 FL (ref 35.9–50)
GLOBULIN SER CALC-MCNC: 2.8 G/DL (ref 1.9–3.5)
GLUCOSE SERPL-MCNC: 103 MG/DL (ref 65–99)
GRAM STN SPEC: ABNORMAL
HCT VFR BLD AUTO: 35.2 % (ref 37–47)
HGB BLD-MCNC: 11.6 G/DL (ref 12–16)
IMM GRANULOCYTES # BLD AUTO: 0.15 K/UL (ref 0–0.11)
IMM GRANULOCYTES NFR BLD AUTO: 1.9 % (ref 0–0.9)
LYMPHOCYTES # BLD AUTO: 1.69 K/UL (ref 1–4.8)
LYMPHOCYTES NFR BLD: 21 % (ref 22–41)
MAGNESIUM SERPL-MCNC: 2.1 MG/DL (ref 1.5–2.5)
MCH RBC QN AUTO: 31.3 PG (ref 27–33)
MCHC RBC AUTO-ENTMCNC: 33 G/DL (ref 33.6–35)
MCV RBC AUTO: 94.9 FL (ref 81.4–97.8)
MONOCYTES # BLD AUTO: 1.17 K/UL (ref 0–0.85)
MONOCYTES NFR BLD AUTO: 14.6 % (ref 0–13.4)
NEUTROPHILS # BLD AUTO: 4.73 K/UL (ref 2–7.15)
NEUTROPHILS NFR BLD: 58.9 % (ref 44–72)
NRBC # BLD AUTO: 0 K/UL
NRBC BLD-RTO: 0 /100 WBC
NSE SERPL-MCNC: 9.8 UG/L (ref 3.7–8.9)
PLATELET # BLD AUTO: 415 K/UL (ref 164–446)
PMV BLD AUTO: 9.6 FL (ref 9–12.9)
POTASSIUM SERPL-SCNC: 4.1 MMOL/L (ref 3.6–5.5)
PROCALCITONIN SERPL-MCNC: <0.05 NG/ML
PROT SERPL-MCNC: 6.3 G/DL (ref 6–8.2)
RBC # BLD AUTO: 3.71 M/UL (ref 4.2–5.4)
SIGNIFICANT IND 70042: ABNORMAL
SITE SITE: ABNORMAL
SODIUM SERPL-SCNC: 136 MMOL/L (ref 135–145)
SOURCE SOURCE: ABNORMAL
TRIGL SERPL-MCNC: 110 MG/DL (ref 0–149)
WBC # BLD AUTO: 8 K/UL (ref 4.8–10.8)

## 2020-09-19 PROCEDURE — A9270 NON-COVERED ITEM OR SERVICE: HCPCS | Performed by: PSYCHIATRY & NEUROLOGY

## 2020-09-19 PROCEDURE — A9270 NON-COVERED ITEM OR SERVICE: HCPCS | Performed by: INTERNAL MEDICINE

## 2020-09-19 PROCEDURE — 700111 HCHG RX REV CODE 636 W/ 250 OVERRIDE (IP): Performed by: INTERNAL MEDICINE

## 2020-09-19 PROCEDURE — 84145 PROCALCITONIN (PCT): CPT

## 2020-09-19 PROCEDURE — 71045 X-RAY EXAM CHEST 1 VIEW: CPT

## 2020-09-19 PROCEDURE — 99291 CRITICAL CARE FIRST HOUR: CPT | Mod: 25 | Performed by: PSYCHIATRY & NEUROLOGY

## 2020-09-19 PROCEDURE — 700102 HCHG RX REV CODE 250 W/ 637 OVERRIDE(OP): Performed by: INTERNAL MEDICINE

## 2020-09-19 PROCEDURE — 94003 VENT MGMT INPAT SUBQ DAY: CPT

## 2020-09-19 PROCEDURE — 95720 EEG PHY/QHP EA INCR W/VEEG: CPT | Performed by: PSYCHIATRY & NEUROLOGY

## 2020-09-19 PROCEDURE — 770022 HCHG ROOM/CARE - ICU (200)

## 2020-09-19 PROCEDURE — 94770 HCHG CO2 EXPIRED GAS DETERMINATION: CPT

## 2020-09-19 PROCEDURE — 700105 HCHG RX REV CODE 258: Performed by: INTERNAL MEDICINE

## 2020-09-19 PROCEDURE — 4A10X4Z MONITORING OF CENTRAL NERVOUS ELECTRICAL ACTIVITY, EXTERNAL APPROACH: ICD-10-PCS | Performed by: PSYCHIATRY & NEUROLOGY

## 2020-09-19 PROCEDURE — 700105 HCHG RX REV CODE 258

## 2020-09-19 PROCEDURE — 700102 HCHG RX REV CODE 250 W/ 637 OVERRIDE(OP): Performed by: PSYCHIATRY & NEUROLOGY

## 2020-09-19 PROCEDURE — 84478 ASSAY OF TRIGLYCERIDES: CPT

## 2020-09-19 PROCEDURE — 85025 COMPLETE CBC W/AUTO DIFF WBC: CPT

## 2020-09-19 PROCEDURE — 83735 ASSAY OF MAGNESIUM: CPT

## 2020-09-19 PROCEDURE — 99291 CRITICAL CARE FIRST HOUR: CPT | Performed by: INTERNAL MEDICINE

## 2020-09-19 PROCEDURE — 80053 COMPREHEN METABOLIC PANEL: CPT

## 2020-09-19 PROCEDURE — 95714 VEEG EA 12-26 HR UNMNTR: CPT | Performed by: PSYCHIATRY & NEUROLOGY

## 2020-09-19 RX ORDER — PHENOBARBITAL 32.4 MG/1
64.8 TABLET ORAL EVERY 8 HOURS
Status: DISCONTINUED | OUTPATIENT
Start: 2020-09-19 | End: 2020-09-26

## 2020-09-19 RX ORDER — SODIUM CHLORIDE 9 MG/ML
INJECTION, SOLUTION INTRAVENOUS
Status: COMPLETED
Start: 2020-09-19 | End: 2020-09-20

## 2020-09-19 RX ADMIN — ENOXAPARIN SODIUM 40 MG: 40 INJECTION SUBCUTANEOUS at 04:55

## 2020-09-19 RX ADMIN — PERAMPANEL 10 MG: 2 TABLET ORAL at 21:33

## 2020-09-19 RX ADMIN — LEVETIRACETAM 1000 MG: 500 TABLET, FILM COATED ORAL at 04:55

## 2020-09-19 RX ADMIN — PHENOBARBITAL 64.8 MG: 32.4 TABLET ORAL at 14:19

## 2020-09-19 RX ADMIN — GLYCOPYRROLATE 0.5 MG: 1 TABLET ORAL at 21:34

## 2020-09-19 RX ADMIN — LACOSAMIDE 200 MG: 100 TABLET, FILM COATED ORAL at 17:21

## 2020-09-19 RX ADMIN — CEFTAZIDIME 2000 MG: 1 INJECTION, POWDER, FOR SOLUTION INTRAMUSCULAR; INTRAVENOUS at 14:18

## 2020-09-19 RX ADMIN — DIVALPROEX SODIUM 500 MG: 125 CAPSULE ORAL at 21:33

## 2020-09-19 RX ADMIN — FAMOTIDINE 20 MG: 20 TABLET, FILM COATED ORAL at 17:21

## 2020-09-19 RX ADMIN — LACOSAMIDE 200 MG: 100 TABLET, FILM COATED ORAL at 03:06

## 2020-09-19 RX ADMIN — GLYCOPYRROLATE 0.5 MG: 1 TABLET ORAL at 14:19

## 2020-09-19 RX ADMIN — LACOSAMIDE 200 MG: 100 TABLET, FILM COATED ORAL at 09:42

## 2020-09-19 RX ADMIN — DIVALPROEX SODIUM 500 MG: 125 CAPSULE ORAL at 14:19

## 2020-09-19 RX ADMIN — DIVALPROEX SODIUM 500 MG: 125 CAPSULE ORAL at 04:55

## 2020-09-19 RX ADMIN — CEFTAZIDIME 1 G: 1 INJECTION, POWDER, FOR SOLUTION INTRAMUSCULAR; INTRAVENOUS at 04:54

## 2020-09-19 RX ADMIN — CEFTAZIDIME 2000 MG: 1 INJECTION, POWDER, FOR SOLUTION INTRAMUSCULAR; INTRAVENOUS at 21:35

## 2020-09-19 RX ADMIN — PHENOBARBITAL 64.8 MG: 32.4 TABLET ORAL at 21:34

## 2020-09-19 RX ADMIN — LEVETIRACETAM 1000 MG: 500 TABLET, FILM COATED ORAL at 14:19

## 2020-09-19 RX ADMIN — FAMOTIDINE 20 MG: 20 TABLET, FILM COATED ORAL at 04:55

## 2020-09-19 RX ADMIN — LEVETIRACETAM 1000 MG: 500 TABLET, FILM COATED ORAL at 21:34

## 2020-09-19 RX ADMIN — SODIUM CHLORIDE 500 ML: 9 INJECTION, SOLUTION INTRAVENOUS at 21:51

## 2020-09-19 RX ADMIN — PHENOBARBITAL 64.8 MG: 32.4 TABLET ORAL at 09:42

## 2020-09-19 RX ADMIN — GLYCOPYRROLATE 0.5 MG: 1 TABLET ORAL at 04:55

## 2020-09-19 RX ADMIN — FENTANYL CITRATE 50 MCG: 50 INJECTION INTRAMUSCULAR; INTRAVENOUS at 14:50

## 2020-09-19 ASSESSMENT — FIBROSIS 4 INDEX: FIB4 SCORE: 0.18

## 2020-09-19 NOTE — PROCEDURES
VIDEO ELECTROENCEPHALOGRAM REPORT        Referring provider: Dr. Bear.      DOS: 9/19/2020 (total recording of 23 hours and 41 minutes).      INDICATION:  Annika He 19 y.o. female presenting with s/p cardiac arrest, altered mental status, seizures.      CURRENT ANTIEPILEPTIC REGIMEN: Levetiracetam 1000 mg tid, Lacosamide 200 mg tid, Valproic Acid 1250 mg tid, Perampanel at 10 mg qhs. Phenobarbital 60 mg q 8 hrs added. The patient was not sedated.      TECHNIQUE: 30 channel video electroencephalogram (EEG) was performed in accordance with the international 10-20 system. The study was reviewed in bipolar and referential montages. The recording examined an encephalopathic patient.      DESCRIPTION OF THE RECORD:  Generalized delta activity. Continuous, blunted generalized sharps with frontal maximum and triphasic morphology. Frequent runs of Frontal Intermittent Rhythmic Delta Activity (FIRDA). Brief non convulsive seizures are still noted, some provoked by tactile stimulation of the patient. Perhaps a mild improvement after addition of Phenobarbital. The patient is not in status epilepticus.      ACTIVATION PROCEDURES:   Not performed.      EKG: sampling of the EKG recording demonstrated sinus rhythm.      EVENTS:  None.      INTERPRETATION:  This is an abnormal continuous video EEG recording in an encephalopathic patient. A moderate encephalopathy is suggested. Continuous blunted generalized sharps with frontal maximum and triphasic morphology. Frequent runs of Frontal Intermittent Rhythmic Delta Activity (FIRDA). Brief non convulsive seizures are still noted, some provoked by tactile stimulation of the patient. Perhaps a mild improvement after addition of Phenobarbital. The patient is not in status epilepticus. The findings suggest underlying areas of persinstently increased cortical irritability and/or structural abnormality. Clinical and radiological correlation is recommended.     Updates  provided to Dr. Bear.         Garland Beatty MD   Epilepsy and Neurodiagnostics.   Clinical  of [facility]rology [employer].   Diplomate in Neurology, Epilepsy, and Electrodiagnostic Medicine.   Office: [phone]  Fax: [phone]

## 2020-09-19 NOTE — PROGRESS NOTES
When walking in to turn patient for Q2 hour turns patient appeared to be smiling and laughing, but not responsive when calling her name. Dr. Lanier updated.

## 2020-09-19 NOTE — PROGRESS NOTES
Patient spiked temperature to 38.2, tylenol given. Dr. Lanier updated with temperature and that patient's tracheal aspirate is showing pseudomonas species.

## 2020-09-19 NOTE — PROGRESS NOTES
NEUROLOGY PROGRESS NOTE      BACKGROUND:    19 y.o. female was admitted on 8/23/2020  9:57 PM for Cardiac arrest (HCC)  Drug overdose.  She is being followed by Neurology for anoxic brain injury.    SUBJECTIVE:   There is subtle changes in her neuro status.  Her eyes are open and she grimaces easily to physical stimulation.  Remains unresponsive with tracheostomy on vent.  She is now off sedation.    VITALS:  Vitals:    09/19/20 0700 09/19/20 0800 09/19/20 1000 09/19/20 1038   BP:       Pulse:    95   Resp:    (!) 26   Temp: 36.8 °C (98.2 °F) 37.1 °C (98.8 °F) 36.4 °C (97.5 °F)    TempSrc: Bladder  Bladder    SpO2:    100%   Weight:       Height:           NEUROLOGICAL EXAM:   She is unresponsive on vent.  Her eyes are open but she does not track.  I do not appreciate facial asymmetry.  Facial sensation cannot be tested.  She has some spontaneous lip movement.  Pupils are 4 mm equal and briskly reactive to light.  She has cough and gag reflex.  She withdraws to physical stimulation in all 4 extremities.  She has slight posturing of left upper extremity in noxious stimuli.  Motor, sensory, coordination cannot be tested.  Rest of her neurological examination is limited.    OBJECTIVE:    NEUROIMAGING:    DX-CHEST-PORTABLE (1 VIEW)   Final Result      Stable bibasilar atelectasis.      DX-CHEST-PORTABLE (1 VIEW)   Final Result      Stable tracheostomy. No new consolidation or pleural effusions.      DX-CHEST-PORTABLE (1 VIEW)   Final Result         1. Stable tracheostomy.   2. No new consolidation or pleural effusions.         DX-CHEST-PORTABLE (1 VIEW)   Final Result      No significant interval change.         MR-BRAIN-W/O   Final Result      1.  Findings consistent with worsening anoxic brain injury involving the lentiform nuclei bilaterally.   2.  Findings consistent with anoxic brain injury in the parietal, occipital and posterior temporal regions which appear somewhat improved from prior exam.  Similar findings in  the bifrontal region appear unchanged.         DX-CHEST-PORTABLE (1 VIEW)   Final Result      No significant interval change.      DX-CHEST-PORTABLE (1 VIEW)   Final Result      Bibasilar underinflation atelectasis which could obscure an additional process. This is similar to the prior study.      DX-CHEST-PORTABLE (1 VIEW)   Final Result      Hazy airspace opacities are improved compared to prior.      DX-CHEST-PORTABLE (1 VIEW)   Final Result         1.  Pulmonary edema and/or infiltrates are identified, which are stable since the prior exam.            DX-CHEST-PORTABLE (1 VIEW)   Final Result         1.  Mild pulmonary edema and/or interstitial infiltrates, somewhat decreased since prior study         IR-MIDLINE CATHETER INSERTION WO GUIDANCE > AGE 3   Final Result                  Ultrasound-guided midline placement performed by qualified nursing staff    as above.          DX-CHEST-PORTABLE (1 VIEW)   Final Result         1.  Mild pulmonary edema and/or interstitial infiltrates, stable since prior study      DX-CHEST-PORTABLE (1 VIEW)   Final Result         1.  Mild pulmonary edema and/or interstitial infiltrates      DX-CHEST-PORTABLE (1 VIEW)   Final Result         No significant interval change.      DX-CHEST-PORTABLE (1 VIEW)   Final Result         New hazy opacity in the left lung base could be atelectasis or consolidation.      DX-CHEST-PORTABLE (1 VIEW)   Final Result      No significant change      DX-CHEST-PORTABLE (1 VIEW)   Final Result         1.  No acute cardiopulmonary disease.                                 DX-CHEST-PORTABLE (1 VIEW)   Final Result         1.  No acute cardiopulmonary disease.                              DX-CHEST-PORTABLE (1 VIEW)   Final Result         1.  No focal infiltrates, previously visualized infiltrates are not readily apparent.                           MR-BRAIN-WITH & W/O   Final Result         1.  Severe diffuse anoxic brain injury pattern which is more extensive  and conspicuous than on previous exam.      2.  No evidence of intracranial mass effect, extra-axial fluid collection, or interval development of hydrocephalus.      3.  Diffuse mucosal inflammatory changes filling the ethmoid and sphenoid sinuses.      DX-CHEST-PORTABLE (1 VIEW)   Final Result         1.  Pulmonary edema and/or infiltrates are identified, which are somewhat decreased since the prior exam.                        [id number] VIEW   Final Result      Feeding tube is noted with tip at the level of the proximal duodenum.                  DX-CHEST-PORTABLE (1 VIEW)   Final Result         1.  Pulmonary edema and/or infiltrates are identified, which are stable since the prior exam.                     DX-CHEST-PORTABLE (1 VIEW)   Final Result      Stable chest x-ray findings with right lower lobe consolidation.      DX-CHEST-PORTABLE (1 VIEW)   Final Result         1.  Pulmonary edema and/or infiltrates are identified, which are stable since the prior exam.                  DX-CHEST-PORTABLE (1 VIEW)   Final Result         1.  Pulmonary edema and/or infiltrates are identified, which are stable since the prior exam.               MR-BRAIN-W/O   Final Result      The diffusion-weighted sequences demonstrates areas of restricted diffusion in the bilateral basal ganglia and some of the frontal gray matter. The predominant portion of the brain parenchyma does not demonstrate any restricted diffusion. Therefore this    findings likely represent mild diffuse anoxic brain injury.      DX-CHEST-PORTABLE (1 VIEW)   Final Result         1.  Pulmonary edema and/or infiltrates are identified, which are somewhat decreased since the prior exam.            DX-CHEST-PORTABLE (1 VIEW)   Final Result         1.  Pulmonary edema and/or infiltrates are identified, which are stable since the prior exam.         DX-ABDOMEN FOR TUBE PLACEMENT   Final Result         1.  Nonspecific bowel gas pattern.   2.  Dobbhoff tube tip  overlying the expected location of the pylorus or first duodenal segment.      DX-CHEST-PORTABLE (1 VIEW)   Final Result         1.  Patchy bilateral pulmonary infiltrates, somewhat increased since prior.      EC-ECHOCARDIOGRAM COMPLETE W/O CONT   Final Result      DX-CHEST-PORTABLE (1 VIEW)   Final Result         1.  Patchy bilateral pulmonary infiltrates, somewhat increased since prior.      CT-HEAD W/O   Final Result         1.  Possible subtle sulcal effacement and slight loss of differentiation of gray-white matter, consider component of cerebral edema. Recommend radiographic follow-up   2.  Bilateral sphenoid and maxillary sinus air-fluid levels      DX-CHEST-PORTABLE (1 VIEW)   Final Result         1.  No acute cardiopulmonary disease.      DX-CHEST-PORTABLE (1 VIEW)    (Results Pending)       MEDICATIONS:  Current Facility-Administered Medications   Medication Dose Route Frequency Provider Last Rate Last Dose   • ceftAZIDime (FORTAZ) 2,000 mg in  mL IVPB  2 g Intravenous Q8HRS Angel Luis Lanier M.D.       • PHENobarbital (LUMINAL) tablet 64.8 mg  64.8 mg Enteral Tube Q8HRS Toni Bear M.D.   64.8 mg at 09/19/20 0942   • divalproex (DEPAKOTE SPRINKLE) capsule 500 mg  500 mg Enteral Tube Q8HRS Angel Luis Lanier M.D.   500 mg at 09/19/20 0455   • fentaNYL (SUBLIMAZE) injection  mcg   mcg Intravenous Q HOUR PRN Angel Luis Lanier M.D.       • levETIRAcetam (KEPPRA) tablet 1,000 mg  1,000 mg Enteral Tube Q8HR Andrew Petersen M.D.   1,000 mg at 09/19/20 0455   • glycopyrrolate (ROBINUL) tablet 0.5 mg  0.5 mg Enteral Tube Q8HRS Andrew Petersen M.D.   0.5 mg at 09/19/20 0455   • lacosamide (VIMPAT) tablet 200 mg  200 mg Enteral Tube Q8HR Andrew Petersen M.D.   200 mg at 09/19/20 0942   • perampanel (FYCOMPA) tablet 10 mg  10 mg Enteral Tube QHS Nithin Donohue M.D.   10 mg at 09/18/20 2239   • propofol (DIPRIVAN) injection  0-80 mcg/kg/min Intravenous  Continuous Boogie Augustine Jr. D.OYue   Stopped at 09/17/20 1652   • acetaminophen (TYLENOL) suppository 325 mg  325 mg Rectal Q6HRS PRN Yosef Tomlin M.D.   650 mg at 09/02/20 1632   • acetaminophen (TYLENOL) tablet 1,000 mg  1,000 mg Enteral Tube Q4HRS PRN Dar Red M.D.   1,000 mg at 09/18/20 1520   • labetalol (NORMODYNE/TRANDATE) injection 10 mg  10 mg Intravenous Q4HRS PRN Yosef Tomlin M.D.   10 mg at 08/30/20 2256   • Pharmacy Consult: Enteral tube insertion - review meds/change route/product selection  1 Each Other PHARMACY TO DOSE Boogie Augustine Jr. D.O.       • Respiratory Therapy Consult   Nebulization Continuous RT Brock Murdock M.D.       • ipratropium-albuterol (DUONEB) nebulizer solution  3 mL Nebulization Q2HRS PRN (RT) Brock Murdock M.D.       • famotidine (PEPCID) tablet 20 mg  20 mg Enteral Tube Q12HRS Dar Red M.D.   20 mg at 09/19/20 0455   • senna-docusate (PERICOLACE or SENOKOT S) 8.6-50 MG per tablet 2 Tab  2 Tab Enteral Tube BID Brock Murdock M.D.   Stopped at 09/17/20 1800    And   • polyethylene glycol/lytes (MIRALAX) PACKET 1 Packet  1 Packet Enteral Tube QDAY PRN Brock Murdock M.D.        And   • magnesium hydroxide (MILK OF MAGNESIA) suspension 30 mL  30 mL Enteral Tube QDAY PRN Brock Murdock M.D.        And   • bisacodyl (DULCOLAX) suppository 10 mg  10 mg Rectal QDAY PRN Brock Murdock M.D.       • MD Alert...ICU Electrolyte Replacement per Pharmacy   Other PHARMACY TO DOSE Brock Murdock M.D.       • lidocaine (XYLOCAINE) 1 % injection 1-2 mL  1-2 mL Tracheal Tube Q30 MIN PRN Brock Murdock M.D.       • enoxaparin (LOVENOX) inj 40 mg  40 mg Subcutaneous DAILY Boogie Augustine Jr., D.O.   40 mg at 09/19/20 0455       LABS:      Recent Labs     09/17/20  0356 09/18/20  0323 09/19/20  0313   WBC 10.0 11.3* 8.0   RBC 3.86* 3.59* 3.71*   HEMOGLOBIN 11.8* 11.4* 11.6*   HEMATOCRIT 36.8* 33.5* 35.2*   MCV 95.3 93.3 94.9   MCH 30.6 31.8 31.3   MCHC 32.1* 34.0 33.0*    RDW 48.0 48.7 49.0   PLATELETCT 490* 432 415   MPV 9.8 9.9 9.6     Recent Labs     20  0356 20  0323 20  0313   SODIUM 139 138 136   POTASSIUM 4.3 4.0 4.1   CHLORIDE 105 103 100   CO2 21 22 22   GLUCOSE 105* 128* 103*   BUN 18 18 19     INR   Date Value Ref Range Status   2020 1.05 0.87 - 1.13 Final     Comment:     INR - Non-therapeutic Reference Range: 0.87-1.13  INR - Therapeutic Reference Range: 2.0-4.0       No results found for: POCINR  Lab Results   Component Value Date/Time    CREATININE 0.45 (L) 2020     Lab Results   Component Value Date/Time    IFAFRICA >60 20200    IFNOTAFR >60 2020     EE2020:  This is an abnormal 24 hrs video EEG recording in a comatose patient. A mild to moderate encephalopathy is suggested. The patient appears only mildly sedated, not in burst suppression pattern. Frequent, blunted generalized sharps with frontal maximum and triphasic morphology, with frequent runs of Frontal Intermittent Rhythmic Delta Activity (FIRDA). No seizures captured, however the study remains significantly abnormal. The findings suggest underlying areas of persinstently increased cortical irritability and/or structural abnormality. Clinical and radiological correlation is recommended.    EE2020:  This is an abnormal continuous video EEG recording in a sedated and/or comatose patient. A moderate encephalopathy is suggested. Not on burst suppression. Frequent, blunted generalized sharps with frontal maximum and triphasic morphology. Frequent runs of Frontal Intermittent Rhythmic Delta Activity (FIRDA). No clear seizures captured despite weaning of Propofol and Midazolam. The findings suggest underlying areas of persinstently increased cortical irritability and/or structural abnormality. Clinical and radiological correlation is recommended.    EE2020:  This is an abnormal continuous video EEG recording in an  encephalopathic patient. A moderate encephalopathy is suggested. Continuous blunted generalized sharps with frontal maximum and triphasic morphology. Frequent runs of Frontal Intermittent Rhythmic Delta Activity (FIRDA). There has been worsening of the eeg. Brief non convulsive seizures have returned since discontinuation of sedation. The patient is not in status epilepticus. The findings suggest underlying areas of persinstently increased cortical irritability and/or structural abnormality. Clinical and radiological correlation is recommended.     ASSESSMENT AND PLAN:  19-year-old female with polysubstance abuse and subsequent cardiac arrest on 8/23/2020 who remains unresponsive.  She is on multiple antiepileptic medication including Keppra 1 g every 8 hours, and Vimpat 200 mg every 8 hours, valproic acid 500 mg every 8 hours, Fycompa 10 mg nightly.  Her repeat brain MRI on 9/15/20 shows worsening anoxic brain injury with involvement of lentiform nuclei bilaterally.   Versed was discontinued 3 days ago, propofol was discontinued 2 days ago and ketamine was discontinued yesterday.  After discontinuation of all sedation there has been worsening of the EEG with presence of brief nonconvulsive seizures but there is no status epilepticus.  Will add phenobarbital 64 mg 3 times a day and will continue with EEG monitoring.  Patient has some neurological changes which is positive, however, her prognosis remains guarded for full and meaningful neurological recovery.  Discussed with Dr. Lanier  Total critical care time spent was 35 minutes.

## 2020-09-19 NOTE — CARE PLAN
Problem: Respiratory:  Goal: Respiratory status will improve  9/19/2020 1321 by Leigha Plata R.N.  Outcome: PROGRESSING AS EXPECTED  Intervention: Educate and encourage coughing and deep breathing  Note: Pt is trach day 11, secretions continue to decrease.  Pt still coughs intermittently, but responds positively to suction.     Problem: Infection  Goal: Will remain free from infection  9/19/2020 1321 by Leigha Plata R.N.  Outcome: PROGRESSING SLOWER THAN EXPECTED  Intervention: Assess for removal of potential routes of infection, such as IV, central line, intra-arterial or urinary catheters  Note: Pt is positive for pseudomonas in the sputum and positive gram + organisms in blood.  Still determining the specific organism in blood.  She is being treated with antibiotics.

## 2020-09-19 NOTE — PROGRESS NOTES
Critical Care Progress Note    Date of admission  8/23/2020    Chief Complaint  19 y.o. female admitted 8/23/2020 with a cardiac arrest following an apparent drug overdose.    Hospital Course    8/24 - TTM protocol initiated, EEG without NCSE  8/25 - rewarmed, awake, not following  8/26 - following occasional commands  8/27- seizure like activity vs shivering--> Keppra, versed, Propofol  8/28- no seizures on EEG  8/29- remains unresponsive.  8/30 -mental status improved, now opening eyes and following commands.  Keppra discontinued  8/31 - started cefepime/vancomycin for possible pneumonia,?  UTI, fever and increasing WBC fever; full vent  9/1 - Seizure activity noted on EEG today, reviewed with neurology; keppra load and increased to 1gm q 12; full vent support  9/2 - remains unresponsive with only some posture and grimace to stimulation, neurology reconsulted, stat MRI brain, LP, cEEG  9/3 -ongoing seizures, Vimpat added, Depakote added, family conference today  9/4 -ongoing seizure noted on cEEG, started on midazolam infusion, full ventilator support  9/5 -ongoing seizures noted, propofol added and increased today, full ventilator support  9/6 -have not yet achieved burst suppression on cEEG; continue propofol, change Versed to ketamine infusion, discussed with neurology, full ventilator support  9/7 - still titrating propofol and ketamine for burst suppression on cEEG. Continue full ventilator support  9/8 - propofol off; ketamine is being weaned; on vimpat, keppra, depakote. Requiring full ventilator support  9/9 - Requiring full ventilator support. propofol and ketamine off; on vimpat, keppra, depakote. Postures to stimuli - consult for trach/PEG  9/10 -status post tracheostomy, off propofol and on ketamine- beginning to wean antiepileptic medications, continues to require full ventilator support. PEG tomorrow. Reportedly is still having seizures.   9/11 PEG tube today, continues to require full mechanical  ventilator support  9/12 returned to deep sedation secondary to persistent seizures; continues to require full vent support  9/13 continues to require full vent support; propofol and ketamine plus AEDs for persistent, intermittent seizures  9/14 -    continue full vent support.  Continue propofol, ketamine.  Start Versed.  Continuous EEG.  9/15 -    continue vent support.  Increase Versed drip.  For repeat MRI today.  9/16 -    stop Versed drip.  Continue other AEDs.  Continue vent support.  9/17 -    febrile.  Culture blood and sputum.  Check UA.  Decrease propofol.  Continue vent support.  9/18 -    Pseudomonas in sputum.  Start Fortaz.  Stop ketamine.  Propofol stopped yesterday.  9/19 -    continue Fortaz.  Add phenobarbital to AED regimen.      Interval Problem Update  Reviewed last 24 hour events:      Decerebrate posturing in UE > LE  Does not follow  Opens eyes  TF  No sedation - ketamine off  Prop off  SR-ST  TF at 70 - goal  Vent 28  Trach 11  Fortaz  Phenobarb this am      Review of Systems  Review of Systems   Unable to perform ROS: Acuity of condition        Vital Signs for last 24 hours   Temp:  [36.4 °C (97.5 °F)-37.1 °C (98.8 °F)] 37.1 °C (98.8 °F)  Pulse:  [] 95  Resp:  [21-28] 26  BP: (101-121)/(48-73) 102/48  SpO2:  [96 %-100 %] 96 %    Hemodynamic parameters for last 24 hours       Respiratory Information for the last 24 hours  Vent Mode: APVCMV  Rate (breaths/min): 26  Vt Target (mL): 320  PEEP/CPAP: 8  MAP: 13  Control VTE (exp VT): 324    Physical Exam   Physical Exam  Constitutional:       Appearance: She is not diaphoretic.      Comments: On ventilator   HENT:      Head: Normocephalic and atraumatic.      Right Ear: External ear normal.      Left Ear: External ear normal.      Nose: Nose normal.      Mouth/Throat:      Mouth: Mucous membranes are moist.      Pharynx: No oropharyngeal exudate.   Eyes:      General: No scleral icterus.     Pupils: Pupils are equal, round, and reactive to  light.   Neck:      Musculoskeletal: Normal range of motion and neck supple.      Comments: Trach in place  Cardiovascular:      Pulses: Normal pulses.      Heart sounds: No murmur. No friction rub.      Comments: Sinus rhythm  Pulmonary:      Breath sounds: Rales (Scattered crackles) present. No wheezing.   Abdominal:      General: Bowel sounds are normal. There is no distension.      Palpations: Abdomen is soft.      Tenderness: There is no abdominal tenderness. There is no guarding.      Comments: Tolerating enteral tube feedings   Musculoskeletal: Normal range of motion.         General: No tenderness.      Comments: No clubbing or cyanosis   Skin:     General: Skin is warm and dry.      Capillary Refill: Capillary refill takes less than 2 seconds.   Neurological:      Comments: Pupils 4 mm, equal and briskly reactive.  She opens her eyes and blinks.  She does not track or follow.         Medications  Current Facility-Administered Medications   Medication Dose Route Frequency Provider Last Rate Last Dose   • ceftAZIDime (FORTAZ) 2,000 mg in  mL IVPB  2 g Intravenous Q8HRS Angel Luis Lanier M.D.       • PHENobarbital (LUMINAL) tablet 64.8 mg  64.8 mg Enteral Tube Q8HRS Toni Bear M.D.   64.8 mg at 09/19/20 0942   • divalproex (DEPAKOTE SPRINKLE) capsule 500 mg  500 mg Enteral Tube Q8HRS Angel Luis Lanier M.D.   500 mg at 09/19/20 0455   • fentaNYL (SUBLIMAZE) injection  mcg   mcg Intravenous Q HOUR PRN Angel Luis Lanier M.D.       • levETIRAcetam (KEPPRA) tablet 1,000 mg  1,000 mg Enteral Tube Q8HR Andrew Petersen M.D.   1,000 mg at 09/19/20 0455   • glycopyrrolate (ROBINUL) tablet 0.5 mg  0.5 mg Enteral Tube Q8HRS Andrew Petersen M.D.   0.5 mg at 09/19/20 0455   • lacosamide (VIMPAT) tablet 200 mg  200 mg Enteral Tube Q8HR Andrew Petersen M.D.   200 mg at 09/19/20 0942   • perampanel (FYCOMPA) tablet 10 mg  10 mg Enteral Tube QHS Nithin D Donohue, M.D.   10  mg at 09/18/20 2239   • propofol (DIPRIVAN) injection  0-80 mcg/kg/min Intravenous Continuous Boogie Augustine Jr. D.OYue   Stopped at 09/17/20 1652   • acetaminophen (TYLENOL) suppository 325 mg  325 mg Rectal Q6HRS PRN Yosef Tomlin M.D.   650 mg at 09/02/20 1632   • acetaminophen (TYLENOL) tablet 1,000 mg  1,000 mg Enteral Tube Q4HRS PRN Dar Red M.D.   1,000 mg at 09/18/20 1520   • labetalol (NORMODYNE/TRANDATE) injection 10 mg  10 mg Intravenous Q4HRS PRN Yosef Tomlin M.D.   10 mg at 08/30/20 2256   • Pharmacy Consult: Enteral tube insertion - review meds/change route/product selection  1 Each Other PHARMACY TO DOSE Boogie Augustine Jr., D.OYue       • Respiratory Therapy Consult   Nebulization Continuous RT Brock Murdock M.D.       • ipratropium-albuterol (DUONEB) nebulizer solution  3 mL Nebulization Q2HRS PRN (RT) Brock Murdock M.D.       • famotidine (PEPCID) tablet 20 mg  20 mg Enteral Tube Q12HRS Dar Red M.D.   20 mg at 09/19/20 0455   • senna-docusate (PERICOLACE or SENOKOT S) 8.6-50 MG per tablet 2 Tab  2 Tab Enteral Tube BID Brock Murdock M.D.   Stopped at 09/17/20 1800    And   • polyethylene glycol/lytes (MIRALAX) PACKET 1 Packet  1 Packet Enteral Tube QDAY PRN Brock Murdock M.D.        And   • magnesium hydroxide (MILK OF MAGNESIA) suspension 30 mL  30 mL Enteral Tube QDAY PRN Brock Murdock M.D.        And   • bisacodyl (DULCOLAX) suppository 10 mg  10 mg Rectal QDAY PRN Brock Murdock M.D.       • MD Alert...ICU Electrolyte Replacement per Pharmacy   Other PHARMACY TO DOSE Brock Murdock M.D.       • lidocaine (XYLOCAINE) 1 % injection 1-2 mL  1-2 mL Tracheal Tube Q30 MIN PRN Brock K Collette, M.D.       • enoxaparin (LOVENOX) inj 40 mg  40 mg Subcutaneous DAILY Boogie Augustine Jr., D.O.   40 mg at 09/19/20 0455       Fluids    Intake/Output Summary (Last 24 hours) at 9/19/2020 1356  Last data filed at 9/19/2020 1200  Gross per 24 hour   Intake 2060 ml   Output 1420 ml   Net 640  ml       Laboratory          Recent Labs     09/17/20  0356 09/18/20 0323 09/19/20 0313   SODIUM 139 138 136   POTASSIUM 4.3 4.0 4.1   CHLORIDE 105 103 100   CO2 21 22 22   BUN 18 18 19   CREATININE 0.33* 0.34* 0.39*   MAGNESIUM 2.0 1.9 2.1   CALCIUM 8.9 8.7 8.7     Recent Labs     09/17/20  0356 09/18/20 0323 09/19/20 0313   ALTSGPT 71* 70* 52*   ASTSGOT 28 30 26   ALKPHOSPHAT 163* 166* 145*   TBILIRUBIN <0.2 0.2 0.2   GLUCOSE 105* 128* 103*     Recent Labs     09/17/20 0356 09/18/20 0323 09/19/20 0313   WBC 10.0 11.3* 8.0   NEUTSPOLYS 69.20 77.80* 58.90   LYMPHOCYTES 13.20* 11.40* 21.00*   MONOCYTES 9.10 7.30 14.60*   EOSINOPHILS 6.40 2.20 3.20   BASOPHILS 0.50 0.30 0.40   ASTSGOT 28 30 26   ALTSGPT 71* 70* 52*   ALKPHOSPHAT 163* 166* 145*   TBILIRUBIN <0.2 0.2 0.2     Recent Labs     09/17/20 0356 09/18/20 0323 09/19/20 0313   RBC 3.86* 3.59* 3.71*   HEMOGLOBIN 11.8* 11.4* 11.6*   HEMATOCRIT 36.8* 33.5* 35.2*   PLATELETCT 490* 432 415       Imaging  X-Ray:  I have personally reviewed the images and compared with prior images. and My impression is: Unchanged bibasilar opacities    Assessment/Plan  * Acute respiratory failure with hypoxia (HCC)- (present on admission)  Assessment & Plan  Intubated 8/24  Trach 9/9  All of the appropriate ventilator bundles are in place  Continue vent support    Anoxic encephalopathy (HCC)- (present on admission)  Assessment & Plan  MRI on 9/2 and 9/15 consistent with severe anoxic brain injury  EEG with frequent, blunted generalized sharps with frontal maximum and triphasic morphology with frequent runs of frontal intermittent rhythmic delta activity (FIRDA)  Continue AEDs    Drug overdose- (present on admission)  Assessment & Plan  BA 0.16 on presentation  Positive urine drug screen for amphetamines on presentation  Luciaiend reported methamphetamine, oxycodone and alcohol use    Fever  Assessment & Plan  1 out of 2 blood cultures positive for probable coag negative  Staphylococcus on 9/17 - likely contaminant  UA unremarkable  Sputum with Pseudomonas  Fever improved after Fortaz started    Seizures (HCC)  Assessment & Plan  Continuous video EEG  Start phenobarbital, 64.8 mg every 8 hours  Continue Vimpat, 200 mg every 8 hours  Continue valproic acid, 500 mg every 8 hours  Continue Keppra, 1000 mg every 8 hours  Continue Fycompa, 10 mg nightly    Pneumonia of both lungs due to Pseudomonas species (HCC)  Assessment & Plan  Sputum culture with MRSA, Acinetobacter and Stenotrophomonas species on 9/6  Completed 10 days of Bactrim on 9/13  Sputum culture from 9/17 with Pseudomonas aeruginosa  Continue Fortaz    Elevated transaminase level- (present on admission)  Assessment & Plan  Due to valproic acid  Improving    S/P PEA cardiac arrest (HCC)- (present on admission)  Assessment & Plan  S/P therapeutic hypothermia protocol    Dysphagia, oropharyngeal- (present on admission)  Assessment & Plan  S/P laparoscopic gastrostomy on 9/11  Continue enteral tube feedings       VTE:  Lovenox  Ulcer: H2 Antagonist  Lines: Pearce Catheter  Ongoing indication addressed    I have performed a physical exam and reviewed and updated ROS and Plan today (9/19/2020). In review of yesterday's note (9/18/2020), there are no changes except as documented above.     I have assessed and reassessed her respiratory status with ventilator adjustments, airway mechanics, ventilator waveforms, blood pressure, hemodynamics, cardiovascular status and her neurologic status.  She is at increased risk for worsening respiratory and CNS system dysfunction.    Discussed patient condition and risk of morbidity and/or mortality with RN, RT, Pharmacy, Charge nurse / hot rounds, QA team and neurology     The patient remains critically ill.  Critical care time = 38 minutes in directly providing and coordinating critical care and extensive data review.  No time overlap and excludes procedures.    Angel Luis Lanier,  MD  Pulmonary and Critical Care Medicine

## 2020-09-20 LAB
ALBUMIN SERPL BCP-MCNC: 3.4 G/DL (ref 3.2–4.9)
ALBUMIN/GLOB SERPL: 1.2 G/DL
ALP SERPL-CCNC: 121 U/L (ref 30–99)
ALT SERPL-CCNC: 46 U/L (ref 2–50)
ANION GAP SERPL CALC-SCNC: 16 MMOL/L (ref 7–16)
AST SERPL-CCNC: 28 U/L (ref 12–45)
BACTERIA BLD CULT: ABNORMAL
BACTERIA BLD CULT: ABNORMAL
BASOPHILS # BLD AUTO: 0.4 % (ref 0–1.8)
BASOPHILS # BLD: 0.03 K/UL (ref 0–0.12)
BILIRUB SERPL-MCNC: <0.2 MG/DL (ref 0.1–1.5)
BUN SERPL-MCNC: 17 MG/DL (ref 8–22)
CALCIUM SERPL-MCNC: 8.7 MG/DL (ref 8.5–10.5)
CHLORIDE SERPL-SCNC: 100 MMOL/L (ref 96–112)
CO2 SERPL-SCNC: 20 MMOL/L (ref 20–33)
CREAT SERPL-MCNC: 0.37 MG/DL (ref 0.5–1.4)
EOSINOPHIL # BLD AUTO: 0.67 K/UL (ref 0–0.51)
EOSINOPHIL NFR BLD: 8.4 % (ref 0–6.9)
ERYTHROCYTE [DISTWIDTH] IN BLOOD BY AUTOMATED COUNT: 48.2 FL (ref 35.9–50)
GLOBULIN SER CALC-MCNC: 2.9 G/DL (ref 1.9–3.5)
GLUCOSE SERPL-MCNC: 142 MG/DL (ref 65–99)
HCT VFR BLD AUTO: 34.3 % (ref 37–47)
HGB BLD-MCNC: 11.4 G/DL (ref 12–16)
IMM GRANULOCYTES # BLD AUTO: 0.1 K/UL (ref 0–0.11)
IMM GRANULOCYTES NFR BLD AUTO: 1.3 % (ref 0–0.9)
LYMPHOCYTES # BLD AUTO: 1.41 K/UL (ref 1–4.8)
LYMPHOCYTES NFR BLD: 17.7 % (ref 22–41)
MAGNESIUM SERPL-MCNC: 2 MG/DL (ref 1.5–2.5)
MCH RBC QN AUTO: 31.2 PG (ref 27–33)
MCHC RBC AUTO-ENTMCNC: 33.2 G/DL (ref 33.6–35)
MCV RBC AUTO: 94 FL (ref 81.4–97.8)
MONOCYTES # BLD AUTO: 0.86 K/UL (ref 0–0.85)
MONOCYTES NFR BLD AUTO: 10.8 % (ref 0–13.4)
NEUTROPHILS # BLD AUTO: 4.89 K/UL (ref 2–7.15)
NEUTROPHILS NFR BLD: 61.4 % (ref 44–72)
NRBC # BLD AUTO: 0 K/UL
NRBC BLD-RTO: 0 /100 WBC
PLATELET # BLD AUTO: 437 K/UL (ref 164–446)
PMV BLD AUTO: 10 FL (ref 9–12.9)
POTASSIUM SERPL-SCNC: 4 MMOL/L (ref 3.6–5.5)
PROT SERPL-MCNC: 6.3 G/DL (ref 6–8.2)
RBC # BLD AUTO: 3.65 M/UL (ref 4.2–5.4)
SIGNIFICANT IND 70042: ABNORMAL
SITE SITE: ABNORMAL
SODIUM SERPL-SCNC: 136 MMOL/L (ref 135–145)
SOURCE SOURCE: ABNORMAL
WBC # BLD AUTO: 8 K/UL (ref 4.8–10.8)

## 2020-09-20 PROCEDURE — 700102 HCHG RX REV CODE 250 W/ 637 OVERRIDE(OP): Performed by: INTERNAL MEDICINE

## 2020-09-20 PROCEDURE — 700102 HCHG RX REV CODE 250 W/ 637 OVERRIDE(OP): Performed by: PSYCHIATRY & NEUROLOGY

## 2020-09-20 PROCEDURE — 85025 COMPLETE CBC W/AUTO DIFF WBC: CPT

## 2020-09-20 PROCEDURE — 83735 ASSAY OF MAGNESIUM: CPT

## 2020-09-20 PROCEDURE — A9270 NON-COVERED ITEM OR SERVICE: HCPCS | Performed by: INTERNAL MEDICINE

## 2020-09-20 PROCEDURE — 94003 VENT MGMT INPAT SUBQ DAY: CPT

## 2020-09-20 PROCEDURE — A9270 NON-COVERED ITEM OR SERVICE: HCPCS | Performed by: PSYCHIATRY & NEUROLOGY

## 2020-09-20 PROCEDURE — 700111 HCHG RX REV CODE 636 W/ 250 OVERRIDE (IP): Performed by: INTERNAL MEDICINE

## 2020-09-20 PROCEDURE — 770022 HCHG ROOM/CARE - ICU (200)

## 2020-09-20 PROCEDURE — 4A10X4Z MONITORING OF CENTRAL NERVOUS ELECTRICAL ACTIVITY, EXTERNAL APPROACH: ICD-10-PCS | Performed by: PSYCHIATRY & NEUROLOGY

## 2020-09-20 PROCEDURE — 94770 HCHG CO2 EXPIRED GAS DETERMINATION: CPT

## 2020-09-20 PROCEDURE — 99291 CRITICAL CARE FIRST HOUR: CPT | Mod: 25 | Performed by: PSYCHIATRY & NEUROLOGY

## 2020-09-20 PROCEDURE — 95714 VEEG EA 12-26 HR UNMNTR: CPT | Performed by: PSYCHIATRY & NEUROLOGY

## 2020-09-20 PROCEDURE — 80053 COMPREHEN METABOLIC PANEL: CPT

## 2020-09-20 PROCEDURE — 99291 CRITICAL CARE FIRST HOUR: CPT | Performed by: INTERNAL MEDICINE

## 2020-09-20 PROCEDURE — 95720 EEG PHY/QHP EA INCR W/VEEG: CPT | Performed by: PSYCHIATRY & NEUROLOGY

## 2020-09-20 PROCEDURE — 700105 HCHG RX REV CODE 258: Performed by: INTERNAL MEDICINE

## 2020-09-20 RX ADMIN — FAMOTIDINE 20 MG: 20 TABLET, FILM COATED ORAL at 18:04

## 2020-09-20 RX ADMIN — GLYCOPYRROLATE 0.5 MG: 1 TABLET ORAL at 05:05

## 2020-09-20 RX ADMIN — LACOSAMIDE 200 MG: 100 TABLET, FILM COATED ORAL at 18:05

## 2020-09-20 RX ADMIN — PHENOBARBITAL 64.8 MG: 32.4 TABLET ORAL at 21:49

## 2020-09-20 RX ADMIN — DIVALPROEX SODIUM 500 MG: 125 CAPSULE ORAL at 21:49

## 2020-09-20 RX ADMIN — CEFTAZIDIME 2000 MG: 1 INJECTION, POWDER, FOR SOLUTION INTRAMUSCULAR; INTRAVENOUS at 15:03

## 2020-09-20 RX ADMIN — LEVETIRACETAM 1000 MG: 500 TABLET, FILM COATED ORAL at 15:12

## 2020-09-20 RX ADMIN — DIVALPROEX SODIUM 500 MG: 125 CAPSULE ORAL at 05:05

## 2020-09-20 RX ADMIN — LACOSAMIDE 200 MG: 100 TABLET, FILM COATED ORAL at 10:43

## 2020-09-20 RX ADMIN — CEFTAZIDIME 2000 MG: 1 INJECTION, POWDER, FOR SOLUTION INTRAMUSCULAR; INTRAVENOUS at 21:49

## 2020-09-20 RX ADMIN — DOCUSATE SODIUM 50 MG AND SENNOSIDES 8.6 MG 2 TABLET: 8.6; 5 TABLET, FILM COATED ORAL at 05:06

## 2020-09-20 RX ADMIN — DIVALPROEX SODIUM 500 MG: 125 CAPSULE ORAL at 15:12

## 2020-09-20 RX ADMIN — ENOXAPARIN SODIUM 40 MG: 40 INJECTION SUBCUTANEOUS at 05:04

## 2020-09-20 RX ADMIN — GLYCOPYRROLATE 0.5 MG: 1 TABLET ORAL at 15:11

## 2020-09-20 RX ADMIN — GLYCOPYRROLATE 0.5 MG: 1 TABLET ORAL at 21:49

## 2020-09-20 RX ADMIN — PHENOBARBITAL 64.8 MG: 32.4 TABLET ORAL at 15:12

## 2020-09-20 RX ADMIN — CEFTAZIDIME 2000 MG: 1 INJECTION, POWDER, FOR SOLUTION INTRAMUSCULAR; INTRAVENOUS at 05:16

## 2020-09-20 RX ADMIN — LEVETIRACETAM 1000 MG: 500 TABLET, FILM COATED ORAL at 21:49

## 2020-09-20 RX ADMIN — FAMOTIDINE 20 MG: 20 TABLET, FILM COATED ORAL at 05:13

## 2020-09-20 RX ADMIN — LACOSAMIDE 200 MG: 100 TABLET, FILM COATED ORAL at 02:29

## 2020-09-20 RX ADMIN — LEVETIRACETAM 1000 MG: 500 TABLET, FILM COATED ORAL at 05:06

## 2020-09-20 RX ADMIN — PHENOBARBITAL 64.8 MG: 32.4 TABLET ORAL at 05:05

## 2020-09-20 ASSESSMENT — FIBROSIS 4 INDEX: FIB4 SCORE: 0.18

## 2020-09-20 NOTE — PROGRESS NOTES
NEUROLOGY PROGRESS NOTE      BACKGROUND:    19 y.o. female was admitted on 8/23/2020  9:57 PM for Cardiac arrest (HCC)  Drug overdose.  She is being followed by Neurology for anoxic brain injury.    SUBJECTIVE:   There is subtle changes in her neuro status.  Her eyes are open and she grimaces easily to physical stimulation.  Remains unresponsive with tracheostomy on vent.  She is now off sedation.    VITALS:  Vitals:    09/20/20 0642 09/20/20 0800 09/20/20 0938 09/20/20 1000   BP:  (P) 102/66  (!) (P) 99/63   Pulse: 97 (P) 100 94 (!) (P) 102   Resp: (!) 23 (P) 17 (!) 26 (!) (P) 24   Temp:  (P) 36.5 °C (97.7 °F)     TempSrc:  (P) Bladder     SpO2: 99% (P) 100% 100% (P) 100%   Weight:       Height:           NEUROLOGICAL EXAM:   She is unresponsive on vent.  Her eyes are open but she does not track.  I do not appreciate facial asymmetry.  Facial sensation cannot be tested.  She has some spontaneous lip movement.  Pupils are 4 mm equal and briskly reactive to light.  She has cough and gag reflex.  She withdraws to physical stimulation in all 4 extremities.  She has slight posturing of left upper extremity in noxious stimuli.  Motor, sensory, coordination cannot be tested.  Rest of her neurological examination is limited.    OBJECTIVE:    NEUROIMAGING:    DX-CHEST-PORTABLE (1 VIEW)   Final Result      Stable bibasilar atelectasis.      DX-CHEST-PORTABLE (1 VIEW)   Final Result      Stable tracheostomy. No new consolidation or pleural effusions.      DX-CHEST-PORTABLE (1 VIEW)   Final Result         1. Stable tracheostomy.   2. No new consolidation or pleural effusions.         DX-CHEST-PORTABLE (1 VIEW)   Final Result      No significant interval change.         MR-BRAIN-W/O   Final Result      1.  Findings consistent with worsening anoxic brain injury involving the lentiform nuclei bilaterally.   2.  Findings consistent with anoxic brain injury in the parietal, occipital and posterior temporal regions which appear  somewhat improved from prior exam.  Similar findings in the bifrontal region appear unchanged.         DX-CHEST-PORTABLE (1 VIEW)   Final Result      No significant interval change.      DX-CHEST-PORTABLE (1 VIEW)   Final Result      Bibasilar underinflation atelectasis which could obscure an additional process. This is similar to the prior study.      DX-CHEST-PORTABLE (1 VIEW)   Final Result      Hazy airspace opacities are improved compared to prior.      DX-CHEST-PORTABLE (1 VIEW)   Final Result         1.  Pulmonary edema and/or infiltrates are identified, which are stable since the prior exam.            DX-CHEST-PORTABLE (1 VIEW)   Final Result         1.  Mild pulmonary edema and/or interstitial infiltrates, somewhat decreased since prior study         IR-MIDLINE CATHETER INSERTION WO GUIDANCE > AGE 3   Final Result                  Ultrasound-guided midline placement performed by qualified nursing staff    as above.          DX-CHEST-PORTABLE (1 VIEW)   Final Result         1.  Mild pulmonary edema and/or interstitial infiltrates, stable since prior study      DX-CHEST-PORTABLE (1 VIEW)   Final Result         1.  Mild pulmonary edema and/or interstitial infiltrates      DX-CHEST-PORTABLE (1 VIEW)   Final Result         No significant interval change.      DX-CHEST-PORTABLE (1 VIEW)   Final Result         New hazy opacity in the left lung base could be atelectasis or consolidation.      DX-CHEST-PORTABLE (1 VIEW)   Final Result      No significant change      DX-CHEST-PORTABLE (1 VIEW)   Final Result         1.  No acute cardiopulmonary disease.                                 DX-CHEST-PORTABLE (1 VIEW)   Final Result         1.  No acute cardiopulmonary disease.                              DX-CHEST-PORTABLE (1 VIEW)   Final Result         1.  No focal infiltrates, previously visualized infiltrates are not readily apparent.                           MR-BRAIN-WITH & W/O   Final Result         1.  Severe  diffuse anoxic brain injury pattern which is more extensive and conspicuous than on previous exam.      2.  No evidence of intracranial mass effect, extra-axial fluid collection, or interval development of hydrocephalus.      3.  Diffuse mucosal inflammatory changes filling the ethmoid and sphenoid sinuses.      DX-CHEST-PORTABLE (1 VIEW)   Final Result         1.  Pulmonary edema and/or infiltrates are identified, which are somewhat decreased since the prior exam.                        BJ-OOLZXIS-9 VIEW   Final Result      Feeding tube is noted with tip at the level of the proximal duodenum.                  DX-CHEST-PORTABLE (1 VIEW)   Final Result         1.  Pulmonary edema and/or infiltrates are identified, which are stable since the prior exam.                     DX-CHEST-PORTABLE (1 VIEW)   Final Result      Stable chest x-ray findings with right lower lobe consolidation.      DX-CHEST-PORTABLE (1 VIEW)   Final Result         1.  Pulmonary edema and/or infiltrates are identified, which are stable since the prior exam.                  DX-CHEST-PORTABLE (1 VIEW)   Final Result         1.  Pulmonary edema and/or infiltrates are identified, which are stable since the prior exam.               MR-BRAIN-W/O   Final Result      The diffusion-weighted sequences demonstrates areas of restricted diffusion in the bilateral basal ganglia and some of the frontal gray matter. The predominant portion of the brain parenchyma does not demonstrate any restricted diffusion. Therefore this    findings likely represent mild diffuse anoxic brain injury.      DX-CHEST-PORTABLE (1 VIEW)   Final Result         1.  Pulmonary edema and/or infiltrates are identified, which are somewhat decreased since the prior exam.            DX-CHEST-PORTABLE (1 VIEW)   Final Result         1.  Pulmonary edema and/or infiltrates are identified, which are stable since the prior exam.         DX-ABDOMEN FOR TUBE PLACEMENT   Final Result         1.   Nonspecific bowel gas pattern.   2.  Dobbhoff tube tip overlying the expected location of the pylorus or first duodenal segment.      DX-CHEST-PORTABLE (1 VIEW)   Final Result         1.  Patchy bilateral pulmonary infiltrates, somewhat increased since prior.      EC-ECHOCARDIOGRAM COMPLETE W/O CONT   Final Result      DX-CHEST-PORTABLE (1 VIEW)   Final Result         1.  Patchy bilateral pulmonary infiltrates, somewhat increased since prior.      CT-HEAD W/O   Final Result         1.  Possible subtle sulcal effacement and slight loss of differentiation of gray-white matter, consider component of cerebral edema. Recommend radiographic follow-up   2.  Bilateral sphenoid and maxillary sinus air-fluid levels      DX-CHEST-PORTABLE (1 VIEW)   Final Result         1.  No acute cardiopulmonary disease.      DX-CHEST-PORTABLE (1 VIEW)    (Results Pending)       MEDICATIONS:  Current Facility-Administered Medications   Medication Dose Route Frequency Provider Last Rate Last Dose   • ceftAZIDime (FORTAZ) 2,000 mg in  mL IVPB  2 g Intravenous Q8HRS Angel Luis Lanier M.D.   Stopped at 09/20/20 0546   • PHENobarbital (LUMINAL) tablet 64.8 mg  64.8 mg Enteral Tube Q8HRS Toni Bear M.D.   64.8 mg at 09/20/20 0505   • divalproex (DEPAKOTE SPRINKLE) capsule 500 mg  500 mg Enteral Tube Q8HRS Angel Luis Lanier M.D.   500 mg at 09/20/20 0505   • fentaNYL (SUBLIMAZE) injection  mcg   mcg Intravenous Q HOUR PRN Angel Luis Lanier M.D.   50 mcg at 09/19/20 1450   • levETIRAcetam (KEPPRA) tablet 1,000 mg  1,000 mg Enteral Tube Q8HR Andrew Petersen M.D.   1,000 mg at 09/20/20 0506   • glycopyrrolate (ROBINUL) tablet 0.5 mg  0.5 mg Enteral Tube Q8HRS Andrew Petersen M.D.   0.5 mg at 09/20/20 0505   • lacosamide (VIMPAT) tablet 200 mg  200 mg Enteral Tube Q8HR Andrew Petersen M.D.   200 mg at 09/20/20 1043   • perampanel (FYCOMPA) tablet 10 mg  10 mg Enteral Tube QHS Nithin Donohue,  M.D.   10 mg at 09/19/20 2133   • propofol (DIPRIVAN) injection  0-80 mcg/kg/min Intravenous Continuous JESSY Piper Jr..OYue   Stopped at 09/17/20 1652   • acetaminophen (TYLENOL) suppository 325 mg  325 mg Rectal Q6HRS PRN Yosef Tomlin M.D.   650 mg at 09/02/20 1632   • acetaminophen (TYLENOL) tablet 1,000 mg  1,000 mg Enteral Tube Q4HRS PRN Dar Red M.D.   1,000 mg at 09/18/20 1520   • labetalol (NORMODYNE/TRANDATE) injection 10 mg  10 mg Intravenous Q4HRS PRN Yosef Tomlin M.D.   10 mg at 08/30/20 2256   • Pharmacy Consult: Enteral tube insertion - review meds/change route/product selection  1 Each Other PHARMACY TO DOSE Boogie Augustine Jr., D.O.       • Respiratory Therapy Consult   Nebulization Continuous RT Brock Murdock M.D.       • ipratropium-albuterol (DUONEB) nebulizer solution  3 mL Nebulization Q2HRS PRN (RT) Brock Murdock M.D.       • famotidine (PEPCID) tablet 20 mg  20 mg Enteral Tube Q12HRS Dar Red M.D.   20 mg at 09/20/20 0513   • senna-docusate (PERICOLACE or SENOKOT S) 8.6-50 MG per tablet 2 Tab  2 Tab Enteral Tube BID Brock Murdock M.D.   2 Tab at 09/20/20 0506    And   • polyethylene glycol/lytes (MIRALAX) PACKET 1 Packet  1 Packet Enteral Tube QDAY PRN Brock Murdock M.D.        And   • magnesium hydroxide (MILK OF MAGNESIA) suspension 30 mL  30 mL Enteral Tube QDAY PRN Brock Murdcok M.D.        And   • bisacodyl (DULCOLAX) suppository 10 mg  10 mg Rectal QDAY PRN Brock Murdock M.D.       • MD Alert...ICU Electrolyte Replacement per Pharmacy   Other PHARMACY TO DOSE Brock Murdock M.D.       • lidocaine (XYLOCAINE) 1 % injection 1-2 mL  1-2 mL Tracheal Tube Q30 MIN PRN Brock Murdock M.D.       • enoxaparin (LOVENOX) inj 40 mg  40 mg Subcutaneous DAILY Boogie Augustine Jr., D.O.   40 mg at 09/20/20 0504       LABS:      Recent Labs     09/18/20  0323 09/19/20  0313 09/20/20  0545   WBC 11.3* 8.0 8.0   RBC 3.59* 3.71* 3.65*   HEMOGLOBIN 11.4* 11.6* 11.4*    HEMATOCRIT 33.5* 35.2* 34.3*   MCV 93.3 94.9 94.0   MCH 31.8 31.3 31.2   MCHC 34.0 33.0* 33.2*   RDW 48.7 49.0 48.2   PLATELETCT 432 415 437   MPV 9.9 9.6 10.0     Recent Labs     20  0323 20  0313 20  0545   SODIUM 138 136 136   POTASSIUM 4.0 4.1 4.0   CHLORIDE 103 100 100   CO2  20   GLUCOSE 128* 103* 142*   BUN      INR   Date Value Ref Range Status   2020 1.05 0.87 - 1.13 Final     Comment:     INR - Non-therapeutic Reference Range: 0.87-1.13  INR - Therapeutic Reference Range: 2.0-4.0       No results found for: POCINR  Lab Results   Component Value Date/Time    CREATININE 0.45 (L) 2020 040     Lab Results   Component Value Date/Time    IFAFRICA >60 2020 0400    IFNOTAFR >60 2020 0400     EE2020:  This is an abnormal 24 hrs video EEG recording in a comatose patient. A mild to moderate encephalopathy is suggested. The patient appears only mildly sedated, not in burst suppression pattern. Frequent, blunted generalized sharps with frontal maximum and triphasic morphology, with frequent runs of Frontal Intermittent Rhythmic Delta Activity (FIRDA). No seizures captured, however the study remains significantly abnormal. The findings suggest underlying areas of persinstently increased cortical irritability and/or structural abnormality. Clinical and radiological correlation is recommended.    EE2020:  This is an abnormal continuous video EEG recording in a sedated and/or comatose patient. A moderate encephalopathy is suggested. Not on burst suppression. Frequent, blunted generalized sharps with frontal maximum and triphasic morphology. Frequent runs of Frontal Intermittent Rhythmic Delta Activity (FIRDA). No clear seizures captured despite weaning of Propofol and Midazolam. The findings suggest underlying areas of persinstently increased cortical irritability and/or structural abnormality. Clinical and radiological correlation is  recommended.    EE2020:  This is an abnormal continuous video EEG recording in an encephalopathic patient. A moderate encephalopathy is suggested. Continuous blunted generalized sharps with frontal maximum and triphasic morphology. Frequent runs of Frontal Intermittent Rhythmic Delta Activity (FIRDA). There has been worsening of the eeg. Brief non convulsive seizures have returned since discontinuation of sedation. The patient is not in status epilepticus. The findings suggest underlying areas of persinstently increased cortical irritability and/or structural abnormality. Clinical and radiological correlation is recommended.     EE2020:  This is an abnormal continuous video EEG recording in an encephalopathic patient. A moderate encephalopathy is suggested. Continuous blunted generalized sharps with frontal maximum and triphasic morphology. Frequent runs of Frontal Intermittent Rhythmic Delta Activity (FIRDA). Brief non convulsive seizures are still noted, some provoked by tactile stimulation of the patient. Perhaps a mild improvement after addition of Phenobarbital. The patient is not in status epilepticus. The findings suggest underlying areas of persinstently increased cortical irritability and/or structural abnormality. Clinical and radiological correlation is recommended.    ASSESSMENT AND PLAN:  19-year-old female with polysubstance abuse and subsequent cardiac arrest on 2020 who remains unresponsive.  She is on multiple antiepileptic medication including Keppra 1 g every 8 hours, and Vimpat 200 mg every 8 hours, valproic acid 500 mg every 8 hours, Fycompa 10 mg nightly.  Phenobarbital 64.8 mg 3 times a day was added on 2020.  Her repeat brain MRI on 9/15/20 shows worsening anoxic brain injury with involvement of lentiform nuclei bilaterally.   Versed was discontinued 4 days ago, propofol was discontinued 3 days ago and ketamine was discontinued 2 days ago.  After discontinuation of  "all sedation there has been worsening of the EEG with presence of brief nonconvulsive seizures but there is no status epilepticus.  With adding phenobarbital EEG revealed: \"Brief non convulsive seizures are still noted, some provoked by tactile stimulation of the patient. Perhaps a mild improvement after addition of Phenobarbital. The patient is not in status epilepticus\".  Patient has some positive neurological changes including smiling to voice and grimacing more frequently with her eyes open and horizontal eye movement, however, her prognosis remains guarded for full and meaningful neurological recovery.  We will continue with current antiepileptic medication and will try to avoid heavy sedation if not in status.  Continue with EEG monitoring for now.  Discussed with Dr. Lanier  Total critical care time spent was 32 minutes.  "

## 2020-09-20 NOTE — PROCEDURES
VIDEO ELECTROENCEPHALOGRAM REPORT        Referring provider: Dr. Bear.      DOS: 9/20/2020 (total recording of 23 hours and 4 minutes).      INDICATION:  Annika He 19 y.o. female presenting with s/p cardiac arrest, altered mental status, seizures.      CURRENT ANTIEPILEPTIC REGIMEN: Levetiracetam 1000 mg tid, Lacosamide 200 mg tid, Valproic Acid 1250 mg tid, Perampanel at 10 mg qhs. Phenobarbital 60 mg q 8 hrs. The patient was not sedated.      TECHNIQUE: 30 channel video electroencephalogram (EEG) was performed in accordance with the international 10-20 system. The study was reviewed in bipolar and referential montages. The recording examined an encephalopathic patient.      DESCRIPTION OF THE RECORD:  Generalized delta activity. Continuous, blunted generalized sharps with frontal maximum and triphasic morphology. Frequent runs of Frontal Intermittent Rhythmic Delta Activity (FIRDA). Brief non convulsive seizures are still noted, some provoked by tactile stimulation of the patient. EEG unchanged. The patient is not in status epilepticus.      ACTIVATION PROCEDURES:   Not performed.      EKG: sampling of the EKG recording demonstrated sinus rhythm.      EVENTS:  None.      INTERPRETATION:  This is an abnormal continuous video EEG recording in an encephalopathic patient. A moderate encephalopathy is suggested. Continuous blunted generalized sharps with frontal maximum and triphasic morphology. Frequent runs of Frontal Intermittent Rhythmic Delta Activity (FIRDA). Brief non convulsive seizures are still noted, some provoked by tactile stimulation of the patient. The eeg remains unchanged. The patient is not in status epilepticus. The findings suggest underlying areas of persinstently increased cortical irritability and/or structural abnormality. Clinical and radiological correlation is recommended.     Updates provided to Dr. Mccann.         Garland Beatty MD   Epilepsy and  Neurodiagnostics.   Clinical  of Neurology [facility].   Diplomate in Neurology, Epilepsy, and Electrodiagnostic Medicine.   Office: [phone]  Fax: [phone]

## 2020-09-20 NOTE — PROGRESS NOTES
Critical Care Progress Note    Date of admission  8/23/2020    Chief Complaint  19 y.o. female admitted 8/23/2020 with a cardiac arrest following an apparent drug overdose.    Hospital Course    8/24 - TTM protocol initiated, EEG without NCSE  8/25 - rewarmed, awake, not following  8/26 - following occasional commands  8/27- seizure like activity vs shivering--> Keppra, versed, Propofol  8/28- no seizures on EEG  8/29- remains unresponsive.  8/30 -mental status improved, now opening eyes and following commands.  Keppra discontinued  8/31 - started cefepime/vancomycin for possible pneumonia,?  UTI, fever and increasing WBC fever; full vent  9/1 - Seizure activity noted on EEG today, reviewed with neurology; keppra load and increased to 1gm q 12; full vent support  9/2 - remains unresponsive with only some posture and grimace to stimulation, neurology reconsulted, stat MRI brain, LP, cEEG  9/3 -ongoing seizures, Vimpat added, Depakote added, family conference today  9/4 -ongoing seizure noted on cEEG, started on midazolam infusion, full ventilator support  9/5 -ongoing seizures noted, propofol added and increased today, full ventilator support  9/6 -have not yet achieved burst suppression on cEEG; continue propofol, change Versed to ketamine infusion, discussed with neurology, full ventilator support  9/7 - still titrating propofol and ketamine for burst suppression on cEEG. Continue full ventilator support  9/8 - propofol off; ketamine is being weaned; on vimpat, keppra, depakote. Requiring full ventilator support  9/9 - Requiring full ventilator support. propofol and ketamine off; on vimpat, keppra, depakote. Postures to stimuli - consult for trach/PEG  9/10 -status post tracheostomy, off propofol and on ketamine- beginning to wean antiepileptic medications, continues to require full ventilator support. PEG tomorrow. Reportedly is still having seizures.   9/11 PEG tube today, continues to require full mechanical  ventilator support  9/12 returned to deep sedation secondary to persistent seizures; continues to require full vent support  9/13 continues to require full vent support; propofol and ketamine plus AEDs for persistent, intermittent seizures  9/14 -    continue full vent support.  Continue propofol, ketamine.  Start Versed.  Continuous EEG.  9/15 -    continue vent support.  Increase Versed drip.  For repeat MRI today.  9/16 -    stop Versed drip.  Continue other AEDs.  Continue vent support.  9/17 -    febrile.  Culture blood and sputum.  Check UA.  Decrease propofol.  Continue vent support.  9/18 -    Pseudomonas in sputum.  Start Fortaz.  Stop ketamine.  Propofol stopped yesterday.  9/19 -    continue Fortaz.  Add phenobarbital to AED regimen.  9/20 -    continue current AED regimen.  Continue Fortaz.      Interval Problem Update  Reviewed last 24 hour events:      Discussed with neuro  BROOKLYNN AUGUST at 70 (goal)  Vent 29  Trach 12  Fortaz      Review of Systems  Review of Systems   Unable to perform ROS: Acuity of condition        Vital Signs for last 24 hours   Temp:  [36.4 °C (97.5 °F)-37.1 °C (98.8 °F)] 36.5 °C (97.7 °F)  Pulse:  [] 99  Resp:  [17-30] 18  BP: ()/(54-70) 110/66  SpO2:  [96 %-100 %] 100 %    Hemodynamic parameters for last 24 hours       Respiratory Information for the last 24 hours  Vent Mode: APVCMV  Rate (breaths/min): 26  Vt Target (mL): 320  PEEP/CPAP: 8  MAP: 13  Control VTE (exp VT): 286    Physical Exam   Physical Exam  Constitutional:       Appearance: She is not diaphoretic.      Comments: On ventilator   HENT:      Head: Normocephalic and atraumatic.      Right Ear: External ear normal.      Left Ear: External ear normal.      Nose: Nose normal.      Mouth/Throat:      Mouth: Mucous membranes are moist.      Pharynx: Oropharynx is clear.   Eyes:      Conjunctiva/sclera: Conjunctivae normal.      Pupils: Pupils are equal, round, and reactive to light.   Neck:       Musculoskeletal: Normal range of motion. No neck rigidity.      Comments: Trach in place  Cardiovascular:      Pulses: Normal pulses.      Heart sounds: No murmur. No gallop.       Comments: Sinus rhythm  Pulmonary:      Breath sounds: Rales (Coarse crackles bilaterally) present. No wheezing.   Abdominal:      General: Bowel sounds are normal. There is no distension.      Palpations: Abdomen is soft.      Tenderness: There is no abdominal tenderness. There is no rebound.      Comments: Tolerating enteral tube feedings   Musculoskeletal: Normal range of motion.      Comments: No clubbing or cyanosis   Skin:     General: Skin is warm and dry.      Capillary Refill: Capillary refill takes less than 2 seconds.   Neurological:      Comments: Pupils 4 mm, equal and briskly reactive.  She opens her eyes and blinks.  She does not track or follow.         Medications  Current Facility-Administered Medications   Medication Dose Route Frequency Provider Last Rate Last Dose   • ceftAZIDime (FORTAZ) 2,000 mg in  mL IVPB  2 g Intravenous Q8HRS Angel Luis Lanier M.D.   Stopped at 09/20/20 0546   • PHENobarbital (LUMINAL) tablet 64.8 mg  64.8 mg Enteral Tube Q8HRS Toni Bear M.D.   64.8 mg at 09/20/20 0505   • divalproex (DEPAKOTE SPRINKLE) capsule 500 mg  500 mg Enteral Tube Q8HRS Angel Luis Lanier M.D.   500 mg at 09/20/20 0505   • fentaNYL (SUBLIMAZE) injection  mcg   mcg Intravenous Q HOUR PRN Angel Luis Lanier M.D.   50 mcg at 09/19/20 1450   • levETIRAcetam (KEPPRA) tablet 1,000 mg  1,000 mg Enteral Tube Q8HR Andrew Petersen M.D.   1,000 mg at 09/20/20 0506   • glycopyrrolate (ROBINUL) tablet 0.5 mg  0.5 mg Enteral Tube Q8HRS Andrew Petersen M.D.   0.5 mg at 09/20/20 0505   • lacosamide (VIMPAT) tablet 200 mg  200 mg Enteral Tube Q8HR Andrew Petersen M.D.   200 mg at 09/20/20 1043   • perampanel (FYCOMPA) tablet 10 mg  10 mg Enteral Tube QHS Nithin Donohue M.D.   10  mg at 09/19/20 2133   • acetaminophen (TYLENOL) suppository 325 mg  325 mg Rectal Q6HRS PRN Yosef Tomlin M.D.   650 mg at 09/02/20 1632   • acetaminophen (TYLENOL) tablet 1,000 mg  1,000 mg Enteral Tube Q4HRS PRN Dar Red M.D.   1,000 mg at 09/18/20 1520   • labetalol (NORMODYNE/TRANDATE) injection 10 mg  10 mg Intravenous Q4HRS PRN Yosef Tomlin M.D.   10 mg at 08/30/20 2256   • Pharmacy Consult: Enteral tube insertion - review meds/change route/product selection  1 Each Other PHARMACY TO DOSE Boogie Augustine Jr., D.O.       • Respiratory Therapy Consult   Nebulization Continuous RT Brock Murdock M.D.       • ipratropium-albuterol (DUONEB) nebulizer solution  3 mL Nebulization Q2HRS PRN (RT) Brock Murdock M.D.       • famotidine (PEPCID) tablet 20 mg  20 mg Enteral Tube Q12HRS Dar Red M.D.   20 mg at 09/20/20 0513   • senna-docusate (PERICOLACE or SENOKOT S) 8.6-50 MG per tablet 2 Tab  2 Tab Enteral Tube BID Brock Murdock M.D.   2 Tab at 09/20/20 0506    And   • polyethylene glycol/lytes (MIRALAX) PACKET 1 Packet  1 Packet Enteral Tube QDAY PRN Brock Murdock M.D.        And   • magnesium hydroxide (MILK OF MAGNESIA) suspension 30 mL  30 mL Enteral Tube QDAY PRN Brock Murdock M.D.        And   • bisacodyl (DULCOLAX) suppository 10 mg  10 mg Rectal QDAY PRN Brock Murdock M.D.       • MD Alert...ICU Electrolyte Replacement per Pharmacy   Other PHARMACY TO DOSE Brock Murdock M.D.       • lidocaine (XYLOCAINE) 1 % injection 1-2 mL  1-2 mL Tracheal Tube Q30 MIN PRN Brock Murdock M.D.       • enoxaparin (LOVENOX) inj 40 mg  40 mg Subcutaneous DAILY Boogie Augustine Jr., D.O.   40 mg at 09/20/20 0504       Fluids    Intake/Output Summary (Last 24 hours) at 9/20/2020 1324  Last data filed at 9/20/2020 1200  Gross per 24 hour   Intake 2421.5 ml   Output 1250 ml   Net 1171.5 ml       Laboratory          Recent Labs     09/18/20  0323 09/19/20  0313 09/20/20  0545   SODIUM 138 136 136   POTASSIUM  4.0 4.1 4.0   CHLORIDE 103 100 100   CO2 22 22 20   BUN 18 19 17   CREATININE 0.34* 0.39* 0.37*   MAGNESIUM 1.9 2.1 2.0   CALCIUM 8.7 8.7 8.7     Recent Labs     09/18/20 0323 09/19/20  0313 09/20/20  0545   ALTSGPT 70* 52* 46   ASTSGOT 30 26 28   ALKPHOSPHAT 166* 145* 121*   TBILIRUBIN 0.2 0.2 <0.2   GLUCOSE 128* 103* 142*     Recent Labs     09/18/20 0323 09/19/20  0313 09/20/20  0545   WBC 11.3* 8.0 8.0   NEUTSPOLYS 77.80* 58.90 61.40   LYMPHOCYTES 11.40* 21.00* 17.70*   MONOCYTES 7.30 14.60* 10.80   EOSINOPHILS 2.20 3.20 8.40*   BASOPHILS 0.30 0.40 0.40   ASTSGOT 30 26 28   ALTSGPT 70* 52* 46   ALKPHOSPHAT 166* 145* 121*   TBILIRUBIN 0.2 0.2 <0.2     Recent Labs     09/18/20 0323 09/19/20  0313 09/20/20  0545   RBC 3.59* 3.71* 3.65*   HEMOGLOBIN 11.4* 11.6* 11.4*   HEMATOCRIT 33.5* 35.2* 34.3*   PLATELETCT 432 415 437       Imaging  None    Assessment/Plan  * Acute respiratory failure with hypoxia (HCC)- (present on admission)  Assessment & Plan  Intubated 8/24  Trach 9/9  All of the appropriate ventilator bundles are in place  Continue vent support - SBT as tolerated    Anoxic encephalopathy (HCC)- (present on admission)  Assessment & Plan  MRI on 9/2 and 9/15 consistent with severe anoxic brain injury  EEG with frequent, blunted generalized sharps with frontal maximum and triphasic morphology with frequent runs of frontal intermittent rhythmic delta activity (FIRDA)  Continue AEDs    Drug overdose- (present on admission)  Assessment & Plan  BA 0.16 on presentation  Positive urine drug screen for amphetamines on presentation  Boyfriend reported methamphetamine, oxycodone and alcohol use    Fever  Assessment & Plan  1 out of 2 blood cultures positive for coag negative Staphylococcus on 9/17 - contaminant  UA unremarkable  Sputum with Pseudomonas  Fever has resolved after Fortaz started    Seizures (HCC)  Assessment & Plan  Continuous video EEG  Continue phenobarbital, 64.8 mg every 8 hours  Continue Vimpat,  200 mg every 8 hours  Continue valproic acid, 500 mg every 8 hours  Continue Keppra, 1000 mg every 8 hours  Continue Fycompa, 10 mg nightly    Pneumonia of both lungs due to Pseudomonas species (HCC)  Assessment & Plan  Sputum culture with MRSA, Acinetobacter and Stenotrophomonas species on 9/6  Completed 10 days of Bactrim on 9/13  Sputum culture from 9/17 with Pseudomonas aeruginosa  Continue Fortaz    Elevated transaminase level- (present on admission)  Assessment & Plan  Due to valproic acid  Resolving    S/P PEA cardiac arrest (HCC)- (present on admission)  Assessment & Plan  S/P therapeutic hypothermia protocol    Dysphagia, oropharyngeal- (present on admission)  Assessment & Plan  S/P laparoscopic gastrostomy on 9/11  Continue enteral tube feedings       VTE:  Lovenox  Ulcer: H2 Antagonist  Lines: Pearce Catheter  Ongoing indication addressed    I have performed a physical exam and reviewed and updated ROS and Plan today (9/20/2020). In review of yesterday's note (9/19/2020), there are no changes except as documented above.     I have assessed and reassessed her respiratory status with ventilator adjustments, ventilator waveforms, airway mechanics, hemodynamics, blood pressure, cardiovascular status and her neurologic status.  She is at increased risk for worsening CNS and respiratory system dysfunction.    Discussed patient condition and risk of morbidity and/or mortality with RN, RT, Pharmacy, Charge nurse / hot rounds, QA team and neurology     The patient remains critically ill.  Critical care time = 32 minutes in directly providing and coordinating critical care and extensive data review.  No time overlap and excludes procedures.    Angel Luis Lanier MD  Pulmonary and Critical Care Medicine

## 2020-09-20 NOTE — CARE PLAN
Adult Ventilation Update    Total Vent Days:29  APVCMV  26  320  +8  30%    Patient Lines/Drains/Airways Status    Active Airway     Name: Placement date: Placement time: Site: Days:    Airway Trach Tracheostomy 8.0  09/09/20   1547   Tracheostomy  12                Pt on continuous EEG, no SBT.

## 2020-09-20 NOTE — PROGRESS NOTES
Monitor Summary:    HR 80s-100s sinus rhythm/sinus tach  BP 90s-100s    .14/.08/.32    12 hour chart check

## 2020-09-20 NOTE — RESPIRATORY CARE
Ventilator Daily Summary    Vent Day #29  Trache day#12     Ventilator settings changed this shift:None    Weaning trials:None    Respiratory Procedures:None  Plan: Continue current ventilator settings and wean mechanical ventilation as tolerated per physician orders.

## 2020-09-21 ENCOUNTER — APPOINTMENT (OUTPATIENT)
Dept: RADIOLOGY | Facility: MEDICAL CENTER | Age: 20
DRG: 004 | End: 2020-09-21
Attending: INTERNAL MEDICINE
Payer: MEDICAID

## 2020-09-21 LAB
ALBUMIN SERPL BCP-MCNC: 3.6 G/DL (ref 3.2–4.9)
ALBUMIN/GLOB SERPL: 1.3 G/DL
ALP SERPL-CCNC: 118 U/L (ref 30–99)
ALT SERPL-CCNC: 43 U/L (ref 2–50)
ANION GAP SERPL CALC-SCNC: 14 MMOL/L (ref 7–16)
AST SERPL-CCNC: 31 U/L (ref 12–45)
BASOPHILS # BLD AUTO: 0.6 % (ref 0–1.8)
BASOPHILS # BLD: 0.05 K/UL (ref 0–0.12)
BILIRUB SERPL-MCNC: 0.2 MG/DL (ref 0.1–1.5)
BUN SERPL-MCNC: 15 MG/DL (ref 8–22)
CALCIUM SERPL-MCNC: 9.1 MG/DL (ref 8.5–10.5)
CHLORIDE SERPL-SCNC: 98 MMOL/L (ref 96–112)
CO2 SERPL-SCNC: 20 MMOL/L (ref 20–33)
CREAT SERPL-MCNC: 0.43 MG/DL (ref 0.5–1.4)
CRP SERPL HS-MCNC: 1.31 MG/DL (ref 0–0.75)
EOSINOPHIL # BLD AUTO: 0.73 K/UL (ref 0–0.51)
EOSINOPHIL NFR BLD: 8.1 % (ref 0–6.9)
ERYTHROCYTE [DISTWIDTH] IN BLOOD BY AUTOMATED COUNT: 48.9 FL (ref 35.9–50)
GLOBULIN SER CALC-MCNC: 2.8 G/DL (ref 1.9–3.5)
GLUCOSE SERPL-MCNC: 106 MG/DL (ref 65–99)
HCT VFR BLD AUTO: 35.6 % (ref 37–47)
HGB BLD-MCNC: 11.7 G/DL (ref 12–16)
IMM GRANULOCYTES # BLD AUTO: 0.15 K/UL (ref 0–0.11)
IMM GRANULOCYTES NFR BLD AUTO: 1.7 % (ref 0–0.9)
LYMPHOCYTES # BLD AUTO: 1.83 K/UL (ref 1–4.8)
LYMPHOCYTES NFR BLD: 20.2 % (ref 22–41)
MAGNESIUM SERPL-MCNC: 2.1 MG/DL (ref 1.5–2.5)
MCH RBC QN AUTO: 31.2 PG (ref 27–33)
MCHC RBC AUTO-ENTMCNC: 32.9 G/DL (ref 33.6–35)
MCV RBC AUTO: 94.9 FL (ref 81.4–97.8)
MONOCYTES # BLD AUTO: 0.95 K/UL (ref 0–0.85)
MONOCYTES NFR BLD AUTO: 10.5 % (ref 0–13.4)
NEUTROPHILS # BLD AUTO: 5.34 K/UL (ref 2–7.15)
NEUTROPHILS NFR BLD: 58.9 % (ref 44–72)
NRBC # BLD AUTO: 0 K/UL
NRBC BLD-RTO: 0 /100 WBC
PLATELET # BLD AUTO: 435 K/UL (ref 164–446)
PMV BLD AUTO: 9.8 FL (ref 9–12.9)
POTASSIUM SERPL-SCNC: 4.3 MMOL/L (ref 3.6–5.5)
PROT SERPL-MCNC: 6.4 G/DL (ref 6–8.2)
RBC # BLD AUTO: 3.75 M/UL (ref 4.2–5.4)
SODIUM SERPL-SCNC: 132 MMOL/L (ref 135–145)
TRIGL SERPL-MCNC: 129 MG/DL (ref 0–149)
WBC # BLD AUTO: 9.1 K/UL (ref 4.8–10.8)

## 2020-09-21 PROCEDURE — 700111 HCHG RX REV CODE 636 W/ 250 OVERRIDE (IP): Performed by: INTERNAL MEDICINE

## 2020-09-21 PROCEDURE — 700102 HCHG RX REV CODE 250 W/ 637 OVERRIDE(OP): Performed by: INTERNAL MEDICINE

## 2020-09-21 PROCEDURE — A9270 NON-COVERED ITEM OR SERVICE: HCPCS | Performed by: INTERNAL MEDICINE

## 2020-09-21 PROCEDURE — 700105 HCHG RX REV CODE 258: Performed by: INTERNAL MEDICINE

## 2020-09-21 PROCEDURE — 4A10X4Z MONITORING OF CENTRAL NERVOUS ELECTRICAL ACTIVITY, EXTERNAL APPROACH: ICD-10-PCS | Performed by: PSYCHIATRY & NEUROLOGY

## 2020-09-21 PROCEDURE — 95714 VEEG EA 12-26 HR UNMNTR: CPT | Performed by: PSYCHIATRY & NEUROLOGY

## 2020-09-21 PROCEDURE — A9270 NON-COVERED ITEM OR SERVICE: HCPCS | Performed by: PSYCHIATRY & NEUROLOGY

## 2020-09-21 PROCEDURE — 71045 X-RAY EXAM CHEST 1 VIEW: CPT

## 2020-09-21 PROCEDURE — 94640 AIRWAY INHALATION TREATMENT: CPT

## 2020-09-21 PROCEDURE — 770022 HCHG ROOM/CARE - ICU (200)

## 2020-09-21 PROCEDURE — 700101 HCHG RX REV CODE 250: Performed by: INTERNAL MEDICINE

## 2020-09-21 PROCEDURE — 700102 HCHG RX REV CODE 250 W/ 637 OVERRIDE(OP): Performed by: PSYCHIATRY & NEUROLOGY

## 2020-09-21 PROCEDURE — 99291 CRITICAL CARE FIRST HOUR: CPT | Performed by: INTERNAL MEDICINE

## 2020-09-21 PROCEDURE — 94003 VENT MGMT INPAT SUBQ DAY: CPT

## 2020-09-21 PROCEDURE — 86140 C-REACTIVE PROTEIN: CPT

## 2020-09-21 PROCEDURE — 95720 EEG PHY/QHP EA INCR W/VEEG: CPT | Performed by: PSYCHIATRY & NEUROLOGY

## 2020-09-21 PROCEDURE — 85025 COMPLETE CBC W/AUTO DIFF WBC: CPT

## 2020-09-21 PROCEDURE — 94770 HCHG CO2 EXPIRED GAS DETERMINATION: CPT

## 2020-09-21 PROCEDURE — 99233 SBSQ HOSP IP/OBS HIGH 50: CPT | Mod: 25 | Performed by: PSYCHIATRY & NEUROLOGY

## 2020-09-21 PROCEDURE — 84478 ASSAY OF TRIGLYCERIDES: CPT

## 2020-09-21 PROCEDURE — 83735 ASSAY OF MAGNESIUM: CPT

## 2020-09-21 PROCEDURE — 80053 COMPREHEN METABOLIC PANEL: CPT

## 2020-09-21 RX ORDER — ZONISAMIDE 50 MG/1
200 CAPSULE ORAL 2 TIMES DAILY
Status: DISCONTINUED | OUTPATIENT
Start: 2020-09-21 | End: 2020-09-21

## 2020-09-21 RX ADMIN — PERAMPANEL 10 MG: 2 TABLET ORAL at 00:03

## 2020-09-21 RX ADMIN — ZONISAMIDE 200 MG: 50 CAPSULE ORAL at 18:02

## 2020-09-21 RX ADMIN — PHENOBARBITAL 64.8 MG: 32.4 TABLET ORAL at 21:05

## 2020-09-21 RX ADMIN — ENOXAPARIN SODIUM 40 MG: 40 INJECTION SUBCUTANEOUS at 05:32

## 2020-09-21 RX ADMIN — FAMOTIDINE 20 MG: 20 TABLET, FILM COATED ORAL at 18:02

## 2020-09-21 RX ADMIN — PROPOFOL 40 MCG/KG/MIN: 10 INJECTION, EMULSION INTRAVENOUS at 21:06

## 2020-09-21 RX ADMIN — LACOSAMIDE 200 MG: 100 TABLET, FILM COATED ORAL at 18:01

## 2020-09-21 RX ADMIN — CEFTAZIDIME 2000 MG: 1 INJECTION, POWDER, FOR SOLUTION INTRAMUSCULAR; INTRAVENOUS at 23:02

## 2020-09-21 RX ADMIN — DIVALPROEX SODIUM 500 MG: 125 CAPSULE ORAL at 05:32

## 2020-09-21 RX ADMIN — PHENOBARBITAL 64.8 MG: 32.4 TABLET ORAL at 14:32

## 2020-09-21 RX ADMIN — ZONISAMIDE 200 MG: 50 CAPSULE ORAL at 11:14

## 2020-09-21 RX ADMIN — LACOSAMIDE 200 MG: 100 TABLET, FILM COATED ORAL at 11:17

## 2020-09-21 RX ADMIN — DOCUSATE SODIUM 50 MG AND SENNOSIDES 8.6 MG 2 TABLET: 8.6; 5 TABLET, FILM COATED ORAL at 18:02

## 2020-09-21 RX ADMIN — GLYCOPYRROLATE 0.5 MG: 1 TABLET ORAL at 21:05

## 2020-09-21 RX ADMIN — LEVETIRACETAM 1000 MG: 500 TABLET, FILM COATED ORAL at 21:15

## 2020-09-21 RX ADMIN — DIVALPROEX SODIUM 500 MG: 125 CAPSULE ORAL at 14:29

## 2020-09-21 RX ADMIN — CEFTAZIDIME 2000 MG: 1 INJECTION, POWDER, FOR SOLUTION INTRAMUSCULAR; INTRAVENOUS at 14:28

## 2020-09-21 RX ADMIN — CEFTAZIDIME 2000 MG: 1 INJECTION, POWDER, FOR SOLUTION INTRAMUSCULAR; INTRAVENOUS at 05:32

## 2020-09-21 RX ADMIN — FAMOTIDINE 20 MG: 20 TABLET, FILM COATED ORAL at 05:32

## 2020-09-21 RX ADMIN — PHENOBARBITAL 64.8 MG: 32.4 TABLET ORAL at 05:33

## 2020-09-21 RX ADMIN — GLYCOPYRROLATE 0.5 MG: 1 TABLET ORAL at 14:31

## 2020-09-21 RX ADMIN — PERAMPANEL 12 MG: 6 TABLET ORAL at 21:05

## 2020-09-21 RX ADMIN — DIVALPROEX SODIUM 500 MG: 125 CAPSULE ORAL at 21:04

## 2020-09-21 RX ADMIN — GLYCOPYRROLATE 0.5 MG: 1 TABLET ORAL at 05:32

## 2020-09-21 RX ADMIN — ACETAMINOPHEN 1000 MG: 325 TABLET, FILM COATED ORAL at 23:03

## 2020-09-21 RX ADMIN — LEVETIRACETAM 1000 MG: 500 TABLET, FILM COATED ORAL at 14:32

## 2020-09-21 RX ADMIN — LIDOCAINE HYDROCHLORIDE 2 ML: 10 INJECTION, SOLUTION EPIDURAL; INFILTRATION; INTRACAUDAL at 01:52

## 2020-09-21 RX ADMIN — LEVETIRACETAM 1000 MG: 500 TABLET, FILM COATED ORAL at 05:33

## 2020-09-21 RX ADMIN — LIDOCAINE HYDROCHLORIDE 2 ML: 10 INJECTION, SOLUTION EPIDURAL; INFILTRATION; INTRACAUDAL at 15:10

## 2020-09-21 RX ADMIN — LACOSAMIDE 200 MG: 100 TABLET, FILM COATED ORAL at 02:33

## 2020-09-21 NOTE — DISCHARGE PLANNING
Care Transition Team Discharge Planning    Anticipated Discharge Disposition: possible d/c to LTAC    Action: Per AM rounds, MD would like pt to d/c to LTAC or SNF.    Lsw reports pt is pending Medicaid and facilities are not going to accept pt w/o active insurance.    Md requiring Lsw to check into this d/c concern.    Barriers to Discharge: no active medical insurance    Plan: f/u w/ [facility], supervisors, etc.

## 2020-09-21 NOTE — DISCHARGE PLANNING
Care Transition Team Discharge Planning    Anticipated Discharge Disposition: d/c to LTAC vs TBD    Action: Harmanw updated MD regarding referrals sent to LTACs d/t newly acquired Medicaid HMO insurance.    Barriers to Discharge: possible acceptance/INS AUTH, open bed etc    Plan: f/u w/ medical team, [facility], etc.

## 2020-09-21 NOTE — DISCHARGE PLANNING
Care Transition Team Discharge Planning    Anticipated Discharge Disposition: d/c to LTAC etc.    Action: Lsw called [facility] to request f/u on pending Medicaid status of pt's medical insurance application.    They check this daily. Date of decision 9/19 [place] [employer] and Medicaid FFS. Some of August [FFS retro] and September [HMO] will be paid by different forms of Medicaid. Both forms currently listed on face sheet for hospital billing purposes only.    Lsw send blanket LTAC referrals as pt is currently active as of 9.19 with Medicaid HMO.    Lsw updated [facility] accordingly.      Barriers to Discharge: acceptance, INs AUTH, medical clearance, transport    Plan: f/u w/ medical team, [facility], pt's family, etc.

## 2020-09-21 NOTE — DIETARY
Nutrition support weekly update:  Day 28 of admit.  Annika He is a 19 y.o. female with admitting DX of cardiac arrest and drug overdose.  Tube feeding initiated on . Current TF via PEG is Fibersource HN @ 70 mL/hr, providing 2016 kcal, 91 gm protein, and 1361 mL of free water per day.     Assessment:  Weight from  was 66.8 kg via bed scale, which is stable with previous weeks wt of 65.1 kg via bed scale taken on .  Overall, pt noted with a 6.2% wt loss since admit (the last 4 weeks) which is considered severe.  Suspect wt loss r/t lengthy hospitalization and loss of lean body mass in addition to fluid fluctuation.  Currently noted to be +9.3L per I/Os.      Wt used in calculations: 65.1 kg - suspect closer to dry wt.  52.2 kg IBW.   Calculation/Equation: MSJ x 1.2 = 1663 kcal.  PSU = 1584 kcal (VE: 8.2, Tmax over the past 24 hours: 37.2). Met cart completed : 2890 kcal.  Additional met cart pending.  Total Calories / day: 1600 - 1950 (Calories / k - 30)  Total Protein / day: 78 - 98 (Grams Protein /kg 1.2  - 1.5)    Evaluation:   1. Pt was tolerating TF @ goal up until  - noted w/ episodes of emesis.  TF was briefly paused and restarted @ 25 mL/hr.  MD OK with advancing per protocol today until goal is reached.  2. +Cough, gag, corneal.  RAAS = 0.  EEG going @ bedside.    3. Per IDT rounds, pt has been off of sedation for the past 2 days.    4. Vent day 30, trach day 13.    5. Current clinical picture and MD progress notes reviewed, including Neuro notes.  6. Pt noted with dependent edema to BUE and BLE.  7. Labs: Sodium: 132, Glucose: 106, Creat.: 0.43, Alk phos: 118.   8. Meds: Ceftazidime, Depakote sprinkle, Pepcid, Keppra, Electrolyte replacement, Senokot, Zonegran.  9. LBM: .  Last emesis:  @ 1600 - 1mL.  10. Current feeding remains appropriate at this time.     Malnutrition risk: No new criteria at this time.     Recommendations/Plan:  1. Continue current TF  formula and goal rate.    2. Fluids per MD.  3. Continue to monitor weight.    RD follows.

## 2020-09-21 NOTE — CARE PLAN
Problem: Respiratory:  Goal: Respiratory status will improve  Note: Respiratory rate and rhythm assessed and monitored. Oxygenation and saturation monitored and titrated as ordered. Patient positioned optimally. Collaboration with RT in place.  T-piece. Large secretions.      Problem: Skin Integrity  Goal: Risk for impaired skin integrity will decrease  Note: Skin is assessed for integrity throughout the shift. Pt is turned at least every 2 hours. Pt is kept dry and clean.

## 2020-09-21 NOTE — PROGRESS NOTES
Critical Care Progress Note    Date of admission  8/23/2020    Chief Complaint  19 y.o. female admitted 8/23/2020 with a cardiac arrest following an apparent drug overdose.    Hospital Course    8/24 - TTM protocol initiated, EEG without NCSE  8/25 - rewarmed, awake, not following  8/26 - following occasional commands  8/27- seizure like activity vs shivering--> Keppra, versed, Propofol  8/28- no seizures on EEG  8/29- remains unresponsive.  8/30 -mental status improved, now opening eyes and following commands.  Keppra discontinued  8/31 - started cefepime/vancomycin for possible pneumonia,?  UTI, fever and increasing WBC fever; full vent  9/1 - Seizure activity noted on EEG today, reviewed with neurology; keppra load and increased to 1gm q 12; full vent support  9/2 - remains unresponsive with only some posture and grimace to stimulation, neurology reconsulted, stat MRI brain, LP, cEEG  9/3 -ongoing seizures, Vimpat added, Depakote added, family conference today  9/4 -ongoing seizure noted on cEEG, started on midazolam infusion, full ventilator support  9/5 -ongoing seizures noted, propofol added and increased today, full ventilator support  9/6 -have not yet achieved burst suppression on cEEG; continue propofol, change Versed to ketamine infusion, discussed with neurology, full ventilator support  9/7 - still titrating propofol and ketamine for burst suppression on cEEG. Continue full ventilator support  9/8 - propofol off; ketamine is being weaned; on vimpat, keppra, depakote. Requiring full ventilator support  9/9 - Requiring full ventilator support. propofol and ketamine off; on vimpat, keppra, depakote. Postures to stimuli - consult for trach/PEG  9/10 -status post tracheostomy, off propofol and on ketamine- beginning to wean antiepileptic medications, continues to require full ventilator support. PEG tomorrow. Reportedly is still having seizures.   9/11 PEG tube today, continues to require full mechanical  ventilator support  9/12 returned to deep sedation secondary to persistent seizures; continues to require full vent support  9/13 continues to require full vent support; propofol and ketamine plus AEDs for persistent, intermittent seizures  9/14 -    continue full vent support.  Continue propofol, ketamine.  Start Versed.  Continuous EEG.  9/15 -    continue vent support.  Increase Versed drip.  For repeat MRI today.  9/16 -    stop Versed drip.  Continue other AEDs.  Continue vent support.  9/17 -    febrile.  Culture blood and sputum.  Check UA.  Decrease propofol.  Continue vent support.  9/18 -    Pseudomonas in sputum.  Start Fortaz.  Stop ketamine.  Propofol stopped yesterday.  9/19 -    continue Fortaz.  Add phenobarbital to AED regimen.  9/20 -    continue current AED regimen.  Continue Fortaz.     9/21- vegetative state persists, no new seizures per neuro,     Interval Problem Update  Reviewed last 24 hour events:      Discussed with neuro  SR-ST  TF at 25 increasing slowly  Vent 30  Trach 13  Fortaz      Review of Systems  Review of Systems   Unable to perform ROS: Acuity of condition        Vital Signs for last 24 hours   Temp:  [36.2 °C (97.2 °F)-36.8 °C (98.2 °F)] 36.5 °C (97.7 °F)  Pulse:  [] 116  Resp:  [16-35] 17  BP: (100-127)/(56-82) 127/82  SpO2:  [91 %-100 %] 98 %    Hemodynamic parameters for last 24 hours       Respiratory Information for the last 24 hours  Vent Mode: Spont  Rate (breaths/min): 26  Vt Target (mL): 320  PEEP/CPAP: 8  MAP: 12  Control VTE (exp VT): 325    Physical Exam   Physical Exam  Constitutional:       Appearance: She is not diaphoretic.      Comments: On ventilator   HENT:      Head: Normocephalic and atraumatic.      Right Ear: External ear normal.      Left Ear: External ear normal.      Nose: Nose normal.      Mouth/Throat:      Mouth: Mucous membranes are moist.      Pharynx: Oropharynx is clear.   Eyes:      Conjunctiva/sclera: Conjunctivae normal.      Pupils:  Pupils are equal, round, and reactive to light.   Neck:      Musculoskeletal: Normal range of motion. No neck rigidity.      Comments: Trach in place  Cardiovascular:      Pulses: Normal pulses.      Heart sounds: No murmur. No gallop.       Comments: Sinus rhythm  Pulmonary:      Breath sounds: Rales (Coarse crackles bilaterally) present. No wheezing.   Abdominal:      General: Bowel sounds are normal. There is no distension.      Palpations: Abdomen is soft.      Tenderness: There is no abdominal tenderness. There is no rebound.      Comments: Tolerating enteral tube feedings   Musculoskeletal: Normal range of motion.      Comments: No clubbing or cyanosis   Skin:     General: Skin is warm and dry.      Capillary Refill: Capillary refill takes less than 2 seconds.   Neurological:      Comments: Pupils 4 mm, equal and briskly reactive.  She opens her eyes and blinks.  She does not track or follow.         Medications  Current Facility-Administered Medications   Medication Dose Route Frequency Provider Last Rate Last Dose   • perampanel (FYCOMPA) tablet 12 mg  12 mg Enteral Tube QHS Ty Mccann M.D.       • zonisamide (ZONEGRAN) 10 mg/ml oral suspension 200 mg  200 mg Enteral Tube BID Ty Mccann M.D.   200 mg at 09/21/20 1114   • ceftAZIDime (FORTAZ) 2,000 mg in  mL IVPB  2 g Intravenous Q8HRS Nithin Padilla M.D. 200 mL/hr at 09/21/20 1428 2,000 mg at 09/21/20 1428   • PHENobarbital (LUMINAL) tablet 64.8 mg  64.8 mg Enteral Tube Q8HRS Toni Bear M.D.   64.8 mg at 09/21/20 1432   • divalproex (DEPAKOTE SPRINKLE) capsule 500 mg  500 mg Enteral Tube Q8HRS Angel Luis Lanier M.D.   500 mg at 09/21/20 1429   • fentaNYL (SUBLIMAZE) injection  mcg   mcg Intravenous Q HOUR PRN Angel Luis Lanier M.D.   50 mcg at 09/19/20 1450   • levETIRAcetam (KEPPRA) tablet 1,000 mg  1,000 mg Enteral Tube Q8HR Andrew Petersen M.D.   1,000 mg at 09/21/20 1432   • glycopyrrolate (ROBINUL)  tablet 0.5 mg  0.5 mg Enteral Tube Q8HRS Andrew Petersen M.D.   0.5 mg at 09/21/20 1431   • lacosamide (VIMPAT) tablet 200 mg  200 mg Enteral Tube Q8HR Andrew Petersen M.D.   200 mg at 09/21/20 1117   • acetaminophen (TYLENOL) suppository 325 mg  325 mg Rectal Q6HRS PRN Yosef Tomlin M.D.   650 mg at 09/02/20 1632   • acetaminophen (TYLENOL) tablet 1,000 mg  1,000 mg Enteral Tube Q4HRS PRN Dar Red M.D.   1,000 mg at 09/18/20 1520   • labetalol (NORMODYNE/TRANDATE) injection 10 mg  10 mg Intravenous Q4HRS PRN Yosef Tomlin M.D.   10 mg at 08/30/20 2256   • Pharmacy Consult: Enteral tube insertion - review meds/change route/product selection  1 Each Other PHARMACY TO DOSE Boogie Augustine Jr., D.O.       • Respiratory Therapy Consult   Nebulization Continuous RT Brock Murdock M.D.       • ipratropium-albuterol (DUONEB) nebulizer solution  3 mL Nebulization Q2HRS PRN (RT) Brock Murdock M.D.       • famotidine (PEPCID) tablet 20 mg  20 mg Enteral Tube Q12HRS Dar Red M.D.   20 mg at 09/21/20 0532   • senna-docusate (PERICOLACE or SENOKOT S) 8.6-50 MG per tablet 2 Tab  2 Tab Enteral Tube BID Brock Murdock M.D.   Stopped at 09/20/20 1800    And   • polyethylene glycol/lytes (MIRALAX) PACKET 1 Packet  1 Packet Enteral Tube QDAY PRN Brock Murodck M.D.        And   • magnesium hydroxide (MILK OF MAGNESIA) suspension 30 mL  30 mL Enteral Tube QDAY PRN Brock Murdock M.D.        And   • bisacodyl (DULCOLAX) suppository 10 mg  10 mg Rectal QDAY PRN Brock Murdock M.D.       • MD Alert...ICU Electrolyte Replacement per Pharmacy   Other PHARMACY TO DOSE Brock Murdock M.D.       • lidocaine (XYLOCAINE) 1 % injection 1-2 mL  1-2 mL Tracheal Tube Q30 MIN PRN Brock Murdock M.D.   2 mL at 09/21/20 1510   • enoxaparin (LOVENOX) inj 40 mg  40 mg Subcutaneous DAILY Boogie Augustine Jr., D.O.   40 mg at 09/21/20 0532       Fluids    Intake/Output Summary (Last 24 hours) at 9/21/2020 1531  Last data  filed at 9/21/2020 1500  Gross per 24 hour   Intake 1025 ml   Output 2326 ml   Net -1301 ml       Laboratory          Recent Labs     09/19/20 0313 09/20/20  0545 09/21/20  0245   SODIUM 136 136 132*   POTASSIUM 4.1 4.0 4.3   CHLORIDE 100 100 98   CO2 22 20 20   BUN 19 17 15   CREATININE 0.39* 0.37* 0.43*   MAGNESIUM 2.1 2.0 2.1   CALCIUM 8.7 8.7 9.1     Recent Labs     09/19/20 0313 09/20/20  0545 09/21/20  0245   ALTSGPT 52* 46 43   ASTSGOT 26 28 31   ALKPHOSPHAT 145* 121* 118*   TBILIRUBIN 0.2 <0.2 0.2   GLUCOSE 103* 142* 106*     Recent Labs     09/19/20 0313 09/20/20  0545 09/21/20  0245   WBC 8.0 8.0 9.1   NEUTSPOLYS 58.90 61.40 58.90   LYMPHOCYTES 21.00* 17.70* 20.20*   MONOCYTES 14.60* 10.80 10.50   EOSINOPHILS 3.20 8.40* 8.10*   BASOPHILS 0.40 0.40 0.60   ASTSGOT 26 28 31   ALTSGPT 52* 46 43   ALKPHOSPHAT 145* 121* 118*   TBILIRUBIN 0.2 <0.2 0.2     Recent Labs     09/19/20 0313 09/20/20  0545 09/21/20  0245   RBC 3.71* 3.65* 3.75*   HEMOGLOBIN 11.6* 11.4* 11.7*   HEMATOCRIT 35.2* 34.3* 35.6*   PLATELETCT 415 437 435       Imaging  None    Assessment/Plan  * Acute respiratory failure with hypoxia (HCC)- (present on admission)  Assessment & Plan  Intubated 8/24  Trach 9/9  On T-piece continuous starting today    Anoxic encephalopathy (HCC)- (present on admission)  Assessment & Plan  MRI on 9/2 and 9/15 consistent with severe anoxic brain injury  EEG with frequent, blunted generalized sharps with frontal maximum and triphasic morphology with frequent runs of frontal intermittent rhythmic delta activity (FIRDA)  Continue AEDs    Exam consistent with vegetative state. Poor prognosis for return to independent function. Discuss further with neurology and family    Drug overdose- (present on admission)  Assessment & Plan  BA 0.16 on presentation  Positive urine drug screen for amphetamines on presentation  Boyfriend reported methamphetamine, oxycodone and alcohol use    Fever  Assessment & Plan  1 out of 2  blood cultures positive for coag negative Staphylococcus on 9/17 - contaminant  UA unremarkable  Sputum with Pseudomonas  Fever has resolved after Fortaz started    Seizures (HCC)  Assessment & Plan  Continuous video EEG  Continue phenobarbital, 64.8 mg every 8 hours  Continue Vimpat, 200 mg every 8 hours  Continue valproic acid, 500 mg every 8 hours  Continue Keppra, 1000 mg every 8 hours  Continue Fycompa, 10 mg nightly    Pneumonia of both lungs due to Pseudomonas species (HCC)  Assessment & Plan  Sputum culture with MRSA, Acinetobacter and Stenotrophomonas species on 9/6  Completed 10 days of Bactrim on 9/13  Sputum culture from 9/17 with Pseudomonas aeruginosa  Continue Fortaz    Elevated transaminase level- (present on admission)  Assessment & Plan  Due to valproic acid  Resolving    S/P PEA cardiac arrest (HCC)- (present on admission)  Assessment & Plan  S/P therapeutic hypothermia protocol    Dysphagia, oropharyngeal- (present on admission)  Assessment & Plan  S/P laparoscopic gastrostomy on 9/11  Continue enteral tube feedings       VTE:  Lovenox  Ulcer: H2 Antagonist  Lines: Pearce Catheter  Ongoing indication addressed    I have performed a physical exam and reviewed and updated ROS and Plan today (9/21/2020). In review of yesterday's note (9/20/2020), there are no changes except as documented above.     I have assessed and reassessed her respiratory status with ventilator adjustments, ventilator waveforms, airway mechanics, hemodynamics, blood pressure, cardiovascular status and her neurologic status.  She is at increased risk for worsening CNS and respiratory system dysfunction.    Discussed patient condition and risk of morbidity and/or mortality with RN, RT, Pharmacy, Charge nurse / hot rounds, QA team and neurology     The patient remains critically ill.  Critical care time = 30 minutes in directly providing and coordinating critical care and extensive data review.  No time overlap and excludes  procedures.

## 2020-09-21 NOTE — PALLIATIVE CARE
Palliative Care follow-up  Case discussed with medical team; requesting f/u meeting with family given further evaluation of prognosis by neurology. Time proposed of 1300 on Tuesday. Verified with charge RN that this time was acceptable and PC to f/u with family. Call to Angel Luis and message left requesting a call back to determine ability to attend meeting at 1300. Call placed to Juliette and she is available for a 1300 meeting tomorrow. Dr. Mccann is not available till after 1500, but can f/u with family after the proposed meeting time if needed.    Juliette raised some questions about visitation and PC offered to discuss with management. Per Simone, parents are allowed to visit individually given Angel Luis works out of town Mon-Thurs. PC updated Juliette and stressed only she and Angel Luis are allowed to visit. She expressed understanding. Update to Dr. Tang/Dr. Padilla about plan for meeting Tuesday at 1300. Discussed with BS RN as well.      Updated: BS team    Plan: Meeting Tuesday at 1300    Thank you for allowing Palliative Care to support this patient and family. Contact [phone] for additional assistance, change in patient status, or with any questions/concerns.

## 2020-09-21 NOTE — DISCHARGE PLANNING
Received Choice form at 1215  Agency/Facility Name: [facility]  ([facility])  Referral sent per Choice form at 121

## 2020-09-21 NOTE — CARE PLAN
Ventilator Daily Summary    Vent Day #30    TD#13    APVCMV  26  320  +8  30%      Ventilator settings changed this shift: None    Weaning trials: No SBT cont. EEG      Plan: Continue current ventilator settings and wean mechanical ventilation as tolerated per physician orders.

## 2020-09-21 NOTE — PROGRESS NOTES
Neurology Progress Note  Neurohospitalist Service, [facility]    Referring Physician: DREW Mcintosh*    Chief Complaint   Patient presents with   • Drug Overdose     unknown amount or type of drug ingestion per EMS, pt found down, aspiration, CPR in progress upon EMS arrival.       HPI: Refer to initial documented Neurology H&P, as detailed in the patient's chart.    Interval History: No acute events overnight.  Remains intubated and sedated in the ICU on VEEG.  Unable to provide subjective interval history given intubation and mental status.    Review of systems: In addition to what is detailed in the HPI and/or updated in the interval history, all other systems reviewed and are negative.    Past Medical History:    has no past medical history on file.    FHx:  family history is not on file.    SHx:   reports that she has never smoked. She has never used smokeless tobacco.    Medications:    Current Facility-Administered Medications:   •  perampanel (FYCOMPA) tablet 12 mg, 12 mg, Enteral Tube, QHS, Ty Mccann M.D.  •  zonisamide (ZONEGRAN) 10 mg/ml oral suspension 200 mg, 200 mg, Enteral Tube, BID, Ty Mccann M.D.  •  ceftAZIDime (FORTAZ) 2,000 mg in  mL IVPB, 2 g, Intravenous, Q8HRS, Angel Luis Lanier M.D., Stopped at 09/21/20 0602  •  PHENobarbital (LUMINAL) tablet 64.8 mg, 64.8 mg, Enteral Tube, Q8HRS, Toni Bear M.D., 64.8 mg at 09/21/20 0533  •  divalproex (DEPAKOTE SPRINKLE) capsule 500 mg, 500 mg, Enteral Tube, Q8HRS, Angel Luis Lanier M.D., 500 mg at 09/21/20 0532  •  fentaNYL (SUBLIMAZE) injection  mcg,  mcg, Intravenous, Q HOUR PRN, Angel Luis Lanier M.D., 50 mcg at 09/19/20 1450  •  levETIRAcetam (KEPPRA) tablet 1,000 mg, 1,000 mg, Enteral Tube, Q8HR, Andrew Petersen M.D., 1,000 mg at 09/21/20 0533  •  glycopyrrolate (ROBINUL) tablet 0.5 mg, 0.5 mg, Enteral Tube, Q8HRS, Andrew Petersen M.D., 0.5 mg at  09/21/20 0532  •  lacosamide (VIMPAT) tablet 200 mg, 200 mg, Enteral Tube, Q8HR, Andrew Petersen M.D., 200 mg at 09/21/20 0233  •  acetaminophen (TYLENOL) suppository 325 mg, 325 mg, Rectal, Q6HRS PRN, Yosef Tomlin M.D., 650 mg at 09/02/20 1632  •  acetaminophen (TYLENOL) tablet 1,000 mg, 1,000 mg, Enteral Tube, Q4HRS PRN, Dar Red M.D., 1,000 mg at 09/18/20 1520  •  labetalol (NORMODYNE/TRANDATE) injection 10 mg, 10 mg, Intravenous, Q4HRS PRN, Yosef Tomlin M.D., 10 mg at 08/30/20 2256  •  Pharmacy Consult: Enteral tube insertion - review meds/change route/product selection, 1 Each, Other, PHARMACY TO DOSE, Boogie Augustine Jr., D.O.  •  Respiratory Therapy Consult, , Nebulization, Continuous RT, Brock Murdock M.D.  •  ipratropium-albuterol (DUONEB) nebulizer solution, 3 mL, Nebulization, Q2HRS PRN (RT), Brock Murdock M.D.  •  famotidine (PEPCID) tablet 20 mg, 20 mg, Enteral Tube, Q12HRS, 20 mg at 09/21/20 0532 **OR** [DISCONTINUED] famotidine (PEPCID) injection 20 mg, 20 mg, Intravenous, Q12HRS, Brock Murdock M.D., 20 mg at 09/04/20 0454  •  senna-docusate (PERICOLACE or SENOKOT S) 8.6-50 MG per tablet 2 Tab, 2 Tab, Enteral Tube, BID, Stopped at 09/20/20 1800 **AND** polyethylene glycol/lytes (MIRALAX) PACKET 1 Packet, 1 Packet, Enteral Tube, QDAY PRN **AND** magnesium hydroxide (MILK OF MAGNESIA) suspension 30 mL, 30 mL, Enteral Tube, QDAY PRN **AND** bisacodyl (DULCOLAX) suppository 10 mg, 10 mg, Rectal, QDAY PRN, Brock Murdock M.D.  •  MD Alert...ICU Electrolyte Replacement per Pharmacy, , Other, PHARMACY TO DOSE, Brock Murdock M.D.  •  lidocaine (XYLOCAINE) 1 % injection 1-2 mL, 1-2 mL, Tracheal Tube, Q30 MIN PRN, Brock Murdock M.D., 2 mL at 09/21/20 0152  •  enoxaparin (LOVENOX) inj 40 mg, 40 mg, Subcutaneous, DAILY, Boogie Augustine Jr., D.O., 40 mg at 09/21/20 0532    Physical Examination:     Vitals:    09/21/20 0610 09/21/20 0700 09/21/20 0800 09/21/20 0915   BP:  104/59 101/56     Pulse: 95 94 95 (!) 103   Resp: (!) 26 (!) 26 (!) 26 16   Temp:   36.2 °C (97.2 °F)    TempSrc:   Temporal    SpO2: 100% 99% 100% 99%   Weight:       Height:           General: Patient is in no acute distress  Eyes: examination of optic disks not indicated at this time  CV: RRR    NEUROLOGICAL EXAM:     Mental status: eyes open, does not follow simple commands  Speech and language: intubated  Cranial nerve exam: Pupils are equal, round and reactive to light bilaterally. Visual fields: does not blink to threat bilaterally. Gaze in neutral position. Corneal reflexes intact bilaterally.  Extraocular muscles are intact to oculocephalic maneuver ie VOR intact. Face is symmetric. Unable to assess sensation in the face due to mental status. Cough and gag reflexes are intact.  Motor exam: Does not participate in formal strength testing. Tone is spastic RUE and flaccid LUE. No abnormal movements were seen on exam.  Sensory exam: no response to noxious stimuli x4 extremities  Deep tendon reflexes:  1+ throughout. Toes mute bilaterally  Coordination: not participatory due to mental status  Gait: deferred due to ICU status    Objective Data:    Labs:  Lab Results   Component Value Date/Time    PROTHROMBTM 14.0 08/25/2020 09:20 PM    INR 1.05 08/25/2020 09:20 PM      Lab Results   Component Value Date/Time    WBC 9.1 09/21/2020 02:45 AM    RBC 3.75 (L) 09/21/2020 02:45 AM    HEMOGLOBIN 11.7 (L) 09/21/2020 02:45 AM    HEMATOCRIT 35.6 (L) 09/21/2020 02:45 AM    MCV 94.9 09/21/2020 02:45 AM    MCH 31.2 09/21/2020 02:45 AM    MCHC 32.9 (L) 09/21/2020 02:45 AM    MPV 9.8 09/21/2020 02:45 AM    NEUTSPOLYS 58.90 09/21/2020 02:45 AM    LYMPHOCYTES 20.20 (L) 09/21/2020 02:45 AM    MONOCYTES 10.50 09/21/2020 02:45 AM    EOSINOPHILS 8.10 (H) 09/21/2020 02:45 AM    BASOPHILS 0.60 09/21/2020 02:45 AM    ANISOCYTOSIS 1+ 08/23/2020 10:05 PM      Lab Results   Component Value Date/Time    SODIUM 132 (L) 09/21/2020 02:45 AM    POTASSIUM 4.3  09/21/2020 02:45 AM    CHLORIDE 98 09/21/2020 02:45 AM    CO2 20 09/21/2020 02:45 AM    GLUCOSE 106 (H) 09/21/2020 02:45 AM    BUN 15 09/21/2020 02:45 AM    CREATININE 0.43 (L) 09/21/2020 02:45 AM      Lab Results   Component Value Date/Time    TRIGLYCERIDE 110 09/19/2020 03:13 AM       Lab Results   Component Value Date/Time    ALKPHOSPHAT 118 (H) 09/21/2020 02:45 AM    ASTSGOT 31 09/21/2020 02:45 AM    ALTSGPT 43 09/21/2020 02:45 AM    TBILIRUBIN 0.2 09/21/2020 02:45 AM        Imaging/Testing:    I interpreted and/or reviewed the patient's neuroimaging    DX-CHEST-PORTABLE (1 VIEW)   Final Result      Improving bilateral atelectasis.      DX-CHEST-PORTABLE (1 VIEW)   Final Result      Stable bibasilar atelectasis.      DX-CHEST-PORTABLE (1 VIEW)   Final Result      Stable tracheostomy. No new consolidation or pleural effusions.      DX-CHEST-PORTABLE (1 VIEW)   Final Result         1. Stable tracheostomy.   2. No new consolidation or pleural effusions.         DX-CHEST-PORTABLE (1 VIEW)   Final Result      No significant interval change.         MR-BRAIN-W/O   Final Result      1.  Findings consistent with worsening anoxic brain injury involving the lentiform nuclei bilaterally.   2.  Findings consistent with anoxic brain injury in the parietal, occipital and posterior temporal regions which appear somewhat improved from prior exam.  Similar findings in the bifrontal region appear unchanged.         DX-CHEST-PORTABLE (1 VIEW)   Final Result      No significant interval change.      DX-CHEST-PORTABLE (1 VIEW)   Final Result      Bibasilar underinflation atelectasis which could obscure an additional process. This is similar to the prior study.      DX-CHEST-PORTABLE (1 VIEW)   Final Result      Hazy airspace opacities are improved compared to prior.      DX-CHEST-PORTABLE (1 VIEW)   Final Result         1.  Pulmonary edema and/or infiltrates are identified, which are stable since the prior exam.             DX-CHEST-PORTABLE (1 VIEW)   Final Result         1.  Mild pulmonary edema and/or interstitial infiltrates, somewhat decreased since prior study         IR-MIDLINE CATHETER INSERTION WO GUIDANCE > AGE 3   Final Result                  Ultrasound-guided midline placement performed by qualified nursing staff    as above.          DX-CHEST-PORTABLE (1 VIEW)   Final Result         1.  Mild pulmonary edema and/or interstitial infiltrates, stable since prior study      DX-CHEST-PORTABLE (1 VIEW)   Final Result         1.  Mild pulmonary edema and/or interstitial infiltrates      DX-CHEST-PORTABLE (1 VIEW)   Final Result         No significant interval change.      DX-CHEST-PORTABLE (1 VIEW)   Final Result         New hazy opacity in the left lung base could be atelectasis or consolidation.      DX-CHEST-PORTABLE (1 VIEW)   Final Result      No significant change      DX-CHEST-PORTABLE (1 VIEW)   Final Result         1.  No acute cardiopulmonary disease.                                 DX-CHEST-PORTABLE (1 VIEW)   Final Result         1.  No acute cardiopulmonary disease.                              DX-CHEST-PORTABLE (1 VIEW)   Final Result         1.  No focal infiltrates, previously visualized infiltrates are not readily apparent.                           MR-BRAIN-WITH & W/O   Final Result         1.  Severe diffuse anoxic brain injury pattern which is more extensive and conspicuous than on previous exam.      2.  No evidence of intracranial mass effect, extra-axial fluid collection, or interval development of hydrocephalus.      3.  Diffuse mucosal inflammatory changes filling the ethmoid and sphenoid sinuses.      DX-CHEST-PORTABLE (1 VIEW)   Final Result         1.  Pulmonary edema and/or infiltrates are identified, which are somewhat decreased since the prior exam.                        QX-[id number]-3 VIEW   Final Result      Feeding tube is noted with tip at the level of the proximal duodenum.                   DX-CHEST-PORTABLE (1 VIEW)   Final Result         1.  Pulmonary edema and/or infiltrates are identified, which are stable since the prior exam.                     DX-CHEST-PORTABLE (1 VIEW)   Final Result      Stable chest x-ray findings with right lower lobe consolidation.      DX-CHEST-PORTABLE (1 VIEW)   Final Result         1.  Pulmonary edema and/or infiltrates are identified, which are stable since the prior exam.                  DX-CHEST-PORTABLE (1 VIEW)   Final Result         1.  Pulmonary edema and/or infiltrates are identified, which are stable since the prior exam.               MR-BRAIN-W/O   Final Result      The diffusion-weighted sequences demonstrates areas of restricted diffusion in the bilateral basal ganglia and some of the frontal gray matter. The predominant portion of the brain parenchyma does not demonstrate any restricted diffusion. Therefore this    findings likely represent mild diffuse anoxic brain injury.      DX-CHEST-PORTABLE (1 VIEW)   Final Result         1.  Pulmonary edema and/or infiltrates are identified, which are somewhat decreased since the prior exam.            DX-CHEST-PORTABLE (1 VIEW)   Final Result         1.  Pulmonary edema and/or infiltrates are identified, which are stable since the prior exam.         DX-ABDOMEN FOR TUBE PLACEMENT   Final Result         1.  Nonspecific bowel gas pattern.   2.  Dobbhoff tube tip overlying the expected location of the pylorus or first duodenal segment.      DX-CHEST-PORTABLE (1 VIEW)   Final Result         1.  Patchy bilateral pulmonary infiltrates, somewhat increased since prior.      EC-ECHOCARDIOGRAM COMPLETE W/O CONT   Final Result      DX-CHEST-PORTABLE (1 VIEW)   Final Result         1.  Patchy bilateral pulmonary infiltrates, somewhat increased since prior.      CT-HEAD W/O   Final Result         1.  Possible subtle sulcal effacement and slight loss of differentiation of gray-white matter, consider component of cerebral  "edema. Recommend radiographic follow-up   2.  Bilateral sphenoid and maxillary sinus air-fluid levels      DX-CHEST-PORTABLE (1 VIEW)   Final Result         1.  No acute cardiopulmonary disease.      DX-CHEST-PORTABLE (1 VIEW)    (Results Pending)     EEG as read by Dr. SHEYLA Beatty 9/21/20: \"A moderate encephalopathy is suggested. Continuous blunted generalized sharps with frontal maximum and triphasic morphology. Frequent runs of Frontal Intermittent Rhythmic Delta Activity (FIRDA). Brief non convulsive seizures are still noted, some provoked by tactile stimulation of the patient. The eeg remains unchanged. The patient is not in status epilepticus. The findings suggest underlying areas of persinstently increased cortical irritability and/or structural abnormality.\"    Assessment and Plan:    Annika He is a 19 y.o. woman presenting for whom neurology has been consulted for seizure management and prognostication.  The patient has a history of polysubstance abuse and subsequent cardiac arrest on 8/23/2020 who remains unresponsive s/p trach and PEG.  Her repeat brain MRI on 9/15/20 shows worsening anoxic brain injury with involvement of lentiform nuclei bilaterally. After discontinuation of sedation there was worsening of the EEG with presence of brief nonconvulsive seizures with hospital course complicated by infection (PNA). Prognosis is guarded; anticipate persistent vegetative state.    Plan:    - continue VEEG  - continue Depakote 500mg TID  - continue Vimpat 200mg TID  - continue Keppra 1000mg TID  - continue phenobarb 64.8mg TID  - increase Fycompa to 12mg qhs  - initiate Zonegran 200mg BID  - seizure precautions  - GOC    The evaluation of the patient, and recommended management, was discussed with the resident staff. I have performed a physical exam and reviewed and updated ROS and Plan today (9/21/2020). In review of yesterday's note (9/20/2020), there are no changes except as documented " above.    Ty Mccann MD  Neurohospitalist, Acute Care Services   of Neurology

## 2020-09-21 NOTE — PROCEDURES
VIDEO ELECTROENCEPHALOGRAM REPORT        Referring provider: Dr. Bear.      DOS: 9/21/2020 (total recording of 23 hours and 25 minutes).      INDICATION:  Annika He 19 y.o. female presenting with s/p cardiac arrest, altered mental status, seizures.      CURRENT ANTIEPILEPTIC REGIMEN: Levetiracetam 1000 mg tid, Lacosamide 200 mg tid, Valproic Acid 1250 mg tid, Perampanel increased to 12 mg qhs, Phenobarbital 60 mg q 8 hrs. Zonisamide 200 mg bid added. The patient was initially not sedated, then Propofol was added.      TECHNIQUE: 30 channel video electroencephalogram (EEG) was performed in accordance with the international 10-20 system. The study was reviewed in bipolar and referential montages. The recording examined an encephalopathic then sedated patient.      DESCRIPTION OF THE RECORD:  Generalized delta activity. Continuous, blunted generalized sharps with frontal maximum and triphasic morphology. Frequent runs of Frontal Intermittent Rhythmic Delta Activity (FIRDA). Worsening of the eeg as study continued with more frequent, mostly Brief non convulsive seizures noted, some provoked by tactile stimulation of the patient. Improvement of the eeg with addition of Propofol, with no further seizures captured since.      ACTIVATION PROCEDURES:   Not performed.      EKG: sampling of the EKG recording demonstrated sinus rhythm.      EVENTS:  None.      INTERPRETATION:  This is an abnormal continuous video EEG recording in an encephalopathic patient. A moderate encephalopathy is suggested. Generalized delta activity. Continuous, blunted generalized sharps with frontal maximum and triphasic morphology. Frequent runs of Frontal Intermittent Rhythmic Delta Activity (FIRDA). Worsening of the eeg as study continued with more frequent, mostly Brief non convulsive seizures noted, some provoked by tactile stimulation of the patient. Improvement of the eeg with addition of Propofol, with no further seizures  captured since. The findings suggest underlying areas of persinstently increased cortical irritability and/or structural abnormality. Clinical and radiological correlation is recommended.     Updates provided to Dr. Mccann and Dr. Padilla.         Garland Beatty MD   Epilepsy and Neurodiagnostics.   Clinical  of Neurology [facility].   Diplomate in Neurology, Epilepsy, and Electrodiagnostic Medicine.   Office: [phone]  Fax: [phone]

## 2020-09-22 LAB
ALBUMIN SERPL BCP-MCNC: 3.6 G/DL (ref 3.2–4.9)
ALBUMIN/GLOB SERPL: 1.1 G/DL
ALP SERPL-CCNC: 121 U/L (ref 30–99)
ALT SERPL-CCNC: 37 U/L (ref 2–50)
ANION GAP SERPL CALC-SCNC: 15 MMOL/L (ref 7–16)
AST SERPL-CCNC: 28 U/L (ref 12–45)
BACTERIA BLD CULT: NORMAL
BASOPHILS # BLD AUTO: 0.2 % (ref 0–1.8)
BASOPHILS # BLD: 0.03 K/UL (ref 0–0.12)
BILIRUB SERPL-MCNC: 0.2 MG/DL (ref 0.1–1.5)
BUN SERPL-MCNC: 15 MG/DL (ref 8–22)
CALCIUM SERPL-MCNC: 9.5 MG/DL (ref 8.5–10.5)
CHLORIDE SERPL-SCNC: 101 MMOL/L (ref 96–112)
CO2 SERPL-SCNC: 21 MMOL/L (ref 20–33)
CREAT SERPL-MCNC: 0.48 MG/DL (ref 0.5–1.4)
EOSINOPHIL # BLD AUTO: 0.66 K/UL (ref 0–0.51)
EOSINOPHIL NFR BLD: 5.1 % (ref 0–6.9)
ERYTHROCYTE [DISTWIDTH] IN BLOOD BY AUTOMATED COUNT: 49.7 FL (ref 35.9–50)
GLOBULIN SER CALC-MCNC: 3.2 G/DL (ref 1.9–3.5)
GLUCOSE SERPL-MCNC: 113 MG/DL (ref 65–99)
HCT VFR BLD AUTO: 39.1 % (ref 37–47)
HGB BLD-MCNC: 12.9 G/DL (ref 12–16)
IMM GRANULOCYTES # BLD AUTO: 0.09 K/UL (ref 0–0.11)
IMM GRANULOCYTES NFR BLD AUTO: 0.7 % (ref 0–0.9)
LYMPHOCYTES # BLD AUTO: 1.81 K/UL (ref 1–4.8)
LYMPHOCYTES NFR BLD: 14 % (ref 22–41)
MAGNESIUM SERPL-MCNC: 2.3 MG/DL (ref 1.5–2.5)
MCH RBC QN AUTO: 31.5 PG (ref 27–33)
MCHC RBC AUTO-ENTMCNC: 33 G/DL (ref 33.6–35)
MCV RBC AUTO: 95.6 FL (ref 81.4–97.8)
MONOCYTES # BLD AUTO: 1.14 K/UL (ref 0–0.85)
MONOCYTES NFR BLD AUTO: 8.8 % (ref 0–13.4)
NEUTROPHILS # BLD AUTO: 9.16 K/UL (ref 2–7.15)
NEUTROPHILS NFR BLD: 71.2 % (ref 44–72)
NRBC # BLD AUTO: 0 K/UL
NRBC BLD-RTO: 0 /100 WBC
PLATELET # BLD AUTO: 512 K/UL (ref 164–446)
PMV BLD AUTO: 10 FL (ref 9–12.9)
POTASSIUM SERPL-SCNC: 4 MMOL/L (ref 3.6–5.5)
PROT SERPL-MCNC: 6.8 G/DL (ref 6–8.2)
RBC # BLD AUTO: 4.09 M/UL (ref 4.2–5.4)
SIGNIFICANT IND 70042: NORMAL
SITE SITE: NORMAL
SODIUM SERPL-SCNC: 137 MMOL/L (ref 135–145)
SOURCE SOURCE: NORMAL
WBC # BLD AUTO: 12.9 K/UL (ref 4.8–10.8)

## 2020-09-22 PROCEDURE — 94690 O2 UPTK REST INDIRECT: CPT

## 2020-09-22 PROCEDURE — 4A10X4Z MONITORING OF CENTRAL NERVOUS ELECTRICAL ACTIVITY, EXTERNAL APPROACH: ICD-10-PCS | Performed by: PSYCHIATRY & NEUROLOGY

## 2020-09-22 PROCEDURE — 94003 VENT MGMT INPAT SUBQ DAY: CPT

## 2020-09-22 PROCEDURE — A9270 NON-COVERED ITEM OR SERVICE: HCPCS | Performed by: PSYCHIATRY & NEUROLOGY

## 2020-09-22 PROCEDURE — 85025 COMPLETE CBC W/AUTO DIFF WBC: CPT

## 2020-09-22 PROCEDURE — 700102 HCHG RX REV CODE 250 W/ 637 OVERRIDE(OP): Performed by: INTERNAL MEDICINE

## 2020-09-22 PROCEDURE — 83735 ASSAY OF MAGNESIUM: CPT

## 2020-09-22 PROCEDURE — 94770 HCHG CO2 EXPIRED GAS DETERMINATION: CPT

## 2020-09-22 PROCEDURE — 95720 EEG PHY/QHP EA INCR W/VEEG: CPT | Performed by: PSYCHIATRY & NEUROLOGY

## 2020-09-22 PROCEDURE — 700102 HCHG RX REV CODE 250 W/ 637 OVERRIDE(OP): Performed by: PSYCHIATRY & NEUROLOGY

## 2020-09-22 PROCEDURE — 700105 HCHG RX REV CODE 258: Performed by: INTERNAL MEDICINE

## 2020-09-22 PROCEDURE — A9270 NON-COVERED ITEM OR SERVICE: HCPCS | Performed by: INTERNAL MEDICINE

## 2020-09-22 PROCEDURE — 700111 HCHG RX REV CODE 636 W/ 250 OVERRIDE (IP): Performed by: INTERNAL MEDICINE

## 2020-09-22 PROCEDURE — 95714 VEEG EA 12-26 HR UNMNTR: CPT | Performed by: PSYCHIATRY & NEUROLOGY

## 2020-09-22 PROCEDURE — 94640 AIRWAY INHALATION TREATMENT: CPT

## 2020-09-22 PROCEDURE — 99232 SBSQ HOSP IP/OBS MODERATE 35: CPT | Mod: 25 | Performed by: PSYCHIATRY & NEUROLOGY

## 2020-09-22 PROCEDURE — 80053 COMPREHEN METABOLIC PANEL: CPT

## 2020-09-22 PROCEDURE — 99291 CRITICAL CARE FIRST HOUR: CPT | Performed by: INTERNAL MEDICINE

## 2020-09-22 PROCEDURE — 770022 HCHG ROOM/CARE - ICU (200)

## 2020-09-22 RX ADMIN — DIVALPROEX SODIUM 500 MG: 125 CAPSULE ORAL at 21:57

## 2020-09-22 RX ADMIN — DIVALPROEX SODIUM 500 MG: 125 CAPSULE ORAL at 15:04

## 2020-09-22 RX ADMIN — PERAMPANEL 12 MG: 6 TABLET ORAL at 21:57

## 2020-09-22 RX ADMIN — GLYCOPYRROLATE 0.5 MG: 1 TABLET ORAL at 15:07

## 2020-09-22 RX ADMIN — CEFTAZIDIME 2000 MG: 1 INJECTION, POWDER, FOR SOLUTION INTRAMUSCULAR; INTRAVENOUS at 21:57

## 2020-09-22 RX ADMIN — DOCUSATE SODIUM 50 MG AND SENNOSIDES 8.6 MG 2 TABLET: 8.6; 5 TABLET, FILM COATED ORAL at 17:47

## 2020-09-22 RX ADMIN — ENOXAPARIN SODIUM 40 MG: 40 INJECTION SUBCUTANEOUS at 06:02

## 2020-09-22 RX ADMIN — FAMOTIDINE 20 MG: 20 TABLET, FILM COATED ORAL at 06:03

## 2020-09-22 RX ADMIN — FAMOTIDINE 20 MG: 20 TABLET, FILM COATED ORAL at 17:47

## 2020-09-22 RX ADMIN — LACOSAMIDE 200 MG: 100 TABLET, FILM COATED ORAL at 10:04

## 2020-09-22 RX ADMIN — DOCUSATE SODIUM 50 MG AND SENNOSIDES 8.6 MG 2 TABLET: 8.6; 5 TABLET, FILM COATED ORAL at 06:03

## 2020-09-22 RX ADMIN — GLYCOPYRROLATE 0.5 MG: 1 TABLET ORAL at 06:02

## 2020-09-22 RX ADMIN — LEVETIRACETAM 1000 MG: 500 TABLET, FILM COATED ORAL at 06:02

## 2020-09-22 RX ADMIN — CEFTAZIDIME 2000 MG: 1 INJECTION, POWDER, FOR SOLUTION INTRAMUSCULAR; INTRAVENOUS at 06:02

## 2020-09-22 RX ADMIN — PROPOFOL 40 MCG/KG/MIN: 10 INJECTION, EMULSION INTRAVENOUS at 08:06

## 2020-09-22 RX ADMIN — GLYCOPYRROLATE 0.5 MG: 1 TABLET ORAL at 21:57

## 2020-09-22 RX ADMIN — PHENOBARBITAL 64.8 MG: 32.4 TABLET ORAL at 21:57

## 2020-09-22 RX ADMIN — PHENOBARBITAL 64.8 MG: 32.4 TABLET ORAL at 06:03

## 2020-09-22 RX ADMIN — LEVETIRACETAM 1000 MG: 500 TABLET, FILM COATED ORAL at 21:57

## 2020-09-22 RX ADMIN — ZONISAMIDE 200 MG: 50 CAPSULE ORAL at 17:47

## 2020-09-22 RX ADMIN — CEFTAZIDIME 2000 MG: 1 INJECTION, POWDER, FOR SOLUTION INTRAMUSCULAR; INTRAVENOUS at 15:03

## 2020-09-22 RX ADMIN — LEVETIRACETAM 1000 MG: 500 TABLET, FILM COATED ORAL at 15:08

## 2020-09-22 RX ADMIN — PROPOFOL 40 MCG/KG/MIN: 10 INJECTION, EMULSION INTRAVENOUS at 02:24

## 2020-09-22 RX ADMIN — DIVALPROEX SODIUM 500 MG: 125 CAPSULE ORAL at 06:02

## 2020-09-22 RX ADMIN — PHENOBARBITAL 64.8 MG: 32.4 TABLET ORAL at 15:08

## 2020-09-22 RX ADMIN — PROPOFOL 30 MCG/KG/MIN: 10 INJECTION, EMULSION INTRAVENOUS at 17:48

## 2020-09-22 RX ADMIN — ZONISAMIDE 200 MG: 50 CAPSULE ORAL at 06:04

## 2020-09-22 RX ADMIN — LACOSAMIDE 200 MG: 100 TABLET, FILM COATED ORAL at 03:43

## 2020-09-22 RX ADMIN — LACOSAMIDE 200 MG: 100 TABLET, FILM COATED ORAL at 17:47

## 2020-09-22 NOTE — FLOWSHEET NOTE
Conscious Sedation Respiratory Update    FiO2%: 30 % (09/22/20 0720)  O2 (LPM): 4 (09/21/20 1489)       Events/Summary/Plan: Metabolic study done.  Pt tolerated the test well. (09/22/20 1126)

## 2020-09-22 NOTE — PROGRESS NOTES
Angel Luis (father) called for updates. Says he is unable to to make it here tomorrow but prefers to have a family meeting on Thursday. Mom Juliette is at bedside and updated.

## 2020-09-22 NOTE — DIETARY
Nutrition Services: Brief Update    · Pt is on tube feeding regimen of Fibersource HN @ goal rate of 70 mL/hr, providing 2016 kcals, 91 grams of protein and 1361 mL of free water per day.  · Metabolic Cart Study completed today: REE = 2293 kcals, strong study as evidenced by RQ = 0.93 and REE Covariance = 4%.  · Propofol restarted today. Currently running @ 30 mcg/min (12 mL/hr) = 317 kcals/day.   · Will not adjust tube feeding based on met cart results at this time to avoid overfeeding pt on the vent with propofol running.  · RD will continue to monitor propofol rate and adjust TF as needed.    From tube feeding weekly update yesterday:  Wt used in calculations: 65.1 kg - suspect closer to dry wt.  52.2 kg IBW.   Calculation/Equation: MSJ x 1.2 = 1663 kcal.  PSU = 1584 kcal (VE: 8.2, Tmax over the past 24 hours: 37.2). Met cart completed : 2890 kcal.  Additional met cart pending.  Total Calories / day: 1600 - 1950 (Calories / k - 30)  Total Protein / day: 78 - 98 (Grams Protein /kg 1.2  - 1.5)    Recommendations/Plan:  1. Continue with Fibersource HN. Reduce rate to 60 ml/hr while on propofol.  This provides 1728 kcals (+kcals from propofol), 78 grams protein, and 1166 ml free water per day.  2. Once propofol d/c'd increase to final goal rate of 70 mL/hr, which provides 2016 kcals, 91 grams of protein and 1361 mL of free water per day.  3. Fluids per MD. MCCORMICK following.

## 2020-09-22 NOTE — RESPIRATORY CARE
Ventilator Daily Summary    Vent Day #31    Ventilator settings changed this shift:Placed on T-piece  Weaning trials:Yes    Respiratory Procedures:None    Plan: Continue current ventilator settings and wean mechanical ventilation as tolerated per physician orders.

## 2020-09-22 NOTE — PROGRESS NOTES
Neurology Progress Note  Neurohospitalist Service, [facility] Neurosciences    Referring Physician: Nithin Padilla M.D.    Chief Complaint   Patient presents with   • Drug Overdose     unknown amount or type of drug ingestion per EMS, pt found down, aspiration, CPR in progress upon EMS arrival.       HPI: Refer to initial documented Neurology H&P, as detailed in the patient's chart.    Interval History: No acute events overnight.  Remains intubated and sedated in ICU.    Review of systems: In addition to what is detailed in the HPI and/or updated in the interval history, all other systems reviewed and are negative.    Past Medical History:    has no past medical history on file.    FHx:  family history is not on file.    SHx:   reports that she has never smoked. She has never used smokeless tobacco.    Medications:    Current Facility-Administered Medications:   •  propofol (DIPRIVAN) injection, 20 mcg/kg/min, Intravenous, Continuous, Last Rate: 8 mL/hr at 09/22/20 1015, 20 mcg/kg/min at 09/22/20 1015 **AND** [START ON 9/23/2020] Triglycerides Starting now and then Every 3 Days, , , Every 3 Days (0300), Nithin Padilla M.D.  •  zonisamide (ZONEGRAN) 10 mg/ml oral suspension 200 mg, 200 mg, Enteral Tube, BID, Ty Mccann M.D., 200 mg at 09/22/20 0604  •  perampanel (FYCOMPA) tablet 12 mg, 12 mg, Oral, QHS, Ty Mccann M.D., 12 mg at 09/21/20 2105  •  ceftAZIDime (FORTAZ) 2,000 mg in  mL IVPB, 2 g, Intravenous, Q8HRS, Nithin Padilla M.D., Stopped at 09/22/20 0632  •  PHENobarbital (LUMINAL) tablet 64.8 mg, 64.8 mg, Enteral Tube, Q8HRS, Toni Bear M.D., 64.8 mg at 09/22/20 0603  •  divalproex (DEPAKOTE SPRINKLE) capsule 500 mg, 500 mg, Enteral Tube, Q8HRS, Angel Luis Lanier M.D., 500 mg at 09/22/20 0602  •  fentaNYL (SUBLIMAZE) injection  mcg,  mcg, Intravenous, Q HOUR PRN, Angel Luis Lanier M.D., 50 mcg at 09/19/20 1450  •  levETIRAcetam (KEPPRA) tablet  1,000 mg, 1,000 mg, Enteral Tube, Q8HR, Andrew Petersen M.D., 1,000 mg at 09/22/20 0602  •  glycopyrrolate (ROBINUL) tablet 0.5 mg, 0.5 mg, Enteral Tube, Q8HRS, Andrew Petersen M.D., 0.5 mg at 09/22/20 0602  •  lacosamide (VIMPAT) tablet 200 mg, 200 mg, Enteral Tube, Q8HR, Andrew Petersen M.D., 200 mg at 09/22/20 1004  •  acetaminophen (TYLENOL) suppository 325 mg, 325 mg, Rectal, Q6HRS PRN, Yosef Tomlin M.D., 650 mg at 09/02/20 1632  •  acetaminophen (TYLENOL) tablet 1,000 mg, 1,000 mg, Enteral Tube, Q4HRS PRN, Dar Red M.D., 1,000 mg at 09/21/20 2303  •  labetalol (NORMODYNE/TRANDATE) injection 10 mg, 10 mg, Intravenous, Q4HRS PRN, Yosef Tomlin M.D., 10 mg at 08/30/20 2256  •  Pharmacy Consult: Enteral tube insertion - review meds/change route/product selection, 1 Each, Other, PHARMACY TO DOSE, Boogie Augustine Jr., D.O.  •  Respiratory Therapy Consult, , Nebulization, Continuous RT, Brock Murdock M.D.  •  ipratropium-albuterol (DUONEB) nebulizer solution, 3 mL, Nebulization, Q2HRS PRN (RT), Brock Murdock M.D.  •  famotidine (PEPCID) tablet 20 mg, 20 mg, Enteral Tube, Q12HRS, 20 mg at 09/22/20 0603 **OR** [DISCONTINUED] famotidine (PEPCID) injection 20 mg, 20 mg, Intravenous, Q12HRS, Brock Murdock M.D., 20 mg at 09/04/20 0454  •  senna-docusate (PERICOLACE or SENOKOT S) 8.6-50 MG per tablet 2 Tab, 2 Tab, Enteral Tube, BID, 2 Tab at 09/22/20 0603 **AND** polyethylene glycol/lytes (MIRALAX) PACKET 1 Packet, 1 Packet, Enteral Tube, QDAY PRN **AND** magnesium hydroxide (MILK OF MAGNESIA) suspension 30 mL, 30 mL, Enteral Tube, QDAY PRN **AND** bisacodyl (DULCOLAX) suppository 10 mg, 10 mg, Rectal, QDAY PRN, Brock Murdock M.D.  •  MD Alert...ICU Electrolyte Replacement per Pharmacy, , Other, PHARMACY TO DOSE, Brock Murdock M.D.  •  lidocaine (XYLOCAINE) 1 % injection 1-2 mL, 1-2 mL, Tracheal Tube, Q30 MIN PRN, Brock Murdock M.D., Stopped at 09/21/20 1638  •  enoxaparin (LOVENOX) inj  40 mg, 40 mg, Subcutaneous, DAILY, Boogie Augustine Jr., D.O., 40 mg at 09/22/20 0602    Physical Examination:     Vitals:    09/22/20 0730 09/22/20 0800 09/22/20 0830 09/22/20 0900   BP: (!) 93/53 (!) 88/51 (!) 83/47 (!) 86/52   Pulse: 96 91 88 85   Resp: (!) 22 (!) 21 (!) 21 (!) 21   Temp:  36.5 °C (97.7 °F)     TempSrc:  Temporal     SpO2: 96% 96% 96% 99%   Weight:       Height:         Examination on propofol drip:    General: intubated and sedated  Eyes: examination of optic disks not indicated at this time  CV: RRR    NEUROLOGICAL EXAM:     Mental status: does not open eyes to noxious stimulus, does not follow commands  Speech and language: intubated  Cranial nerve exam: Pupils are equal, round and reactive to light bilaterally. Visual fields: does not blink to threat bilaterally. Gaze in neutral position. Corneal reflexes absent bilaterally.  Extraocular muscles are not intact to oculocephalic maneuver ie VOR absent. Face is symmetric. Unable to assess sensation in the face due to mental status. Cough and gag reflexes are absent.  Motor exam: Does not participate in formal strength testing. Tone is spastic b/l UE R>L. No abnormal movements were seen on exam.  Sensory exam: no response to noxious stimuli x4 extremities  Deep tendon reflexes:  1+ throughout. Toes mute bilaterally  Coordination: not participatory due to mental status  Gait: deferred due to ICU status  Objective Data:    Labs:  Lab Results   Component Value Date/Time    PROTHROMBTM 14.0 08/25/2020 09:20 PM    INR 1.05 08/25/2020 09:20 PM      Lab Results   Component Value Date/Time    WBC 12.9 (H) 09/22/2020 04:05 AM    RBC 4.09 (L) 09/22/2020 04:05 AM    HEMOGLOBIN 12.9 09/22/2020 04:05 AM    HEMATOCRIT 39.1 09/22/2020 04:05 AM    MCV 95.6 09/22/2020 04:05 AM    MCH 31.5 09/22/2020 04:05 AM    MCHC 33.0 (L) 09/22/2020 04:05 AM    MPV 10.0 09/22/2020 04:05 AM    NEUTSPOLYS 71.20 09/22/2020 04:05 AM    LYMPHOCYTES 14.00 (L) 09/22/2020 04:05 AM     MONOCYTES 8.80 09/22/2020 04:05 AM    EOSINOPHILS 5.10 09/22/2020 04:05 AM    BASOPHILS 0.20 09/22/2020 04:05 AM    ANISOCYTOSIS 1+ 08/23/2020 10:05 PM      Lab Results   Component Value Date/Time    SODIUM 137 09/22/2020 04:05 AM    POTASSIUM 4.0 09/22/2020 04:05 AM    CHLORIDE 101 09/22/2020 04:05 AM    CO2 21 09/22/2020 04:05 AM    GLUCOSE 113 (H) 09/22/2020 04:05 AM    BUN 15 09/22/2020 04:05 AM    CREATININE 0.48 (L) 09/22/2020 04:05 AM      Lab Results   Component Value Date/Time    TRIGLYCERIDE 129 09/21/2020 09:58 PM       Lab Results   Component Value Date/Time    ALKPHOSPHAT 121 (H) 09/22/2020 04:05 AM    ASTSGOT 28 09/22/2020 04:05 AM    ALTSGPT 37 09/22/2020 04:05 AM    TBILIRUBIN 0.2 09/22/2020 04:05 AM        Imaging/Testing:    I interpreted and/or reviewed the patient's neuroimaging    DX-CHEST-PORTABLE (1 VIEW)   Final Result      Improving bilateral atelectasis.      DX-CHEST-PORTABLE (1 VIEW)   Final Result      Stable bibasilar atelectasis.      DX-CHEST-PORTABLE (1 VIEW)   Final Result      Stable tracheostomy. No new consolidation or pleural effusions.      DX-CHEST-PORTABLE (1 VIEW)   Final Result         1. Stable tracheostomy.   2. No new consolidation or pleural effusions.         DX-CHEST-PORTABLE (1 VIEW)   Final Result      No significant interval change.         MR-BRAIN-W/O   Final Result      1.  Findings consistent with worsening anoxic brain injury involving the lentiform nuclei bilaterally.   2.  Findings consistent with anoxic brain injury in the parietal, occipital and posterior temporal regions which appear somewhat improved from prior exam.  Similar findings in the bifrontal region appear unchanged.         DX-CHEST-PORTABLE (1 VIEW)   Final Result      No significant interval change.      DX-CHEST-PORTABLE (1 VIEW)   Final Result      Bibasilar underinflation atelectasis which could obscure an additional process. This is similar to the prior study.      DX-CHEST-PORTABLE (1  VIEW)   Final Result      Hazy airspace opacities are improved compared to prior.      DX-CHEST-PORTABLE (1 VIEW)   Final Result         1.  Pulmonary edema and/or infiltrates are identified, which are stable since the prior exam.            DX-CHEST-PORTABLE (1 VIEW)   Final Result         1.  Mild pulmonary edema and/or interstitial infiltrates, somewhat decreased since prior study         IR-MIDLINE CATHETER INSERTION WO GUIDANCE > AGE 3   Final Result                  Ultrasound-guided midline placement performed by qualified nursing staff    as above.          DX-CHEST-PORTABLE (1 VIEW)   Final Result         1.  Mild pulmonary edema and/or interstitial infiltrates, stable since prior study      DX-CHEST-PORTABLE (1 VIEW)   Final Result         1.  Mild pulmonary edema and/or interstitial infiltrates      DX-CHEST-PORTABLE (1 VIEW)   Final Result         No significant interval change.      DX-CHEST-PORTABLE (1 VIEW)   Final Result         New hazy opacity in the left lung base could be atelectasis or consolidation.      DX-CHEST-PORTABLE (1 VIEW)   Final Result      No significant change      DX-CHEST-PORTABLE (1 VIEW)   Final Result         1.  No acute cardiopulmonary disease.                                 DX-CHEST-PORTABLE (1 VIEW)   Final Result         1.  No acute cardiopulmonary disease.                              DX-CHEST-PORTABLE (1 VIEW)   Final Result         1.  No focal infiltrates, previously visualized infiltrates are not readily apparent.                           MR-BRAIN-WITH & W/O   Final Result         1.  Severe diffuse anoxic brain injury pattern which is more extensive and conspicuous than on previous exam.      2.  No evidence of intracranial mass effect, extra-axial fluid collection, or interval development of hydrocephalus.      3.  Diffuse mucosal inflammatory changes filling the ethmoid and sphenoid sinuses.      DX-CHEST-PORTABLE (1 VIEW)   Final Result         1.  Pulmonary  edema and/or infiltrates are identified, which are somewhat decreased since the prior exam.                        [id number] VIEW   Final Result      Feeding tube is noted with tip at the level of the proximal duodenum.                  DX-CHEST-PORTABLE (1 VIEW)   Final Result         1.  Pulmonary edema and/or infiltrates are identified, which are stable since the prior exam.                     DX-CHEST-PORTABLE (1 VIEW)   Final Result      Stable chest x-ray findings with right lower lobe consolidation.      DX-CHEST-PORTABLE (1 VIEW)   Final Result         1.  Pulmonary edema and/or infiltrates are identified, which are stable since the prior exam.                  DX-CHEST-PORTABLE (1 VIEW)   Final Result         1.  Pulmonary edema and/or infiltrates are identified, which are stable since the prior exam.               MR-BRAIN-W/O   Final Result      The diffusion-weighted sequences demonstrates areas of restricted diffusion in the bilateral basal ganglia and some of the frontal gray matter. The predominant portion of the brain parenchyma does not demonstrate any restricted diffusion. Therefore this    findings likely represent mild diffuse anoxic brain injury.      DX-CHEST-PORTABLE (1 VIEW)   Final Result         1.  Pulmonary edema and/or infiltrates are identified, which are somewhat decreased since the prior exam.            DX-CHEST-PORTABLE (1 VIEW)   Final Result         1.  Pulmonary edema and/or infiltrates are identified, which are stable since the prior exam.         DX-ABDOMEN FOR TUBE PLACEMENT   Final Result         1.  Nonspecific bowel gas pattern.   2.  Dobbhoff tube tip overlying the expected location of the pylorus or first duodenal segment.      DX-CHEST-PORTABLE (1 VIEW)   Final Result         1.  Patchy bilateral pulmonary infiltrates, somewhat increased since prior.      EC-ECHOCARDIOGRAM COMPLETE W/O CONT   Final Result      DX-CHEST-PORTABLE (1 VIEW)   Final Result         1.   "Patchy bilateral pulmonary infiltrates, somewhat increased since prior.      CT-HEAD W/O   Final Result         1.  Possible subtle sulcal effacement and slight loss of differentiation of gray-white matter, consider component of cerebral edema. Recommend radiographic follow-up   2.  Bilateral sphenoid and maxillary sinus air-fluid levels      DX-CHEST-PORTABLE (1 VIEW)   Final Result         1.  No acute cardiopulmonary disease.      DX-CHEST-PORTABLE (1 VIEW)    (Results Pending)     Interval EEG read by Dr. SHEYLA Beatty: \"abnormal continuous video EEG recording in an encephalopathic patient. A moderate encephalopathy is suggested. Generalized delta activity. Continuous, blunted generalized sharps with frontal maximum and triphasic morphology. Frequent runs of Frontal Intermittent Rhythmic Delta Activity (FIRDA). Worsening of the eeg as study continued with more frequent, mostly Brief non convulsive seizures noted, some provoked by tactile stimulation of the patient. Improvement of the eeg with addition of Propofol, with no further seizures captured since. The findings suggest underlying areas of persinstently increased cortical irritability and/or structural abnormality..\"    Assessment and Plan:    Annika He is a 19 y.o. woman presenting for whom neurology has been consulted for seizure management and prognostication.  The patient has a history of polysubstance abuse and subsequent cardiac arrest on 8/23/2020 who remains unresponsive s/p trach and PEG.  Her repeat brain MRI on 9/15/20 shows worsening anoxic brain injury with involvement of lentiform nuclei bilaterally. After discontinuation of sedation there was worsening of the EEG with presence of brief nonconvulsive seizures with hospital course complicated by infection (PNA). Plan to continue sedation until 8a 9/23/20 and then formal prognostication on Thursday 9/24/20 off sedation.     Plan:     - continue VEEG  - continue Depakote 500mg TID  - " continue Vimpat 200mg TID  - continue Keppra 1000mg TID  - continue phenobarb 64.8mg TID  - continue Fycompa to 12mg qhs  - continue Zonegran 200mg BID  - seizure precautions  - discontinue Propofol at 8am tomorrow 9/23/20  - [facility]; neurological prognostication on Thursday 9/24/20 once 24hr off sedation  - family meeting on 9/24/20 at 1600    The evaluation of the patient, and recommended management, was discussed with Dr. Padilla at bedside. I have performed a physical exam and reviewed and updated ROS and Plan today (9/22/2020). In review of yesterday's note (9/21/2020), there are no changes except as documented above.    Ty Mccann MD  Neurohospitalist, Acute Care Services   of Neurology

## 2020-09-22 NOTE — PROGRESS NOTES
Pt is awake, coughing, . Increased Propofol to 30 mcg/kg/min per morning discussion with MD. BP 98/61, MAP 74.

## 2020-09-22 NOTE — PROGRESS NOTES
ICU Brief Note    More seizures noted yesterday by EEG. No seizures over night after starting propofol.    Dr Beatty from neurology notes that patient has a bad brain and is in a vegetative state. He futher notes that her prognosis for meaningful recovery is futile.     Palliative care consulted. Family meeting today or soon.

## 2020-09-22 NOTE — PROGRESS NOTES
Tele summary   SR/ST    0.16,0.08,0.32  No acute tele events    Prop started per MD order per neurology note no titration @40. Wait for neurology reassessment of seizure activity.     Tube feeds stopped @0644 due to emesis

## 2020-09-22 NOTE — CARE PLAN
Problem: Respiratory:  Goal: Respiratory status will improve  Note: Respiratory rate and rhythm assessed and monitored. Oxygenation and saturation monitored. Patient positioned optimally. Collaboration with RT in place.       Problem: Skin Integrity  Goal: Risk for impaired skin integrity will decrease  Note: Skin is assessed for integrity throughout the shift. Pt is turned at least every 2 hours. Pt is kept dry and clean.

## 2020-09-22 NOTE — PROGRESS NOTES
BP is in the 80's since 7 am. Orders to decrease Propofol to 20 mcg/kg/min. Orders to stop the Propofol infusion on 9/23 at 0800. Neurology at bedside for POC discussion.

## 2020-09-22 NOTE — PALLIATIVE CARE
Palliative Care follow-up  Call from Juliette requesting the meeting to be at the end of the week when Angel Luis can be present. PC RN coordinated with Dr. Mccann and critical care team for meeting Thursday at 1600. Angel Luis and Juliette updated on new meeting time of Thursday at 1600. PC team will be in the meeting; BS RN updated via Vo[employer].       Updated: MD; ANDREI    Plan: meeting Thursday at 1600    Thank you for allowing Palliative Care to support this patient and family. Contact [phone] for additional assistance, change in patient status, or with any questions/concerns.

## 2020-09-22 NOTE — PROCEDURES
VIDEO ELECTROENCEPHALOGRAM REPORT        Referring provider: Dr. Mccann.      DOS: 9/22/2020 (total recording of 23 hours and 16 minutes).      INDICATION:  Annika He 19 y.o. female presenting with s/p cardiac arrest, altered mental status, seizures.      CURRENT ANTIEPILEPTIC REGIMEN: Levetiracetam 1000 mg tid, Lacosamide 200 mg tid, Valproic Acid 1250 mg tid, Perampanel increased to 12 mg qhs, Phenobarbital 60 mg q 8 hrs. Zonisamide 200 mg bid added. The patient sedated with Propofol.      TECHNIQUE: 30 channel video electroencephalogram (EEG) was performed in accordance with the international 10-20 system. The study was reviewed in bipolar and referential montages. The recording examined an encephalopathic and/or sedated patient.      DESCRIPTION OF THE RECORD:  Generalized delta activity. Frequent, blunted generalized sharps with frontal maximum and triphasic morphology. Frequent runs of Frontal Intermittent Rhythmic Delta Activity (FIRDA). Frequent non convulsive seizures noted despite the use of multiple anti-seizure medications and sedative.      ACTIVATION PROCEDURES:   Not performed.      EKG: sampling of the EKG recording demonstrated sinus rhythm.      EVENTS:  None.      INTERPRETATION:  This is an abnormal continuous video EEG recording in an encephalopathic and/or sedated patient. A moderate encephalopathy is suggested. Frequent, blunted generalized sharps with frontal maximum and triphasic morphology. Frequent runs of Frontal Intermittent Rhythmic Delta Activity (FIRDA). Frequent non convulsive seizures noted despite the use of multiple anti-seizure medications and sedative. The findings suggest underlying areas of persinstently increased cortical irritability and/or structural abnormality. The patient has failed multiple attempts to wean sedation and seizures are refractory to medical treatment. Clinical and radiological correlation is recommended.     Updates provided to Dr. Mccann and  Dr. Padilla.         Garland Beatty MD   Epilepsy and Neurodiagnostics.   Clinical  of Neurology [facility].   Diplomate in Neurology, Epilepsy, and Electrodiagnostic Medicine.   Office: [phone]  Fax: [phone]

## 2020-09-22 NOTE — PALLIATIVE CARE
Palliative Care follow-up  Call from father Angel Luis stating he will be available to participate in the conference today at 1300 by phone. He was hoping the meeting could be moved to Thursday when he is back in town. PC explained the need to provide updates on her current situation and further discussions could also take place later this week as needed. He was appreciative. He will have his phone ready and expect a call from PC RN during the meeting.      Updated: PC team    Plan: family meeting today at 1300    Thank you for allowing Palliative Care to support this patient and family. Contact [phone] for additional assistance, change in patient status, or with any questions/concerns.

## 2020-09-22 NOTE — DISCHARGE PLANNING
Care Transition Team Discharge Planning    Anticipated Discharge Disposition: TBD    Action: Lsw received VM from palliative RN Kianna. She reports there will be a meeting w/ family this Thursday, 9.22.20, at 4PM.    She understands d/c planning team does not have to attend, may want to know about the meeting.     Barriers to Discharge: TBD    Plan: f/u w/ palliative, medical team, pt/parents

## 2020-09-22 NOTE — PROGRESS NOTES
Critical Care Progress Note    Date of admission  8/23/2020    Chief Complaint  19 y.o. female admitted 8/23/2020 with a cardiac arrest following an apparent drug overdose.    Hospital Course    8/24 - TTM protocol initiated, EEG without NCSE  8/25 - rewarmed, awake, not following  8/26 - following occasional commands  8/27- seizure like activity vs shivering--> Keppra, versed, Propofol  8/28- no seizures on EEG  8/29- remains unresponsive.  8/30 -mental status improved, now opening eyes and following commands.  Keppra discontinued  8/31 - started cefepime/vancomycin for possible pneumonia,?  UTI, fever and increasing WBC fever; full vent  9/1 - Seizure activity noted on EEG today, reviewed with neurology; keppra load and increased to 1gm q 12; full vent support  9/2 - remains unresponsive with only some posture and grimace to stimulation, neurology reconsulted, stat MRI brain, LP, cEEG  9/3 -ongoing seizures, Vimpat added, Depakote added, family conference today  9/4 -ongoing seizure noted on cEEG, started on midazolam infusion, full ventilator support  9/5 -ongoing seizures noted, propofol added and increased today, full ventilator support  9/6 -have not yet achieved burst suppression on cEEG; continue propofol, change Versed to ketamine infusion, discussed with neurology, full ventilator support  9/7 - still titrating propofol and ketamine for burst suppression on cEEG. Continue full ventilator support  9/8 - propofol off; ketamine is being weaned; on vimpat, keppra, depakote. Requiring full ventilator support  9/9 - Requiring full ventilator support. propofol and ketamine off; on vimpat, keppra, depakote. Postures to stimuli - consult for trach/PEG  9/10 -status post tracheostomy, off propofol and on ketamine- beginning to wean antiepileptic medications, continues to require full ventilator support. PEG tomorrow. Reportedly is still having seizures.   9/11 PEG tube today, continues to require full mechanical  ventilator support  9/12 returned to deep sedation secondary to persistent seizures; continues to require full vent support  9/13 continues to require full vent support; propofol and ketamine plus AEDs for persistent, intermittent seizures  9/14 -    continue full vent support.  Continue propofol, ketamine.  Start Versed.  Continuous EEG.  9/15 -    continue vent support.  Increase Versed drip.  For repeat MRI today.  9/16 -    stop Versed drip.  Continue other AEDs.  Continue vent support.  9/17 -    febrile.  Culture blood and sputum.  Check UA.  Decrease propofol.  Continue vent support.  9/18 -    Pseudomonas in sputum.  Start Fortaz.  Stop ketamine.  Propofol stopped yesterday.  9/19 -    continue Fortaz.  Add phenobarbital to AED regimen.  9/20 -    continue current AED regimen.  Continue Fortaz.     9/21- vegetative state persists, no new seizures per neuro,   9/22- more seizures by EEG, placed back on propofol with seizure cessation      Interval Problem Update  Reviewed last 24 hour events:  Back on propofol for EEG seizures  Will remain on prop for 36 hours then observe off  Worrisome prognosis per neuro  Remains on vent/ T-piece  Family meeting this Thursday with neuro/ICU      Discussed with neuro  SR-ST  TF at 25 increasing slowly  Vent 30  Trach 13  Fortaz      Review of Systems  Review of Systems   Unable to perform ROS: Acuity of condition        Vital Signs for last 24 hours   Temp:  [36.2 °C (97.1 °F)-36.6 °C (97.8 °F)] 36.6 °C (97.8 °F)  Pulse:  [] 71  Resp:  [16-36] 33  BP: ()/(42-83) 98/61  SpO2:  [66 %-100 %] 82 %    Hemodynamic parameters for last 24 hours       Respiratory Information for the last 24 hours  Vent Mode: ASV  PEEP/CPAP: 8  P Support: 5  MAP: 13  Length of Weaning Trial (Hours): 5 minutes  Control VTE (exp VT): 332    Physical Exam   Physical Exam  Constitutional:       Appearance: She is not diaphoretic.      Comments: On ventilator   HENT:      Head: Normocephalic  and atraumatic.      Right Ear: External ear normal.      Left Ear: External ear normal.      Nose: Nose normal.      Mouth/Throat:      Mouth: Mucous membranes are moist.      Pharynx: Oropharynx is clear.   Eyes:      Conjunctiva/sclera: Conjunctivae normal.      Pupils: Pupils are equal, round, and reactive to light.   Neck:      Musculoskeletal: Normal range of motion. No neck rigidity.      Comments: Trach in place  Cardiovascular:      Pulses: Normal pulses.      Heart sounds: No murmur. No gallop.       Comments: Sinus rhythm  Pulmonary:      Breath sounds: Rales (Coarse crackles bilaterally) present. No wheezing.   Abdominal:      General: Bowel sounds are normal. There is no distension.      Palpations: Abdomen is soft.      Tenderness: There is no abdominal tenderness. There is no rebound.      Comments: Tolerating enteral tube feedings   Musculoskeletal: Normal range of motion.      Comments: No clubbing or cyanosis   Skin:     General: Skin is warm and dry.      Capillary Refill: Capillary refill takes less than 2 seconds.   Neurological:      Comments: Pupils 4 mm, equal and briskly reactive.  She opens her eyes and blinks.  She does not track or follow.         Medications  Current Facility-Administered Medications   Medication Dose Route Frequency Provider Last Rate Last Dose   • propofol (DIPRIVAN) injection  30 mcg/kg/min Intravenous Continuous Nithin Padilla M.D. 12 mL/hr at 09/22/20 1500 30 mcg/kg/min at 09/22/20 1500   • zonisamide (ZONEGRAN) 10 mg/ml oral suspension 200 mg  200 mg Enteral Tube BID Ty Mccann M.D.   200 mg at 09/22/20 0604   • perampanel (FYCOMPA) tablet 12 mg  12 mg Oral QHS Ty Mccann M.D.   12 mg at 09/21/20 2105   • ceftAZIDime (FORTAZ) 2,000 mg in  mL IVPB  2 g Intravenous Q8HRS Nithin Padilla M.D. 200 mL/hr at 09/22/20 1503 2,000 mg at 09/22/20 1503   • PHENobarbital (LUMINAL) tablet 64.8 mg  64.8 mg Enteral Tube Q8HRS Toni Bear M.D.   64.8 mg  at 09/22/20 1508   • divalproex (DEPAKOTE SPRINKLE) capsule 500 mg  500 mg Enteral Tube Q8HRS Angel Luis Lanier M.D.   500 mg at 09/22/20 1504   • fentaNYL (SUBLIMAZE) injection  mcg   mcg Intravenous Q HOUR PRN Angel Luis Lanier M.D.   50 mcg at 09/19/20 1450   • levETIRAcetam (KEPPRA) tablet 1,000 mg  1,000 mg Enteral Tube Q8HR Andrew Petersen M.D.   1,000 mg at 09/22/20 1508   • glycopyrrolate (ROBINUL) tablet 0.5 mg  0.5 mg Enteral Tube Q8HRS Andrew Petersen M.D.   0.5 mg at 09/22/20 1507   • lacosamide (VIMPAT) tablet 200 mg  200 mg Enteral Tube Q8HR Andrew Petersen M.D.   200 mg at 09/22/20 1004   • acetaminophen (TYLENOL) suppository 325 mg  325 mg Rectal Q6HRS PRN Yosef Tomlin M.D.   650 mg at 09/02/20 1632   • acetaminophen (TYLENOL) tablet 1,000 mg  1,000 mg Enteral Tube Q4HRS PRN Dar Red M.D.   1,000 mg at 09/21/20 2303   • labetalol (NORMODYNE/TRANDATE) injection 10 mg  10 mg Intravenous Q4HRS PRN Yosef Tomlin M.D.   10 mg at 08/30/20 2256   • Pharmacy Consult: Enteral tube insertion - review meds/change route/product selection  1 Each Other PHARMACY TO DOSE Boogie Augustine Jr., D.O.       • Respiratory Therapy Consult   Nebulization Continuous RT Brock Murdock M.D.       • ipratropium-albuterol (DUONEB) nebulizer solution  3 mL Nebulization Q2HRS PRN (RT) Brock Murdock M.D.       • famotidine (PEPCID) tablet 20 mg  20 mg Enteral Tube Q12HRS Dar Red M.D.   20 mg at 09/22/20 0603   • senna-docusate (PERICOLACE or SENOKOT S) 8.6-50 MG per tablet 2 Tab  2 Tab Enteral Tube BID Brock Murdock M.D.   2 Tab at 09/22/20 0603    And   • polyethylene glycol/lytes (MIRALAX) PACKET 1 Packet  1 Packet Enteral Tube QDAY PRN Brock Murdock M.D.        And   • magnesium hydroxide (MILK OF MAGNESIA) suspension 30 mL  30 mL Enteral Tube QDAY PRN Brock Murdock M.D.        And   • bisacodyl (DULCOLAX) suppository 10 mg  10 mg Rectal QDAY PRN Brock Murdock  M.D.       • MD Alert...ICU Electrolyte Replacement per Pharmacy   Other PHARMACY TO DOSE Brock Murdock M.D.       • lidocaine (XYLOCAINE) 1 % injection 1-2 mL  1-2 mL Tracheal Tube Q30 MIN PRN Brock Murdock M.D.   Stopped at 09/21/20 1638   • enoxaparin (LOVENOX) inj 40 mg  40 mg Subcutaneous DAILY Boogie Augustine Jr., D.O.   40 mg at 09/22/20 0602       Fluids    Intake/Output Summary (Last 24 hours) at 9/22/2020 1534  Last data filed at 9/22/2020 1500  Gross per 24 hour   Intake 1187.47 ml   Output 1728 ml   Net -540.53 ml       Laboratory          Recent Labs     09/20/20  0545 09/21/20  0245 09/22/20  0405   SODIUM 136 132* 137   POTASSIUM 4.0 4.3 4.0   CHLORIDE 100 98 101   CO2 20 20 21   BUN 17 15 15   CREATININE 0.37* 0.43* 0.48*   MAGNESIUM 2.0 2.1 2.3   CALCIUM 8.7 9.1 9.5     Recent Labs     09/20/20  0545 09/21/20  0245 09/22/20  0405   ALTSGPT 46 43 37   ASTSGOT 28 31 28   ALKPHOSPHAT 121* 118* 121*   TBILIRUBIN <0.2 0.2 0.2   GLUCOSE 142* 106* 113*     Recent Labs     09/20/20  0545 09/21/20  0245 09/22/20  0405   WBC 8.0 9.1 12.9*   NEUTSPOLYS 61.40 58.90 71.20   LYMPHOCYTES 17.70* 20.20* 14.00*   MONOCYTES 10.80 10.50 8.80   EOSINOPHILS 8.40* 8.10* 5.10   BASOPHILS 0.40 0.60 0.20   ASTSGOT 28 31 28   ALTSGPT 46 43 37   ALKPHOSPHAT 121* 118* 121*   TBILIRUBIN <0.2 0.2 0.2     Recent Labs     09/20/20  0545 09/21/20  0245 09/22/20  0405   RBC 3.65* 3.75* 4.09*   HEMOGLOBIN 11.4* 11.7* 12.9   HEMATOCRIT 34.3* 35.6* 39.1   PLATELETCT 437 435 512*       Imaging  None    Assessment/Plan  * Acute respiratory failure with hypoxia (HCC)- (present on admission)  Assessment & Plan  Intubated 8/24  Trach 9/9  On T-piece 6 hours/day yesterday  Holding T-piece trials while on medium/high prop    Anoxic encephalopathy (HCC)- (present on admission)  Assessment & Plan  MRI on 9/2 and 9/15 consistent with severe anoxic brain injury  EEG with frequent, blunted generalized sharps with frontal maximum and triphasic  morphology with frequent runs of frontal intermittent rhythmic delta activity (FIRDA)  Continue AEDs    Exam consistent with vegetative state. Poor prognosis for return to independent function. Discuss further with neurology and family    More seizures, back on propofol. Suspect extremely poor brain function will persist. Meet with family Thursday after assessment again off propofol.    Drug overdose- (present on admission)  Assessment & Plan  BA 0.16 on presentation  Positive urine drug screen for amphetamines on presentation  Boyfriend reported methamphetamine, oxycodone and alcohol use    Fever  Assessment & Plan  1 out of 2 blood cultures positive for coag negative Staphylococcus on 9/17 - contaminant  UA unremarkable  Sputum with Pseudomonas  Fever has resolved after Fortaz started    Seizures ([facility])  Assessment & Plan  Continuous video EEG  Continue phenobarbital, 64.8 mg every 8 hours  Continue Vimpat, 200 mg every 8 hours  Continue valproic acid, 500 mg every 8 hours  Continue Keppra, 1000 mg every 8 hours  Continue Fycompa, 10 mg nightly    Now on prop for 36 hours. Then will observe off prop    Pneumonia of both lungs due to Pseudomonas species ([facility])  Assessment & Plan  Sputum culture with MRSA, Acinetobacter and Stenotrophomonas species on 9/6  Completed 10 days of Bactrim on 9/13  Sputum culture from 9/17 with Pseudomonas aeruginosa  Continue Fortaz    Elevated transaminase level- (present on admission)  Assessment & Plan  Due to valproic acid  Resolving    S/P PEA cardiac arrest ([facility])- (present on admission)  Assessment & Plan  S/P therapeutic hypothermia protocol    Dysphagia, oropharyngeal- (present on admission)  Assessment & Plan  S/P laparoscopic gastrostomy on 9/11  Continue enteral tube feedings       VTE:  Lovenox  Ulcer: H2 Antagonist  Lines: Pearce Catheter  Ongoing indication addressed    I have performed a physical exam and reviewed and updated ROS and Plan today (9/22/2020). In review of  yesterday's note (9/21/2020), there are no changes except as documented above.     I have assessed and reassessed her respiratory status with ventilator adjustments, ventilator waveforms, airway mechanics, hemodynamics, blood pressure, cardiovascular status and her neurologic status.  She is at increased risk for worsening CNS and respiratory system dysfunction.    Discussed patient condition and risk of morbidity and/or mortality with RN, RT, Pharmacy, Charge nurse / hot rounds, QA team and neurology     The patient remains critically ill.  Critical care time = 35 minutes in directly providing and coordinating critical care and extensive data review.  No time overlap and excludes procedures.

## 2020-09-23 ENCOUNTER — APPOINTMENT (OUTPATIENT)
Dept: RADIOLOGY | Facility: MEDICAL CENTER | Age: 20
DRG: 004 | End: 2020-09-23
Attending: INTERNAL MEDICINE
Payer: MEDICAID

## 2020-09-23 LAB
ALBUMIN SERPL BCP-MCNC: 3.6 G/DL (ref 3.2–4.9)
ALBUMIN/GLOB SERPL: 1.1 G/DL
ALP SERPL-CCNC: 105 U/L (ref 30–99)
ALT SERPL-CCNC: 29 U/L (ref 2–50)
ANION GAP SERPL CALC-SCNC: 14 MMOL/L (ref 7–16)
AST SERPL-CCNC: 28 U/L (ref 12–45)
BASOPHILS # BLD AUTO: 0.6 % (ref 0–1.8)
BASOPHILS # BLD: 0.05 K/UL (ref 0–0.12)
BILIRUB SERPL-MCNC: <0.2 MG/DL (ref 0.1–1.5)
BUN SERPL-MCNC: 16 MG/DL (ref 8–22)
CALCIUM SERPL-MCNC: 9.2 MG/DL (ref 8.5–10.5)
CHLORIDE SERPL-SCNC: 99 MMOL/L (ref 96–112)
CO2 SERPL-SCNC: 24 MMOL/L (ref 20–33)
CREAT SERPL-MCNC: 0.39 MG/DL (ref 0.5–1.4)
EOSINOPHIL # BLD AUTO: 0.59 K/UL (ref 0–0.51)
EOSINOPHIL NFR BLD: 7.1 % (ref 0–6.9)
ERYTHROCYTE [DISTWIDTH] IN BLOOD BY AUTOMATED COUNT: 51.2 FL (ref 35.9–50)
GLOBULIN SER CALC-MCNC: 3.2 G/DL (ref 1.9–3.5)
GLUCOSE SERPL-MCNC: 94 MG/DL (ref 65–99)
HCT VFR BLD AUTO: 39.2 % (ref 37–47)
HGB BLD-MCNC: 12.6 G/DL (ref 12–16)
IMM GRANULOCYTES # BLD AUTO: 0.16 K/UL (ref 0–0.11)
IMM GRANULOCYTES NFR BLD AUTO: 1.9 % (ref 0–0.9)
LYMPHOCYTES # BLD AUTO: 1.82 K/UL (ref 1–4.8)
LYMPHOCYTES NFR BLD: 22 % (ref 22–41)
MAGNESIUM SERPL-MCNC: 2.2 MG/DL (ref 1.5–2.5)
MCH RBC QN AUTO: 31.3 PG (ref 27–33)
MCHC RBC AUTO-ENTMCNC: 32.1 G/DL (ref 33.6–35)
MCV RBC AUTO: 97.5 FL (ref 81.4–97.8)
MONOCYTES # BLD AUTO: 1.28 K/UL (ref 0–0.85)
MONOCYTES NFR BLD AUTO: 15.5 % (ref 0–13.4)
NEUTROPHILS # BLD AUTO: 4.36 K/UL (ref 2–7.15)
NEUTROPHILS NFR BLD: 52.9 % (ref 44–72)
NRBC # BLD AUTO: 0 K/UL
NRBC BLD-RTO: 0 /100 WBC
PLATELET # BLD AUTO: 420 K/UL (ref 164–446)
PMV BLD AUTO: 9.8 FL (ref 9–12.9)
POTASSIUM SERPL-SCNC: 4.5 MMOL/L (ref 3.6–5.5)
PROT SERPL-MCNC: 6.8 G/DL (ref 6–8.2)
RBC # BLD AUTO: 4.02 M/UL (ref 4.2–5.4)
SODIUM SERPL-SCNC: 137 MMOL/L (ref 135–145)
TRIGL SERPL-MCNC: 178 MG/DL (ref 0–149)
WBC # BLD AUTO: 8.3 K/UL (ref 4.8–10.8)

## 2020-09-23 PROCEDURE — 770022 HCHG ROOM/CARE - ICU (200)

## 2020-09-23 PROCEDURE — 700102 HCHG RX REV CODE 250 W/ 637 OVERRIDE(OP): Performed by: INTERNAL MEDICINE

## 2020-09-23 PROCEDURE — A9270 NON-COVERED ITEM OR SERVICE: HCPCS | Performed by: INTERNAL MEDICINE

## 2020-09-23 PROCEDURE — 94640 AIRWAY INHALATION TREATMENT: CPT

## 2020-09-23 PROCEDURE — A9270 NON-COVERED ITEM OR SERVICE: HCPCS | Performed by: PSYCHIATRY & NEUROLOGY

## 2020-09-23 PROCEDURE — 71045 X-RAY EXAM CHEST 1 VIEW: CPT

## 2020-09-23 PROCEDURE — 700111 HCHG RX REV CODE 636 W/ 250 OVERRIDE (IP): Performed by: INTERNAL MEDICINE

## 2020-09-23 PROCEDURE — 95720 EEG PHY/QHP EA INCR W/VEEG: CPT | Performed by: PSYCHIATRY & NEUROLOGY

## 2020-09-23 PROCEDURE — 99232 SBSQ HOSP IP/OBS MODERATE 35: CPT | Mod: 25 | Performed by: PSYCHIATRY & NEUROLOGY

## 2020-09-23 PROCEDURE — 700105 HCHG RX REV CODE 258

## 2020-09-23 PROCEDURE — 700102 HCHG RX REV CODE 250 W/ 637 OVERRIDE(OP): Performed by: PSYCHIATRY & NEUROLOGY

## 2020-09-23 PROCEDURE — 84478 ASSAY OF TRIGLYCERIDES: CPT

## 2020-09-23 PROCEDURE — 99291 CRITICAL CARE FIRST HOUR: CPT | Performed by: INTERNAL MEDICINE

## 2020-09-23 PROCEDURE — 700105 HCHG RX REV CODE 258: Performed by: INTERNAL MEDICINE

## 2020-09-23 PROCEDURE — 4A10X4Z MONITORING OF CENTRAL NERVOUS ELECTRICAL ACTIVITY, EXTERNAL APPROACH: ICD-10-PCS | Performed by: PSYCHIATRY & NEUROLOGY

## 2020-09-23 PROCEDURE — 80053 COMPREHEN METABOLIC PANEL: CPT

## 2020-09-23 PROCEDURE — 85025 COMPLETE CBC W/AUTO DIFF WBC: CPT

## 2020-09-23 PROCEDURE — 95714 VEEG EA 12-26 HR UNMNTR: CPT | Performed by: PSYCHIATRY & NEUROLOGY

## 2020-09-23 PROCEDURE — 83735 ASSAY OF MAGNESIUM: CPT

## 2020-09-23 RX ORDER — SODIUM CHLORIDE 9 MG/ML
INJECTION, SOLUTION INTRAVENOUS
Status: COMPLETED
Start: 2020-09-23 | End: 2020-09-23

## 2020-09-23 RX ADMIN — PROPOFOL 30 MCG/KG/MIN: 10 INJECTION, EMULSION INTRAVENOUS at 18:41

## 2020-09-23 RX ADMIN — PROPOFOL 30 MCG/KG/MIN: 10 INJECTION, EMULSION INTRAVENOUS at 10:44

## 2020-09-23 RX ADMIN — DIVALPROEX SODIUM 500 MG: 125 CAPSULE ORAL at 14:44

## 2020-09-23 RX ADMIN — DIVALPROEX SODIUM 500 MG: 125 CAPSULE ORAL at 22:05

## 2020-09-23 RX ADMIN — ZONISAMIDE 200 MG: 50 CAPSULE ORAL at 17:25

## 2020-09-23 RX ADMIN — PERAMPANEL 12 MG: 6 TABLET ORAL at 22:05

## 2020-09-23 RX ADMIN — FAMOTIDINE 20 MG: 20 TABLET, FILM COATED ORAL at 17:24

## 2020-09-23 RX ADMIN — ENOXAPARIN SODIUM 40 MG: 40 INJECTION SUBCUTANEOUS at 05:17

## 2020-09-23 RX ADMIN — LACOSAMIDE 200 MG: 100 TABLET, FILM COATED ORAL at 10:28

## 2020-09-23 RX ADMIN — CEFTAZIDIME 2000 MG: 1 INJECTION, POWDER, FOR SOLUTION INTRAMUSCULAR; INTRAVENOUS at 05:17

## 2020-09-23 RX ADMIN — LACOSAMIDE 200 MG: 100 TABLET, FILM COATED ORAL at 17:25

## 2020-09-23 RX ADMIN — PHENOBARBITAL 64.8 MG: 32.4 TABLET ORAL at 22:05

## 2020-09-23 RX ADMIN — GLYCOPYRROLATE 0.5 MG: 1 TABLET ORAL at 22:06

## 2020-09-23 RX ADMIN — PHENOBARBITAL 64.8 MG: 32.4 TABLET ORAL at 05:16

## 2020-09-23 RX ADMIN — MIDAZOLAM 5 MG/HR: 5 INJECTION INTRAMUSCULAR; INTRAVENOUS at 09:31

## 2020-09-23 RX ADMIN — DOCUSATE SODIUM 50 MG AND SENNOSIDES 8.6 MG 2 TABLET: 8.6; 5 TABLET, FILM COATED ORAL at 17:25

## 2020-09-23 RX ADMIN — PROPOFOL 30 MCG/KG/MIN: 10 INJECTION, EMULSION INTRAVENOUS at 02:11

## 2020-09-23 RX ADMIN — CEFTAZIDIME 2000 MG: 1 INJECTION, POWDER, FOR SOLUTION INTRAMUSCULAR; INTRAVENOUS at 14:39

## 2020-09-23 RX ADMIN — DIVALPROEX SODIUM 500 MG: 125 CAPSULE ORAL at 05:17

## 2020-09-23 RX ADMIN — LEVETIRACETAM 1000 MG: 500 TABLET, FILM COATED ORAL at 22:05

## 2020-09-23 RX ADMIN — LEVETIRACETAM 1000 MG: 500 TABLET, FILM COATED ORAL at 14:44

## 2020-09-23 RX ADMIN — LEVETIRACETAM 1000 MG: 500 TABLET, FILM COATED ORAL at 05:17

## 2020-09-23 RX ADMIN — CEFTAZIDIME 2000 MG: 1 INJECTION, POWDER, FOR SOLUTION INTRAMUSCULAR; INTRAVENOUS at 19:30

## 2020-09-23 RX ADMIN — SODIUM CHLORIDE 250 ML: 9 INJECTION, SOLUTION INTRAVENOUS at 05:18

## 2020-09-23 RX ADMIN — LACOSAMIDE 200 MG: 100 TABLET, FILM COATED ORAL at 02:26

## 2020-09-23 RX ADMIN — DOCUSATE SODIUM 50 MG AND SENNOSIDES 8.6 MG 2 TABLET: 8.6; 5 TABLET, FILM COATED ORAL at 05:17

## 2020-09-23 RX ADMIN — FAMOTIDINE 20 MG: 20 TABLET, FILM COATED ORAL at 05:16

## 2020-09-23 RX ADMIN — GLYCOPYRROLATE 0.5 MG: 1 TABLET ORAL at 05:17

## 2020-09-23 RX ADMIN — GLYCOPYRROLATE 0.5 MG: 1 TABLET ORAL at 14:46

## 2020-09-23 RX ADMIN — PHENOBARBITAL 64.8 MG: 32.4 TABLET ORAL at 14:44

## 2020-09-23 RX ADMIN — ZONISAMIDE 200 MG: 50 CAPSULE ORAL at 05:30

## 2020-09-23 NOTE — PROGRESS NOTES
Critical Care Progress Note    Date of admission  8/23/2020    Chief Complaint  19 y.o. female admitted 8/23/2020 with a cardiac arrest following an apparent drug overdose.    Hospital Course    8/24 - TTM protocol initiated, EEG without NCSE  8/25 - rewarmed, awake, not following  8/26 - following occasional commands  8/27- seizure like activity vs shivering--> Keppra, versed, Propofol  8/28- no seizures on EEG  8/29- remains unresponsive.  8/30 -mental status improved, now opening eyes and following commands.  Keppra discontinued  8/31 - started cefepime/vancomycin for possible pneumonia,?  UTI, fever and increasing WBC fever; full vent  9/1 - Seizure activity noted on EEG today, reviewed with neurology; keppra load and increased to 1gm q 12; full vent support  9/2 - remains unresponsive with only some posture and grimace to stimulation, neurology reconsulted, stat MRI brain, LP, cEEG  9/3 -ongoing seizures, Vimpat added, Depakote added, family conference today  9/4 -ongoing seizure noted on cEEG, started on midazolam infusion, full ventilator support  9/5 -ongoing seizures noted, propofol added and increased today, full ventilator support  9/6 -have not yet achieved burst suppression on cEEG; continue propofol, change Versed to ketamine infusion, discussed with neurology, full ventilator support  9/7 - still titrating propofol and ketamine for burst suppression on cEEG. Continue full ventilator support  9/8 - propofol off; ketamine is being weaned; on vimpat, keppra, depakote. Requiring full ventilator support  9/9 - Requiring full ventilator support. propofol and ketamine off; on vimpat, keppra, depakote. Postures to stimuli - consult for trach/PEG  9/10 -status post tracheostomy, off propofol and on ketamine- beginning to wean antiepileptic medications, continues to require full ventilator support. PEG tomorrow. Reportedly is still having seizures.   9/11 PEG tube today, continues to require full mechanical  ventilator support  9/12 returned to deep sedation secondary to persistent seizures; continues to require full vent support  9/13 continues to require full vent support; propofol and ketamine plus AEDs for persistent, intermittent seizures  9/14 -    continue full vent support.  Continue propofol, ketamine.  Start Versed.  Continuous EEG.  9/15 -    continue vent support.  Increase Versed drip.  For repeat MRI today.  9/16 -    stop Versed drip.  Continue other AEDs.  Continue vent support.  9/17 -    febrile.  Culture blood and sputum.  Check UA.  Decrease propofol.  Continue vent support.  9/18 -    Pseudomonas in sputum.  Start Fortaz.  Stop ketamine.  Propofol stopped yesterday.  9/19 -    continue Fortaz.  Add phenobarbital to AED regimen.  9/20 -    continue current AED regimen.  Continue Fortaz.     9/21- vegetative state persists, no new seizures per neuro,   9/22- more seizures by EEG, placed back on propofol with seizure cessation  9/23- seizures intermittent overnight per EEG, Versed added today, grim outlook neurologically      Interval Problem Update  Reviewed last 24 hour events:  Seizures by EEG  Versed added to fixed dose propofol  Family conference deferred until Friday per neurology to evaluate pt off all sedatives prior to meet family  Prognosis is unfortunately grim for meaningful neurologic recovery  Remains on T-piece  Lytes corrected    Prior  Back on propofol for EEG seizures  Will remain on prop for 36 hours then observe off  Worrisome prognosis per neuro  Remains on vent/ T-piece  Family meeting this Thursday with neuro/ICU      Discussed with neuro  SR-ST  TF at 25 increasing slowly  Vent 30  Trach 13  Fortaz      Review of Systems  Review of Systems   Unable to perform ROS: Acuity of condition        Vital Signs for last 24 hours   Temp:  [35.9 °C (96.6 °F)-37 °C (98.6 °F)] 36.6 °C (97.8 °F)  Pulse:  [] 86  Resp:  [15-37] 26  BP: ()/(38-67) 95/43  SpO2:  [82 %-100 %] 97  %    Hemodynamic parameters for last 24 hours       Respiratory Information for the last 24 hours       Physical Exam   Physical Exam  Constitutional:       Appearance: She is not diaphoretic.      Comments: On ventilator   HENT:      Head: Normocephalic and atraumatic.      Right Ear: External ear normal.      Left Ear: External ear normal.      Nose: Nose normal.      Mouth/Throat:      Mouth: Mucous membranes are moist.      Pharynx: Oropharynx is clear.   Eyes:      Conjunctiva/sclera: Conjunctivae normal.      Pupils: Pupils are equal, round, and reactive to light.   Neck:      Musculoskeletal: Normal range of motion. No neck rigidity.      Comments: Trach in place  Cardiovascular:      Pulses: Normal pulses.      Heart sounds: No murmur. No gallop.       Comments: Sinus rhythm  Pulmonary:      Breath sounds: Rales (Coarse crackles bilaterally) present. No wheezing.   Abdominal:      General: Bowel sounds are normal. There is no distension.      Palpations: Abdomen is soft.      Tenderness: There is no abdominal tenderness. There is no rebound.      Comments: Tolerating enteral tube feedings   Musculoskeletal: Normal range of motion.      Comments: No clubbing or cyanosis   Skin:     General: Skin is warm and dry.      Capillary Refill: Capillary refill takes less than 2 seconds.   Neurological:      Comments: Pupils 4 mm, equal and briskly reactive.  She opens her eyes and blinks.  She does not track or follow.         Medications  Current Facility-Administered Medications   Medication Dose Route Frequency Provider Last Rate Last Dose   • propofol (DIPRIVAN) injection  30 mcg/kg/min Intravenous Continuous Nithin Padilla M.D. 12 mL/hr at 09/23/20 1044 30 mcg/kg/min at 09/23/20 1044   • midazolam (VERSED) premix 125 mg/125 mL NS  5 mg/hr Intravenous Continuous Nithin Padilla M.D. 5 mL/hr at 09/23/20 0931 5 mg/hr at 09/23/20 0931   • zonisamide (ZONEGRAN) 10 mg/ml oral suspension 200 mg  200 mg Enteral Tube  BID Ty Mccann M.D.   200 mg at 09/23/20 0530   • perampanel (FYCOMPA) tablet 12 mg  12 mg Oral QHS Ty Mccann M.D.   12 mg at 09/22/20 2157   • ceftAZIDime (FORTAZ) 2,000 mg in  mL IVPB  2 g Intravenous Q8HRS Nithin Padilla M.D.   Stopped at 09/23/20 0547   • PHENobarbital (LUMINAL) tablet 64.8 mg  64.8 mg Enteral Tube Q8HRS Toni Bear M.D.   64.8 mg at 09/23/20 0516   • divalproex (DEPAKOTE SPRINKLE) capsule 500 mg  500 mg Enteral Tube Q8HRS Angel Luis Lanier M.D.   500 mg at 09/23/20 0517   • fentaNYL (SUBLIMAZE) injection  mcg   mcg Intravenous Q HOUR PRN Angel Luis Lanier M.D.   50 mcg at 09/19/20 1450   • levETIRAcetam (KEPPRA) tablet 1,000 mg  1,000 mg Enteral Tube Q8HR Andrew Petersen M.D.   1,000 mg at 09/23/20 0517   • glycopyrrolate (ROBINUL) tablet 0.5 mg  0.5 mg Enteral Tube Q8HRS Andrew Petersen M.D.   0.5 mg at 09/23/20 0517   • lacosamide (VIMPAT) tablet 200 mg  200 mg Enteral Tube Q8HR Andrew Petersen M.D.   200 mg at 09/23/20 1028   • acetaminophen (TYLENOL) suppository 325 mg  325 mg Rectal Q6HRS PRN Yosef Tomlin M.D.   650 mg at 09/02/20 1632   • acetaminophen (TYLENOL) tablet 1,000 mg  1,000 mg Enteral Tube Q4HRS PRN Dar Red M.D.   1,000 mg at 09/21/20 2303   • labetalol (NORMODYNE/TRANDATE) injection 10 mg  10 mg Intravenous Q4HRS PRN Yosef Tomlin M.D.   10 mg at 08/30/20 2256   • Pharmacy Consult: Enteral tube insertion - review meds/change route/product selection  1 Each Other PHARMACY TO DOSE Boogie Augustine Jr., D.O.       • Respiratory Therapy Consult   Nebulization Continuous RT Brock Murdock M.D.       • ipratropium-albuterol (DUONEB) nebulizer solution  3 mL Nebulization Q2HRS PRN (RT) Brock Murdock M.D.       • famotidine (PEPCID) tablet 20 mg  20 mg Enteral Tube Q12HRS Dar Red M.D.   20 mg at 09/23/20 0516   • senna-docusate (PERICOLACE or SENOKOT S) 8.6-50 MG per tablet 2 Tab  2 Tab Enteral  Tube BID Brock Murdock M.D.   2 Tab at 09/23/20 0517    And   • polyethylene glycol/lytes (MIRALAX) PACKET 1 Packet  1 Packet Enteral Tube QDAY PRN Brock Murdock M.D.        And   • magnesium hydroxide (MILK OF MAGNESIA) suspension 30 mL  30 mL Enteral Tube QDAY PRN Brock Murdock M.D.        And   • bisacodyl (DULCOLAX) suppository 10 mg  10 mg Rectal QDAY PRN Brock Murdock M.D.       • MD Alert...ICU Electrolyte Replacement per Pharmacy   Other PHARMACY TO DOSE Brock Murdock M.D.       • lidocaine (XYLOCAINE) 1 % injection 1-2 mL  1-2 mL Tracheal Tube Q30 MIN PRN Brock Murdock M.D.   Stopped at 09/21/20 1638   • enoxaparin (LOVENOX) inj 40 mg  40 mg Subcutaneous DAILY Boogie Augustine Jr., D.O.   40 mg at 09/23/20 0517       Fluids    Intake/Output Summary (Last 24 hours) at 9/23/2020 1434  Last data filed at 9/23/2020 1253  Gross per 24 hour   Intake 1508.42 ml   Output 1290 ml   Net 218.42 ml       Laboratory          Recent Labs     09/21/20 0245 09/22/20  0405 09/23/20  0609   SODIUM 132* 137 137   POTASSIUM 4.3 4.0 4.5   CHLORIDE 98 101 99   CO2 20 21 24   BUN 15 15 16   CREATININE 0.43* 0.48* 0.39*   MAGNESIUM 2.1 2.3 2.2   CALCIUM 9.1 9.5 9.2     Recent Labs     09/21/20 0245 09/22/20  0405 09/23/20  0609   ALTSGPT 43 37 29   ASTSGOT 31 28 28   ALKPHOSPHAT 118* 121* 105*   TBILIRUBIN 0.2 0.2 <0.2   GLUCOSE 106* 113* 94     Recent Labs     09/21/20 0245 09/22/20  0405 09/23/20  0609   WBC 9.1 12.9* 8.3   NEUTSPOLYS 58.90 71.20 52.90   LYMPHOCYTES 20.20* 14.00* 22.00   MONOCYTES 10.50 8.80 15.50*   EOSINOPHILS 8.10* 5.10 7.10*   BASOPHILS 0.60 0.20 0.60   ASTSGOT 31 28 28   ALTSGPT 43 37 29   ALKPHOSPHAT 118* 121* 105*   TBILIRUBIN 0.2 0.2 <0.2     Recent Labs     09/21/20  0245 09/22/20  0405 09/23/20  0609   RBC 3.75* 4.09* 4.02*   HEMOGLOBIN 11.7* 12.9 12.6   HEMATOCRIT 35.6* 39.1 39.2   PLATELETCT 435 512* 420       Imaging  None    Assessment/Plan  * Acute respiratory failure with hypoxia (HCC)-  "(present on admission)  Assessment & Plan  Intubated 8/24  Trach 9/9  On T-piece 6 hours/day yesterday     Now on continuous T-piece, watching minute ventilation    Anoxic encephalopathy (HCC)- (present on admission)  Assessment & Plan  MRI on 9/2 and 9/15 consistent with severe anoxic brain injury  EEG with frequent, blunted generalized sharps with frontal maximum and triphasic morphology with frequent runs of frontal intermittent rhythmic delta activity (FIRDA)  Continue AEDs    Exam consistent with vegetative state. Poor prognosis for return to independent function. Discuss further with neurology and family    More seizures, back on propofol. Suspect extremely poor brain function will persist. Meet with family Thursday after assessment again off propofol.    In addition to fixed dose propofol I added Versed at request of neurology as pt still having seizures per EEG. The refractory seizures reflect a \"bad\" irritable brain. Sadly, given the duration of illness and the progressive clinical decline and MRI deterioration the prognosis is grim for meaningful lneurology recovery    Drug overdose- (present on admission)  Assessment & Plan  BA 0.16 on presentation  Positive urine drug screen for amphetamines on presentation  Boyfriend reported methamphetamine, oxycodone and alcohol use    Fever  Assessment & Plan  1 out of 2 blood cultures positive for coag negative Staphylococcus on 9/17 - contaminant  UA unremarkable  Sputum with Pseudomonas  Fever has resolved after Fortaz started    Seizures ([facility])  Assessment & Plan  Continuous video EEG  Continue phenobarbital, 64.8 mg every 8 hours  Continue Vimpat, 200 mg every 8 hours  Continue valproic acid, 500 mg every 8 hours  Continue Keppra, 1000 mg every 8 hours  Continue Fycompa, 10 mg nightly    Now on prop for 36 hours. Then will observe off prop    Pneumonia of both lungs due to Pseudomonas species ([facility])  Assessment & Plan  Sputum culture with MRSA, Acinetobacter and " Stenotrophomonas species on 9/6  Completed 10 days of Bactrim on 9/13  Sputum culture from 9/17 with Pseudomonas aeruginosa  Continue Fortaz    Elevated transaminase level- (present on admission)  Assessment & Plan  Due to valproic acid  Resolving    S/P PEA cardiac arrest (HCC)- (present on admission)  Assessment & Plan  S/P therapeutic hypothermia protocol    Dysphagia, oropharyngeal- (present on admission)  Assessment & Plan  S/P laparoscopic gastrostomy on 9/11  Continue enteral tube feedings       VTE:  Lovenox  Ulcer: H2 Antagonist  Lines: Pearce Catheter  Ongoing indication addressed    I have performed a physical exam and reviewed and updated ROS and Plan today (9/23/2020). In review of yesterday's note (9/22/2020), there are no changes except as documented above.     I have assessed and reassessed her respiratory status with ventilator adjustments, ventilator waveforms, airway mechanics, hemodynamics, blood pressure, cardiovascular status and her neurologic status.  She is at increased risk for worsening CNS and respiratory system dysfunction.    Discussed patient condition and risk of morbidity and/or mortality with RN, RT, Pharmacy, Charge nurse / hot rounds, QA team and neurology     The patient remains critically ill.  Critical care time = 30 minutes in directly providing and coordinating critical care and extensive data review.  No time overlap and excludes procedures.

## 2020-09-23 NOTE — CARE PLAN
Problem: Venous Thromboembolism (VTW)/Deep Vein Thrombosis (DVT) Prevention:  Goal: Patient will participate in Venous Thrombosis (VTE)/Deep Vein Thrombosis (DVT)Prevention Measures  Note: Lovenox and SCDs in place     Problem: Skin Integrity  Goal: Risk for impaired skin integrity will decrease  Note: Skin is assessed for integrity throughout the shift. Pt is turned at least every 2 hours. Pt is kept dry and clean.

## 2020-09-23 NOTE — PROGRESS NOTES
Pharmacy Pharmacotherapy Consult for LOS >30 days    Admit Date: 8/23/2020      Medications were reviewed for appropriateness and ongoing need.     Current Facility-Administered Medications   Medication Dose Route Frequency Provider Last Rate Last Dose   • propofol (DIPRIVAN) injection  30 mcg/kg/min Intravenous Continuous Nithin Padilla M.D. 12 mL/hr at 09/23/20 1044 30 mcg/kg/min at 09/23/20 1044   • midazolam (VERSED) premix 125 mg/125 mL NS  5 mg/hr Intravenous Continuous Nithin Padilla M.D. 5 mL/hr at 09/23/20 0931 5 mg/hr at 09/23/20 0931   • influenza vaccine quad injection 0.5 mL  0.5 mL Intramuscular Once PRN Nithin Padilla M.D.       • zonisamide (ZONEGRAN) 10 mg/ml oral suspension 200 mg  200 mg Enteral Tube BID Ty Mccann M.D.   200 mg at 09/23/20 0530   • perampanel (FYCOMPA) tablet 12 mg  12 mg Oral QHS Ty Mccann M.D.   12 mg at 09/22/20 2157   • ceftAZIDime (FORTAZ) 2,000 mg in  mL IVPB  2 g Intravenous Q8HRS Nithin Padilla M.D.   Stopped at 09/23/20 1509   • PHENobarbital (LUMINAL) tablet 64.8 mg  64.8 mg Enteral Tube Q8HRS Toni Bear M.D.   64.8 mg at 09/23/20 1444   • divalproex (DEPAKOTE SPRINKLE) capsule 500 mg  500 mg Enteral Tube Q8HRS Angel Luis Lanier M.D.   500 mg at 09/23/20 1444   • fentaNYL (SUBLIMAZE) injection  mcg   mcg Intravenous Q HOUR PRN Angel Luis Lanier M.D.   50 mcg at 09/19/20 1450   • levETIRAcetam (KEPPRA) tablet 1,000 mg  1,000 mg Enteral Tube Q8HR Andrew Petersen M.D.   1,000 mg at 09/23/20 1444   • glycopyrrolate (ROBINUL) tablet 0.5 mg  0.5 mg Enteral Tube Q8HRS Andrew Petersen M.D.   0.5 mg at 09/23/20 1446   • lacosamide (VIMPAT) tablet 200 mg  200 mg Enteral Tube Q8HR Andrew Petersen M.D.   200 mg at 09/23/20 1028   • acetaminophen (TYLENOL) tablet 1,000 mg  1,000 mg Enteral Tube Q4HRS PRN Dar Red M.D.   1,000 mg at 09/21/20 2303   • Pharmacy Consult: Enteral tube insertion - review  meds/change route/product selection  1 Each Other PHARMACY TO DOSE Boogie Augustine Jr., D.O.       • Respiratory Therapy Consult   Nebulization Continuous RT Brock Murdock M.D.       • famotidine (PEPCID) tablet 20 mg  20 mg Enteral Tube Q12HRS Dar Red M.D.   20 mg at 09/23/20 0516   • senna-docusate (PERICOLACE or SENOKOT S) 8.6-50 MG per tablet 2 Tab  2 Tab Enteral Tube BID Brock Murdock M.D.   2 Tab at 09/23/20 0517   • MD Alert...ICU Electrolyte Replacement per Pharmacy   Other PHARMACY TO DOSE Brock Murdock M.D.       • lidocaine (XYLOCAINE) 1 % injection 1-2 mL  1-2 mL Tracheal Tube Q30 MIN PRN Brock Murdock M.D.   Stopped at 09/21/20 1638   • enoxaparin (LOVENOX) inj 40 mg  40 mg Subcutaneous DAILY Boogie Augustine Jr., D.O.   40 mg at 09/23/20 0517       Recommendations:  PRN agents no longer in use discontinued per discussion with MD.  Annual influenza vaccine ordered.    Lisa Hedrick, PharmD, BCCCP

## 2020-09-23 NOTE — PROGRESS NOTES
Neurology Progress Note  Neurohospitalist Service, [facility] Neurosciences    Referring Physician: Nithin Padilla M.D.    Chief Complaint   Patient presents with   • Drug Overdose     unknown amount or type of drug ingestion per EMS, pt found down, aspiration, CPR in progress upon EMS arrival.       HPI: Refer to initial documented Neurology H&P, as detailed in the patient's chart.    Interval History: Pt. Continues to exhibit frequent nonconvulsive seizures over the past 24hr.  Versed added around 9AM.    Review of systems: In addition to what is detailed in the HPI and/or updated in the interval history, all other systems reviewed and are negative.    Past Medical History:    has no past medical history on file.    FHx:  family history is not on file.    SHx:   reports that she has never smoked. She has never used smokeless tobacco.    Medications:    Current Facility-Administered Medications:   •  propofol (DIPRIVAN) injection, 30 mcg/kg/min, Intravenous, Continuous, Nithin Padilla M.D., Last Rate: 12 mL/hr at 09/23/20 0815, 30 mcg/kg/min at 09/23/20 0815  •  midazolam (VERSED) premix 125 mg/125 mL NS, 5 mg/hr, Intravenous, Continuous, Nithin Padilla M.D., Last Rate: 5 mL/hr at 09/23/20 0931, 5 mg/hr at 09/23/20 0931  •  zonisamide (ZONEGRAN) 10 mg/ml oral suspension 200 mg, 200 mg, Enteral Tube, BID, Ty Mccann M.D., 200 mg at 09/23/20 0530  •  perampanel (FYCOMPA) tablet 12 mg, 12 mg, Oral, QHS, Ty Mccann M.D., 12 mg at 09/22/20 2157  •  ceftAZIDime (FORTAZ) 2,000 mg in  mL IVPB, 2 g, Intravenous, Q8HRS, Nithin Padilla M.D., Stopped at 09/23/20 0547  •  PHENobarbital (LUMINAL) tablet 64.8 mg, 64.8 mg, Enteral Tube, Q8HRS, Toni Bear M.D., 64.8 mg at 09/23/20 0516  •  divalproex (DEPAKOTE SPRINKLE) capsule 500 mg, 500 mg, Enteral Tube, Q8HRS, Angel Luis Lanier M.D., 500 mg at 09/23/20 0517  •  fentaNYL (SUBLIMAZE) injection  mcg,  mcg, Intravenous,  Q HOUR PRN, Angel Luis Lanier M.D., 50 mcg at 09/19/20 1450  •  levETIRAcetam (KEPPRA) tablet 1,000 mg, 1,000 mg, Enteral Tube, Q8HR, Andrew Petersen M.D., 1,000 mg at 09/23/20 0517  •  glycopyrrolate (ROBINUL) tablet 0.5 mg, 0.5 mg, Enteral Tube, Q8HRS, Andrew Petersen M.D., 0.5 mg at 09/23/20 0517  •  lacosamide (VIMPAT) tablet 200 mg, 200 mg, Enteral Tube, Q8HR, Andrew Petersen M.D., 200 mg at 09/23/20 0226  •  acetaminophen (TYLENOL) suppository 325 mg, 325 mg, Rectal, Q6HRS PRN, Yosef Tomlin M.D., 650 mg at 09/02/20 1632  •  acetaminophen (TYLENOL) tablet 1,000 mg, 1,000 mg, Enteral Tube, Q4HRS PRN, Dar Red M.D., 1,000 mg at 09/21/20 2303  •  labetalol (NORMODYNE/TRANDATE) injection 10 mg, 10 mg, Intravenous, Q4HRS PRN, Yosef Tomlin M.D., 10 mg at 08/30/20 2256  •  Pharmacy Consult: Enteral tube insertion - review meds/change route/product selection, 1 Each, Other, PHARMACY TO DOSE, Boogie Augustine Jr., D.O.  •  Respiratory Therapy Consult, , Nebulization, Continuous RT, Brock Murdock M.D.  •  ipratropium-albuterol (DUONEB) nebulizer solution, 3 mL, Nebulization, Q2HRS PRN (RT), Brock Murdock M.D.  •  famotidine (PEPCID) tablet 20 mg, 20 mg, Enteral Tube, Q12HRS, 20 mg at 09/23/20 0516 **OR** [DISCONTINUED] famotidine (PEPCID) injection 20 mg, 20 mg, Intravenous, Q12HRS, Brock Murdock M.D., 20 mg at 09/04/20 0454  •  senna-docusate (PERICOLACE or SENOKOT S) 8.6-50 MG per tablet 2 Tab, 2 Tab, Enteral Tube, BID, 2 Tab at 09/23/20 0517 **AND** polyethylene glycol/lytes (MIRALAX) PACKET 1 Packet, 1 Packet, Enteral Tube, QDAY PRN **AND** magnesium hydroxide (MILK OF MAGNESIA) suspension 30 mL, 30 mL, Enteral Tube, QDAY PRN **AND** bisacodyl (DULCOLAX) suppository 10 mg, 10 mg, Rectal, QDAY PRN, Brock Murdock M.D.  •  MD Alert...ICU Electrolyte Replacement per Pharmacy, , Other, PHARMACY TO DOSE, Brock Murdock M.D.  •  lidocaine (XYLOCAINE) 1 % injection 1-2 mL, 1-2 mL,  Tracheal Tube, Q30 MIN PRN, Brock Murdock M.D., Stopped at 09/21/20 1638  •  enoxaparin (LOVENOX) inj 40 mg, 40 mg, Subcutaneous, DAILY, Boogie Augustine Jr., D.O., 40 mg at 09/23/20 0517    Physical Examination:     Vitals:    09/23/20 0830 09/23/20 0900 09/23/20 0930 09/23/20 1000   BP: (!) 94/52 (!) 99/57 106/63 (!) 95/53   Pulse: 98 98 (!) 107 95   Resp: (!) 23 (!) 23 (!) 31 (!) 25   Temp:       TempSrc:       SpO2: 98% 97% 98% 99%   Weight:       Height:           Examination on propofol and versed drips:     General: intubated and sedated  Eyes: examination of optic disks not indicated at this time  CV: RRR     NEUROLOGICAL EXAM:      Mental status: does not open eyes to noxious stimulus, does not follow commands  Speech and language: intubated  Cranial nerve exam: Pupils are equal, round and minimally reactive to light bilaterally. Visual fields: does not blink to threat bilaterally. Gaze in neutral position. Corneal reflexes weakly present bilaterally.  Extraocular muscles are not intact to oculocephalic maneuver ie VOR absent. Face is symmetric. Unable to assess sensation in the face due to mental status. Cough and gag reflexes are absent.  Motor exam: Does not participate in formal strength testing. Tone is decreased. No abnormal movements were seen on exam.  Sensory exam: no response to noxious stimuli x4 extremities  Deep tendon reflexes:  1+ throughout. Toes mute bilaterally  Coordination: not participatory due to mental status  Gait: deferred due to ICU status    Objective Data:    Labs:  Lab Results   Component Value Date/Time    PROTHROMBTM 14.0 08/25/2020 09:20 PM    INR 1.05 08/25/2020 09:20 PM      Lab Results   Component Value Date/Time    WBC 8.3 09/23/2020 06:09 AM    RBC 4.02 (L) 09/23/2020 06:09 AM    HEMOGLOBIN 12.6 09/23/2020 06:09 AM    HEMATOCRIT 39.2 09/23/2020 06:09 AM    MCV 97.5 09/23/2020 06:09 AM    MCH 31.3 09/23/2020 06:09 AM    MCHC 32.1 (L) 09/23/2020 06:09 AM    MPV 9.8  09/23/2020 06:09 AM    NEUTSPOLYS 52.90 09/23/2020 06:09 AM    LYMPHOCYTES 22.00 09/23/2020 06:09 AM    MONOCYTES 15.50 (H) 09/23/2020 06:09 AM    EOSINOPHILS 7.10 (H) 09/23/2020 06:09 AM    BASOPHILS 0.60 09/23/2020 06:09 AM    ANISOCYTOSIS 1+ 08/23/2020 10:05 PM      Lab Results   Component Value Date/Time    SODIUM 137 09/23/2020 06:09 AM    POTASSIUM 4.5 09/23/2020 06:09 AM    CHLORIDE 99 09/23/2020 06:09 AM    CO2 24 09/23/2020 06:09 AM    GLUCOSE 94 09/23/2020 06:09 AM    BUN 16 09/23/2020 06:09 AM    CREATININE 0.39 (L) 09/23/2020 06:09 AM      Lab Results   Component Value Date/Time    TRIGLYCERIDE 178 (H) 09/23/2020 06:09 AM       Lab Results   Component Value Date/Time    ALKPHOSPHAT 105 (H) 09/23/2020 06:09 AM    ASTSGOT 28 09/23/2020 06:09 AM    ALTSGPT 29 09/23/2020 06:09 AM    TBILIRUBIN <0.2 09/23/2020 06:09 AM        Imaging/Testing:    I interpreted and/or reviewed the patient's neuroimaging    DX-CHEST-PORTABLE (1 VIEW)   Final Result      Persistent bibasilar opacities. No pleural effusion.      DX-CHEST-PORTABLE (1 VIEW)   Final Result      Improving bilateral atelectasis.      DX-CHEST-PORTABLE (1 VIEW)   Final Result      Stable bibasilar atelectasis.      DX-CHEST-PORTABLE (1 VIEW)   Final Result      Stable tracheostomy. No new consolidation or pleural effusions.      DX-CHEST-PORTABLE (1 VIEW)   Final Result         1. Stable tracheostomy.   2. No new consolidation or pleural effusions.         DX-CHEST-PORTABLE (1 VIEW)   Final Result      No significant interval change.         MR-BRAIN-W/O   Final Result      1.  Findings consistent with worsening anoxic brain injury involving the lentiform nuclei bilaterally.   2.  Findings consistent with anoxic brain injury in the parietal, occipital and posterior temporal regions which appear somewhat improved from prior exam.  Similar findings in the bifrontal region appear unchanged.         DX-CHEST-PORTABLE (1 VIEW)   Final Result      No  significant interval change.      DX-CHEST-PORTABLE (1 VIEW)   Final Result      Bibasilar underinflation atelectasis which could obscure an additional process. This is similar to the prior study.      DX-CHEST-PORTABLE (1 VIEW)   Final Result      Hazy airspace opacities are improved compared to prior.      DX-CHEST-PORTABLE (1 VIEW)   Final Result         1.  Pulmonary edema and/or infiltrates are identified, which are stable since the prior exam.            DX-CHEST-PORTABLE (1 VIEW)   Final Result         1.  Mild pulmonary edema and/or interstitial infiltrates, somewhat decreased since prior study         IR-MIDLINE CATHETER INSERTION WO GUIDANCE > AGE 3   Final Result                  Ultrasound-guided midline placement performed by qualified nursing staff    as above.          DX-CHEST-PORTABLE (1 VIEW)   Final Result         1.  Mild pulmonary edema and/or interstitial infiltrates, stable since prior study      DX-CHEST-PORTABLE (1 VIEW)   Final Result         1.  Mild pulmonary edema and/or interstitial infiltrates      DX-CHEST-PORTABLE (1 VIEW)   Final Result         No significant interval change.      DX-CHEST-PORTABLE (1 VIEW)   Final Result         New hazy opacity in the left lung base could be atelectasis or consolidation.      DX-CHEST-PORTABLE (1 VIEW)   Final Result      No significant change      DX-CHEST-PORTABLE (1 VIEW)   Final Result         1.  No acute cardiopulmonary disease.                                 DX-CHEST-PORTABLE (1 VIEW)   Final Result         1.  No acute cardiopulmonary disease.                              DX-CHEST-PORTABLE (1 VIEW)   Final Result         1.  No focal infiltrates, previously visualized infiltrates are not readily apparent.                           MR-BRAIN-WITH & W/O   Final Result         1.  Severe diffuse anoxic brain injury pattern which is more extensive and conspicuous than on previous exam.      2.  No evidence of intracranial mass effect,  extra-axial fluid collection, or interval development of hydrocephalus.      3.  Diffuse mucosal inflammatory changes filling the ethmoid and sphenoid sinuses.      DX-CHEST-PORTABLE (1 VIEW)   Final Result         1.  Pulmonary edema and/or infiltrates are identified, which are somewhat decreased since the prior exam.                        PW-[id number]-2 VIEW   Final Result      Feeding tube is noted with tip at the level of the proximal duodenum.                  DX-CHEST-PORTABLE (1 VIEW)   Final Result         1.  Pulmonary edema and/or infiltrates are identified, which are stable since the prior exam.                     DX-CHEST-PORTABLE (1 VIEW)   Final Result      Stable chest x-ray findings with right lower lobe consolidation.      DX-CHEST-PORTABLE (1 VIEW)   Final Result         1.  Pulmonary edema and/or infiltrates are identified, which are stable since the prior exam.                  DX-CHEST-PORTABLE (1 VIEW)   Final Result         1.  Pulmonary edema and/or infiltrates are identified, which are stable since the prior exam.               MR-BRAIN-W/O   Final Result      The diffusion-weighted sequences demonstrates areas of restricted diffusion in the bilateral basal ganglia and some of the frontal gray matter. The predominant portion of the brain parenchyma does not demonstrate any restricted diffusion. Therefore this    findings likely represent mild diffuse anoxic brain injury.      DX-CHEST-PORTABLE (1 VIEW)   Final Result         1.  Pulmonary edema and/or infiltrates are identified, which are somewhat decreased since the prior exam.            DX-CHEST-PORTABLE (1 VIEW)   Final Result         1.  Pulmonary edema and/or infiltrates are identified, which are stable since the prior exam.         DX-ABDOMEN FOR TUBE PLACEMENT   Final Result         1.  Nonspecific bowel gas pattern.   2.  Dobbhoff tube tip overlying the expected location of the pylorus or first duodenal segment.     "  DX-CHEST-PORTABLE (1 VIEW)   Final Result         1.  Patchy bilateral pulmonary infiltrates, somewhat increased since prior.      EC-ECHOCARDIOGRAM COMPLETE W/O CONT   Final Result      DX-CHEST-PORTABLE (1 VIEW)   Final Result         1.  Patchy bilateral pulmonary infiltrates, somewhat increased since prior.      CT-HEAD W/O   Final Result         1.  Possible subtle sulcal effacement and slight loss of differentiation of gray-white matter, consider component of cerebral edema. Recommend radiographic follow-up   2.  Bilateral sphenoid and maxillary sinus air-fluid levels      DX-CHEST-PORTABLE (1 VIEW)   Final Result         1.  No acute cardiopulmonary disease.      DX-CHEST-PORTABLE (1 VIEW)    (Results Pending)     EEG as ready by Dr. SHEYLA Beatty 9/23/20: \"abnormal continuous video EEG recording in an encephalopathic and/or sedated patient. A moderate encephalopathy is suggested. Frequent, blunted generalized sharps with frontal maximum and triphasic morphology. Frequent runs of Frontal Intermittent Rhythmic Delta Activity (FIRDA). Frequent non convulsive seizures noted despite the use of multiple anti-seizure medications and sedative. The findings suggest underlying areas of persinstently increased cortical irritability and/or structural abnormality. The patient has failed multiple attempts to wean sedation and seizures are refractory to medical treatment. .\"    Assessment and Plan:    Annika He is a 19 y.o. woman presenting for whom neurology has been consulted for seizure management and prognostication.  The patient has a history of polysubstance abuse and subsequent cardiac arrest on 8/23/2020 who remains unresponsive s/p trach and PEG.  Her repeat brain MRI on 9/15/20 shows worsening anoxic brain injury with involvement of lentiform nuclei bilaterally. Patient has failed repeated trials to wean sedation with poor control of nonconvulsive and hospital course complicated by infection (PNA).  " Versed added to Propofol AM 9/23/20 in an effort to obtain 24hr of burst suppression. Plan to continue sedation until 8a 9/24/20 and then formal prognostication on Friday 9/25/20 off sedation.     Plan:     - continue VEEG  - continue Depakote 500mg TID  - continue Vimpat 200mg TID  - continue Keppra 1000mg TID  - continue phenobarb 64.8mg TID  - continue Fycompa to 12mg qhs  - continue Zonegran 200mg BID  - seizure precautions  - continue Propofol and versed drips  - discontinue Propofol at 8am tomorrow 9/24/20 if seizure free for 24hr  - GOC; tentative neurological prognostication on Friday 9/24/20 once 24hr off sedation  - family meeting postponed to 9/25/20 in the afternoon given breakthrough nonconvulsive seizures    The evaluation of the patient, and recommended management, was discussed with Dr. Beatty and Dr. Padilla in coordination with palliative care. I have performed a physical exam and reviewed and updated ROS and Plan today (9/23/2020). In review of yesterday's note (9/22/2020), there are no changes except as documented above.    Ty Mccann MD  Neurohospitalist, Acute Care Services   of Neurology

## 2020-09-23 NOTE — DISCHARGE PLANNING
Agency/Facility Name: [facility]   Spoke To: Krystal   Outcome: Per Krystal the person processes referrals is out for the day, asks that [facility] refax referral for review, agrees to f/u tomorrow.        Agency/Facility Name: [facility]   Spoke To: Alona   Outcome: [facility] noted fax failed to send, [facility] provided alternate fax: [phone]. [facility] sent referral manually at 1530.

## 2020-09-23 NOTE — PALLIATIVE CARE
Palliative Care follow-up  Update from Dr. Mccann noting the need for adjustment of medications to control her seizures and a meeting Friday would be more appropriate. Call placed to Juliette and she is in agreement with a meeting Friday at 1500 but states she will need to be by phone as she is traveling to [facility] to  her mother. She states that Angel Luis should be available and it's more important that he is in-person. Phone call to Angel Luis and message left with the date/time change for the meeting. Update to MDs and RN with confirmation of meeting.    Updated: BS team    Plan: family meeting Friday at 1500    Thank you for allowing Palliative Care to support this patient and family. Contact [phone] for additional assistance, change in patient status, or with any questions/concerns.

## 2020-09-23 NOTE — DISCHARGE PLANNING
Care Transition Team Discharge Planning    Anticipated Discharge Disposition: d/c to LTAC    Action: Lsw requested [facility] please f/u w/ the blanket referrals to local LTACs.    Barriers to Discharge: TBD    Plan: f/u w/ [facility], medical team

## 2020-09-23 NOTE — CARE PLAN
Respiratory Therapy Update    T-piecing for 18 hrs+ on 4/28%  Vent on standby           FiO2%: 28 % (09/23/20 0233)  O2 (LPM): 4 (09/23/20 0233)

## 2020-09-23 NOTE — PROGRESS NOTES
Pearce catheter is occluded, not draining despite the flush. Replaced with a new one. Pt tolerated well.

## 2020-09-23 NOTE — PROCEDURES
VIDEO ELECTROENCEPHALOGRAM REPORT        Referring provider: Dr. Mccann.      DOS: 9/23/2020 (total recording of 23 hours and 45 minutes).      INDICATION:  Annika He 19 y.o. female presenting with s/p cardiac arrest, altered mental status, seizures.      CURRENT ANTIEPILEPTIC REGIMEN: Levetiracetam 1000 mg tid, Lacosamide 200 mg tid, Valproic Acid 1250 mg tid, Perampanel increased to 12 mg qhs, Phenobarbital 60 mg q 8 hrs. Zonisamide 200 mg bid added. The patient sedated with Propofol, and Midazolam added.      TECHNIQUE: 30 channel video electroencephalogram (EEG) was performed in accordance with the international 10-20 system. The study was reviewed in bipolar and referential montages. The recording examined an encephalopathic and/or sedated patient.      DESCRIPTION OF THE RECORD:  Generalized delta activity. Frequent, blunted generalized sharps with frontal maximum and triphasic morphology. Frequent runs of Frontal Intermittent Rhythmic Delta Activity (FIRDA). A mild improvement after addition of Midazolam, however, mostly brief non convulsive seizures still noted overnight despite the use of multiple anti-seizure medications and Propofol and Midazolam.       ACTIVATION PROCEDURES:   Not performed.      EKG: sampling of the EKG recording demonstrated sinus rhythm.      EVENTS:  None.      INTERPRETATION:  This is an abnormal continuous video EEG recording in an encephalopathic and/or sedated patient. A moderate encephalopathy is suggested. Frequent, blunted generalized sharps with frontal maximum and triphasic morphology. Frequent runs of Frontal Intermittent Rhythmic Delta Activity (FIRDA). A mild improvement after addition of Midazolam, however, mostly brief non convulsive seizures still noted overnight despite the use of multiple anti-seizure medications as well as sedation with Propofol and Midazolam. The findings suggest underlying areas of persinstently increased cortical irritability  and/or structural abnormality. Clinical and radiological correlation is recommended.     Updates provided to Dr. Mccann and Dr. Padilla.         Garland Beatty MD   Epilepsy and Neurodiagnostics.   Clinical  of Neurology [facility] of Medicine.   Diplomate in Neurology, Epilepsy, and Electrodiagnostic Medicine.   Office: [phone]  Fax: [phone]

## 2020-09-24 LAB
ACTION RANGE TRIGGERED IACRT: YES
ALBUMIN SERPL BCP-MCNC: 3.3 G/DL (ref 3.2–4.9)
ALBUMIN/GLOB SERPL: 1.1 G/DL
ALP SERPL-CCNC: 97 U/L (ref 30–99)
ALT SERPL-CCNC: 30 U/L (ref 2–50)
ANION GAP SERPL CALC-SCNC: 12 MMOL/L (ref 7–16)
AST SERPL-CCNC: 29 U/L (ref 12–45)
BASE EXCESS BLDA CALC-SCNC: 2 MMOL/L (ref -4–3)
BASOPHILS # BLD AUTO: 0.6 % (ref 0–1.8)
BASOPHILS # BLD: 0.04 K/UL (ref 0–0.12)
BILIRUB SERPL-MCNC: <0.2 MG/DL (ref 0.1–1.5)
BODY TEMPERATURE: ABNORMAL DEGREES
BUN SERPL-MCNC: 17 MG/DL (ref 8–22)
CALCIUM SERPL-MCNC: 8.7 MG/DL (ref 8.5–10.5)
CHLORIDE SERPL-SCNC: 101 MMOL/L (ref 96–112)
CO2 BLDA-SCNC: 30 MMOL/L (ref 20–33)
CO2 SERPL-SCNC: 24 MMOL/L (ref 20–33)
CREAT SERPL-MCNC: 0.38 MG/DL (ref 0.5–1.4)
EOSINOPHIL # BLD AUTO: 0.49 K/UL (ref 0–0.51)
EOSINOPHIL NFR BLD: 6.8 % (ref 0–6.9)
ERYTHROCYTE [DISTWIDTH] IN BLOOD BY AUTOMATED COUNT: 51.9 FL (ref 35.9–50)
GLOBULIN SER CALC-MCNC: 3 G/DL (ref 1.9–3.5)
GLUCOSE SERPL-MCNC: 89 MG/DL (ref 65–99)
HCO3 BLDA-SCNC: 28 MMOL/L (ref 17–25)
HCT VFR BLD AUTO: 38.2 % (ref 37–47)
HGB BLD-MCNC: 12.2 G/DL (ref 12–16)
HOROWITZ INDEX BLDA+IHG-RTO: 293 MM[HG]
IMM GRANULOCYTES # BLD AUTO: 0.13 K/UL (ref 0–0.11)
IMM GRANULOCYTES NFR BLD AUTO: 1.8 % (ref 0–0.9)
INST. QUALIFIED PATIENT IIQPT: YES
LYMPHOCYTES # BLD AUTO: 1.42 K/UL (ref 1–4.8)
LYMPHOCYTES NFR BLD: 19.6 % (ref 22–41)
MAGNESIUM SERPL-MCNC: 2.1 MG/DL (ref 1.5–2.5)
MCH RBC QN AUTO: 31.4 PG (ref 27–33)
MCHC RBC AUTO-ENTMCNC: 31.9 G/DL (ref 33.6–35)
MCV RBC AUTO: 98.5 FL (ref 81.4–97.8)
MONOCYTES # BLD AUTO: 1.09 K/UL (ref 0–0.85)
MONOCYTES NFR BLD AUTO: 15 % (ref 0–13.4)
NEUTROPHILS # BLD AUTO: 4.08 K/UL (ref 2–7.15)
NEUTROPHILS NFR BLD: 56.2 % (ref 44–72)
NRBC # BLD AUTO: 0 K/UL
NRBC BLD-RTO: 0 /100 WBC
O2/TOTAL GAS SETTING VFR VENT: 28 %
PCO2 BLDA: 52.5 MMHG (ref 26–37)
PH BLDA: 7.33 [PH] (ref 7.4–7.5)
PLATELET # BLD AUTO: 375 K/UL (ref 164–446)
PMV BLD AUTO: 9.8 FL (ref 9–12.9)
PO2 BLDA: 82 MMHG (ref 64–87)
POTASSIUM SERPL-SCNC: 4.1 MMOL/L (ref 3.6–5.5)
PROT SERPL-MCNC: 6.3 G/DL (ref 6–8.2)
RBC # BLD AUTO: 3.88 M/UL (ref 4.2–5.4)
SAO2 % BLDA: 95 % (ref 93–99)
SODIUM SERPL-SCNC: 137 MMOL/L (ref 135–145)
SPECIMEN DRAWN FROM PATIENT: ABNORMAL
WBC # BLD AUTO: 7.3 K/UL (ref 4.8–10.8)

## 2020-09-24 PROCEDURE — 700111 HCHG RX REV CODE 636 W/ 250 OVERRIDE (IP): Performed by: INTERNAL MEDICINE

## 2020-09-24 PROCEDURE — A9270 NON-COVERED ITEM OR SERVICE: HCPCS | Performed by: INTERNAL MEDICINE

## 2020-09-24 PROCEDURE — 95720 EEG PHY/QHP EA INCR W/VEEG: CPT | Performed by: PSYCHIATRY & NEUROLOGY

## 2020-09-24 PROCEDURE — 99232 SBSQ HOSP IP/OBS MODERATE 35: CPT | Mod: 25 | Performed by: PSYCHIATRY & NEUROLOGY

## 2020-09-24 PROCEDURE — 700102 HCHG RX REV CODE 250 W/ 637 OVERRIDE(OP): Performed by: INTERNAL MEDICINE

## 2020-09-24 PROCEDURE — 36600 WITHDRAWAL OF ARTERIAL BLOOD: CPT

## 2020-09-24 PROCEDURE — 83735 ASSAY OF MAGNESIUM: CPT

## 2020-09-24 PROCEDURE — 94760 N-INVAS EAR/PLS OXIMETRY 1: CPT

## 2020-09-24 PROCEDURE — 700105 HCHG RX REV CODE 258: Performed by: INTERNAL MEDICINE

## 2020-09-24 PROCEDURE — 4A10X4Z MONITORING OF CENTRAL NERVOUS ELECTRICAL ACTIVITY, EXTERNAL APPROACH: ICD-10-PCS | Performed by: PSYCHIATRY & NEUROLOGY

## 2020-09-24 PROCEDURE — 99291 CRITICAL CARE FIRST HOUR: CPT | Performed by: INTERNAL MEDICINE

## 2020-09-24 PROCEDURE — 85025 COMPLETE CBC W/AUTO DIFF WBC: CPT

## 2020-09-24 PROCEDURE — 80053 COMPREHEN METABOLIC PANEL: CPT

## 2020-09-24 PROCEDURE — 82803 BLOOD GASES ANY COMBINATION: CPT

## 2020-09-24 PROCEDURE — 700105 HCHG RX REV CODE 258

## 2020-09-24 PROCEDURE — 95714 VEEG EA 12-26 HR UNMNTR: CPT | Performed by: PSYCHIATRY & NEUROLOGY

## 2020-09-24 PROCEDURE — 770022 HCHG ROOM/CARE - ICU (200)

## 2020-09-24 PROCEDURE — 94640 AIRWAY INHALATION TREATMENT: CPT

## 2020-09-24 RX ORDER — SODIUM CHLORIDE, SODIUM LACTATE, POTASSIUM CHLORIDE, CALCIUM CHLORIDE 600; 310; 30; 20 MG/100ML; MG/100ML; MG/100ML; MG/100ML
INJECTION, SOLUTION INTRAVENOUS
Status: COMPLETED
Start: 2020-09-24 | End: 2020-09-25

## 2020-09-24 RX ORDER — SODIUM CHLORIDE, SODIUM LACTATE, POTASSIUM CHLORIDE, CALCIUM CHLORIDE 600; 310; 30; 20 MG/100ML; MG/100ML; MG/100ML; MG/100ML
INJECTION, SOLUTION INTRAVENOUS
Status: ACTIVE
Start: 2020-09-24 | End: 2020-09-25

## 2020-09-24 RX ADMIN — MIDAZOLAM 15 MG/HR: 5 INJECTION INTRAMUSCULAR; INTRAVENOUS at 11:40

## 2020-09-24 RX ADMIN — PERAMPANEL 12 MG: 6 TABLET ORAL at 22:51

## 2020-09-24 RX ADMIN — LEVETIRACETAM 1000 MG: 500 TABLET, FILM COATED ORAL at 21:14

## 2020-09-24 RX ADMIN — SODIUM CHLORIDE, POTASSIUM CHLORIDE, SODIUM LACTATE AND CALCIUM CHLORIDE: 600; 310; 30; 20 INJECTION, SOLUTION INTRAVENOUS at 15:15

## 2020-09-24 RX ADMIN — DOCUSATE SODIUM 50 MG AND SENNOSIDES 8.6 MG 2 TABLET: 8.6; 5 TABLET, FILM COATED ORAL at 18:09

## 2020-09-24 RX ADMIN — DIVALPROEX SODIUM 500 MG: 125 CAPSULE ORAL at 21:14

## 2020-09-24 RX ADMIN — LACOSAMIDE 200 MG: 100 TABLET, FILM COATED ORAL at 10:53

## 2020-09-24 RX ADMIN — GLYCOPYRROLATE 0.5 MG: 1 TABLET ORAL at 05:23

## 2020-09-24 RX ADMIN — PROPOFOL 30 MCG/KG/MIN: 10 INJECTION, EMULSION INTRAVENOUS at 04:30

## 2020-09-24 RX ADMIN — FAMOTIDINE 20 MG: 20 TABLET, FILM COATED ORAL at 18:09

## 2020-09-24 RX ADMIN — DIVALPROEX SODIUM 500 MG: 125 CAPSULE ORAL at 15:30

## 2020-09-24 RX ADMIN — ENOXAPARIN SODIUM 40 MG: 40 INJECTION SUBCUTANEOUS at 05:22

## 2020-09-24 RX ADMIN — LACOSAMIDE 200 MG: 100 TABLET, FILM COATED ORAL at 18:09

## 2020-09-24 RX ADMIN — PROPOFOL 80 MCG/KG/MIN: 10 INJECTION, EMULSION INTRAVENOUS at 11:45

## 2020-09-24 RX ADMIN — ZONISAMIDE 200 MG: 50 CAPSULE ORAL at 05:22

## 2020-09-24 RX ADMIN — PHENOBARBITAL 64.8 MG: 32.4 TABLET ORAL at 21:14

## 2020-09-24 RX ADMIN — LEVETIRACETAM 1000 MG: 500 TABLET, FILM COATED ORAL at 05:23

## 2020-09-24 RX ADMIN — LACOSAMIDE 200 MG: 100 TABLET, FILM COATED ORAL at 02:44

## 2020-09-24 RX ADMIN — ZONISAMIDE 200 MG: 50 CAPSULE ORAL at 18:10

## 2020-09-24 RX ADMIN — CEFTAZIDIME 2000 MG: 1 INJECTION, POWDER, FOR SOLUTION INTRAMUSCULAR; INTRAVENOUS at 02:44

## 2020-09-24 RX ADMIN — PROPOFOL 30 MCG/KG/MIN: 10 INJECTION, EMULSION INTRAVENOUS at 15:13

## 2020-09-24 RX ADMIN — LEVETIRACETAM 1000 MG: 500 TABLET, FILM COATED ORAL at 15:30

## 2020-09-24 RX ADMIN — PHENOBARBITAL 64.8 MG: 32.4 TABLET ORAL at 05:23

## 2020-09-24 RX ADMIN — MIDAZOLAM 15 MG/HR: 5 INJECTION INTRAMUSCULAR; INTRAVENOUS at 19:29

## 2020-09-24 RX ADMIN — PHENOBARBITAL 64.8 MG: 32.4 TABLET ORAL at 15:30

## 2020-09-24 RX ADMIN — GLYCOPYRROLATE 0.5 MG: 1 TABLET ORAL at 15:31

## 2020-09-24 RX ADMIN — DIVALPROEX SODIUM 500 MG: 125 CAPSULE ORAL at 05:27

## 2020-09-24 RX ADMIN — DOCUSATE SODIUM 50 MG AND SENNOSIDES 8.6 MG 2 TABLET: 8.6; 5 TABLET, FILM COATED ORAL at 05:22

## 2020-09-24 RX ADMIN — GLYCOPYRROLATE 0.5 MG: 1 TABLET ORAL at 21:14

## 2020-09-24 RX ADMIN — FAMOTIDINE 20 MG: 20 TABLET, FILM COATED ORAL at 05:23

## 2020-09-24 NOTE — PROCEDURES
VIDEO ELECTROENCEPHALOGRAM REPORT        Referring provider: Dr. Mccann.      DOS: 9/24/2020 (total recording of 23 hours and 33 minutes).      INDICATION:  Annika He 19 y.o. female presenting with s/p cardiac arrest, altered mental status, seizures.      CURRENT ANTIEPILEPTIC REGIMEN: Levetiracetam 1000 mg tid, Lacosamide 200 mg tid, Valproic Acid 1250 mg tid, Perampanel 12 mg qhs, Phenobarbital 60 mg q 8 hrs. Zonisamide 200 mg . The patient was sedated with Propofol (increased), and Midazolam.      TECHNIQUE: 30 channel video electroencephalogram (EEG) was performed in accordance with the international 10-20 system. The study was reviewed in bipolar and referential montages. The recording examined an encephalopathic and/or sedated patient.      DESCRIPTION OF THE RECORD:  Generalized delta activity. Frequent, blunted generalized sharps with frontal maximum and triphasic morphology. Frequent runs of Frontal Intermittent Rhythmic Delta Activity (FIRDA). Some improvement after increase in sedation, however, non convulsive seizures still noted despite the use of multiple anti-seizure medications and increased rate of sedatives.      ACTIVATION PROCEDURES:   Not performed.      EKG: sampling of the EKG recording demonstrated sinus rhythm.      EVENTS:  None.      INTERPRETATION:  This is an abnormal continuous video EEG recording in an encephalopathic and/or sedated patient. A moderate encephalopathy is suggested. Frequent, blunted generalized sharps with frontal maximum and triphasic morphology. Frequent, long lasting runs of Frontal Intermittent Rhythmic Delta Activity (FIRDA). Some overall improvement after increase in sedation, however, non convulsive seizures still noted throughout the study, despite the use of multiple anti-seizure medications and increased rate of sedatives. The findings suggest underlying areas of persinstently increased cortical irritability and/or structural  abnormality. Clinical and radiological correlation is recommended.     Updates provided to Dr. Mccann and Dr. Padilla.         Garland Beatty MD   Epilepsy and Neurodiagnostics.   Clinical  of Neurology [facility] [email or web address]] of Medicine.   Diplomate in Neurology, Epilepsy, and Electrodiagnostic Medicine.   Office: [phone]  Fax: [phone]

## 2020-09-24 NOTE — PROGRESS NOTES
Neurology Progress Note  Neurohospitalist Service, [facility]    Referring Physician: Nithin Padilla M.D.    Chief Complaint   Patient presents with   • Drug Overdose     unknown amount or type of drug ingestion per EMS, pt found down, aspiration, CPR in progress upon EMS arrival.       HPI: Refer to initial documented Neurology H&P, as detailed in the patient's chart.    Interval History: Patient unfortunately continues to have frequent non-convulsive seizures on vEEG refractory to an extensive anti-epileptic drug regimen including Keppra, Vimpat, Depakote, Fycompa, Zonegran, Phenobarb, Versed, and Propofol.     Review of systems: In addition to what is detailed in the HPI and/or updated in the interval history, all other systems reviewed and are negative.    Past Medical History:   Unable to assess    FHx:  Unable to assess    SHx:   reports that she has never smoked. She has never used smokeless tobacco.    Medications:    Current Facility-Administered Medications:   •  propofol (DIPRIVAN) injection, 60 mcg/kg/min, Intravenous, Continuous, Nithin Padilla M.D., Last Rate: 24 mL/hr at 09/24/20 0748, 60 mcg/kg/min at 09/24/20 0748  •  midazolam (VERSED) premix 125 mg/125 mL NS, 5 mg/hr, Intravenous, Continuous, Nithin Padilla M.D., Last Rate: 5 mL/hr at 09/23/20 0931, 5 mg/hr at 09/23/20 0931  •  influenza vaccine quad injection 0.5 mL, 0.5 mL, Intramuscular, Once PRN, Nithin Padilla M.D.  •  zonisamide (ZONEGRAN) 10 mg/ml oral suspension 200 mg, 200 mg, Enteral Tube, BID, Nithin Padilla M.D., 200 mg at 09/24/20 0522  •  perampanel (FYCOMPA) tablet 12 mg, 12 mg, Oral, QHS, Nithin Padilla M.D., 12 mg at 09/23/20 2205  •  ceftAZIDime (FORTAZ) 2,000 mg in  mL IVPB, 2 g, Intravenous, Q8HRS, Nithin Padilla M.D., Stopped at 09/24/20 0314  •  PHENobarbital (LUMINAL) tablet 64.8 mg, 64.8 mg, Enteral Tube, Q8HRS, Nithin Padilla M.D., 64.8 mg at 09/24/20 0523  •   divalproex (DEPAKOTE SPRINKLE) capsule 500 mg, 500 mg, Enteral Tube, Q8HRS, Nithin Padilla M.D., 500 mg at 09/24/20 0527  •  fentaNYL (SUBLIMAZE) injection  mcg,  mcg, Intravenous, Q HOUR PRN, Nithin Padilla M.D., 50 mcg at 09/19/20 1450  •  levETIRAcetam (KEPPRA) tablet 1,000 mg, 1,000 mg, Enteral Tube, Q8HR, Nithin Padilla M.D., 1,000 mg at 09/24/20 0523  •  glycopyrrolate (ROBINUL) tablet 0.5 mg, 0.5 mg, Enteral Tube, Q8HRS, Nithin Padilla M.D., 0.5 mg at 09/24/20 0523  •  lacosamide (VIMPAT) tablet 200 mg, 200 mg, Enteral Tube, Q8HR, Nithin Padilla M.D., 200 mg at 09/24/20 0244  •  acetaminophen (TYLENOL) tablet 1,000 mg, 1,000 mg, Enteral Tube, Q4HRS PRN, Nithin Padilla M.D., 1,000 mg at 09/21/20 2303  •  Pharmacy Consult: Enteral tube insertion - review meds/change route/product selection, 1 Each, Other, PHARMACY TO DOSE, Nithin Padilla M.D.  •  Respiratory Therapy Consult, , Nebulization, Continuous RT, Brock Murdock M.D.  •  famotidine (PEPCID) tablet 20 mg, 20 mg, Enteral Tube, Q12HRS, 20 mg at 09/24/20 0523 **OR** [DISCONTINUED] famotidine (PEPCID) injection 20 mg, 20 mg, Intravenous, Q12HRS, Brock Murdock M.D., 20 mg at 09/04/20 0454  •  senna-docusate (PERICOLACE or SENOKOT S) 8.6-50 MG per tablet 2 Tab, 2 Tab, Enteral Tube, BID, 2 Tab at 09/24/20 0522 **AND** [DISCONTINUED] polyethylene glycol/lytes (MIRALAX) PACKET 1 Packet, 1 Packet, Enteral Tube, QDAY PRN **AND** [DISCONTINUED] magnesium hydroxide (MILK OF MAGNESIA) suspension 30 mL, 30 mL, Enteral Tube, QDAY PRN **AND** [DISCONTINUED] bisacodyl (DULCOLAX) suppository 10 mg, 10 mg, Rectal, QDAY PRN, Brock Murdock M.D.  •  MD Alert...ICU Electrolyte Replacement per Pharmacy, , Other, PHARMACY TO DOSE, Brock Murdock M.D.  •  lidocaine (XYLOCAINE) 1 % injection 1-2 mL, 1-2 mL, Tracheal Tube, Q30 MIN PRN, Brock Murdock M.D., Stopped at 09/21/20 7365  •  enoxaparin (LOVENOX) inj 40 mg, 40 mg, Subcutaneous, DAILY,  Boogie Augustine Jr. DYueOYue, 40 mg at 09/24/20 0522    Physical Examination:     Vitals:    09/24/20 0337 09/24/20 0400 09/24/20 0500 09/24/20 0805   BP:  (!) 83/52 (!) 92/47    Pulse:  87 92 98   Resp: (!) 24 (!) 24 (!) 27 (!) 28   Temp:       TempSrc:       SpO2:    99%   Weight:       Height:           General: Patient is intubated and sedated  Eyes: examination of optic disks not indicated at this time  CV: RRR    NEUROLOGICAL EXAM:     Mental status: not awake, does not follows commands, does not open eyes to noxious stimuli  Speech and language: unable to assess, patient intubated/sedated  Cranial nerve exam: Pupils are equal, round and reactive to light bilaterally. Corneal reflex intact. VOR present. Face is symmetric. Gag reflexes absent. Cough reflex present. Heavy secretions.   Motor exam: Bilateral upper and lower extremities flaccid with decreased tone.   Deep tendon reflexes: Bilateral lower extremity patellar hyperreflexia, able to elicit w/ hand. Toes mute bilaterally.   Coordination: unable to assess  Gait: unable to assess    Objective Data:    Labs:  Lab Results   Component Value Date/Time    PROTHROMBTM 14.0 08/25/2020 09:20 PM    INR 1.05 08/25/2020 09:20 PM      Lab Results   Component Value Date/Time    WBC 7.3 09/24/2020 06:50 AM    RBC 3.88 (L) 09/24/2020 06:50 AM    HEMOGLOBIN 12.2 09/24/2020 06:50 AM    HEMATOCRIT 38.2 09/24/2020 06:50 AM    MCV 98.5 (H) 09/24/2020 06:50 AM    MCH 31.4 09/24/2020 06:50 AM    MCHC 31.9 (L) 09/24/2020 06:50 AM    MPV 9.8 09/24/2020 06:50 AM    NEUTSPOLYS 56.20 09/24/2020 06:50 AM    LYMPHOCYTES 19.60 (L) 09/24/2020 06:50 AM    MONOCYTES 15.00 (H) 09/24/2020 06:50 AM    EOSINOPHILS 6.80 09/24/2020 06:50 AM    BASOPHILS 0.60 09/24/2020 06:50 AM    ANISOCYTOSIS 1+ 08/23/2020 10:05 PM      Lab Results   Component Value Date/Time    SODIUM 137 09/24/2020 06:50 AM    POTASSIUM 4.1 09/24/2020 06:50 AM    CHLORIDE 101 09/24/2020 06:50 AM    CO2 24 09/24/2020 06:50 AM     GLUCOSE 89 09/24/2020 06:50 AM    BUN 17 09/24/2020 06:50 AM    CREATININE 0.38 (L) 09/24/2020 06:50 AM      Lab Results   Component Value Date/Time    TRIGLYCERIDE 178 (H) 09/23/2020 06:09 AM       Lab Results   Component Value Date/Time    ALKPHOSPHAT 97 09/24/2020 06:50 AM    ASTSGOT 29 09/24/2020 06:50 AM    ALTSGPT 30 09/24/2020 06:50 AM    TBILIRUBIN <0.2 09/24/2020 06:50 AM        Imaging/Testing:    I interpreted and/or reviewed the patient's neuroimaging    MR-BRAIN-W/O   Final Result       1.  Findings consistent with worsening anoxic brain injury involving the lentiform nuclei bilaterally.   2.  Findings consistent with anoxic brain injury in the parietal, occipital and posterior temporal regions which appear somewhat improved from prior exam.  Similar findings in the bifrontal region appear unchanged.     Assessment and Plan:    Annika He is a 19 y.o. woman presenting for whom neurology has been consulted for seizure management and prognostication. She was admitted 09/11/2020 in cardiac arrest unfortunately after polysubstance overdose w/ methamphetamines and oxycodone. She appears to have suffered anoxic brain injury as supported by MRI and is currently intubated, on high doses of multiple anti-epilepsy drugs for refractory seizures. Formal prognostication will have to be differed until the patient is seizure free for 24hrs and burst suppression is achieved. Unable to wean off sedation at this point since burst suppression has not been achieved.     Plan:  - Continue vEEG  - Continue Propofol and Versed drips, discontinue Propofol when seizure free for 24hrs  - Neurological prognostication once 24hrs off sedation  - Continue Keppra 1000mg TID  - Continue Vimpat 200mg TID  - Continue Depakote 500mg TID  - Continue Phenobarb 64.8mg TID  - Continue Fycompa 12mg qHS  - Continue Zonegran 200mg BID  - Seizure precautions

## 2020-09-24 NOTE — PROGRESS NOTES
Critical Care Progress Note    Date of admission  8/23/2020    Chief Complaint  19 y.o. female admitted 8/23/2020 with a cardiac arrest following an apparent drug overdose.    Hospital Course    8/24 - TTM protocol initiated, EEG without NCSE  8/25 - rewarmed, awake, not following  8/26 - following occasional commands  8/27- seizure like activity vs shivering--> Keppra, versed, Propofol  8/28- no seizures on EEG  8/29- remains unresponsive.  8/30 -mental status improved, now opening eyes and following commands.  Keppra discontinued  8/31 - started cefepime/vancomycin for possible pneumonia,?  UTI, fever and increasing WBC fever; full vent  9/1 - Seizure activity noted on EEG today, reviewed with neurology; keppra load and increased to 1gm q 12; full vent support  9/2 - remains unresponsive with only some posture and grimace to stimulation, neurology reconsulted, stat MRI brain, LP, cEEG  9/3 -ongoing seizures, Vimpat added, Depakote added, family conference today  9/4 -ongoing seizure noted on cEEG, started on midazolam infusion, full ventilator support  9/5 -ongoing seizures noted, propofol added and increased today, full ventilator support  9/6 -have not yet achieved burst suppression on cEEG; continue propofol, change Versed to ketamine infusion, discussed with neurology, full ventilator support  9/7 - still titrating propofol and ketamine for burst suppression on cEEG. Continue full ventilator support  9/8 - propofol off; ketamine is being weaned; on vimpat, keppra, depakote. Requiring full ventilator support  9/9 - Requiring full ventilator support. propofol and ketamine off; on vimpat, keppra, depakote. Postures to stimuli - consult for trach/PEG  9/10 -status post tracheostomy, off propofol and on ketamine- beginning to wean antiepileptic medications, continues to require full ventilator support. PEG tomorrow. Reportedly is still having seizures.   9/11 PEG tube today, continues to require full mechanical  ventilator support  9/12 returned to deep sedation secondary to persistent seizures; continues to require full vent support  9/13 continues to require full vent support; propofol and ketamine plus AEDs for persistent, intermittent seizures  9/14 -    continue full vent support.  Continue propofol, ketamine.  Start Versed.  Continuous EEG.  9/15 -    continue vent support.  Increase Versed drip.  For repeat MRI today.  9/16 -    stop Versed drip.  Continue other AEDs.  Continue vent support.  9/17 -    febrile.  Culture blood and sputum.  Check UA.  Decrease propofol.  Continue vent support.  9/18 -    Pseudomonas in sputum.  Start Fortaz.  Stop ketamine.  Propofol stopped yesterday.  9/19 -    continue Fortaz.  Add phenobarbital to AED regimen.  9/20 -    continue current AED regimen.  Continue Fortaz.     9/21- vegetative state persists, no new seizures per neuro,   9/22- more seizures by EEG, placed back on propofol with seizure cessation  9/23- seizures intermittent overnight per EEG, Versed added today, grim outlook neurologically  9/24- occasional short seizures, propofol dose increased, versed maintained      Interval Problem Update  Reviewed last 24 hour events:  Intermittent short seizures overnight per neurology  Propofol dose increased from 30 to 60  Neurology considering pentobarb coma  Prognosis for return to independent function remains grim  Family conference tomorrow  No other events reported overnight  On continuous t-piece, ABG pending    Prior  Seizures by EEG  Versed added to fixed dose propofol  Family conference deferred until Friday per neurology to evaluate pt off all sedatives prior to meet family  Prognosis is unfortunately grim for meaningful neurologic recovery  Remains on T-piece  Lytes corrected    Prior  Back on propofol for EEG seizures  Will remain on prop for 36 hours then observe off  Worrisome prognosis per neuro  Remains on vent/ T-piece  Family meeting this Thursday with  neuro/ICU      Discussed with neuro  SR-ST  TF at 25 increasing slowly  Vent 30  Trach 13  [facility]      Review of Systems  Review of Systems   Unable to perform ROS: Acuity of condition        Vital Signs for last 24 hours   Temp:  [36.3 °C (97.4 °F)-36.6 °C (97.8 °F)] 36.4 °C (97.5 °F)  Pulse:  [] 89  Resp:  [20-33] 30  BP: ()/(38-53) 92/47  SpO2:  [95 %-100 %] 97 %    Hemodynamic parameters for last 24 hours       Respiratory Information for the last 24 hours       Physical Exam   Physical Exam  Constitutional:       Appearance: She is not diaphoretic.      Comments: On ventilator   HENT:      Head: Normocephalic and atraumatic.      Right Ear: External ear normal.      Left Ear: External ear normal.      Nose: Nose normal.      Mouth/Throat:      Mouth: Mucous membranes are moist.      Pharynx: Oropharynx is clear.   Eyes:      Conjunctiva/sclera: Conjunctivae normal.      Pupils: Pupils are equal, round, and reactive to light.   Neck:      Musculoskeletal: Normal range of motion. No neck rigidity.      Comments: Trach in place  Cardiovascular:      Pulses: Normal pulses.      Heart sounds: No murmur. No gallop.       Comments: Sinus rhythm  Pulmonary:      Breath sounds: Rales (Coarse crackles bilaterally) present. No wheezing.   Abdominal:      General: Bowel sounds are normal. There is no distension.      Palpations: Abdomen is soft.      Tenderness: There is no abdominal tenderness. There is no rebound.      Comments: Tolerating enteral tube feedings   Musculoskeletal: Normal range of motion.      Comments: No clubbing or cyanosis   Skin:     General: Skin is warm and dry.      Capillary Refill: Capillary refill takes less than 2 seconds.   Neurological:      Comments: Pupils 4 mm, equal and briskly reactive.  She opens her eyes and blinks.  She does not track or follow.         Medications  Current Facility-Administered Medications   Medication Dose Route Frequency Provider Last Rate Last Dose   •  propofol (DIPRIVAN) injection  80 mcg/kg/min Intravenous Continuous Nithin Padilla M.D.       • midazolam (VERSED) premix 125 mg/125 mL NS  15 mg/hr Intravenous Continuous Nithin Padilla M.D. 5 mL/hr at 09/23/20 0931 5 mg/hr at 09/23/20 0931   • influenza vaccine quad injection 0.5 mL  0.5 mL Intramuscular Once PRN Nithin Padilla M.D.       • zonisamide (ZONEGRAN) 10 mg/ml oral suspension 200 mg  200 mg Enteral Tube BID Nithin Padilla M.D.   200 mg at 09/24/20 0522   • perampanel (FYCOMPA) tablet 12 mg  12 mg Oral QHS Nithin Padilla M.D.   12 mg at 09/23/20 2205   • PHENobarbital (LUMINAL) tablet 64.8 mg  64.8 mg Enteral Tube Q8HRS Nithin Padilla M.D.   64.8 mg at 09/24/20 0523   • divalproex (DEPAKOTE SPRINKLE) capsule 500 mg  500 mg Enteral Tube Q8HRS Nithin Padilla M.D.   500 mg at 09/24/20 0527   • fentaNYL (SUBLIMAZE) injection  mcg   mcg Intravenous Q HOUR PRN Nithin Padilla M.D.   50 mcg at 09/19/20 1450   • levETIRAcetam (KEPPRA) tablet 1,000 mg  1,000 mg Enteral Tube Q8HR Nithin Padilla M.D.   1,000 mg at 09/24/20 0523   • glycopyrrolate (ROBINUL) tablet 0.5 mg  0.5 mg Enteral Tube Q8HRS Nithin Padilla M.D.   0.5 mg at 09/24/20 0523   • lacosamide (VIMPAT) tablet 200 mg  200 mg Enteral Tube Q8HR Nithin Padilla M.D.   200 mg at 09/24/20 1053   • acetaminophen (TYLENOL) tablet 1,000 mg  1,000 mg Enteral Tube Q4HRS PRN Nithin Padilla M.D.   1,000 mg at 09/21/20 2303   • Pharmacy Consult: Enteral tube insertion - review meds/change route/product selection  1 Each Other PHARMACY TO DOSE Nithin Padilla M.D.       • Respiratory Therapy Consult   Nebulization Continuous RT Brock Murdock M.D.       • famotidine (PEPCID) tablet 20 mg  20 mg Enteral Tube Q12HRS Nithin Padilla M.D.   20 mg at 09/24/20 0523   • senna-docusate (PERICOLACE or SENOKOT S) 8.6-50 MG per tablet 2 Tab  2 Tab Enteral Tube BID Brock Murdock M.D.   2 Tab at 09/24/20 0522   • MD Alert...ICU  Electrolyte Replacement per Pharmacy   Other PHARMACY TO DOSE Brock Murdock M.D.       • lidocaine (XYLOCAINE) 1 % injection 1-2 mL  1-2 mL Tracheal Tube Q30 MIN PRN Brock Murdock M.D.   Stopped at 09/21/20 1638   • enoxaparin (LOVENOX) inj 40 mg  40 mg Subcutaneous DAILY Boogie Augustine Jr., D.O.   40 mg at 09/24/20 0522       Fluids    Intake/Output Summary (Last 24 hours) at 9/24/2020 1127  Last data filed at 9/24/2020 0600  Gross per 24 hour   Intake 1306 ml   Output 1795 ml   Net -489 ml       Laboratory          Recent Labs     09/22/20  0405 09/23/20  0609 09/24/20  0650   SODIUM 137 137 137   POTASSIUM 4.0 4.5 4.1   CHLORIDE 101 99 101   CO2 21 24 24   BUN 15 16 17   CREATININE 0.48* 0.39* 0.38*   MAGNESIUM 2.3 2.2 2.1   CALCIUM 9.5 9.2 8.7     Recent Labs     09/22/20  0405 09/23/20  0609 09/24/20  0650   ALTSGPT 37 29 30   ASTSGOT 28 28 29   ALKPHOSPHAT 121* 105* 97   TBILIRUBIN 0.2 <0.2 <0.2   GLUCOSE 113* 94 89     Recent Labs     09/22/20  0405 09/23/20  0609 09/24/20  0650   WBC 12.9* 8.3 7.3   NEUTSPOLYS 71.20 52.90 56.20   LYMPHOCYTES 14.00* 22.00 19.60*   MONOCYTES 8.80 15.50* 15.00*   EOSINOPHILS 5.10 7.10* 6.80   BASOPHILS 0.20 0.60 0.60   ASTSGOT 28 28 29   ALTSGPT 37 29 30   ALKPHOSPHAT 121* 105* 97   TBILIRUBIN 0.2 <0.2 <0.2     Recent Labs     09/22/20  0405 09/23/20  0609 09/24/20  0650   RBC 4.09* 4.02* 3.88*   HEMOGLOBIN 12.9 12.6 12.2   HEMATOCRIT 39.1 39.2 38.2   PLATELETCT 512* 420 375       Imaging  None    Assessment/Plan  * Acute respiratory failure with hypoxia (HCC)- (present on admission)  Assessment & Plan  Intubated 8/24  Trach 9/9  On T-piece 6 hours/day yesterday     Now on continuous T-piece, watching minute ventilation  Will check ABG today    Anoxic encephalopathy (HCC)- (present on admission)  Assessment & Plan  MRI on 9/2 and 9/15 consistent with severe anoxic brain injury  EEG with frequent, blunted generalized sharps with frontal maximum and triphasic morphology with  "frequent runs of frontal intermittent rhythmic delta activity (FIRDA)  Continue AEDs    Exam consistent with vegetative state. Poor prognosis for return to independent function. Discuss further with neurology and family    More seizures, back on propofol. Suspect extremely poor brain function will persist. Meet with family Thursday after assessment again off propofol.    More seizures on Versed and propofol. We increased propofol dose from 30 to 60. Neurology considering pentobarb coma. Given duration of coma (30 days), poor and worsening MRI, refractory seizures despite 6 anti-seizure drugs, in my opinion, this patient's prognosis for return to functional independence and return to communication is poor. We will discuss with family tomorrow with neurology.    In addition to fixed dose propofol I added Versed at request of neurology as pt still having seizures per EEG. The refractory seizures reflect a \"bad\" irritable brain. Sadly, given the duration of illness and the progressive clinical decline and MRI deterioration the prognosis is grim for meaningful lneurology recovery    Drug overdose- (present on admission)  Assessment & Plan  BA 0.16 on presentation  Positive urine drug screen for amphetamines on presentation  Boyfriend reported methamphetamine, oxycodone and alcohol use    Fever  Assessment & Plan  1 out of 2 blood cultures positive for coag negative Staphylococcus on 9/17 - contaminant  UA unremarkable  Sputum with Pseudomonas  Fever has resolved after Fortaz started    Seizures ([facility])  Assessment & Plan  Continuous video EEG  Continue phenobarbital, 64.8 mg every 8 hours  Continue Vimpat, 200 mg every 8 hours  Continue valproic acid, 500 mg every 8 hours  Continue Keppra, 1000 mg every 8 hours  Continue Fycompa, 10 mg nightly      On increased dose of propofol today at 60 and Versed will be increased from 5 to 15    Pneumonia of both lungs due to Pseudomonas species ([facility])  Assessment & Plan  Sputum " culture with MRSA, Acinetobacter and Stenotrophomonas species on 9/6  Completed 10 days of Bactrim on 9/13  Sputum culture from 9/17 with Pseudomonas aeruginosa  Stop Fortaz today- reported to rarely be associated with seizures    Elevated transaminase level- (present on admission)  Assessment & Plan  Due to valproic acid  Resolving    S/P PEA cardiac arrest (HCC)- (present on admission)  Assessment & Plan  S/P therapeutic hypothermia protocol    Dysphagia, oropharyngeal- (present on admission)  Assessment & Plan  S/P laparoscopic gastrostomy on 9/11  Continue enteral tube feedings       VTE:  Lovenox  Ulcer: H2 Antagonist  Lines: Pearce Catheter  Ongoing indication addressed    I have performed a physical exam and reviewed and updated ROS and Plan today (9/24/2020). In review of yesterday's note (9/23/2020), there are no changes except as documented above.     I have assessed and reassessed her respiratory status with ventilator adjustments, ventilator waveforms, airway mechanics, hemodynamics, blood pressure, cardiovascular status and her neurologic status.  She is at increased risk for worsening CNS and respiratory system dysfunction.    Discussed patient condition and risk of morbidity and/or mortality with RN, RT, Pharmacy, Charge nurse / hot rounds, QA team and neurology     The patient remains critically ill.  Critical care time = 35 minutes in directly providing and coordinating critical care and extensive data review.  No time overlap and excludes procedures.

## 2020-09-24 NOTE — CARE PLAN
Problem: Respiratory:  Goal: Respiratory status will improve  Outcome: PROGRESSING SLOWER THAN EXPECTED     Problem: Fluid Volume:  Goal: Will maintain balanced intake and output  Outcome: PROGRESSING SLOWER THAN EXPECTED     Problem: Communication  Goal: The ability to communicate needs accurately and effectively will improve  Outcome: PROGRESSING SLOWER THAN EXPECTED     Problem: Safety  Goal: Will remain free from injury  Outcome: PROGRESSING SLOWER THAN EXPECTED  Goal: Will remain free from falls  Outcome: PROGRESSING SLOWER THAN EXPECTED     Problem: Infection  Goal: Will remain free from infection  Outcome: PROGRESSING SLOWER THAN EXPECTED     Problem: Venous Thromboembolism (VTW)/Deep Vein Thrombosis (DVT) Prevention:  Goal: Patient will participate in Venous Thrombosis (VTE)/Deep Vein Thrombosis (DVT)Prevention Measures  Outcome: PROGRESSING SLOWER THAN EXPECTED     Problem: Bowel/Gastric:  Goal: Normal bowel function is maintained or improved  Outcome: PROGRESSING SLOWER THAN EXPECTED     Problem: Bowel/Gastric:  Goal: Normal bowel function is maintained or improved  Outcome: PROGRESSING SLOWER THAN EXPECTED  Goal: Will not experience complications related to bowel motility  Outcome: PROGRESSING SLOWER THAN EXPECTED

## 2020-09-24 NOTE — PROGRESS NOTES
Patient became hypotensive in the 70's systolic. MD Padilla updated. New orders per MAR completed.   Patient began to have rhythmic movements appearing like seizures. MD updated, Assessed EEG and  patient at this time. No new orders.

## 2020-09-25 ENCOUNTER — APPOINTMENT (OUTPATIENT)
Dept: RADIOLOGY | Facility: MEDICAL CENTER | Age: 20
DRG: 004 | End: 2020-09-25
Attending: INTERNAL MEDICINE
Payer: MEDICAID

## 2020-09-25 LAB
ALBUMIN SERPL BCP-MCNC: 3.2 G/DL (ref 3.2–4.9)
ALBUMIN/GLOB SERPL: 1.1 G/DL
ALP SERPL-CCNC: 89 U/L (ref 30–99)
ALT SERPL-CCNC: 28 U/L (ref 2–50)
ANION GAP SERPL CALC-SCNC: 9 MMOL/L (ref 7–16)
AST SERPL-CCNC: 26 U/L (ref 12–45)
BASOPHILS # BLD AUTO: 0.6 % (ref 0–1.8)
BASOPHILS # BLD: 0.05 K/UL (ref 0–0.12)
BILIRUB SERPL-MCNC: <0.2 MG/DL (ref 0.1–1.5)
BUN SERPL-MCNC: 15 MG/DL (ref 8–22)
CALCIUM SERPL-MCNC: 8.7 MG/DL (ref 8.5–10.5)
CHLORIDE SERPL-SCNC: 101 MMOL/L (ref 96–112)
CO2 SERPL-SCNC: 24 MMOL/L (ref 20–33)
CREAT SERPL-MCNC: 0.36 MG/DL (ref 0.5–1.4)
EOSINOPHIL # BLD AUTO: 0.56 K/UL (ref 0–0.51)
EOSINOPHIL NFR BLD: 7.2 % (ref 0–6.9)
ERYTHROCYTE [DISTWIDTH] IN BLOOD BY AUTOMATED COUNT: 53.1 FL (ref 35.9–50)
GLOBULIN SER CALC-MCNC: 2.8 G/DL (ref 1.9–3.5)
GLUCOSE SERPL-MCNC: 103 MG/DL (ref 65–99)
HCT VFR BLD AUTO: 39.7 % (ref 37–47)
HGB BLD-MCNC: 12.1 G/DL (ref 12–16)
IMM GRANULOCYTES # BLD AUTO: 0.08 K/UL (ref 0–0.11)
IMM GRANULOCYTES NFR BLD AUTO: 1 % (ref 0–0.9)
LYMPHOCYTES # BLD AUTO: 1.49 K/UL (ref 1–4.8)
LYMPHOCYTES NFR BLD: 19.2 % (ref 22–41)
MAGNESIUM SERPL-MCNC: 2 MG/DL (ref 1.5–2.5)
MCH RBC QN AUTO: 30.8 PG (ref 27–33)
MCHC RBC AUTO-ENTMCNC: 30.5 G/DL (ref 33.6–35)
MCV RBC AUTO: 101 FL (ref 81.4–97.8)
MONOCYTES # BLD AUTO: 0.97 K/UL (ref 0–0.85)
MONOCYTES NFR BLD AUTO: 12.5 % (ref 0–13.4)
NEUTROPHILS # BLD AUTO: 4.62 K/UL (ref 2–7.15)
NEUTROPHILS NFR BLD: 59.5 % (ref 44–72)
NRBC # BLD AUTO: 0 K/UL
NRBC BLD-RTO: 0 /100 WBC
PLATELET # BLD AUTO: 307 K/UL (ref 164–446)
PMV BLD AUTO: 9.9 FL (ref 9–12.9)
POTASSIUM SERPL-SCNC: 4.5 MMOL/L (ref 3.6–5.5)
PROT SERPL-MCNC: 6 G/DL (ref 6–8.2)
RBC # BLD AUTO: 3.93 M/UL (ref 4.2–5.4)
SODIUM SERPL-SCNC: 134 MMOL/L (ref 135–145)
WBC # BLD AUTO: 7.8 K/UL (ref 4.8–10.8)

## 2020-09-25 PROCEDURE — 94640 AIRWAY INHALATION TREATMENT: CPT

## 2020-09-25 PROCEDURE — A9270 NON-COVERED ITEM OR SERVICE: HCPCS | Performed by: INTERNAL MEDICINE

## 2020-09-25 PROCEDURE — 95711 VEEG 2-12 HR UNMONITORED: CPT | Performed by: PSYCHIATRY & NEUROLOGY

## 2020-09-25 PROCEDURE — 700111 HCHG RX REV CODE 636 W/ 250 OVERRIDE (IP): Performed by: INTERNAL MEDICINE

## 2020-09-25 PROCEDURE — 700102 HCHG RX REV CODE 250 W/ 637 OVERRIDE(OP): Performed by: INTERNAL MEDICINE

## 2020-09-25 PROCEDURE — 71045 X-RAY EXAM CHEST 1 VIEW: CPT

## 2020-09-25 PROCEDURE — 80053 COMPREHEN METABOLIC PANEL: CPT

## 2020-09-25 PROCEDURE — 99291 CRITICAL CARE FIRST HOUR: CPT | Performed by: INTERNAL MEDICINE

## 2020-09-25 PROCEDURE — 4A10X4Z MONITORING OF CENTRAL NERVOUS ELECTRICAL ACTIVITY, EXTERNAL APPROACH: ICD-10-PCS | Performed by: PSYCHIATRY & NEUROLOGY

## 2020-09-25 PROCEDURE — 700105 HCHG RX REV CODE 258: Performed by: INTERNAL MEDICINE

## 2020-09-25 PROCEDURE — 85025 COMPLETE CBC W/AUTO DIFF WBC: CPT

## 2020-09-25 PROCEDURE — 770022 HCHG ROOM/CARE - ICU (200)

## 2020-09-25 PROCEDURE — 99232 SBSQ HOSP IP/OBS MODERATE 35: CPT | Mod: 25 | Performed by: PSYCHIATRY & NEUROLOGY

## 2020-09-25 PROCEDURE — 83735 ASSAY OF MAGNESIUM: CPT

## 2020-09-25 PROCEDURE — 95714 VEEG EA 12-26 HR UNMNTR: CPT | Performed by: PSYCHIATRY & NEUROLOGY

## 2020-09-25 PROCEDURE — 95718 EEG PHYS/QHP 2-12 HR W/VEEG: CPT | Performed by: PSYCHIATRY & NEUROLOGY

## 2020-09-25 RX ADMIN — FAMOTIDINE 20 MG: 20 TABLET, FILM COATED ORAL at 17:38

## 2020-09-25 RX ADMIN — LACOSAMIDE 200 MG: 100 TABLET, FILM COATED ORAL at 11:00

## 2020-09-25 RX ADMIN — PHENOBARBITAL 64.8 MG: 32.4 TABLET ORAL at 13:41

## 2020-09-25 RX ADMIN — LEVETIRACETAM 1000 MG: 500 TABLET, FILM COATED ORAL at 05:37

## 2020-09-25 RX ADMIN — GLYCOPYRROLATE 0.5 MG: 1 TABLET ORAL at 21:50

## 2020-09-25 RX ADMIN — PROPOFOL 30 MCG/KG/MIN: 10 INJECTION, EMULSION INTRAVENOUS at 09:39

## 2020-09-25 RX ADMIN — MIDAZOLAM 15 MG/HR: 5 INJECTION INTRAMUSCULAR; INTRAVENOUS at 04:22

## 2020-09-25 RX ADMIN — PROPOFOL 30 MCG/KG/MIN: 10 INJECTION, EMULSION INTRAVENOUS at 17:38

## 2020-09-25 RX ADMIN — PROPOFOL 30 MCG/KG/MIN: 10 INJECTION, EMULSION INTRAVENOUS at 01:10

## 2020-09-25 RX ADMIN — DIVALPROEX SODIUM 500 MG: 125 CAPSULE ORAL at 21:50

## 2020-09-25 RX ADMIN — MIDAZOLAM 15 MG/HR: 5 INJECTION INTRAMUSCULAR; INTRAVENOUS at 13:04

## 2020-09-25 RX ADMIN — FAMOTIDINE 20 MG: 20 TABLET, FILM COATED ORAL at 05:37

## 2020-09-25 RX ADMIN — GLYCOPYRROLATE 0.5 MG: 1 TABLET ORAL at 05:37

## 2020-09-25 RX ADMIN — PHENOBARBITAL 64.8 MG: 32.4 TABLET ORAL at 05:36

## 2020-09-25 RX ADMIN — LACOSAMIDE 200 MG: 100 TABLET, FILM COATED ORAL at 01:19

## 2020-09-25 RX ADMIN — ENOXAPARIN SODIUM 40 MG: 40 INJECTION SUBCUTANEOUS at 05:37

## 2020-09-25 RX ADMIN — LEVETIRACETAM 1000 MG: 500 TABLET, FILM COATED ORAL at 21:50

## 2020-09-25 RX ADMIN — LACOSAMIDE 200 MG: 100 TABLET, FILM COATED ORAL at 17:37

## 2020-09-25 RX ADMIN — PHENOBARBITAL 64.8 MG: 32.4 TABLET ORAL at 21:50

## 2020-09-25 RX ADMIN — ZONISAMIDE 200 MG: 50 CAPSULE ORAL at 17:37

## 2020-09-25 RX ADMIN — PERAMPANEL 12 MG: 6 TABLET ORAL at 21:50

## 2020-09-25 RX ADMIN — LEVETIRACETAM 1000 MG: 500 TABLET, FILM COATED ORAL at 13:42

## 2020-09-25 RX ADMIN — ZONISAMIDE 200 MG: 50 CAPSULE ORAL at 05:38

## 2020-09-25 RX ADMIN — DIVALPROEX SODIUM 500 MG: 125 CAPSULE ORAL at 13:41

## 2020-09-25 RX ADMIN — DIVALPROEX SODIUM 500 MG: 125 CAPSULE ORAL at 05:37

## 2020-09-25 RX ADMIN — GLYCOPYRROLATE 0.5 MG: 1 TABLET ORAL at 13:41

## 2020-09-25 RX ADMIN — DOCUSATE SODIUM 50 MG AND SENNOSIDES 8.6 MG 2 TABLET: 8.6; 5 TABLET, FILM COATED ORAL at 05:37

## 2020-09-25 NOTE — PROGRESS NOTES
ICU Progress    Detailed family conference held with family (mother/ father) , palliative care and neurology. Grim prognosis explained. The family decided to change goals of care to comfort measures only. They appreciate that the patient may die within hours to days but she will die without pain and without suffering.    There is one sibling coming from [place], [place] so family will defer full implementation of comfort care until family is here. In the meantime there will be no escalation of care, no CPR, no pressors and we will discontinue labs. We will also add a prn narcotic prn any evidence of pain or suffering.

## 2020-09-25 NOTE — PROGRESS NOTES
Neurology Progress Note  Neurohospitalist Service, [facility]    Referring Physician: Nithin Padilla M.D.    Chief Complaint   Patient presents with   • Drug Overdose     unknown amount or type of drug ingestion per EMS, pt found down, aspiration, CPR in progress upon EMS arrival.       HPI: Refer to initial documented Neurology H&P, as detailed in the patient's chart.    Interval History: No acute events or change in presentation overnight. Continues to require sedation on top of multiple AEDs for pharmacorefractory seizures.    Review of systems: Unable to assess in setting of intubation/sedation    Past Medical History:    has no past medical history on file.    FHx:  family history is not on file.    SHx:   reports that she has never smoked. She has never used smokeless tobacco.    Medications:    Current Facility-Administered Medications:   •  propofol (DIPRIVAN) injection, 30 mcg/kg/min, Intravenous, Continuous, Nithin Padilla M.D., Last Rate: 12 mL/hr at 09/25/20 0939, 30 mcg/kg/min at 09/25/20 0939  •  midazolam (VERSED) premix 125 mg/125 mL NS, 15 mg/hr, Intravenous, Continuous, Nithin Padilla M.D., Last Rate: 15 mL/hr at 09/25/20 0422, 15 mg/hr at 09/25/20 0422  •  influenza vaccine quad injection 0.5 mL, 0.5 mL, Intramuscular, Once PRN, Nithin Padilla M.D.  •  zonisamide (ZONEGRAN) 10 mg/ml oral suspension 200 mg, 200 mg, Enteral Tube, BID, Nithin Padilla M.D., 200 mg at 09/25/20 0538  •  perampanel (FYCOMPA) tablet 12 mg, 12 mg, Oral, QHS, Nithin Padilla M.D., 12 mg at 09/24/20 2251  •  PHENobarbital (LUMINAL) tablet 64.8 mg, 64.8 mg, Enteral Tube, Q8HRS, Nithin Padilla M.D., 64.8 mg at 09/25/20 0536  •  divalproex (DEPAKOTE SPRINKLE) capsule 500 mg, 500 mg, Enteral Tube, Q8HRS, Nithin Padilla M.D., 500 mg at 09/25/20 0537  •  fentaNYL (SUBLIMAZE) injection  mcg,  mcg, Intravenous, Q HOUR PRN, Nithin Padilla M.D., 50 mcg at 09/19/20 6709  •   levETIRAcetam (KEPPRA) tablet 1,000 mg, 1,000 mg, Enteral Tube, Q8HR, Nithin Padilla M.D., 1,000 mg at 09/25/20 0537  •  glycopyrrolate (ROBINUL) tablet 0.5 mg, 0.5 mg, Enteral Tube, Q8HRS, Nithin Padilla M.D., 0.5 mg at 09/25/20 0537  •  lacosamide (VIMPAT) tablet 200 mg, 200 mg, Enteral Tube, Q8HR, Nithin Padilla M.D., 200 mg at 09/25/20 0119  •  acetaminophen (TYLENOL) tablet 1,000 mg, 1,000 mg, Enteral Tube, Q4HRS PRN, Nithin Padilla M.D., 1,000 mg at 09/21/20 2303  •  Pharmacy Consult: Enteral tube insertion - review meds/change route/product selection, 1 Each, Other, PHARMACY TO DOSE, Nithin Padilla M.D.  •  Respiratory Therapy Consult, , Nebulization, Continuous RT, Brock Murdock M.D.  •  famotidine (PEPCID) tablet 20 mg, 20 mg, Enteral Tube, Q12HRS, 20 mg at 09/25/20 0537 **OR** [DISCONTINUED] famotidine (PEPCID) injection 20 mg, 20 mg, Intravenous, Q12HRS, Brock Murdock M.D., 20 mg at 09/04/20 0454  •  senna-docusate (PERICOLACE or SENOKOT S) 8.6-50 MG per tablet 2 Tab, 2 Tab, Enteral Tube, BID, 2 Tab at 09/25/20 0537 **AND** [DISCONTINUED] polyethylene glycol/lytes (MIRALAX) PACKET 1 Packet, 1 Packet, Enteral Tube, QDAY PRN **AND** [DISCONTINUED] magnesium hydroxide (MILK OF MAGNESIA) suspension 30 mL, 30 mL, Enteral Tube, QDAY PRN **AND** [DISCONTINUED] bisacodyl (DULCOLAX) suppository 10 mg, 10 mg, Rectal, QDAY PRN, Brock Murdock M.D.  •  MD Alert...ICU Electrolyte Replacement per Pharmacy, , Other, PHARMACY TO DOSE, Brock Murdock M.D.  •  lidocaine (XYLOCAINE) 1 % injection 1-2 mL, 1-2 mL, Tracheal Tube, Q30 MIN PRN, Brock Murdock M.D., Stopped at 09/21/20 1638  •  enoxaparin (LOVENOX) inj 40 mg, 40 mg, Subcutaneous, DAILY, Boogie Augustine Jr., D.O., 40 mg at 09/25/20 0537    Physical Examination:     Vitals:    09/25/20 0500 09/25/20 0600 09/25/20 0800 09/25/20 0823   BP: (!) 90/49 (!) 96/52 (!) (P) 92/48    Pulse: 89 95 (P) 99 (!) 108   Resp: (!) 30 (!) 31 (!) (P) 33 18   Temp:        TempSrc:       SpO2: 96% 97% (P) 95% 96%   Weight:       Height:           General: Patient is awake and in no acute distress  Eyes: examination of optic disks not indicated at this time  CV: RRR    NEUROLOGICAL EXAM:     Mental status: not awake, does not follows commands, does not open eyes to noxious stimuli  Speech and language: unable to assess, patient intubated/sedated  Cranial nerve exam: Pupils are equal, round and reactive to light bilaterally. Corneal reflex present. VOR present. Cough and gag reflexes present.   Motor exam: Bilateral upper and lower extremities flaccid with decreased tone.   Deep tendon reflexes: Bilateral lower extremity patellar hyperreflexic. Toes mute bilaterally.   Coordination: unable to assess  Gait: unable to assess    Objective Data:    Labs:  Lab Results   Component Value Date/Time    PROTHROMBTM 14.0 08/25/2020 09:20 PM    INR 1.05 08/25/2020 09:20 PM      Lab Results   Component Value Date/Time    WBC 7.8 09/25/2020 06:33 AM    RBC 3.93 (L) 09/25/2020 06:33 AM    HEMOGLOBIN 12.1 09/25/2020 06:33 AM    HEMATOCRIT 39.7 09/25/2020 06:33 AM    .0 (H) 09/25/2020 06:33 AM    MCH 30.8 09/25/2020 06:33 AM    MCHC 30.5 (L) 09/25/2020 06:33 AM    MPV 9.9 09/25/2020 06:33 AM    NEUTSPOLYS 59.50 09/25/2020 06:33 AM    LYMPHOCYTES 19.20 (L) 09/25/2020 06:33 AM    MONOCYTES 12.50 09/25/2020 06:33 AM    EOSINOPHILS 7.20 (H) 09/25/2020 06:33 AM    BASOPHILS 0.60 09/25/2020 06:33 AM    ANISOCYTOSIS 1+ 08/23/2020 10:05 PM      Lab Results   Component Value Date/Time    SODIUM 134 (L) 09/25/2020 08:06 AM    POTASSIUM 4.5 09/25/2020 08:06 AM    CHLORIDE 101 09/25/2020 08:06 AM    CO2 24 09/25/2020 08:06 AM    GLUCOSE 103 (H) 09/25/2020 08:06 AM    BUN 15 09/25/2020 08:06 AM    CREATININE 0.36 (L) 09/25/2020 08:06 AM      Lab Results   Component Value Date/Time    TRIGLYCERIDE 178 (H) 09/23/2020 06:09 AM       Lab Results   Component Value Date/Time    ALKPHOSPHAT 89 09/25/2020 08:06 AM     ASTSGOT 26 09/25/2020 08:06 AM    ALTSGPT 28 09/25/2020 08:06 AM    TBILIRUBIN <0.2 09/25/2020 08:06 AM        Imaging/Testing:    I interpreted and/or reviewed the patient's neuroimaging    R-[id number]-W/O   Final Result      1.  Findings consistent with worsening anoxic brain injury involving the lentiform nuclei bilaterally.   2.  Findings consistent with anoxic brain injury in the parietal, occipital and posterior temporal regions which appear somewhat improved from prior exam.  Similar findings in the bifrontal region appear unchanged.     Assessment and Plan:    Annika He is an unfortunate 19 y.o. female presenting for whom neurology has been consulted for seizure management and prognostication in setting of diffuse anoxic brain injury. She remains sedated on propofol/versed and multiple anti-epileptic drugs in efforts to achieve burst suppression. Brain stem reflexes at this time remain intact however prognosis is poor given severity of epilepsy.      Plan:  - Goals of care meeting today  - Continue vEEG  - Continue Propofol and Versed drips, titrate sedation when seizure free for 24hrs  - Continue Keppra 1000mg TID  - Continue Vimpat 200mg TID  - Continue Depakote 500mg TID  - Continue Phenobarb 64.8mg TID  - Continue Fycompa 12mg qHS  - Continue Zonegran 200mg BID  - Seizure precautions

## 2020-09-25 NOTE — PALLIATIVE CARE
"Palliative Care follow-up  PC RN met with mom Juliette, her SO Mitch, dad Angel Luis, Dr Padilla, Dr Mccann, and BS RN in the conference room. Clinical course discussed and updates provided from neurology and critical care noting the difficult to manage seizures and its lack of compatibility with meaningful recovery. Family allowed time to process and ask questions. Angel Luis expressed noting \"this was coming.\" They agree that living on machines or in a vegetative state was not in line with Lindsey's goals. Angel Luis was tearful when saying \"There's no one in Heaven to meet her yet.\" Further discussion surrounding goals and options led to a plan for comfort focused treatment and supporting Lindsey through her end-of-life path. The family expressed understanding of comfort care and the process, including changes in medications, no labs/tests/EEG, and a focus on allowing a dignified and peaceful death. Emotional support, normalization of feelings and validation provided. All questions answered and plan made to reach out to her siblings to visit.     Updated: BS team present    Plan: follow for support    Thank you for allowing Palliative Care to support this patient and family. Contact [phone] for additional assistance, change in patient status, or with any questions/concerns.   "

## 2020-09-25 NOTE — PROGRESS NOTES
From [id number] Care Conference held with family, goals of care discussed, see notes from Dr. Padilla, Dr. Kumar and Pallitive RN Alona Viera    Discussed that when patient transitions to comfort care other visitors, such as siblings, would be allowed to visit    Shortly after conference ended, a man named Sandra who stated he was her significant other, tried to visit the patient and had the password given by the father. This RN confirmed with father Angel Luis that Sandra was allowed to visit.    This RN explained that until the patient is comfort care, only one visitor would be allowed due to Covid-19 visitor restriction policy. Currently, the patient's one visitor is the father, Angel Luis. Explained that when patient transitions to comfort care others, such as siblings and Sandra would be allowed to visit if accompanied by dad. Discussed plan with charge RN.

## 2020-09-25 NOTE — PROGRESS NOTES
Critical Care Progress Note    Date of admission  8/23/2020    Chief Complaint  19 y.o. female admitted 8/23/2020 with a cardiac arrest following an apparent drug overdose.    Hospital Course    8/24 - TTM protocol initiated, EEG without NCSE  8/25 - rewarmed, awake, not following  8/26 - following occasional commands  8/27- seizure like activity vs shivering--> Keppra, versed, Propofol  8/28- no seizures on EEG  8/29- remains unresponsive.  8/30 -mental status improved, now opening eyes and following commands.  Keppra discontinued  8/31 - started cefepime/vancomycin for possible pneumonia,?  UTI, fever and increasing WBC fever; full vent  9/1 - Seizure activity noted on EEG today, reviewed with neurology; keppra load and increased to 1gm q 12; full vent support  9/2 - remains unresponsive with only some posture and grimace to stimulation, neurology reconsulted, stat MRI brain, LP, cEEG  9/3 -ongoing seizures, Vimpat added, Depakote added, family conference today  9/4 -ongoing seizure noted on cEEG, started on midazolam infusion, full ventilator support  9/5 -ongoing seizures noted, propofol added and increased today, full ventilator support  9/6 -have not yet achieved burst suppression on cEEG; continue propofol, change Versed to ketamine infusion, discussed with neurology, full ventilator support  9/7 - still titrating propofol and ketamine for burst suppression on cEEG. Continue full ventilator support  9/8 - propofol off; ketamine is being weaned; on vimpat, keppra, depakote. Requiring full ventilator support  9/9 - Requiring full ventilator support. propofol and ketamine off; on vimpat, keppra, depakote. Postures to stimuli - consult for trach/PEG  9/10 -status post tracheostomy, off propofol and on ketamine- beginning to wean antiepileptic medications, continues to require full ventilator support. PEG tomorrow. Reportedly is still having seizures.   9/11 PEG tube today, continues to require full mechanical  ventilator support  9/12 returned to deep sedation secondary to persistent seizures; continues to require full vent support  9/13 continues to require full vent support; propofol and ketamine plus AEDs for persistent, intermittent seizures  9/14 -    continue full vent support.  Continue propofol, ketamine.  Start Versed.  Continuous EEG.  9/15 -    continue vent support.  Increase Versed drip.  For repeat MRI today.  9/16 -    stop Versed drip.  Continue other AEDs.  Continue vent support.  9/17 -    febrile.  Culture blood and sputum.  Check UA.  Decrease propofol.  Continue vent support.  9/18 -    Pseudomonas in sputum.  Start Fortaz.  Stop ketamine.  Propofol stopped yesterday.  9/19 -    continue Fortaz.  Add phenobarbital to AED regimen.  9/20 -    continue current AED regimen.  Continue Fortaz.     9/21- vegetative state persists, no new seizures per neuro,   9/22- more seizures by EEG, placed back on propofol with seizure cessation  9/23- seizures intermittent overnight per EEG, Versed added today, grim outlook neurologically  9/24- occasional short seizures, propofol dose increased, versed maintained  9/25- still having occasional seizures- family meeting with neurology today      Interval Problem Update  Reviewed last 24 hour events:  Still having occasional seizures  No other events  Remains critical ill  Poor prognosis  Family meeting today with neurology    Prior 24 hours  Intermittent short seizures overnight per neurology  Propofol dose increased from 30 to 60  Neurology considering pentobarb coma  Prognosis for return to independent function remains grim  Family conference tomorrow  No other events reported overnight  On continuous t-piece, ABG pending      Prior  Seizures by EEG  Versed added to fixed dose propofol  Family conference deferred until Friday per neurology to evaluate pt off all sedatives prior to meet family  Prognosis is unfortunately grim for meaningful neurologic recovery  Remains  on T-piece  Lytes corrected    Prior  Back on propofol for EEG seizures  Will remain on prop for 36 hours then observe off  Worrisome prognosis per neuro  Remains on vent/ T-piece  Family meeting this Thursday with neuro/ICU      Discussed with neuro  SR-ST  TF at 25 increasing slowly  Vent 30  Trach 13  Fortaz      Review of Systems  Review of Systems   Unable to perform ROS: Acuity of condition        Vital Signs for last 24 hours   Temp:  [35.8 °C (96.4 °F)-36.9 °C (98.5 °F)] (P) 35.9 °C (96.7 °F)  Pulse:  [] (P) 95  Resp:  [18-45] (P) 31  BP: (73-96)/(31-58) (P) 89/57  SpO2:  [93 %-99 %] (P) 93 %    Hemodynamic parameters for last 24 hours       Respiratory Information for the last 24 hours       Physical Exam   Physical Exam  Constitutional:       Appearance: She is not diaphoretic.      Comments: On ventilator   HENT:      Head: Normocephalic and atraumatic.      Right Ear: External ear normal.      Left Ear: External ear normal.      Nose: Nose normal.      Mouth/Throat:      Mouth: Mucous membranes are moist.      Pharynx: Oropharynx is clear.   Eyes:      Conjunctiva/sclera: Conjunctivae normal.      Pupils: Pupils are equal, round, and reactive to light.   Neck:      Musculoskeletal: Normal range of motion. No neck rigidity.      Comments: Trach in place  Cardiovascular:      Pulses: Normal pulses.      Heart sounds: No murmur. No gallop.       Comments: Sinus rhythm  Pulmonary:      Breath sounds: Rales (Coarse crackles bilaterally) present. No wheezing.   Abdominal:      General: Bowel sounds are normal. There is no distension.      Palpations: Abdomen is soft.      Tenderness: There is no abdominal tenderness. There is no rebound.      Comments: Tolerating enteral tube feedings   Musculoskeletal: Normal range of motion.      Comments: No clubbing or cyanosis   Skin:     General: Skin is warm and dry.      Capillary Refill: Capillary refill takes less than 2 seconds.   Neurological:      Comments:  Pupils 4 mm, equal and briskly reactive.  She opens her eyes and blinks.  She does not track or follow.         Medications  Current Facility-Administered Medications   Medication Dose Route Frequency Provider Last Rate Last Dose   • propofol (DIPRIVAN) injection  30 mcg/kg/min Intravenous Continuous Nithin Padilla M.D. 12 mL/hr at 09/25/20 0939 30 mcg/kg/min at 09/25/20 0939   • midazolam (VERSED) premix 125 mg/125 mL NS  15 mg/hr Intravenous Continuous Nithin Padilla M.D. 15 mL/hr at 09/25/20 1304 15 mg/hr at 09/25/20 1304   • influenza vaccine quad injection 0.5 mL  0.5 mL Intramuscular Once PRN Nithin Padilla M.D.       • zonisamide (ZONEGRAN) 10 mg/ml oral suspension 200 mg  200 mg Enteral Tube BID Nithin Padilla M.D.   200 mg at 09/25/20 0538   • perampanel (FYCOMPA) tablet 12 mg  12 mg Oral QHS Nithin Padilla M.D.   12 mg at 09/24/20 2251   • PHENobarbital (LUMINAL) tablet 64.8 mg  64.8 mg Enteral Tube Q8HRS Nithin Padilla M.D.   64.8 mg at 09/25/20 1341   • divalproex (DEPAKOTE SPRINKLE) capsule 500 mg  500 mg Enteral Tube Q8HRS Nithin Padilla M.D.   500 mg at 09/25/20 1341   • fentaNYL (SUBLIMAZE) injection  mcg   mcg Intravenous Q HOUR PRN Nithin Padilla M.D.   50 mcg at 09/19/20 1450   • levETIRAcetam (KEPPRA) tablet 1,000 mg  1,000 mg Enteral Tube Q8HR Nithin Padilla M.D.   1,000 mg at 09/25/20 1342   • glycopyrrolate (ROBINUL) tablet 0.5 mg  0.5 mg Enteral Tube Q8HRS Nithin Padilla M.D.   0.5 mg at 09/25/20 1341   • lacosamide (VIMPAT) tablet 200 mg  200 mg Enteral Tube Q8HR Nithin Padilla M.D.   200 mg at 09/25/20 1100   • acetaminophen (TYLENOL) tablet 1,000 mg  1,000 mg Enteral Tube Q4HRS PRN Nithin Padilla M.D.   1,000 mg at 09/21/20 2303   • Pharmacy Consult: Enteral tube insertion - review meds/change route/product selection  1 Each Other PHARMACY TO DOSE Nithin Padilla M.D.       • Respiratory Therapy Consult   Nebulization Continuous RT Brock WARNER  BAILEE Murdock       • famotidine (PEPCID) tablet 20 mg  20 mg Enteral Tube Q12HRS Nithin Padilla M.D.   20 mg at 09/25/20 0537   • senna-docusate (PERICOLACE or SENOKOT S) 8.6-50 MG per tablet 2 Tab  2 Tab Enteral Tube BID Brock Murdock M.D.   2 Tab at 09/25/20 0537   • MD Alert...ICU Electrolyte Replacement per Pharmacy   Other PHARMACY TO DOSE Brock Murdock M.D.       • lidocaine (XYLOCAINE) 1 % injection 1-2 mL  1-2 mL Tracheal Tube Q30 MIN PRN Brock Murdock M.D.   Stopped at 09/21/20 1638   • enoxaparin (LOVENOX) inj 40 mg  40 mg Subcutaneous DAILY Boogie Augustine Jr., D.O.   40 mg at 09/25/20 0537       Fluids    Intake/Output Summary (Last 24 hours) at 9/25/2020 1422  Last data filed at 9/25/2020 1400  Gross per 24 hour   Intake 3266.66 ml   Output 2825 ml   Net 441.66 ml       Laboratory  Recent Labs     09/24/20  1522   ISTATAPH 7.335*   ISTATAPCO2 52.5*   ISTATAPO2 82   ISTATATCO2 30   IOAGCRN2MNQ 95   ISTATARTHCO3 28.0*   ISTATARTBE 2   ISTATTEMP see below   ISTATFIO2 28   ISTATSPEC Arterial         Recent Labs     09/23/20  0609 09/24/20  0650 09/25/20  0806   SODIUM 137 137 134*   POTASSIUM 4.5 4.1 4.5   CHLORIDE 99 101 101   CO2 24 24 24   BUN 16 17 15   CREATININE 0.39* 0.38* 0.36*   MAGNESIUM 2.2 2.1 2.0   CALCIUM 9.2 8.7 8.7     Recent Labs     09/23/20  0609 09/24/20  0650 09/25/20  0806   ALTSGPT 29 30 28   ASTSGOT 28 29 26   ALKPHOSPHAT 105* 97 89   TBILIRUBIN <0.2 <0.2 <0.2   GLUCOSE 94 89 103*     Recent Labs     09/23/20  0609 09/24/20  0650 09/25/20  0633 09/25/20  0806   WBC 8.3 7.3 7.8  --    NEUTSPOLYS 52.90 56.20 59.50  --    LYMPHOCYTES 22.00 19.60* 19.20*  --    MONOCYTES 15.50* 15.00* 12.50  --    EOSINOPHILS 7.10* 6.80 7.20*  --    BASOPHILS 0.60 0.60 0.60  --    ASTSGOT 28 29  --  26   ALTSGPT 29 30  --  28   ALKPHOSPHAT 105* 97  --  89   TBILIRUBIN <0.2 <0.2  --  <0.2     Recent Labs     09/23/20  0609 09/24/20  0650 09/25/20  0633   RBC 4.02* 3.88* 3.93*   HEMOGLOBIN 12.6 12.2  "12.1   HEMATOCRIT 39.2 38.2 39.7   PLATELETCT 420 272 307       Imaging  None    Assessment/Plan  * Acute respiratory failure with hypoxia (HCC)- (present on admission)  Assessment & Plan  Intubated 8/24  Trach 9/9  On T-piece 6 hours/day yesterday     Now on continuous T-piece, watching minute ventilation  ABG OK  Needs intermittent suctioning    Anoxic encephalopathy (HCC)- (present on admission)  Assessment & Plan  MRI on 9/2 and 9/15 consistent with severe anoxic brain injury  EEG with frequent, blunted generalized sharps with frontal maximum and triphasic morphology with frequent runs of frontal intermittent rhythmic delta activity (FIRDA)  Continue AEDs    Exam consistent with vegetative state. Poor prognosis for return to independent function. Discuss further with neurology and family    More seizures, back on propofol. Suspect extremely poor brain function will persist. Meet with family Thursday after assessment again off propofol.    More seizures on Versed and propofol. We increased propofol dose from 30 to 60. Neurology considering pentobarb coma. Given duration of coma (30 days), poor and worsening MRI, refractory seizures despite 6 anti-seizure drugs, in my opinion, this patient's prognosis for return to functional independence and return to communication is poor. We will discuss with family tomorrow with neurology.    In addition to fixed dose propofol I added Versed at request of neurology as pt still having seizures per EEG. The refractory seizures reflect a \"bad\" irritable brain. Sadly, given the duration of illness and the progressive clinical decline and MRI deterioration the prognosis is grim for meaningful lneurology recovery    No change- still having some seizures per EEG/ neurology  Family meeting with pt's father today to provide medical update and discuss goals of care    Drug overdose- (present on admission)  Assessment & Plan  BA 0.16 on presentation  Positive urine drug screen for " amphetamines on presentation  Boyfriend reported methamphetamine, oxycodone and alcohol use    Fever  Assessment & Plan  1 out of 2 blood cultures positive for coag negative Staphylococcus on 9/17 - contaminant  UA unremarkable  Sputum with Pseudomonas  Fever has resolved after Fortaz started    Seizures ([facility])  Assessment & Plan  Continuous video EEG  Continue phenobarbital, 64.8 mg every 8 hours  Continue Vimpat, 200 mg every 8 hours  Continue valproic acid, 500 mg every 8 hours  Continue Keppra, 1000 mg every 8 hours  Continue Fycompa, 10 mg nightly      On increased dose of propofol today at 60 and Versed will be increased from 5 to 15  Still having seizures per EEG on this. If we continue to pursue aggressive medical care (to be discussed today with family) the tentative plan is to add ketamine    Pneumonia of both lungs due to Pseudomonas species ([facility])  Assessment & Plan  Sputum culture with MRSA, Acinetobacter and Stenotrophomonas species on 9/6  Completed 10 days of Bactrim on 9/13  Sputum culture from 9/17 with Pseudomonas aeruginosa  Stop Fortaz today- reported to rarely be associated with seizures    Elevated transaminase level- (present on admission)  Assessment & Plan  Due to valproic acid  Resolving    S/P PEA cardiac arrest ([facility])- (present on admission)  Assessment & Plan  S/P therapeutic hypothermia protocol    Dysphagia, oropharyngeal- (present on admission)  Assessment & Plan  S/P laparoscopic gastrostomy on 9/11  Continue enteral tube feedings       VTE:  Lovenox  Ulcer: H2 Antagonist  Lines: Pearce Catheter  Ongoing indication addressed    I have performed a physical exam and reviewed and updated ROS and Plan today (9/25/2020). In review of yesterday's note (9/24/2020), there are no changes except as documented above.     I have assessed and reassessed her respiratory status with ventilator adjustments, ventilator waveforms, airway mechanics, hemodynamics, blood pressure, cardiovascular status and  her neurologic status.  She is at increased risk for worsening CNS and respiratory system dysfunction.    Discussed patient condition and risk of morbidity and/or mortality with RN, RT, Pharmacy, Charge nurse / hot rounds, QA team and neurology     The patient remains critically ill.  Critical care time = 30 minutes in directly providing and coordinating critical care and extensive data review.  No time overlap and excludes procedures.

## 2020-09-25 NOTE — PROCEDURES
VIDEO ELECTROENCEPHALOGRAM REPORT        Referring provider: Dr. Mccann.      DOS: 9/25/2020 (total recording of 7 hours and 58 minutes).      INDICATION:  Annika He 19 y.o. female presenting with s/p cardiac arrest, altered mental status, seizures.      CURRENT ANTIEPILEPTIC REGIMEN: Levetiracetam 1000 mg tid, Lacosamide 200 mg tid, Valproic Acid 1250 mg tid, Perampanel 12 mg qhs, Phenobarbital 60 mg q 8 hrs. Zonisamide 200 mg . The patient was sedated with Propofol, and Midazolam.      TECHNIQUE: 30 channel video electroencephalogram (EEG) was performed in accordance with the international 10-20 system. The study was reviewed in bipolar and referential montages. The recording examined an encephalopathic and/or sedated patient.      DESCRIPTION OF THE RECORD:  Generalized delta activity. Frequent, blunted generalized sharps with frontal maximum and triphasic morphology. Frequent runs of Frontal Intermittent Rhythmic Delta Activity (FIRDA). the eeg remains stable, with non convulsive seizures still noted despite the use of multiple anti-seizure medications and increased rate of sedatives.      ACTIVATION PROCEDURES:   Not performed.      EKG: sampling of the EKG recording demonstrated sinus rhythm.      EVENTS:  None.      INTERPRETATION:  This is an abnormal continuous video EEG recording in an encephalopathic and/or sedated patient. A moderate encephalopathy is suggested. Frequent, blunted generalized sharps with frontal maximum and triphasic morphology. Frequent, long lasting runs of Frontal Intermittent Rhythmic Delta Activity (FIRDA). The eeg remains stable, with non convulsive seizures still noted throughout the study, despite the use of multiple anti-seizure medications and increased rate of sedatives. The findings suggest underlying areas of persinstently increased cortical irritability and/or structural abnormality. Clinical and radiological correlation is recommended.     Updates provided to  Dr. Mccann and Dr. Padilla.         Garland Beatty MD   Epilepsy and Neurodiagnostics.   Clinical  of Neurology [facility].   Diplomate in Neurology, Epilepsy, and Electrodiagnostic Medicine.   Office: [phone]  Fax: [phone]

## 2020-09-26 PROCEDURE — 700105 HCHG RX REV CODE 258: Performed by: INTERNAL MEDICINE

## 2020-09-26 PROCEDURE — A9270 NON-COVERED ITEM OR SERVICE: HCPCS | Performed by: INTERNAL MEDICINE

## 2020-09-26 PROCEDURE — 770022 HCHG ROOM/CARE - ICU (200)

## 2020-09-26 PROCEDURE — 700102 HCHG RX REV CODE 250 W/ 637 OVERRIDE(OP): Performed by: INTERNAL MEDICINE

## 2020-09-26 PROCEDURE — 99231 SBSQ HOSP IP/OBS SF/LOW 25: CPT | Performed by: HOSPITALIST

## 2020-09-26 PROCEDURE — 94640 AIRWAY INHALATION TREATMENT: CPT

## 2020-09-26 PROCEDURE — 700111 HCHG RX REV CODE 636 W/ 250 OVERRIDE (IP): Performed by: INTERNAL MEDICINE

## 2020-09-26 PROCEDURE — 700105 HCHG RX REV CODE 258: Performed by: HOSPITALIST

## 2020-09-26 PROCEDURE — 99231 SBSQ HOSP IP/OBS SF/LOW 25: CPT | Performed by: INTERNAL MEDICINE

## 2020-09-26 PROCEDURE — 700111 HCHG RX REV CODE 636 W/ 250 OVERRIDE (IP): Performed by: HOSPITALIST

## 2020-09-26 RX ORDER — HYDROMORPHONE HYDROCHLORIDE 1 MG/ML
0.5 INJECTION, SOLUTION INTRAMUSCULAR; INTRAVENOUS; SUBCUTANEOUS
Status: DISCONTINUED | OUTPATIENT
Start: 2020-09-26 | End: 2020-09-26

## 2020-09-26 RX ORDER — HYDROMORPHONE HYDROCHLORIDE 2 MG/ML
1.5 INJECTION, SOLUTION INTRAMUSCULAR; INTRAVENOUS; SUBCUTANEOUS
Status: DISCONTINUED | OUTPATIENT
Start: 2020-09-26 | End: 2020-09-28 | Stop reason: HOSPADM

## 2020-09-26 RX ORDER — LORAZEPAM 2 MG/ML
4 INJECTION INTRAMUSCULAR
Status: DISCONTINUED | OUTPATIENT
Start: 2020-09-26 | End: 2020-09-28 | Stop reason: HOSPADM

## 2020-09-26 RX ORDER — HYDROMORPHONE HYDROCHLORIDE 1 MG/ML
0.5 INJECTION, SOLUTION INTRAMUSCULAR; INTRAVENOUS; SUBCUTANEOUS
Status: DISCONTINUED | OUTPATIENT
Start: 2020-09-26 | End: 2020-09-28 | Stop reason: HOSPADM

## 2020-09-26 RX ORDER — HYDROMORPHONE HYDROCHLORIDE 1 MG/ML
1 INJECTION, SOLUTION INTRAMUSCULAR; INTRAVENOUS; SUBCUTANEOUS
Status: DISCONTINUED | OUTPATIENT
Start: 2020-09-26 | End: 2020-09-26

## 2020-09-26 RX ORDER — ATROPINE SULFATE 10 MG/ML
2 SOLUTION/ DROPS OPHTHALMIC EVERY 4 HOURS PRN
Status: DISCONTINUED | OUTPATIENT
Start: 2020-09-26 | End: 2020-09-28 | Stop reason: HOSPADM

## 2020-09-26 RX ORDER — LORAZEPAM 2 MG/ML
1-4 INJECTION INTRAMUSCULAR
Status: DISCONTINUED | OUTPATIENT
Start: 2020-09-26 | End: 2020-09-26

## 2020-09-26 RX ORDER — HYDROMORPHONE HYDROCHLORIDE 2 MG/ML
2 INJECTION, SOLUTION INTRAMUSCULAR; INTRAVENOUS; SUBCUTANEOUS
Status: DISCONTINUED | OUTPATIENT
Start: 2020-09-26 | End: 2020-09-28 | Stop reason: HOSPADM

## 2020-09-26 RX ORDER — HYDROMORPHONE HYDROCHLORIDE 1 MG/ML
1 INJECTION, SOLUTION INTRAMUSCULAR; INTRAVENOUS; SUBCUTANEOUS
Status: DISCONTINUED | OUTPATIENT
Start: 2020-09-26 | End: 2020-09-28 | Stop reason: HOSPADM

## 2020-09-26 RX ADMIN — ENOXAPARIN SODIUM 40 MG: 40 INJECTION SUBCUTANEOUS at 05:00

## 2020-09-26 RX ADMIN — LORAZEPAM 2 MG/HR: 4 INJECTION INTRAMUSCULAR; INTRAVENOUS at 18:44

## 2020-09-26 RX ADMIN — HYDROMORPHONE HYDROCHLORIDE 0.5 MG: 1 INJECTION, SOLUTION INTRAMUSCULAR; INTRAVENOUS; SUBCUTANEOUS at 07:37

## 2020-09-26 RX ADMIN — LACOSAMIDE 200 MG: 100 TABLET, FILM COATED ORAL at 00:58

## 2020-09-26 RX ADMIN — PROPOFOL 30 MCG/KG/MIN: 10 INJECTION, EMULSION INTRAVENOUS at 00:58

## 2020-09-26 RX ADMIN — ZONISAMIDE 200 MG: 50 CAPSULE ORAL at 05:01

## 2020-09-26 RX ADMIN — FAMOTIDINE 20 MG: 20 TABLET, FILM COATED ORAL at 05:01

## 2020-09-26 RX ADMIN — PROPOFOL 30 MCG/KG/MIN: 10 INJECTION, EMULSION INTRAVENOUS at 09:53

## 2020-09-26 RX ADMIN — HYDROMORPHONE HYDROCHLORIDE 1.5 MG/HR: 2 INJECTION INTRAMUSCULAR; INTRAVENOUS; SUBCUTANEOUS at 19:20

## 2020-09-26 RX ADMIN — GLYCOPYRROLATE 0.5 MG: 1 TABLET ORAL at 05:00

## 2020-09-26 RX ADMIN — PHENOBARBITAL 64.8 MG: 32.4 TABLET ORAL at 05:00

## 2020-09-26 RX ADMIN — MIDAZOLAM 5 MG/HR: 5 INJECTION INTRAMUSCULAR; INTRAVENOUS at 09:13

## 2020-09-26 RX ADMIN — LACOSAMIDE 200 MG: 100 TABLET, FILM COATED ORAL at 09:53

## 2020-09-26 RX ADMIN — DIVALPROEX SODIUM 500 MG: 125 CAPSULE ORAL at 05:00

## 2020-09-26 RX ADMIN — HYDROMORPHONE HYDROCHLORIDE 0.5 MG/HR: 2 INJECTION INTRAMUSCULAR; INTRAVENOUS; SUBCUTANEOUS at 12:08

## 2020-09-26 RX ADMIN — LEVETIRACETAM 1000 MG: 500 TABLET, FILM COATED ORAL at 05:00

## 2020-09-26 NOTE — ASSESSMENT & PLAN NOTE
Severe brain injury now comfort care status  Ativan gtts for Sz prophylaxis  Dilauidid gtts for pain/air hunger

## 2020-09-26 NOTE — CARE PLAN
Problem: Ventilation Defect:  Goal: Ability to achieve and maintain unassisted ventilation or tolerate decreased levels of ventilator support  Note: T-Piece 4 LPM and 28% FiO2

## 2020-09-26 NOTE — CARE PLAN
Problem: Discharge Barriers/Planning  Goal: Patient's continuum of care needs will be met  Outcome: PROGRESSING SLOWER THAN EXPECTED  Note: Goals of care discussed with family today. See progress notes     Problem: Skin Integrity  Goal: Risk for impaired skin integrity will decrease  Outcome: PROGRESSING SLOWER THAN EXPECTED  Note: Redness noted around head and face after continuous EEG removed. Redness also noted on labia and thighs. Orange cream applied to thighs. Continued Q2 hour turns, repositioning of medical devices and patient on waffle cushion

## 2020-09-26 NOTE — PROGRESS NOTES
ICU Progress    Pt seen and personally examined. I am assessing and treating for any pain or discomfort. No clinical changes. We are shifting to comfort care. Waiting for family to pay respects. I am stopping drugs and treatments not specifically intended to make patient comfortable now. I will call pt's father today as well. I suspect that patient may continue to live for some days but however long she lives we will focus on keeping her comfortable.    Addendum- I talked with pt's father today about goals of care. He asks that we focus on comfort care only. I will place these orders.  Thus patient has no ICU needs and may be transferred to the Hospitalist service and the medical floor.

## 2020-09-26 NOTE — ASSESSMENT & PLAN NOTE
Status epilepticus difficult to control with multiple antiepileptic agents.  Likely secondary to severe anoxic brain injury.  Continue ativan gtts 2mg/hr with 4mg q10min boluses for breakthrough events

## 2020-09-26 NOTE — PROGRESS NOTES
[facility] Medicine Daily Progress Note    Date of Service  9/26/2020    Chief Complaint  19 y.o. female admitted 8/23/2020 with cardiac arrest and drug overdose    Hospital Course    This is a 19-year-old female who was admitted on August 23 with drug overdose which apparently involved methamphetamines, and oxycodone.  She was found down by her boyfriend.  She suffered a cardiac arrest en route to the hospital, and received Narcan, epinephrine and CPR with ROSC.  On scene, they were unable to intubate and, and placed an LMA.  She was subsequently intubated in the emergency room.  There was apparently some emesis during the attempts at intubation in the field.  Trach 9/9  ICU course complicated by status epilepticus resistant to multiple medications      Interval Problem Update  ROS limited by pt's mental status    Consultants/Specialty  Neuro  Cards    Code Status  Comfort Care/DNR    Disposition  HOspice    Review of Systems  Review of Systems   Unable to perform ROS: Mental status change        Physical Exam  Temp:  [35.6 °C (96.1 °F)-37.7 °C (99.9 °F)] 35.6 °C (96.1 °F)  Pulse:  [] 113  Resp:  [16-40] 21  BP: ()/(51-80) 93/66  SpO2:  [87 %-98 %] 93 %    Physical Exam  Vitals signs reviewed.   Constitutional:       General: She is not in acute distress.     Appearance: She is well-developed. She is not diaphoretic.   HENT:      Head: Normocephalic and atraumatic.   Neck:      Vascular: No JVD.   Cardiovascular:      Rate and Rhythm: Regular rhythm. Tachycardia present.   Pulmonary:      Effort: Pulmonary effort is normal. No respiratory distress.      Breath sounds: No stridor. Rales present. No wheezing.   Abdominal:      Palpations: Abdomen is soft.      Tenderness: There is no abdominal tenderness. There is no guarding or rebound.   Musculoskeletal:      Right lower leg: Edema present.      Left lower leg: Edema present.   Skin:     General: Skin is warm and dry.      Findings: No rash.    Neurological:      Comments: +gag  No corneal  No response to pain         Fluids    Intake/Output Summary (Last 24 hours) at 9/26/2020 1444  Last data filed at 9/26/2020 1400  Gross per 24 hour   Intake 990.67 ml   Output 1540 ml   Net -549.33 ml       Laboratory  Recent Labs     09/24/20  0650 09/25/20  0633   WBC 7.3 7.8   RBC 3.88* 3.93*   HEMOGLOBIN 12.2 12.1   HEMATOCRIT 38.2 39.7   MCV 98.5* 101.0*   MCH 31.4 30.8   MCHC 31.9* 30.5*   RDW 51.9* 53.1*   PLATELETCT 375 307   MPV 9.8 9.9     Recent Labs     09/24/20  0650 09/25/20  0806   SODIUM 137 134*   POTASSIUM 4.1 4.5   CHLORIDE 101 101   CO2 24 24   GLUCOSE 89 103*   BUN 17 15   CREATININE 0.38* 0.36*   CALCIUM 8.7 8.7                   Imaging  DX-CHEST-PORTABLE (1 VIEW)   Final Result      Stable right worse left basilar consolidation and atelectasis      DX-CHEST-PORTABLE (1 VIEW)   Final Result      Persistent bibasilar opacities. No pleural effusion.      DX-CHEST-PORTABLE (1 VIEW)   Final Result      Improving bilateral atelectasis.      DX-CHEST-PORTABLE (1 VIEW)   Final Result      Stable bibasilar atelectasis.      DX-CHEST-PORTABLE (1 VIEW)   Final Result      Stable tracheostomy. No new consolidation or pleural effusions.      DX-CHEST-PORTABLE (1 VIEW)   Final Result         1. Stable tracheostomy.   2. No new consolidation or pleural effusions.         DX-CHEST-PORTABLE (1 VIEW)   Final Result      No significant interval change.         MR-BRAIN-W/O   Final Result      1.  Findings consistent with worsening anoxic brain injury involving the lentiform nuclei bilaterally.   2.  Findings consistent with anoxic brain injury in the parietal, occipital and posterior temporal regions which appear somewhat improved from prior exam.  Similar findings in the bifrontal region appear unchanged.         DX-CHEST-PORTABLE (1 VIEW)   Final Result      No significant interval change.      DX-CHEST-PORTABLE (1 VIEW)   Final Result      Bibasilar  underinflation atelectasis which could obscure an additional process. This is similar to the prior study.      DX-CHEST-PORTABLE (1 VIEW)   Final Result      Hazy airspace opacities are improved compared to prior.      DX-CHEST-PORTABLE (1 VIEW)   Final Result         1.  Pulmonary edema and/or infiltrates are identified, which are stable since the prior exam.            DX-CHEST-PORTABLE (1 VIEW)   Final Result         1.  Mild pulmonary edema and/or interstitial infiltrates, somewhat decreased since prior study         IR-MIDLINE CATHETER INSERTION WO GUIDANCE > AGE 3   Final Result                  Ultrasound-guided midline placement performed by qualified nursing staff    as above.          DX-CHEST-PORTABLE (1 VIEW)   Final Result         1.  Mild pulmonary edema and/or interstitial infiltrates, stable since prior study      DX-CHEST-PORTABLE (1 VIEW)   Final Result         1.  Mild pulmonary edema and/or interstitial infiltrates      DX-CHEST-PORTABLE (1 VIEW)   Final Result         No significant interval change.      DX-CHEST-PORTABLE (1 VIEW)   Final Result         New hazy opacity in the left lung base could be atelectasis or consolidation.      DX-CHEST-PORTABLE (1 VIEW)   Final Result      No significant change      DX-CHEST-PORTABLE (1 VIEW)   Final Result         1.  No acute cardiopulmonary disease.                                 DX-CHEST-PORTABLE (1 VIEW)   Final Result         1.  No acute cardiopulmonary disease.                              DX-CHEST-PORTABLE (1 VIEW)   Final Result         1.  No focal infiltrates, previously visualized infiltrates are not readily apparent.                           MR-BRAIN-WITH & W/O   Final Result         1.  Severe diffuse anoxic brain injury pattern which is more extensive and conspicuous than on previous exam.      2.  No evidence of intracranial mass effect, extra-axial fluid collection, or interval development of hydrocephalus.      3.  Diffuse mucosal  inflammatory changes filling the ethmoid and sphenoid sinuses.      DX-CHEST-PORTABLE (1 VIEW)   Final Result         1.  Pulmonary edema and/or infiltrates are identified, which are somewhat decreased since the prior exam.                        [id number] VIEW   Final Result      Feeding tube is noted with tip at the level of the proximal duodenum.                  DX-CHEST-PORTABLE (1 VIEW)   Final Result         1.  Pulmonary edema and/or infiltrates are identified, which are stable since the prior exam.                     DX-CHEST-PORTABLE (1 VIEW)   Final Result      Stable chest x-ray findings with right lower lobe consolidation.      DX-CHEST-PORTABLE (1 VIEW)   Final Result         1.  Pulmonary edema and/or infiltrates are identified, which are stable since the prior exam.                  DX-CHEST-PORTABLE (1 VIEW)   Final Result         1.  Pulmonary edema and/or infiltrates are identified, which are stable since the prior exam.               MR-BRAIN-W/O   Final Result      The diffusion-weighted sequences demonstrates areas of restricted diffusion in the bilateral basal ganglia and some of the frontal gray matter. The predominant portion of the brain parenchyma does not demonstrate any restricted diffusion. Therefore this    findings likely represent mild diffuse anoxic brain injury.      DX-CHEST-PORTABLE (1 VIEW)   Final Result         1.  Pulmonary edema and/or infiltrates are identified, which are somewhat decreased since the prior exam.            DX-CHEST-PORTABLE (1 VIEW)   Final Result         1.  Pulmonary edema and/or infiltrates are identified, which are stable since the prior exam.         DX-ABDOMEN FOR TUBE PLACEMENT   Final Result         1.  Nonspecific bowel gas pattern.   2.  Dobbhoff tube tip overlying the expected location of the pylorus or first duodenal segment.      DX-CHEST-PORTABLE (1 VIEW)   Final Result         1.  Patchy bilateral pulmonary infiltrates, somewhat increased  since prior.      EC-ECHOCARDIOGRAM COMPLETE W/O CONT   Final Result      DX-CHEST-PORTABLE (1 VIEW)   Final Result         1.  Patchy bilateral pulmonary infiltrates, somewhat increased since prior.      CT-HEAD W/O   Final Result         1.  Possible subtle sulcal effacement and slight loss of differentiation of gray-white matter, consider component of cerebral edema. Recommend radiographic follow-up   2.  Bilateral sphenoid and maxillary sinus air-fluid levels      DX-CHEST-PORTABLE (1 VIEW)   Final Result         1.  No acute cardiopulmonary disease.      DX-CHEST-PORTABLE (1 VIEW)    (Results Pending)        Assessment/Plan  Anoxic encephalopathy (HCC)- (present on admission)  Assessment & Plan  Severe brain injury now comfort care status  Ativan gtts for Sz prophylaxis  Dilauidid gtts for pain/air hunger    Seizures ([facility])  Assessment & Plan  Status epilepticus  Start ativan gtts 2mg/hr with 4mg q10min boluses for breakthrough events    Pneumonia of both lungs due to Pseudomonas species ([facility])  Assessment & Plan  Cont precuations  DC Abx;s as comfort care status    S/P PEA cardiac arrest (HCC)- (present on admission)  Assessment & Plan  Secondary to narcotic overdose and aspiration       VTE prophylaxis: none

## 2020-09-27 PROCEDURE — 99231 SBSQ HOSP IP/OBS SF/LOW 25: CPT | Performed by: FAMILY MEDICINE

## 2020-09-27 PROCEDURE — 770021 HCHG ROOM/CARE - ISO PRIVATE

## 2020-09-27 PROCEDURE — 700111 HCHG RX REV CODE 636 W/ 250 OVERRIDE (IP): Performed by: INTERNAL MEDICINE

## 2020-09-27 PROCEDURE — 700111 HCHG RX REV CODE 636 W/ 250 OVERRIDE (IP): Performed by: HOSPITALIST

## 2020-09-27 PROCEDURE — 700105 HCHG RX REV CODE 258: Performed by: HOSPITALIST

## 2020-09-27 PROCEDURE — 700105 HCHG RX REV CODE 258: Performed by: INTERNAL MEDICINE

## 2020-09-27 RX ADMIN — HYDROMORPHONE HYDROCHLORIDE 1.5 MG/HR: 2 INJECTION INTRAMUSCULAR; INTRAVENOUS; SUBCUTANEOUS at 04:59

## 2020-09-27 RX ADMIN — LORAZEPAM 2 MG/HR: 4 INJECTION INTRAMUSCULAR; INTRAVENOUS at 20:42

## 2020-09-27 RX ADMIN — HYDROMORPHONE HYDROCHLORIDE 2 MG: 2 INJECTION INTRAMUSCULAR; INTRAVENOUS; SUBCUTANEOUS at 12:54

## 2020-09-27 RX ADMIN — HYDROMORPHONE HYDROCHLORIDE 2 MG: 2 INJECTION INTRAMUSCULAR; INTRAVENOUS; SUBCUTANEOUS at 18:05

## 2020-09-27 RX ADMIN — LORAZEPAM 2 MG/HR: 4 INJECTION INTRAMUSCULAR; INTRAVENOUS at 06:45

## 2020-09-27 NOTE — PROGRESS NOTES
Called pharmacist Lee Ann and Narc tech Avery, unable to pull lorazepam gtt fromk [facility]    Avery on floor, med in [facility] but unable to be pulled by multiple RNs.    Avery checking with supervisor will follow up    Avery hand delivered lorazepam gtt to this RN. Paperwork signed.  Per Time, nursing copy of paperwork to return to pharmacy once gtt completed, or patient passes. Gtt started per MAR

## 2020-09-27 NOTE — PROGRESS NOTES
[facility] Medicine Daily Progress Note    Date of Service  9/27/2020    Chief Complaint  19 y.o. female admitted 8/23/2020 with cardiac arrest and drug overdose    Hospital Course    This is a 19-year-old female who was admitted on August 23 with drug overdose which apparently involved methamphetamines, and oxycodone.  She was found down by her boyfriend.  She suffered a cardiac arrest en route to the hospital, and received Narcan, epinephrine and CPR with ROSC.  On scene, they were unable to intubate and, and placed an LMA.  She was subsequently intubated in the emergency room.  There was apparently some emesis during the attempts at intubation in the field.  Trach 9/9  ICU course complicated by status epilepticus resistant to multiple medications      Interval Problem Update  Continues to be unresponsive.  No seizures noted.  On Ativan and Dilaudid drips.    Consultants/Specialty  Neuro  Cards    Code Status  Comfort Care/DNR    Disposition  HOspice    Review of Systems  Review of Systems   Unable to perform ROS: Mental status change        Physical Exam  Temp:  [35.8 °C (96.4 °F)-37.6 °C (99.7 °F)] 37.6 °C (99.7 °F)  Pulse:  [] 126  Resp:  [18] 18  BP: (113)/(58) 113/58  SpO2:  [41 %-83 %] 41 %    Physical Exam  Vitals signs reviewed.   Constitutional:       Comments: Unresponsive    HENT:      Head: Normocephalic and atraumatic.   Neck:      Vascular: No JVD.   Cardiovascular:      Rate and Rhythm: Regular rhythm. Tachycardia present.   Pulmonary:      Effort: Pulmonary effort is normal. No respiratory distress.      Breath sounds: Rales present.   Abdominal:      Palpations: Abdomen is soft.   Musculoskeletal:      Right lower leg: Edema present.      Left lower leg: Edema present.   Skin:     General: Skin is warm and dry.      Findings: No rash.   Neurological:      Comments: +gag  No corneal reflex   Not localizing or withdraw to painful stimulus    Psychiatric:      Comments: Unresponsive           Fluids    Intake/Output Summary (Last 24 hours) at 9/27/2020 1545  Last data filed at 9/27/2020 0800  Gross per 24 hour   Intake 360.5 ml   Output 525 ml   Net -164.5 ml       Laboratory  Recent Labs     09/25/20  0633   WBC 7.8   RBC 3.93*   HEMOGLOBIN 12.1   HEMATOCRIT 39.7   .0*   MCH 30.8   MCHC 30.5*   RDW 53.1*   PLATELETCT 307   MPV 9.9     Recent Labs     09/25/20  0806   SODIUM 134*   POTASSIUM 4.5   CHLORIDE 101   CO2 24   GLUCOSE 103*   BUN 15   CREATININE 0.36*   CALCIUM 8.7                   Imaging  DX-CHEST-PORTABLE (1 VIEW)   Final Result      Stable right worse left basilar consolidation and atelectasis      DX-CHEST-PORTABLE (1 VIEW)   Final Result      Persistent bibasilar opacities. No pleural effusion.      DX-CHEST-PORTABLE (1 VIEW)   Final Result      Improving bilateral atelectasis.      DX-CHEST-PORTABLE (1 VIEW)   Final Result      Stable bibasilar atelectasis.      DX-CHEST-PORTABLE (1 VIEW)   Final Result      Stable tracheostomy. No new consolidation or pleural effusions.      DX-CHEST-PORTABLE (1 VIEW)   Final Result         1. Stable tracheostomy.   2. No new consolidation or pleural effusions.         DX-CHEST-PORTABLE (1 VIEW)   Final Result      No significant interval change.         MR-BRAIN-W/O   Final Result      1.  Findings consistent with worsening anoxic brain injury involving the lentiform nuclei bilaterally.   2.  Findings consistent with anoxic brain injury in the parietal, occipital and posterior temporal regions which appear somewhat improved from prior exam.  Similar findings in the bifrontal region appear unchanged.         DX-CHEST-PORTABLE (1 VIEW)   Final Result      No significant interval change.      DX-CHEST-PORTABLE (1 VIEW)   Final Result      Bibasilar underinflation atelectasis which could obscure an additional process. This is similar to the prior study.      DX-CHEST-PORTABLE (1 VIEW)   Final Result      Hazy airspace opacities are improved  compared to prior.      DX-CHEST-PORTABLE (1 VIEW)   Final Result         1.  Pulmonary edema and/or infiltrates are identified, which are stable since the prior exam.            DX-CHEST-PORTABLE (1 VIEW)   Final Result         1.  Mild pulmonary edema and/or interstitial infiltrates, somewhat decreased since prior study         IR-MIDLINE CATHETER INSERTION WO GUIDANCE > AGE 3   Final Result                  Ultrasound-guided midline placement performed by qualified nursing staff    as above.          DX-CHEST-PORTABLE (1 VIEW)   Final Result         1.  Mild pulmonary edema and/or interstitial infiltrates, stable since prior study      DX-CHEST-PORTABLE (1 VIEW)   Final Result         1.  Mild pulmonary edema and/or interstitial infiltrates      DX-CHEST-PORTABLE (1 VIEW)   Final Result         No significant interval change.      DX-CHEST-PORTABLE (1 VIEW)   Final Result         New hazy opacity in the left lung base could be atelectasis or consolidation.      DX-CHEST-PORTABLE (1 VIEW)   Final Result      No significant change      DX-CHEST-PORTABLE (1 VIEW)   Final Result         1.  No acute cardiopulmonary disease.                                 DX-CHEST-PORTABLE (1 VIEW)   Final Result         1.  No acute cardiopulmonary disease.                              DX-CHEST-PORTABLE (1 VIEW)   Final Result         1.  No focal infiltrates, previously visualized infiltrates are not readily apparent.                           MR-BRAIN-WITH & W/O   Final Result         1.  Severe diffuse anoxic brain injury pattern which is more extensive and conspicuous than on previous exam.      2.  No evidence of intracranial mass effect, extra-axial fluid collection, or interval development of hydrocephalus.      3.  Diffuse mucosal inflammatory changes filling the ethmoid and sphenoid sinuses.      DX-CHEST-PORTABLE (1 VIEW)   Final Result         1.  Pulmonary edema and/or infiltrates are identified, which are somewhat  decreased since the prior exam.                        [id number] VIEW   Final Result      Feeding tube is noted with tip at the level of the proximal duodenum.                  DX-CHEST-PORTABLE (1 VIEW)   Final Result         1.  Pulmonary edema and/or infiltrates are identified, which are stable since the prior exam.                     DX-CHEST-PORTABLE (1 VIEW)   Final Result      Stable chest x-ray findings with right lower lobe consolidation.      DX-CHEST-PORTABLE (1 VIEW)   Final Result         1.  Pulmonary edema and/or infiltrates are identified, which are stable since the prior exam.                  DX-CHEST-PORTABLE (1 VIEW)   Final Result         1.  Pulmonary edema and/or infiltrates are identified, which are stable since the prior exam.               MR-BRAIN-W/O   Final Result      The diffusion-weighted sequences demonstrates areas of restricted diffusion in the bilateral basal ganglia and some of the frontal gray matter. The predominant portion of the brain parenchyma does not demonstrate any restricted diffusion. Therefore this    findings likely represent mild diffuse anoxic brain injury.      DX-CHEST-PORTABLE (1 VIEW)   Final Result         1.  Pulmonary edema and/or infiltrates are identified, which are somewhat decreased since the prior exam.            DX-CHEST-PORTABLE (1 VIEW)   Final Result         1.  Pulmonary edema and/or infiltrates are identified, which are stable since the prior exam.         DX-ABDOMEN FOR TUBE PLACEMENT   Final Result         1.  Nonspecific bowel gas pattern.   2.  Dobbhoff tube tip overlying the expected location of the pylorus or first duodenal segment.      DX-CHEST-PORTABLE (1 VIEW)   Final Result         1.  Patchy bilateral pulmonary infiltrates, somewhat increased since prior.      EC-ECHOCARDIOGRAM COMPLETE W/O CONT   Final Result      DX-CHEST-PORTABLE (1 VIEW)   Final Result         1.  Patchy bilateral pulmonary infiltrates, somewhat increased  since prior.      CT-HEAD W/O   Final Result         1.  Possible subtle sulcal effacement and slight loss of differentiation of gray-white matter, consider component of cerebral edema. Recommend radiographic follow-up   2.  Bilateral sphenoid and maxillary sinus air-fluid levels      DX-CHEST-PORTABLE (1 VIEW)   Final Result         1.  No acute cardiopulmonary disease.      DX-CHEST-PORTABLE (1 VIEW)    (Results Pending)        Assessment/Plan  * Acute respiratory failure with hypoxia (HCC)- (present on admission)  Assessment & Plan  On comfort measures     Anoxic encephalopathy (HCC)- (present on admission)  Assessment & Plan  Severe brain injury now comfort care status  Ativan gtts for Sz prophylaxis  Dilauidid gtts for pain/air hunger    Drug overdose- (present on admission)  Assessment & Plan  Presumed took extra meth amphetamine resulting in severe anoxic brain injury.  Currently on comfort care measures.    Seizures (HCC)  Assessment & Plan  Status epilepticus difficult to control with multiple antiepileptic agents.  Likely secondary to severe anoxic brain injury.  Continue ativan gtts 2mg/hr with 4mg q10min boluses for breakthrough events    Pneumonia of both lungs due to Pseudomonas species (HCC)  Assessment & Plan  Cont precuations  DC Abx;s as comfort care status    S/P PEA cardiac arrest (HCC)- (present on admission)  Assessment & Plan  Secondary to narcotic overdose and aspiration       VTE prophylaxis: none.  On comfort measures

## 2020-09-27 NOTE — PROGRESS NOTES
"Father in to visit this morning and asking if patient will \"wake up\" and also expressing concern that patient is not \"getting fed\".   Allowed him to express his feelings and provided emotional support, reiterated comfort care measures for patient and plan of care.  Father expressed understanding and said \"I am just praying for a miracle\".  "

## 2020-09-27 NOTE — CARE PLAN
Problem: Skin Integrity  Goal: Skin Integrity is maintained  Description: Waffle cushion in use  Outcome: PROGRESSING AS EXPECTED  Note: Waffle cushion in use     Problem: Pain  Goal: Alleviation of Pain or a reduction in pain to the patient's comfort goal  Description: Dilaudid and Ativan gtts used for pain management and seizure control  Outcome: PROGRESSING AS EXPECTED  Note: Dilaudid and Ativan gtts used for pain and seizure control

## 2020-09-27 NOTE — ASSESSMENT & PLAN NOTE
Presumed took extra meth amphetamine resulting in severe anoxic brain injury.  Currently on comfort care measures.

## 2020-09-28 VITALS
BODY MASS INDEX: 26.09 KG/M2 | WEIGHT: 147.27 LBS | TEMPERATURE: 99.3 F | HEIGHT: 63 IN | DIASTOLIC BLOOD PRESSURE: 38 MMHG | SYSTOLIC BLOOD PRESSURE: 77 MMHG

## 2020-09-28 PROCEDURE — 700105 HCHG RX REV CODE 258: Performed by: INTERNAL MEDICINE

## 2020-09-28 PROCEDURE — 700111 HCHG RX REV CODE 636 W/ 250 OVERRIDE (IP): Performed by: INTERNAL MEDICINE

## 2020-09-28 RX ADMIN — HYDROMORPHONE HYDROCHLORIDE 2 MG: 2 INJECTION INTRAMUSCULAR; INTRAVENOUS; SUBCUTANEOUS at 05:39

## 2020-09-28 RX ADMIN — HYDROMORPHONE HYDROCHLORIDE 2 MG/HR: 2 INJECTION INTRAMUSCULAR; INTRAVENOUS; SUBCUTANEOUS at 00:45

## 2020-09-28 NOTE — PROGRESS NOTES
0725 - RN attempted to call father with no response. Supervisors Cricket johnson and social workYael as well.

## 2020-09-28 NOTE — PROGRESS NOTES
[facility] Medicine Daily Progress Note    Date of Service  9/28/2020    Chief Complaint  19 y.o. female admitted 8/23/2020 with cardiac arrest and drug overdose    Hospital Course    This is a 19-year-old female who was admitted on August 23 with drug overdose which apparently involved methamphetamines, and oxycodone.  She was found down by her boyfriend.  She suffered a cardiac arrest en route to the hospital, and received Narcan, epinephrine and CPR with ROSC.  On scene, they were unable to intubate and, and placed an LMA.  She was subsequently intubated in the emergency room.  There was apparently some emesis during the attempts at intubation in the field.  Trach 9/9  ICU course complicated by status epilepticus resistant to multiple medications      Interval Problem Update  Continues to be unresponsive.  No seizures noted.  On Ativan and Dilaudid drips.    Consultants/Specialty  Neuro  Cards    Code Status  Comfort Care/DNR    Disposition  HOspice    Review of Systems  Review of Systems   Unable to perform ROS: Mental status change        Physical Exam  Temp:  [37.4 °C (99.3 °F)-37.6 °C (99.7 °F)] 37.4 °C (99.3 °F)  Pulse:  [0-136] 0  Resp:  [0-35] 0  BP: ()/(32-58) 77/38  SpO2:  [0 %-41 %] 0 %    Physical Exam  Vitals signs reviewed.   Constitutional:       Comments: Unresponsive    HENT:      Head: Normocephalic and atraumatic.   Neck:      Vascular: No JVD.   Cardiovascular:      Rate and Rhythm: Regular rhythm. Tachycardia present.   Pulmonary:      Effort: Pulmonary effort is normal. No respiratory distress.      Breath sounds: Rales present.   Abdominal:      Palpations: Abdomen is soft.   Musculoskeletal:      Right lower leg: Edema present.      Left lower leg: Edema present.   Skin:     General: Skin is warm and dry.      Findings: No rash.   Neurological:      Comments: +gag  No corneal reflex   Not localizing or withdraw to painful stimulus    Psychiatric:      Comments: Unresponsive           Fluids    Intake/Output Summary (Last 24 hours) at 9/28/2020 0754  Last data filed at 9/27/2020 1800  Gross per 24 hour   Intake 360 ml   Output 500 ml   Net -140 ml       Laboratory      Recent Labs     09/25/20  0806   SODIUM 134*   POTASSIUM 4.5   CHLORIDE 101   CO2 24   GLUCOSE 103*   BUN 15   CREATININE 0.36*   CALCIUM 8.7                   Imaging  DX-CHEST-PORTABLE (1 VIEW)   Final Result      Stable right worse left basilar consolidation and atelectasis      DX-CHEST-PORTABLE (1 VIEW)   Final Result      Persistent bibasilar opacities. No pleural effusion.      DX-CHEST-PORTABLE (1 VIEW)   Final Result      Improving bilateral atelectasis.      DX-CHEST-PORTABLE (1 VIEW)   Final Result      Stable bibasilar atelectasis.      DX-CHEST-PORTABLE (1 VIEW)   Final Result      Stable tracheostomy. No new consolidation or pleural effusions.      DX-CHEST-PORTABLE (1 VIEW)   Final Result         1. Stable tracheostomy.   2. No new consolidation or pleural effusions.         DX-CHEST-PORTABLE (1 VIEW)   Final Result      No significant interval change.         MR-BRAIN-W/O   Final Result      1.  Findings consistent with worsening anoxic brain injury involving the lentiform nuclei bilaterally.   2.  Findings consistent with anoxic brain injury in the parietal, occipital and posterior temporal regions which appear somewhat improved from prior exam.  Similar findings in the bifrontal region appear unchanged.         DX-CHEST-PORTABLE (1 VIEW)   Final Result      No significant interval change.      DX-CHEST-PORTABLE (1 VIEW)   Final Result      Bibasilar underinflation atelectasis which could obscure an additional process. This is similar to the prior study.      DX-CHEST-PORTABLE (1 VIEW)   Final Result      Hazy airspace opacities are improved compared to prior.      DX-CHEST-PORTABLE (1 VIEW)   Final Result         1.  Pulmonary edema and/or infiltrates are identified, which are stable since the prior exam.             DX-CHEST-PORTABLE (1 VIEW)   Final Result         1.  Mild pulmonary edema and/or interstitial infiltrates, somewhat decreased since prior study         IR-MIDLINE CATHETER INSERTION WO GUIDANCE > AGE 3   Final Result                  Ultrasound-guided midline placement performed by qualified nursing staff    as above.          DX-CHEST-PORTABLE (1 VIEW)   Final Result         1.  Mild pulmonary edema and/or interstitial infiltrates, stable since prior study      DX-CHEST-PORTABLE (1 VIEW)   Final Result         1.  Mild pulmonary edema and/or interstitial infiltrates      DX-CHEST-PORTABLE (1 VIEW)   Final Result         No significant interval change.      DX-CHEST-PORTABLE (1 VIEW)   Final Result         New hazy opacity in the left lung base could be atelectasis or consolidation.      DX-CHEST-PORTABLE (1 VIEW)   Final Result      No significant change      DX-CHEST-PORTABLE (1 VIEW)   Final Result         1.  No acute cardiopulmonary disease.                                 DX-CHEST-PORTABLE (1 VIEW)   Final Result         1.  No acute cardiopulmonary disease.                              DX-CHEST-PORTABLE (1 VIEW)   Final Result         1.  No focal infiltrates, previously visualized infiltrates are not readily apparent.                           MR-BRAIN-WITH & W/O   Final Result         1.  Severe diffuse anoxic brain injury pattern which is more extensive and conspicuous than on previous exam.      2.  No evidence of intracranial mass effect, extra-axial fluid collection, or interval development of hydrocephalus.      3.  Diffuse mucosal inflammatory changes filling the ethmoid and sphenoid sinuses.      DX-CHEST-PORTABLE (1 VIEW)   Final Result         1.  Pulmonary edema and/or infiltrates are identified, which are somewhat decreased since the prior exam.                        [id number] VIEW   Final Result      Feeding tube is noted with tip at the level of the proximal duodenum.                   DX-CHEST-PORTABLE (1 VIEW)   Final Result         1.  Pulmonary edema and/or infiltrates are identified, which are stable since the prior exam.                     DX-CHEST-PORTABLE (1 VIEW)   Final Result      Stable chest x-ray findings with right lower lobe consolidation.      DX-CHEST-PORTABLE (1 VIEW)   Final Result         1.  Pulmonary edema and/or infiltrates are identified, which are stable since the prior exam.                  DX-CHEST-PORTABLE (1 VIEW)   Final Result         1.  Pulmonary edema and/or infiltrates are identified, which are stable since the prior exam.               MR-BRAIN-W/O   Final Result      The diffusion-weighted sequences demonstrates areas of restricted diffusion in the bilateral basal ganglia and some of the frontal gray matter. The predominant portion of the brain parenchyma does not demonstrate any restricted diffusion. Therefore this    findings likely represent mild diffuse anoxic brain injury.      DX-CHEST-PORTABLE (1 VIEW)   Final Result         1.  Pulmonary edema and/or infiltrates are identified, which are somewhat decreased since the prior exam.            DX-CHEST-PORTABLE (1 VIEW)   Final Result         1.  Pulmonary edema and/or infiltrates are identified, which are stable since the prior exam.         DX-ABDOMEN FOR TUBE PLACEMENT   Final Result         1.  Nonspecific bowel gas pattern.   2.  Dobbhoff tube tip overlying the expected location of the pylorus or first duodenal segment.      DX-CHEST-PORTABLE (1 VIEW)   Final Result         1.  Patchy bilateral pulmonary infiltrates, somewhat increased since prior.      EC-ECHOCARDIOGRAM COMPLETE W/O CONT   Final Result      DX-CHEST-PORTABLE (1 VIEW)   Final Result         1.  Patchy bilateral pulmonary infiltrates, somewhat increased since prior.      CT-HEAD W/O   Final Result         1.  Possible subtle sulcal effacement and slight loss of differentiation of gray-white matter, consider component of  cerebral edema. Recommend radiographic follow-up   2.  Bilateral sphenoid and maxillary sinus air-fluid levels      DX-CHEST-PORTABLE (1 VIEW)   Final Result         1.  No acute cardiopulmonary disease.      DX-CHEST-PORTABLE (1 VIEW)    (Results Pending)        Assessment/Plan  * Acute respiratory failure with hypoxia (HCC)- (present on admission)  Assessment & Plan  On comfort measures     Anoxic encephalopathy (HCC)- (present on admission)  Assessment & Plan  Severe brain injury now comfort care status  Ativan gtts for Sz prophylaxis  Dilauidid gtts for pain/air hunger    Drug overdose- (present on admission)  Assessment & Plan  Presumed took extra meth amphetamine resulting in severe anoxic brain injury.  Currently on comfort care measures.    Seizures ([facility])  Assessment & Plan  Status epilepticus difficult to control with multiple antiepileptic agents.  Likely secondary to severe anoxic brain injury.  Continue ativan gtts 2mg/hr with 4mg q10min boluses for breakthrough events    Pneumonia of both lungs due to Pseudomonas species ([facility])  Assessment & Plan  Cont precuations  DC Abx;s as comfort care status    S/P PEA cardiac arrest (HCC)- (present on admission)  Assessment & Plan  Secondary to narcotic overdose and aspiration       VTE prophylaxis: none.  On comfort measures

## 2020-09-28 NOTE — PROGRESS NOTES
0530 father called for updates. RN relayed to father the patients HR was slowing and saturation was dropping. Father stated he was coming in.  0600 father at patient bedside. Visibly tearful and upset. Instructs staff that they are unable to say dying or dead.   0611 RNs enter room to pronounce patient. RNs speak to father about how the patient has no heartbeat and is not breathing. Father abruptly leaves without conveying understanding.   0620 RN attempted to call father with no response.   0640 RN attempts to call mother with no response.

## 2020-09-28 NOTE — DISCHARGE SUMMARY
Death Summary    Cause of Death  Acute respiratory failure due to anoxic brain injury due to polysubstance overdose with oxycodone, alcohol and methamphetamines    Comorbid Conditions at the Time of Death  Principal Problem:    Acute respiratory failure with hypoxia (HCC) POA: Yes  Active Problems:    Drug overdose POA: Yes    Anoxic encephalopathy (HCC) POA: Yes    S/P PEA cardiac arrest (HCC) POA: Yes    Pneumonia of both lungs due to Pseudomonas species (HCC) POA: Unknown    Seizures (HCC) POA: Unknown  Resolved Problems:    * No resolved hospital problems. *      History of Presenting Illness and Hospital Course  This is a 19 y.o. female admitted 8/23/2020 who was found down by her boyfriend.  He had reported that she took something, he was not sure what but he thought may be alcohol and some narcotics.  EMS was called.  They found her vomiting and had difficulty controlling her airway.  They ended up using an LMA to oxygenate her and brought her emergently to the hospital.  She did not respond to Narcan.  En route she became bradycardic requiring epinephrine and CPR.  In the ED she was intubated, and was actively vomiting during the intubation.  She was admitted to the ICU with consultation from neurology    Unfortunately the patient did not do well.  She was initially treated with hypo-thermia protocol, and began rewarming on the 25th.  She developed some seizure-like activity on August 27, and levetiracetam was initiated.  She was also treated empirically for aspiration pneumonia.  EEG was performed on several days to assess her for nonconvulsive status, and she was noted to be in status.  Unfortunately her seizures were very difficult to control despite aggressive efforts by the neurology team.  She had a tracheostomy done on September 9 and a PEG tube on September 11.    On September 25, a conference was entertained with neurology and the family.  He was opinion of the attending neurologist that the patient  had had a devastating anoxic injury which had resulted in her status epilepticus, and that her neurologic prognosis was grim.  At this point the family decided to progress to comfort care, which was initiated.  She went on to pass quietly on the morning of September 28 at 6:11.    Death Date: 09/28/20   Death Time: 0611         Pronounced By (RN1): Juliette Rose  Pronounced By (RN2): Sheryl Song

## 2020-09-28 NOTE — CARE PLAN
Problem: Psychosocial needs  Goal: Anxiety reduction  Outcome: PROGRESSING AS EXPECTED  Pt on Ativan drip for anxiety with good results, patient calm.  Goal: Privacy for patient and family  Outcome: PROGRESSING AS EXPECTED  Pt in private room on cardiac unit.     Problem: Skin Integrity  Goal: Skin Integrity is maintained  Description: Waffle cushion in use  Outcome: PROGRESSING AS EXPECTED  Pt on waffle mattress, mepilex in place over bony prominences, turned every 2-3  hours.       Problem: Pain  Goal: Alleviation of Pain or a reduction in pain to the patient's comfort goal  Description: Dilaudid and Ativan gtts used for pain management and seizure control  Outcome: PROGRESSING AS EXPECTED  Pt on dilaudid drip for pain control with CPOT=0.     Problem: Self Care Deficit  Goal: Oral Hygiene  Outcome: PROGRESSING AS EXPECTED  Oral care provided every 4 hours by nursing staff with Ty oral care kits.     Problem: Elimination  Goal: Urinary elimination support  Outcome: PROGRESSING AS EXPECTED  Pearce catheter in place per comfort care orders.     Problem: Psychosocial needs  Goal: Spiritual needs incorporated in hospitalization  Outcome: PROGRESSING SLOWER THAN EXPECTED  Pt family and friends in to visit today and updated on pt condition.  Emotional support provided for them as they cope with prognosis.

## 2020-09-30 LAB
FUNGUS SPEC CULT: NORMAL
SIGNIFICANT IND 70042: NORMAL
SITE SITE: NORMAL
SOURCE SOURCE: NORMAL

## 2020-10-07 LAB
FUNGUS SPEC CULT: NORMAL
SIGNIFICANT IND 70042: NORMAL
SITE SITE: NORMAL
SOURCE SOURCE: NORMAL

## 2022-02-01 NOTE — PROGRESS NOTES
"Critical Care Progress Note    Date of admission  2020    Chief Complaint  19 y.o. female admitted 2020 with drug overdose, cardiac arrest    Hospital Course    \"19 y.o. female who presented 2020 with drug overdose with methamphetamines, oxycodone and found by boyfriend.  Cardiac arrest enroute to hospital and received narcan, epinephrine and cpr and ROSC achieved.  Difficult airway at scene and LMA placed. ET tube placed in ER.  She was vomiting during airway attempt.  On propofol due to vent asynchrony.  Propofol turned off for neuro assessment and possible hypothermic protocol.  Family no longer at hospital and unable to reach per phone number given for mother.\"     - TTM protocol initiated, EEG without NCSE   - rewarmed, awake, not following   - following occasional commands  - seizure like activity vs shivering--> Keppra, versed, Propofol      Interval Problem Update  Reviewed last 24 hour events:   - MRI -mild anoxic brain injury affecting bilateral basal ganglia.   - Neuro: Deeply sedated after seizure-like activity versus shivering last night. Opens eyes but not following.   - HR: 60s-80s   - SBP: 120-170s   - GI: TF at goal, BM PTA   - UOP: 1.5 L   - Pearce: yes   - Tm: 38.2, Normothermia protocol initiated.   - Lines: CVC, Art, PIV   - PPx: GI pepcid, DVT lovenox   - AB.34/   - ABX 5 days prophylaxis completed     Infusions:  Propofol    7.    Review of Systems  Review of Systems   Unable to perform ROS: Acuity of condition        Vital Signs for last 24 hours   Temp:  [37.1 °C (98.8 °F)-37.6 °C (99.7 °F)] 37.6 °C (99.7 °F)  Pulse:  [] 89  Resp:  [15-30] 26  BP: ()/(42-88) 125/73  SpO2:  [97 %-100 %] 98 %    Hemodynamic parameters for last 24 hours  CVP:  [1 MM HG-300 MM HG] 300 MM HG    Respiratory Information for the last 24 hours  Vent Mode: APVCMV  Rate (breaths/min): 26  Vt Target (mL): 310  PEEP/CPAP: 8  MAP: 11  Control VTE (exp VT): " 394    Physical Exam   Physical Exam  Vitals signs and nursing note reviewed.   Constitutional:       General: She is not in acute distress.     Appearance: She is not ill-appearing or toxic-appearing.      Interventions: She is intubated.   HENT:      Head: Normocephalic and atraumatic.      Right Ear: External ear normal.      Left Ear: External ear normal.      Nose: No congestion or rhinorrhea.      Comments: Feeding tube in place     Mouth/Throat:      Mouth: Mucous membranes are dry.      Pharynx: No oropharyngeal exudate or posterior oropharyngeal erythema.      Comments: ET tube in place  Eyes:      General: No scleral icterus.     Conjunctiva/sclera: Conjunctivae normal.      Pupils: Pupils are equal, round, and reactive to light.      Comments: Gaze normalized   Neck:      Musculoskeletal: Neck supple. No neck rigidity or muscular tenderness.   Cardiovascular:      Rate and Rhythm: Normal rate and regular rhythm.      Pulses: Normal pulses.      Heart sounds: Normal heart sounds. No murmur.   Pulmonary:      Effort: She is intubated.      Breath sounds: Rhonchi present. No wheezing or rales.   Chest:       Abdominal:      General: Abdomen is flat. There is no distension.      Palpations: There is no mass.      Tenderness: There is no abdominal tenderness.   Musculoskeletal:         General: No swelling or tenderness.      Right lower leg: No edema.      Left lower leg: No edema.   Skin:     General: Skin is warm and dry.      Capillary Refill: Capillary refill takes less than 2 seconds.      Findings: No erythema or rash.   Neurological:      GCS: GCS eye subscore is 3. GCS verbal subscore is 1. GCS motor subscore is 5.      Cranial Nerves: No cranial nerve deficit or facial asymmetry.      Motor: Abnormal muscle tone present.      Comments: Deeply sedated. RASS-4 for seizure like activity vs seizures.  Pupils are constricted consistent with propofol infusion.  No response to verbal or painful stimuli.   Intermittent fine tremor noted at the hands and feet.   Psychiatric:      Comments: Unable to assess         Medications  Current Facility-Administered Medications   Medication Dose Route Frequency Provider Last Rate Last Dose   • labetalol (NORMODYNE/TRANDATE) injection 10 mg  10 mg Intravenous Q4HRS PRN Yosef Tomlin M.D.   10 mg at 08/28/20 0131   • midazolam (VERSED) 2 MG/2ML injection 2-4 mg  2-4 mg Intravenous Q15 MIN PRN Yosef Tomlin M.D.   2 mg at 08/28/20 0854   • Pharmacy Consult: pharmacy to discontinue all other orders for acetaminophen   Other PHARMACY TO DOSE Boogie Stokes M.D.       • acetaminophen (TYLENOL) tablet 1,000 mg  1,000 mg Oral Q6HRS Boogie Stokes M.D.   1,000 mg at 08/28/20 1139   • busPIRone (BUSPAR) tablet 15 mg  15 mg Oral BID Boogie Stokes M.D.   15 mg at 08/28/20 1040   • meperidine (DEMEROL) injection 25 mg  25 mg Intravenous Q HOUR PRN Boogie Stokes M.D.   25 mg at 08/28/20 1232   • fentaNYL (SUBLIMAZE) injection 100 mcg  100 mcg Intravenous Q HOUR PRN Boogie Stokes M.D.       • fentaNYL (SUBLIMAZE) 50 mcg/mL in 50mL (Continuous Infusion)   Intravenous Continuous Boogie Stokes M.D. 1 mL/hr at 08/28/20 1235 50 mcg/hr at 08/28/20 1235   • midazolam (VERSED) 2 MG/2ML injection 2-4 mg  2-4 mg Intravenous Once PRN Boogie Augustine Jr. D.O.       • levETIRAcetam (Keppra) 500 mg in 100 mL NaCl IV premix  500 mg Intravenous Q12HRS Jeremy M Gonda, M.D.   Stopped at 08/28/20 1120   • fentaNYL (SUBLIMAZE) injection 50 mcg  50 mcg Intravenous Q15 MIN PRN Boogie Augustine Jr., D.OYue        And   • fentaNYL (SUBLIMAZE) injection 100 mcg  100 mcg Intravenous Q15 MIN PRN Boogie Augustine Jr. D.OYue   100 mcg at 08/28/20 0456    And   • fentaNYL (SUBLIMAZE) 50 mcg/mL in 50mL (Continuous Infusion)   Intravenous Continuous Boogie Augustine Jr., D.O.   Stopped at 08/27/20 2040    And   • propofol (DIPRIVAN) injection  0-80 mcg/kg/min Intravenous Continuous Boogie Augustine Jr. D.O. 36.8  Satisfactory mL/hr at 08/28/20 1220 80 mcg/kg/min at 08/28/20 1220   • metroNIDAZOLE (FLAGYL) tablet 500 mg  500 mg Enteral Tube Q8HRS Boogie Augustine Jr., D.O.   500 mg at 08/28/20 1412   • Pharmacy Consult: Enteral tube insertion - review meds/change route/product selection  1 Each Other PHARMACY TO DOSE Boogie Augustine Jr., D.O.       • Respiratory Therapy Consult   Nebulization Continuous RT Brock Murdock M.D.       • ipratropium-albuterol (DUONEB) nebulizer solution  3 mL Nebulization Q2HRS PRN (RT) Brock Murdock M.D.       • famotidine (PEPCID) tablet 20 mg  20 mg Enteral Tube Q12HRS Brock Murdock M.D.   20 mg at 08/28/20 0451    Or   • famotidine (PEPCID) injection 20 mg  20 mg Intravenous Q12HRS Brock Murdock M.D.   20 mg at 08/27/20 1758   • senna-docusate (PERICOLACE or SENOKOT S) 8.6-50 MG per tablet 2 Tab  2 Tab Enteral Tube BID Brock Murdock M.D.   2 Tab at 08/28/20 0451    And   • polyethylene glycol/lytes (MIRALAX) PACKET 1 Packet  1 Packet Enteral Tube QDAY PRN Brock Murdock M.D.        And   • magnesium hydroxide (MILK OF MAGNESIA) suspension 30 mL  30 mL Enteral Tube QDAY PRN Brock Murdock M.D.        And   • bisacodyl (DULCOLAX) suppository 10 mg  10 mg Rectal QDAY PRN Brock Murdock M.D.       • MD Alert...ICU Electrolyte Replacement per Pharmacy   Other PHARMACY TO DOSE Brock Murdock M.D.       • lidocaine (XYLOCAINE) 1 % injection 1-2 mL  1-2 mL Tracheal Tube Q30 MIN PRN Brock Murdock M.D.       • hydrALAZINE (APRESOLINE) injection 10-20 mg  10-20 mg Intravenous Q6HRS PRN Brock Murdock M.D.       • enoxaparin (LOVENOX) inj 40 mg  40 mg Subcutaneous DAILY Boogie Augustine Jr., D.O.   40 mg at 08/28/20 0450       Fluids    Intake/Output Summary (Last 24 hours) at 8/28/2020 1420  Last data filed at 8/28/2020 1200  Gross per 24 hour   Intake 1563.74 ml   Output 2225 ml   Net -661.26 ml       Laboratory  Recent Labs     08/26/20  0311 08/27/20  0444 08/28/20  0438   ISTATAPH 7.419 7.320* 7.347*      ISTATAPCO2 24.3* 38.1* 40.6*   ISTATAPO2 84 89* 84   ISTATATCO2 16* 21 23   [id number] 97 96 96   ISTATARTHCO3 15.7* 19.6 22.3   ISTATARTBE -7* -6* -3   ISTATTEMP 97.8 F 36.4 C 37.9 C   ISTATFIO2 30 30 30   ISTATSPEC Arterial Arterial Arterial   ISTATAPHTC 7.426 7.329* 7.334*   [id number] 82 86 89*         Recent Labs     08/26/20  0358 08/27/20  0547 08/28/20  0435   SODIUM 138 137 138   POTASSIUM 3.7 4.2 4.4   CHLORIDE 111 105 108   CO2 16* 20 21   BUN 7* 13 12   CREATININE 0.48* 0.56 0.51   MAGNESIUM 2.0 1.6 1.8   PHOSPHORUS 2.4* 4.5  --    CALCIUM 7.9* 8.5 8.7     Recent Labs     08/26/20  0358 08/27/20  0547 08/28/20  0435   ALTSGPT 50 63* 82*   ASTSGOT 44 67* 75*   ALKPHOSPHAT 71 98 94   TBILIRUBIN 0.4 0.3 0.2   PREALBUMIN 14.7*  --   --    GLUCOSE 113* 101* 104*     Recent Labs     08/26/20 0358 08/27/20  0527 08/27/20  0547 08/28/20  0435   WBC 7.9 8.4  --  9.4   NEUTSPOLYS 77.90* 71.00  --  70.50   LYMPHOCYTES 14.60* 17.30*  --  15.40*   MONOCYTES 5.10 8.00  --  11.20   EOSINOPHILS 1.80 1.90  --  1.60   BASOPHILS 0.30 0.40  --  0.40   ASTSGOT 44  --  67* 75*   ALTSGPT 50  --  63* 82*   ALKPHOSPHAT 71  --  98 94   TBILIRUBIN 0.4  --  0.3 0.2     Recent Labs     08/25/20  1600 08/25/20  2120 08/26/20  0358 08/27/20  0527 08/28/20  0435   RBC  --   --  3.83* 4.24 4.22   HEMOGLOBIN  --   --  12.1 13.3 13.2   HEMATOCRIT  --   --  35.6* 40.6 40.5   PLATELETCT  --   --  193 247 257   PROTHROMBTM 14.1 14.0  --   --   --    APTT 47.2* 45.0*  --   --   --    INR 1.06 1.05  --   --   --        Imaging  X-Ray:  I have personally reviewed the images and compared with prior images. and My impression is: Endotracheal tube is 2 cm above the may.  There is a left subclavian central venous catheter with the tip near the cavoatrial junction.  Mild pulmonary edema is unchanged.  Cannot exclude underlying infiltrate.  MRI:   Reviewed   IMPRESSION:     The diffusion-weighted sequences demonstrates areas of restricted  diffusion in the bilateral basal ganglia and some of the frontal gray matter. The predominant portion of the brain parenchyma does not demonstrate any restricted diffusion. Therefore this   findings likely represent mild diffuse anoxic brain injury.    Assessment/Plan  Drug overdose- (present on admission)  Assessment & Plan  Methamphetamines, oxycodone and EtOH per boyfriend  Counseling when extubated    Metabolic acidosis- (present on admission)  Assessment & Plan  Resolving, secondary to cardiac arrest      Acute respiratory failure with hypoxia (HCC)- (present on admission)  Assessment & Plan  Secondary to drug overdose  Lung protective ventilation strategies  Titrate ventilator prescription to optimize oxygenation, ventilation, and acid base balance.        Cardiac arrest (HCC)- (present on admission)  Assessment & Plan  Pulseless electrical activity in ambulance while en route to hospital            PEA (Pulseless electrical activity) (HCC)- (present on admission)  Assessment & Plan  PEA enroute to hospital  Hypoxia and aspiration contributory  MRI reveals bilateral basal ganglial anoxic injury.  Neuron-specific enolase sent for analysis.  Maintain normothermia  Maintain euglycemia  PCO2 35-40    Hypoglycemia- (present on admission)  Assessment & Plan  Possibly due to mild shock liver  Continue to monitor serum blood glucose    Encephalopathy acute- (present on admission)  Assessment & Plan  Anoxic encephalopathy  Initial EEG without an NCSE  Seizure-like activity last night, EEG pending  Serial neurologic exams  NSE sent      Elevated transaminase level- (present on admission)  Assessment & Plan  Likely due to ischemic hepatitis, improving today  Monitor synthetic functions including INR and glucose  Avoid hepatotoxins  Initially hypoglycemic, now titrated off of dextrose    Cerebral edema (HCC)- (present on admission)  Assessment & Plan  Mild/possible per radiologist on CT  MRI on 8/27/2020 without edema or  severe changes       VTE:  Lovenox  Ulcer: H2 Antagonist  Lines: Central Line  Ongoing indication addressed, Arterial Line  Ongoing indication addressed and Pearce Catheter  Ongoing indication addressed    I have performed a physical exam and reviewed and updated ROS and Plan today (8/28/2020). In review of yesterday's note (8/27/2020), there are no changes except as documented above.     Titrating ventilator, sedatives and evaluating post-arrest encephalopathy. This patient is critically ill, at high risk for decompensation leading to worsening vital organ dysfunction and death without critical care interventions.    Discussed patient condition and risk of morbidity and/or mortality with RN, RT, Pharmacy, Dietary, , Code status disscussed, Charge nurse / hot rounds and neurology       The patient remains critically ill.  I have assessed and reassessed the respiratory status and made ventilator adjustments based upon arterial blood gas analysis, ventilator waveforms and airway mechanics.  I have assessed and reassessed the blood pressure, hemodynamics, cardiovascular status. This patient remains at high risk for worsening cardiopulmonary dysfunction and death without the above critical care interventions.      Critical care time 50 minutes in directly providing and coordinating critical care and extensive data review.  No time overlap and excludes procedures.

## 2023-03-05 NOTE — PROGRESS NOTES
Monitor Summary:     SR/ST HR 's  .14/.08/.36    12 hour cc complete   Weekend Coverage:  SW received consult for \"pt has insurance in [place] worried about costs of hospitalization.\" POWER sent secure chat to floor SW to follow up with financial advocates on Monday as they are not onsite on weekends.

## 2025-01-23 NOTE — ASSESSMENT & PLAN NOTE
1 out of 2 blood cultures positive for coag negative Staphylococcus on 9/17 - contaminant  UA unremarkable  Sputum with Pseudomonas  Fever has resolved after Fortaz started   Other

## 2025-05-23 NOTE — PROCEDURES
No change VIDEO ELECTROENCEPHALOGRAM REPORT        Referring provider: Dr. Red.      DOS: 9/4/2020 (total recording of 23 hours and 13 minutes).      INDICATION:  Annika He 19 y.o. female presenting with s/p cardiac arrest, altered mental status, seizures.      CURRENT ANTIEPILEPTIC REGIMEN: Levetiracetam 1000 mg bid, Lacosamide 200 mg bid, and Valproic Acid 500 mg bid. Sedation added with Versed.      TECHNIQUE: 30 channel video electroencephalogram (EEG) was performed in accordance with the international 10-20 system. The study was reviewed in bipolar and referential montages. The recording examined a comatose, then sedated patient.      DESCRIPTION OF THE RECORD:  Generalized delta / theta activity. With the use of sedation, slow wave sleep elements are noted, including sleep spindles. Frequent runs of Frontal Intermittent Rhythmic Delta Activity (FIRDA), and frequent generalized sharps with frontal maximum and triphasic morphology. Several seizures captured during the first several hours of the study, some lasting over 5 minutes to suggest non convulsive status epilepticus. No additional seizures or status since sedation added, but eeg remains abnormal with frequent sharps and intermittently FIRDA. The patient is not burst suppressed.      ACTIVATION PROCEDURES:   Not performed.      EKG: sampling of the EKG recording demonstrated sinus rhythm.      EVENTS:  See above.      INTERPRETATION:  This is an abnormal 24 hrs video EEG recording in a comatose patient. A moderate encephalopathy is suggested. Frequent runs of Frontal Intermittent Rhythmic Delta Activity (FIRDA), and frequent generalized sharps with frontal maximum and triphasic morphology. Several seizures captured during the first several hours of the study, some lasting over 5 minutes to suggest non convulsive status epilepticus. No additional seizures or status epilepticus since sedation added, but eeg remains abnormal with frequent sharps and  intermittently FIRDA. The patient is not burst suppressed. The findings suggest underlying areas of increased cortical irritability and structural abnormality. The patient remains at risk for further seizures. Clinical and radiological correlation is recommended.     Updates provided to Dr. Red and Dr. Gonzales.         Garland Beatty MD   Epilepsy and Neurodiagnostics.   Clinical  of Neurology [facility].   Diplomate in Neurology, Epilepsy, and Electrodiagnostic Medicine.   Office: [phone]  Fax: [phone]

## (undated) DEVICE — GLOVE BIOGEL SZ 8 SURGICAL PF LTX - (50PR/BX 4BX/CA)

## (undated) DEVICE — SENSOR SPO2 NEO LNCS ADHESIVE (20/BX) SEE USER NOTES

## (undated) DEVICE — MASK ANESTHESIA ADULT  - (100/CA)

## (undated) DEVICE — SLEEVE, VASO, THIGH, MED

## (undated) DEVICE — SUCTION INSTRUMENT YANKAUER BULBOUS TIP W/O VENT (50EA/CA)

## (undated) DEVICE — TROCAR 5X100 NON BLADED Z-TH - READ KII (6/BX)

## (undated) DEVICE — MEDICINE CUP STERILE 2 OZ - (100/CA)

## (undated) DEVICE — SYRINGE 50ML CATHETER TIP (40EA/BX 4BX/CA)

## (undated) DEVICE — CHLORAPREP 26 ML APPLICATOR - ORANGE TINT(25/CA)

## (undated) DEVICE — GLOVE BIOGEL PI INDICATOR SZ 6.5 SURGICAL PF LF - (50/BX 4BX/CA)

## (undated) DEVICE — HEAD HOLDER JUNIOR/ADULT

## (undated) DEVICE — LACTATED RINGERS INJ 1000 ML - (14EA/CA 60CA/PF)

## (undated) DEVICE — PROTECTOR ULNA NERVE - (36PR/CA)

## (undated) DEVICE — PACK LAP CHOLE OR - (2EA/CA)

## (undated) DEVICE — GLOVE BIOGEL PI INDICATOR SZ 7.0 SURGICAL PF LF - (50/BX 4BX/CA)

## (undated) DEVICE — CANISTER SUCTION 3000ML MECHANICAL FILTER AUTO SHUTOFF MEDI-VAC NONSTERILE LF DISP  (40EA/CA)

## (undated) DEVICE — CANNULA W/SEAL 5X100 Z-THRE - ADED KII (12/BX)

## (undated) DEVICE — JELLY SURGILUBE STERILE TUBE 4.25 OZ (1/EA)

## (undated) DEVICE — DRESSING TRANSPARENT FILM TEGADERM 4 X 4.75" (50EA/BX)"

## (undated) DEVICE — ELECTRODE 850 FOAM ADHESIVE - HYDROGEL RADIOTRNSPRNT (50/PK)

## (undated) DEVICE — SET TUBING PNEUMOCLEAR HIGH FLOW SMOKE EVACUATION (10EA/BX)

## (undated) DEVICE — SODIUM CHL IRRIGATION 0.9% 1000ML (12EA/CA)

## (undated) DEVICE — KIT 18FR INITIAL PLACEMENT - (1/CA)

## (undated) DEVICE — NEPTUNE 4 PORT MANIFOLD - (20/PK)

## (undated) DEVICE — SET LEADWIRE 5 LEAD BEDSIDE DISPOSABLE ECG (1SET OF 5/EA)

## (undated) DEVICE — SPONGE DRAIN 4 X 4IN 6-PLY - (2/PK25PK/BX12BX/CS)

## (undated) DEVICE — GLOVE BIOGEL PI ORTHO SZ 8 PF LF (40PR/BX)

## (undated) DEVICE — Device

## (undated) DEVICE — SUTURE 0 VICRYL PLUS UR-6 - 27 INCH (36/BX)

## (undated) DEVICE — GOWN WARMING STANDARD FLEX - (30/CA)

## (undated) DEVICE — ELECTRODE DUAL RETURN W/ CORD - (50/PK)

## (undated) DEVICE — SUTURE GENERAL

## (undated) DEVICE — SET EXTENSION WITH 2 PORTS (48EA/CA) ***PART #2C8610 IS A SUBSTITUTE*****

## (undated) DEVICE — BLADE SURGICAL #15 - (50/BX 3BX/CA)

## (undated) DEVICE — DRESSING TRANSPARENT FILM TEGADERM 2.375 X 2.75"  (100EA/BX)"

## (undated) DEVICE — KIT ANESTHESIA W/CIRCUIT & 3/LT BAG W/FILTER (20EA/CA)

## (undated) DEVICE — TROCAR 5X100 SEPARTATOR ADV - FIXATION (6/BX)

## (undated) DEVICE — GLOVE SZ 7 BIOGEL PI MICRO - PF LF (50PR/BX 4BX/CA)

## (undated) DEVICE — TUBING CLEARLINK DUO-VENT - C-FLO (48EA/CA)

## (undated) DEVICE — SUTURE 4-0 MONOCRYL PLUS PS-2 - 27 INCH (36/BX)